# Patient Record
Sex: MALE | Race: WHITE | Employment: OTHER | URBAN - METROPOLITAN AREA
[De-identification: names, ages, dates, MRNs, and addresses within clinical notes are randomized per-mention and may not be internally consistent; named-entity substitution may affect disease eponyms.]

---

## 2020-07-03 ENCOUNTER — APPOINTMENT (OUTPATIENT)
Dept: GENERAL RADIOLOGY | Age: 50
DRG: 917 | End: 2020-07-03
Payer: MEDICARE

## 2020-07-03 ENCOUNTER — HOSPITAL ENCOUNTER (INPATIENT)
Age: 50
LOS: 6 days | Discharge: LEFT BEFORE DISCHARGE | DRG: 917 | End: 2020-07-09
Attending: EMERGENCY MEDICINE | Admitting: HOSPITALIST
Payer: MEDICARE

## 2020-07-03 PROBLEM — R41.82 AMS (ALTERED MENTAL STATUS): Status: ACTIVE | Noted: 2020-07-03

## 2020-07-03 LAB
A/G RATIO: 1.3 (ref 1.1–2.2)
ALBUMIN SERPL-MCNC: 4.1 G/DL (ref 3.4–5)
ALP BLD-CCNC: 114 U/L (ref 40–129)
ALT SERPL-CCNC: 21 U/L (ref 10–40)
AMPHETAMINE SCREEN, URINE: POSITIVE
ANION GAP SERPL CALCULATED.3IONS-SCNC: 11 MMOL/L (ref 3–16)
AST SERPL-CCNC: 32 U/L (ref 15–37)
BACTERIA: ABNORMAL /HPF
BARBITURATE SCREEN URINE: ABNORMAL
BASE EXCESS VENOUS: -2.7 MMOL/L (ref -3–3)
BASE EXCESS VENOUS: 1.5 MMOL/L (ref -3–3)
BASOPHILS ABSOLUTE: 0 K/UL (ref 0–0.2)
BASOPHILS RELATIVE PERCENT: 0.2 %
BENZODIAZEPINE SCREEN, URINE: ABNORMAL
BILIRUB SERPL-MCNC: 0.8 MG/DL (ref 0–1)
BILIRUBIN URINE: NEGATIVE
BLOOD, URINE: ABNORMAL
BUN BLDV-MCNC: 16 MG/DL (ref 7–20)
CALCIUM SERPL-MCNC: 8.6 MG/DL (ref 8.3–10.6)
CANNABINOID SCREEN URINE: ABNORMAL
CARBOXYHEMOGLOBIN: 1.7 % (ref 0–1.5)
CARBOXYHEMOGLOBIN: 2.3 % (ref 0–1.5)
CHLORIDE BLD-SCNC: 101 MMOL/L (ref 99–110)
CLARITY: CLEAR
CO2: 27 MMOL/L (ref 21–32)
COCAINE METABOLITE SCREEN URINE: ABNORMAL
COLOR: YELLOW
CREAT SERPL-MCNC: 0.8 MG/DL (ref 0.9–1.3)
EOSINOPHILS ABSOLUTE: 0 K/UL (ref 0–0.6)
EOSINOPHILS RELATIVE PERCENT: 0.1 %
GFR AFRICAN AMERICAN: >60
GFR NON-AFRICAN AMERICAN: >60
GLOBULIN: 3.2 G/DL
GLUCOSE BLD-MCNC: 105 MG/DL (ref 70–99)
GLUCOSE URINE: NEGATIVE MG/DL
HCO3 VENOUS: 23.8 MMOL/L (ref 23–29)
HCO3 VENOUS: 28.9 MMOL/L (ref 23–29)
HCT VFR BLD CALC: 40.1 % (ref 40.5–52.5)
HEMOGLOBIN: 13 G/DL (ref 13.5–17.5)
KETONES, URINE: NEGATIVE MG/DL
LEUKOCYTE ESTERASE, URINE: NEGATIVE
LYMPHOCYTES ABSOLUTE: 0.2 K/UL (ref 1–5.1)
LYMPHOCYTES RELATIVE PERCENT: 1.6 %
Lab: ABNORMAL
MCH RBC QN AUTO: 28.6 PG (ref 26–34)
MCHC RBC AUTO-ENTMCNC: 32.3 G/DL (ref 31–36)
MCV RBC AUTO: 88.5 FL (ref 80–100)
METHADONE SCREEN, URINE: ABNORMAL
METHEMOGLOBIN VENOUS: 0.3 %
METHEMOGLOBIN VENOUS: 0.3 %
MICROSCOPIC EXAMINATION: YES
MONOCYTES ABSOLUTE: 0.2 K/UL (ref 0–1.3)
MONOCYTES RELATIVE PERCENT: 1.2 %
MUCUS: ABNORMAL /LPF
NEUTROPHILS ABSOLUTE: 14.6 K/UL (ref 1.7–7.7)
NEUTROPHILS RELATIVE PERCENT: 96.9 %
NITRITE, URINE: NEGATIVE
O2 CONTENT, VEN: 13 VOL %
O2 CONTENT, VEN: 17 VOL %
O2 SAT, VEN: 79 %
O2 SAT, VEN: 88 %
O2 THERAPY: ABNORMAL
O2 THERAPY: ABNORMAL
OPIATE SCREEN URINE: ABNORMAL
OXYCODONE URINE: ABNORMAL
PCO2, VEN: 48.4 MMHG (ref 40–50)
PCO2, VEN: 57.6 MMHG (ref 40–50)
PDW BLD-RTO: 14 % (ref 12.4–15.4)
PH UA: 5
PH UA: 5 (ref 5–8)
PH VENOUS: 7.31 (ref 7.35–7.45)
PH VENOUS: 7.32 (ref 7.35–7.45)
PHENCYCLIDINE SCREEN URINE: ABNORMAL
PLATELET # BLD: 283 K/UL (ref 135–450)
PMV BLD AUTO: 6.8 FL (ref 5–10.5)
PO2, VEN: 47.2 MMHG (ref 25–40)
PO2, VEN: 59.1 MMHG (ref 25–40)
POTASSIUM REFLEX MAGNESIUM: 4.3 MMOL/L (ref 3.5–5.1)
PRO-BNP: 512 PG/ML (ref 0–124)
PROPOXYPHENE SCREEN: ABNORMAL
PROTEIN UA: 30 MG/DL
RBC # BLD: 4.53 M/UL (ref 4.2–5.9)
RBC UA: ABNORMAL /HPF (ref 0–4)
SODIUM BLD-SCNC: 139 MMOL/L (ref 136–145)
SPECIFIC GRAVITY UA: >=1.03 (ref 1–1.03)
TCO2 CALC VENOUS: 25 MMOL/L
TCO2 CALC VENOUS: 31 MMOL/L
TOTAL PROTEIN: 7.3 G/DL (ref 6.4–8.2)
TROPONIN: <0.01 NG/ML
URINE REFLEX TO CULTURE: YES
URINE TYPE: ABNORMAL
UROBILINOGEN, URINE: 1 E.U./DL
WBC # BLD: 15.1 K/UL (ref 4–11)
WBC UA: ABNORMAL /HPF (ref 0–5)

## 2020-07-03 PROCEDURE — 2500000003 HC RX 250 WO HCPCS

## 2020-07-03 PROCEDURE — 93005 ELECTROCARDIOGRAM TRACING: CPT | Performed by: PHYSICIAN ASSISTANT

## 2020-07-03 PROCEDURE — 80307 DRUG TEST PRSMV CHEM ANLYZR: CPT

## 2020-07-03 PROCEDURE — 85025 COMPLETE CBC W/AUTO DIFF WBC: CPT

## 2020-07-03 PROCEDURE — 2000000000 HC ICU R&B

## 2020-07-03 PROCEDURE — 6360000002 HC RX W HCPCS: Performed by: EMERGENCY MEDICINE

## 2020-07-03 PROCEDURE — 2580000003 HC RX 258: Performed by: PHYSICIAN ASSISTANT

## 2020-07-03 PROCEDURE — 6360000002 HC RX W HCPCS: Performed by: PHYSICIAN ASSISTANT

## 2020-07-03 PROCEDURE — 6360000002 HC RX W HCPCS

## 2020-07-03 PROCEDURE — 82803 BLOOD GASES ANY COMBINATION: CPT

## 2020-07-03 PROCEDURE — 96365 THER/PROPH/DIAG IV INF INIT: CPT

## 2020-07-03 PROCEDURE — 71045 X-RAY EXAM CHEST 1 VIEW: CPT

## 2020-07-03 PROCEDURE — 99291 CRITICAL CARE FIRST HOUR: CPT | Performed by: INTERNAL MEDICINE

## 2020-07-03 PROCEDURE — 84484 ASSAY OF TROPONIN QUANT: CPT

## 2020-07-03 PROCEDURE — 99285 EMERGENCY DEPT VISIT HI MDM: CPT

## 2020-07-03 PROCEDURE — 96375 TX/PRO/DX INJ NEW DRUG ADDON: CPT

## 2020-07-03 PROCEDURE — 96372 THER/PROPH/DIAG INJ SC/IM: CPT

## 2020-07-03 PROCEDURE — 87086 URINE CULTURE/COLONY COUNT: CPT

## 2020-07-03 PROCEDURE — 80053 COMPREHEN METABOLIC PANEL: CPT

## 2020-07-03 PROCEDURE — 83880 ASSAY OF NATRIURETIC PEPTIDE: CPT

## 2020-07-03 PROCEDURE — 81001 URINALYSIS AUTO W/SCOPE: CPT

## 2020-07-03 RX ORDER — HALOPERIDOL 5 MG/ML
5 INJECTION INTRAMUSCULAR ONCE
Status: COMPLETED | OUTPATIENT
Start: 2020-07-03 | End: 2020-07-03

## 2020-07-03 RX ORDER — SODIUM CHLORIDE 0.9 % (FLUSH) 0.9 %
10 SYRINGE (ML) INJECTION EVERY 12 HOURS SCHEDULED
Status: DISCONTINUED | OUTPATIENT
Start: 2020-07-03 | End: 2020-07-04

## 2020-07-03 RX ORDER — 0.9 % SODIUM CHLORIDE 0.9 %
2000 INTRAVENOUS SOLUTION INTRAVENOUS ONCE
Status: COMPLETED | OUTPATIENT
Start: 2020-07-03 | End: 2020-07-03

## 2020-07-03 RX ORDER — DIPHENHYDRAMINE HYDROCHLORIDE 50 MG/ML
25 INJECTION INTRAMUSCULAR; INTRAVENOUS ONCE
Status: COMPLETED | OUTPATIENT
Start: 2020-07-03 | End: 2020-07-03

## 2020-07-03 RX ORDER — LORAZEPAM 2 MG/ML
2 INJECTION INTRAMUSCULAR ONCE
Status: COMPLETED | OUTPATIENT
Start: 2020-07-03 | End: 2020-07-03

## 2020-07-03 RX ORDER — DIPHENHYDRAMINE HYDROCHLORIDE 50 MG/ML
INJECTION INTRAMUSCULAR; INTRAVENOUS
Status: COMPLETED
Start: 2020-07-03 | End: 2020-07-03

## 2020-07-03 RX ORDER — SODIUM CHLORIDE 0.9 % (FLUSH) 0.9 %
10 SYRINGE (ML) INJECTION PRN
Status: DISCONTINUED | OUTPATIENT
Start: 2020-07-03 | End: 2020-07-04

## 2020-07-03 RX ADMIN — HALOPERIDOL LACTATE 5 MG: 5 INJECTION, SOLUTION INTRAMUSCULAR at 14:11

## 2020-07-03 RX ADMIN — CEFTRIAXONE SODIUM 1 G: 1 INJECTION, POWDER, FOR SOLUTION INTRAMUSCULAR; INTRAVENOUS at 17:21

## 2020-07-03 RX ADMIN — DIPHENHYDRAMINE HYDROCHLORIDE 25 MG: 50 INJECTION, SOLUTION INTRAMUSCULAR; INTRAVENOUS at 14:11

## 2020-07-03 RX ADMIN — DIPHENHYDRAMINE HYDROCHLORIDE 25 MG: 50 INJECTION, SOLUTION INTRAMUSCULAR; INTRAVENOUS at 14:00

## 2020-07-03 RX ADMIN — DIPHENHYDRAMINE HYDROCHLORIDE 25 MG: 50 INJECTION INTRAMUSCULAR; INTRAVENOUS at 14:00

## 2020-07-03 RX ADMIN — HALOPERIDOL LACTATE 5 MG: 5 INJECTION, SOLUTION INTRAMUSCULAR at 13:50

## 2020-07-03 RX ADMIN — SODIUM CHLORIDE 2000 ML: 9 INJECTION, SOLUTION INTRAVENOUS at 14:29

## 2020-07-03 RX ADMIN — LORAZEPAM 2 MG: 2 INJECTION INTRAMUSCULAR; INTRAVENOUS at 13:50

## 2020-07-03 RX ADMIN — LORAZEPAM 2 MG: 2 INJECTION INTRAMUSCULAR; INTRAVENOUS at 14:11

## 2020-07-03 NOTE — ED NOTES
Patient remains subdue, sleeping with respiration even and easy no s/s of distress or discomfort at present.      Bertha Weinberg RN  07/03/20 5660

## 2020-07-03 NOTE — ED PROVIDER NOTES
[address] ED  EMERGENCY DEPARTMENT ENCOUNTER        Pt Name: Halie Morales  MRN: [id number]  Armstrongf[place]roll 1970  Date of evaluation: 7/3/2020  Provider: Arminda Elizondo PA-C  PCP: No primary care provider on file. I have seen and evaluated this patient with my supervising physician Demetri Moise MD.    CHIEF COMPLAINT       Chief Complaint   Patient presents with    Shortness of Breath     Squad brought patient in with patient posturing to breathe, EMS stated patient found in shed with O2 sat 63% with white powder substance next to patient. patient alert. HISTORY OF PRESENT ILLNESS   (Location, Timing/Onset, Context/Setting, Quality, Duration, Modifying Factors, Severity, Associated Signs and Symptoms)  Note limiting factors. Halie Morales is a 52 y.o. male who presents here to the emergency department, with rapid respirations, severe agitation, tachycardia, the squad report that there was a crystalline substance found near him, and a syringe found near him. He states that he is having some trouble breathing, otherwise all other history is unobtainable. Nursing Notes were all reviewed and agreed with or any disagreements were addressed in the HPI.     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    Pneumonia          SURGICAL HISTORY     Past Surgical History:   Procedure Laterality Date    BRAIN SURGERY  1986    s/p trauma    BRAIN SURGERY      FRACTURE SURGERY      LEG SURGERY           CURRENTMEDICATIONS       Previous Medications    CLONIDINE (CATAPRES) 0.1 Carboxyhemoglobin 2.3 (*)     All other components within normal limits    Narrative:     Performed at:  [facility] [address],  [place], [facility]   Phone [phone]   MICROSCOPIC URINALYSIS - Abnormal; Notable for the following components:    Mucus, UA 2+ (*)     WBC, UA 21-50 (*)     RBC, UA 11-20 (*)     Bacteria, UA 2+ (*)     All other components within normal limits    Narrative:     Performed at:  [facility] - [facility],  [place], [facility]   Phone [phone]   CULTURE, BLOOD 1   CULTURE, BLOOD 2   CULTURE, URINE   TROPONIN    Narrative:     Performed at:  [facility]) - [facility],  [place], [facility]   Phone [phone]   LACTATE, SEPSIS   LACTATE, SEPSIS       All other labs were within normal range or not returned as of this dictation. EKG: All EKG's are interpreted by the Emergency Department Physician in the absence of a cardiologist.  Please see their note for interpretation of EKG. RADIOLOGY:   Non-plain film images such as CT, Ultrasound and MRI are read by the radiologist. Plain radiographic images are visualized and preliminarily interpreted by the ED Provider with the below findings:        Interpretation per the Radiologist below, if available at the time of this note:    XR CHEST PORTABLE   Final Result   Pulmonary vascular congestion. Xr Chest Portable    Result Date: 7/3/2020  EXAMINATION: ONE XRAY VIEW OF THE CHEST 7/3/2020 1:41 pm COMPARISON: July 24, 2014 HISTORY: ORDERING SYSTEM PROVIDED HISTORY: SOB TECHNOLOGIST PROVIDED HISTORY: Reason for exam:->SOB Reason for Exam: SOB Acuity: Unknown Type of Exam: Unknown FINDINGS: Cardiac silhouette is normal in size. No pneumothorax. No pleural effusion. Pulmonary vascular congestion. No focal consolidation. No acute bony abnormality. Deformity of the proximal left humerus, unchanged. Pulmonary vascular congestion. PROCEDURES   Unless otherwise noted below, none     Procedures    CRITICAL CARE TIME   N/A    CONSULTS:  None      EMERGENCY DEPARTMENT COURSE and DIFFERENTIAL DIAGNOSIS/MDM:   Vitals:    Vitals:    07/03/20 1525 07/03/20 1615 07/03/20 1618 07/03/20 1626   BP:  (!) 72/55 (!) 74/55 85/66   Pulse:  105 105 102   Resp:       Temp:       TempSrc:       SpO2:  100% 100% 100%   Weight: 125 lb (56.7 kg)      Height: 5' 2\" (1.575 m)          Patient was given the following medications:  Medications   sodium chloride flush 0.9 % injection 10 mL (has no administration in time range)   sodium chloride flush 0.9 % injection 10 mL (has no administration in time range)   cefTRIAXone (ROCEPHIN) 1 g IVPB in 50 mL D5W minibag (has no administration in time range)   LORazepam (ATIVAN) injection 2 mg (2 mg Intramuscular Given 7/3/20 1350)   haloperidol lactate (HALDOL) injection 5 mg (5 mg Intramuscular Given 7/3/20 1350)   0.9 % sodium chloride bolus (0 mLs Intravenous Stopped 7/3/20 1628)   diphenhydrAMINE (BENADRYL) injection 25 mg (25 mg Intravenous Given 7/3/20 1400)   LORazepam (ATIVAN) injection 2 mg (2 mg Intravenous Given by Other 7/3/20 1411)   haloperidol lactate (HALDOL) injection 5 mg (5 mg Intramuscular Given by Other 7/3/20 1411)   diphenhydrAMINE (BENADRYL) injection 25 mg (25 mg Intravenous Given by Other 7/3/20 1411)           This patient required a significant amount of sedation because of his agitation, our suspicion is that this is related to methamphetamine use/abuse, he does have a concurrent unrelated urinary tract infection. He remained stable here,    He required multiple visits, we entertained intubation however he was able to protect his airway, swallow, and maintain his oxygen saturation rate. Patient was turned over to the oncoming physician for final disposition and plan    FINAL IMPRESSION      1. Methamphetamine abuse ([address]    2.  Urinary tract infection

## 2020-07-03 NOTE — ED PROVIDER NOTES
Emergency Department Provider Note     Location: [place] [address] ED  7/3/2020    I independently performed a history and physical on [employer]. All diagnostic, treatment, and disposition decisions were made by myself in conjunction with the mid-level provider. Briefly, this is a 52 y.o. male here for reported shortness of breath. Patient appeared to be struggling to breathe reportedly patient was found in his shed with O2 saturation 63% with a white powder substance next to patient. Patient was alert upon arrival but was acutely agitated. He was found to have a syringe in his pocket. Patient was sedated. ED Triage Vitals   BP Temp Temp src Pulse Resp SpO2 Height Weight   -- -- -- -- -- -- -- --        Patient resting comfortably in no acute distress. Heart is tachycardic rate and regular rhythm. Lungs clear to auscultation bilaterally. Abdomen is soft, nondistended, and nontender. No peripheral edema noted. Patient seen and examined. Vital signs notable for blood pressure 96/78 with pulse 144, afebrile. Lab work obtained and notable for leukocytosis, pH 7.31, troponin negative. We will continue to observe patient in the emergency department. EKG  The Ekg interpreted by me in the absence of a cardiologist shows. Sinus tachycardia, rate 150, normal intervals, no STEMI      No results found.       Results for orders placed or performed during the hospital encounter of 07/03/20   CBC Auto Differential   Result Value Ref Range    WBC 15.1 (H) 4.0 - 11.0 K/uL    RBC 4.53 4.20 - 5.90 M/uL    Hemoglobin 13.0 (L) 13.5 - 17.5 g/dL    Hematocrit 40.1 (L) 40.5 - 52.5 %    MCV 88.5 80.0 - 100.0 fL    MCH 28.6 26.0 - 34.0 pg    MCHC 32.3 31.0 - 36.0 g/dL    RDW 14.0 12.4 - 15.4 %    Platelets 270 962 - 985 K/uL    MPV 6.8 5.0 - 10.5 fL    Neutrophils % 96.9 %    Lymphocytes % 1.6 %    Monocytes % 1.2 %    Eosinophils % 0.1 %    Basophils % 0.2 %    Neutrophils Absolute 14.6 (H) 1.7 - 7.7 is 15 minutes, which excludes separately billable procedures and updating family. Time spent is specifically for management of the presenting complaint and symptoms initially, direct bedside care, reevaluation, review of records, and consultation. There was a high probability of clinically significant life-threatening deterioration in the patient's condition, which required my urgent intervention. This chart was generated in part by using Dragon Dictation system and may contain errors related to that system including errors in grammar, punctuation, and spelling, as well as words and phrases that may be inappropriate. If there are any questions or concerns please feel free to contact the dictating provider for clarification.            Demetri Mak MD  07/03/20 0097

## 2020-07-04 ENCOUNTER — APPOINTMENT (OUTPATIENT)
Dept: GENERAL RADIOLOGY | Age: 50
DRG: 917 | End: 2020-07-04
Payer: MEDICARE

## 2020-07-04 LAB
ALBUMIN SERPL-MCNC: 2.8 G/DL (ref 3.4–5)
ALP BLD-CCNC: 84 U/L (ref 40–129)
ALT SERPL-CCNC: 19 U/L (ref 10–40)
ANION GAP SERPL CALCULATED.3IONS-SCNC: 11 MMOL/L (ref 3–16)
AST SERPL-CCNC: 33 U/L (ref 15–37)
BASE EXCESS ARTERIAL: -1.3 MMOL/L (ref -3–3)
BASOPHILS ABSOLUTE: 0 K/UL (ref 0–0.2)
BASOPHILS RELATIVE PERCENT: 0.4 %
BILIRUB SERPL-MCNC: 0.5 MG/DL (ref 0–1)
BILIRUBIN DIRECT: <0.2 MG/DL (ref 0–0.3)
BILIRUBIN, INDIRECT: ABNORMAL MG/DL (ref 0–1)
BUN BLDV-MCNC: 12 MG/DL (ref 7–20)
CALCIUM SERPL-MCNC: 8.2 MG/DL (ref 8.3–10.6)
CARBOXYHEMOGLOBIN ARTERIAL: 0.5 % (ref 0–1.5)
CHLORIDE BLD-SCNC: 103 MMOL/L (ref 99–110)
CO2: 19 MMOL/L (ref 21–32)
CREAT SERPL-MCNC: 0.6 MG/DL (ref 0.9–1.3)
EKG ATRIAL RATE: 150 BPM
EKG DIAGNOSIS: NORMAL
EKG P AXIS: 68 DEGREES
EKG P-R INTERVAL: 94 MS
EKG Q-T INTERVAL: 224 MS
EKG QRS DURATION: 58 MS
EKG QTC CALCULATION (BAZETT): 353 MS
EKG R AXIS: 91 DEGREES
EKG T AXIS: 78 DEGREES
EKG VENTRICULAR RATE: 150 BPM
EOSINOPHILS ABSOLUTE: 0.1 K/UL (ref 0–0.6)
EOSINOPHILS RELATIVE PERCENT: 1.3 %
GFR AFRICAN AMERICAN: >60
GFR NON-AFRICAN AMERICAN: >60
GLUCOSE BLD-MCNC: 128 MG/DL (ref 70–99)
GLUCOSE BLD-MCNC: 66 MG/DL (ref 70–99)
GLUCOSE BLD-MCNC: 68 MG/DL (ref 70–99)
GLUCOSE BLD-MCNC: 74 MG/DL (ref 70–99)
GLUCOSE BLD-MCNC: 76 MG/DL (ref 70–99)
GLUCOSE BLD-MCNC: 90 MG/DL (ref 70–99)
GLUCOSE BLD-MCNC: 98 MG/DL (ref 70–99)
HCO3 ARTERIAL: 24.6 MMOL/L (ref 21–29)
HCT VFR BLD CALC: 39.3 % (ref 40.5–52.5)
HEMOGLOBIN, ART, EXTENDED: 13.1 G/DL (ref 13.5–17.5)
HEMOGLOBIN: 12.3 G/DL (ref 13.5–17.5)
LACTIC ACID: 1.1 MMOL/L (ref 0.4–2)
LYMPHOCYTES ABSOLUTE: 1.8 K/UL (ref 1–5.1)
LYMPHOCYTES RELATIVE PERCENT: 22.3 %
MCH RBC QN AUTO: 28.5 PG (ref 26–34)
MCHC RBC AUTO-ENTMCNC: 31.3 G/DL (ref 31–36)
MCV RBC AUTO: 91.2 FL (ref 80–100)
METHEMOGLOBIN ARTERIAL: 0.3 %
MONOCYTES ABSOLUTE: 0.4 K/UL (ref 0–1.3)
MONOCYTES RELATIVE PERCENT: 4.9 %
NEUTROPHILS ABSOLUTE: 5.6 K/UL (ref 1.7–7.7)
NEUTROPHILS RELATIVE PERCENT: 71.1 %
O2 CONTENT ARTERIAL: 18 ML/DL
O2 SAT, ARTERIAL: 98 %
O2 THERAPY: ABNORMAL
PCO2 ARTERIAL: 45.7 MMHG (ref 35–45)
PDW BLD-RTO: 14.7 % (ref 12.4–15.4)
PERFORMED ON: ABNORMAL
PERFORMED ON: ABNORMAL
PERFORMED ON: NORMAL
PH ARTERIAL: 7.35 (ref 7.35–7.45)
PLATELET # BLD: 166 K/UL (ref 135–450)
PMV BLD AUTO: 7.4 FL (ref 5–10.5)
PO2 ARTERIAL: 114.9 MMHG (ref 75–108)
POTASSIUM REFLEX MAGNESIUM: 4.2 MMOL/L (ref 3.5–5.1)
PROCALCITONIN: 50.02 NG/ML (ref 0–0.15)
RBC # BLD: 4.31 M/UL (ref 4.2–5.9)
SODIUM BLD-SCNC: 133 MMOL/L (ref 136–145)
TCO2 ARTERIAL: 26 MMOL/L
TOTAL PROTEIN: 5.9 G/DL (ref 6.4–8.2)
URINE CULTURE, ROUTINE: NORMAL
WBC # BLD: 7.9 K/UL (ref 4–11)

## 2020-07-04 PROCEDURE — 36556 INSERT NON-TUNNEL CV CATH: CPT

## 2020-07-04 PROCEDURE — 2700000000 HC OXYGEN THERAPY PER DAY

## 2020-07-04 PROCEDURE — U0002 COVID-19 LAB TEST NON-CDC: HCPCS

## 2020-07-04 PROCEDURE — 6360000002 HC RX W HCPCS: Performed by: INTERNAL MEDICINE

## 2020-07-04 PROCEDURE — 2500000003 HC RX 250 WO HCPCS: Performed by: HOSPITALIST

## 2020-07-04 PROCEDURE — 6370000000 HC RX 637 (ALT 250 FOR IP): Performed by: INTERNAL MEDICINE

## 2020-07-04 PROCEDURE — 6370000000 HC RX 637 (ALT 250 FOR IP): Performed by: HOSPITALIST

## 2020-07-04 PROCEDURE — 36415 COLL VENOUS BLD VENIPUNCTURE: CPT

## 2020-07-04 PROCEDURE — 94640 AIRWAY INHALATION TREATMENT: CPT

## 2020-07-04 PROCEDURE — 2580000003 HC RX 258: Performed by: HOSPITALIST

## 2020-07-04 PROCEDURE — 0BH17EZ INSERTION OF ENDOTRACHEAL AIRWAY INTO TRACHEA, VIA NATURAL OR ARTIFICIAL OPENING: ICD-10-PCS | Performed by: HOSPITALIST

## 2020-07-04 PROCEDURE — U0003 INFECTIOUS AGENT DETECTION BY NUCLEIC ACID (DNA OR RNA); SEVERE ACUTE RESPIRATORY SYNDROME CORONAVIRUS 2 (SARS-COV-2) (CORONAVIRUS DISEASE [COVID-19]), AMPLIFIED PROBE TECHNIQUE, MAKING USE OF HIGH THROUGHPUT TECHNOLOGIES AS DESCRIBED BY CMS-2020-01-R: HCPCS

## 2020-07-04 PROCEDURE — 83605 ASSAY OF LACTIC ACID: CPT

## 2020-07-04 PROCEDURE — 2500000003 HC RX 250 WO HCPCS: Performed by: INTERNAL MEDICINE

## 2020-07-04 PROCEDURE — 82803 BLOOD GASES ANY COMBINATION: CPT

## 2020-07-04 PROCEDURE — 80048 BASIC METABOLIC PNL TOTAL CA: CPT

## 2020-07-04 PROCEDURE — 5A1945Z RESPIRATORY VENTILATION, 24-96 CONSECUTIVE HOURS: ICD-10-PCS | Performed by: HOSPITALIST

## 2020-07-04 PROCEDURE — 93010 ELECTROCARDIOGRAM REPORT: CPT | Performed by: INTERNAL MEDICINE

## 2020-07-04 PROCEDURE — 71045 X-RAY EXAM CHEST 1 VIEW: CPT

## 2020-07-04 PROCEDURE — 6360000002 HC RX W HCPCS: Performed by: HOSPITALIST

## 2020-07-04 PROCEDURE — 85025 COMPLETE CBC W/AUTO DIFF WBC: CPT

## 2020-07-04 PROCEDURE — 2000000000 HC ICU R&B

## 2020-07-04 PROCEDURE — 84145 PROCALCITONIN (PCT): CPT

## 2020-07-04 PROCEDURE — 80076 HEPATIC FUNCTION PANEL: CPT

## 2020-07-04 PROCEDURE — 06HN33Z INSERTION OF INFUSION DEVICE INTO LEFT FEMORAL VEIN, PERCUTANEOUS APPROACH: ICD-10-PCS | Performed by: HOSPITALIST

## 2020-07-04 PROCEDURE — 99291 CRITICAL CARE FIRST HOUR: CPT | Performed by: INTERNAL MEDICINE

## 2020-07-04 PROCEDURE — 94761 N-INVAS EAR/PLS OXIMETRY MLT: CPT

## 2020-07-04 PROCEDURE — 2580000003 HC RX 258: Performed by: INTERNAL MEDICINE

## 2020-07-04 RX ORDER — DEXTROSE, SODIUM CHLORIDE, SODIUM LACTATE, POTASSIUM CHLORIDE, AND CALCIUM CHLORIDE 5; .6; .31; .03; .02 G/100ML; G/100ML; G/100ML; G/100ML; G/100ML
INJECTION, SOLUTION INTRAVENOUS CONTINUOUS
Status: DISCONTINUED | OUTPATIENT
Start: 2020-07-04 | End: 2020-07-07

## 2020-07-04 RX ORDER — TRAMADOL HYDROCHLORIDE 50 MG/1
50 TABLET ORAL PRN
Status: DISPENSED | OUTPATIENT
Start: 2020-07-04 | End: 2020-07-07

## 2020-07-04 RX ORDER — ACETAMINOPHEN 650 MG/1
650 SUPPOSITORY RECTAL EVERY 6 HOURS PRN
Status: DISCONTINUED | OUTPATIENT
Start: 2020-07-04 | End: 2020-07-09 | Stop reason: HOSPADM

## 2020-07-04 RX ORDER — SODIUM CHLORIDE, SODIUM LACTATE, POTASSIUM CHLORIDE, CALCIUM CHLORIDE 600; 310; 30; 20 MG/100ML; MG/100ML; MG/100ML; MG/100ML
INJECTION, SOLUTION INTRAVENOUS CONTINUOUS
Status: DISCONTINUED | OUTPATIENT
Start: 2020-07-04 | End: 2020-07-04

## 2020-07-04 RX ORDER — ACETAMINOPHEN 325 MG/1
650 TABLET ORAL EVERY 6 HOURS PRN
Status: DISCONTINUED | OUTPATIENT
Start: 2020-07-04 | End: 2020-07-09 | Stop reason: HOSPADM

## 2020-07-04 RX ORDER — TRAZODONE HYDROCHLORIDE 100 MG/1
100 TABLET ORAL NIGHTLY PRN
Status: DISCONTINUED | OUTPATIENT
Start: 2020-07-04 | End: 2020-07-09 | Stop reason: HOSPADM

## 2020-07-04 RX ORDER — HALOPERIDOL 5 MG/ML
2 INJECTION INTRAMUSCULAR ONCE
Status: COMPLETED | OUTPATIENT
Start: 2020-07-04 | End: 2020-07-04

## 2020-07-04 RX ORDER — LANOLIN ALCOHOL/MO/W.PET/CERES
3 CREAM (GRAM) TOPICAL NIGHTLY
Status: DISCONTINUED | OUTPATIENT
Start: 2020-07-04 | End: 2020-07-09 | Stop reason: HOSPADM

## 2020-07-04 RX ORDER — IPRATROPIUM BROMIDE AND ALBUTEROL SULFATE 2.5; .5 MG/3ML; MG/3ML
1 SOLUTION RESPIRATORY (INHALATION)
Status: DISCONTINUED | OUTPATIENT
Start: 2020-07-04 | End: 2020-07-05

## 2020-07-04 RX ORDER — GABAPENTIN 300 MG/1
300 CAPSULE ORAL EVERY 8 HOURS PRN
Status: DISCONTINUED | OUTPATIENT
Start: 2020-07-04 | End: 2020-07-09 | Stop reason: HOSPADM

## 2020-07-04 RX ORDER — POLYETHYLENE GLYCOL 3350 17 G/17G
17 POWDER, FOR SOLUTION ORAL DAILY PRN
Status: DISCONTINUED | OUTPATIENT
Start: 2020-07-04 | End: 2020-07-09 | Stop reason: HOSPADM

## 2020-07-04 RX ORDER — LORAZEPAM 2 MG/ML
INJECTION INTRAMUSCULAR
Status: DISPENSED
Start: 2020-07-04 | End: 2020-07-04

## 2020-07-04 RX ORDER — LORAZEPAM 2 MG/ML
1 INJECTION INTRAMUSCULAR EVERY 4 HOURS PRN
Status: DISCONTINUED | OUTPATIENT
Start: 2020-07-04 | End: 2020-07-04

## 2020-07-04 RX ORDER — LORAZEPAM 2 MG/ML
2 INJECTION INTRAMUSCULAR EVERY 6 HOURS PRN
Status: DISCONTINUED | OUTPATIENT
Start: 2020-07-04 | End: 2020-07-09 | Stop reason: HOSPADM

## 2020-07-04 RX ORDER — GABAPENTIN 300 MG/1
300 CAPSULE ORAL 3 TIMES DAILY
Status: DISCONTINUED | OUTPATIENT
Start: 2020-07-04 | End: 2020-07-09 | Stop reason: HOSPADM

## 2020-07-04 RX ORDER — IBUPROFEN 800 MG/1
800 TABLET ORAL EVERY 8 HOURS PRN
Status: DISCONTINUED | OUTPATIENT
Start: 2020-07-04 | End: 2020-07-09 | Stop reason: HOSPADM

## 2020-07-04 RX ORDER — PROPOFOL 10 MG/ML
INJECTION, EMULSION INTRAVENOUS
Status: DISPENSED
Start: 2020-07-04 | End: 2020-07-05

## 2020-07-04 RX ORDER — HYDROXYZINE PAMOATE 50 MG/1
50 CAPSULE ORAL EVERY 8 HOURS PRN
Status: DISCONTINUED | OUTPATIENT
Start: 2020-07-04 | End: 2020-07-09 | Stop reason: HOSPADM

## 2020-07-04 RX ORDER — DEXTROSE MONOHYDRATE 50 MG/ML
100 INJECTION, SOLUTION INTRAVENOUS PRN
Status: DISCONTINUED | OUTPATIENT
Start: 2020-07-04 | End: 2020-07-09 | Stop reason: HOSPADM

## 2020-07-04 RX ORDER — DEXTROSE MONOHYDRATE 25 G/50ML
12.5 INJECTION, SOLUTION INTRAVENOUS PRN
Status: DISCONTINUED | OUTPATIENT
Start: 2020-07-04 | End: 2020-07-09 | Stop reason: HOSPADM

## 2020-07-04 RX ORDER — NICOTINE POLACRILEX 4 MG
15 LOZENGE BUCCAL PRN
Status: DISCONTINUED | OUTPATIENT
Start: 2020-07-04 | End: 2020-07-09 | Stop reason: HOSPADM

## 2020-07-04 RX ORDER — DICYCLOMINE HCL 20 MG
20 TABLET ORAL EVERY 6 HOURS PRN
Status: DISCONTINUED | OUTPATIENT
Start: 2020-07-04 | End: 2020-07-09 | Stop reason: HOSPADM

## 2020-07-04 RX ORDER — CLONIDINE HYDROCHLORIDE 0.1 MG/1
0.1 TABLET ORAL EVERY 4 HOURS PRN
Status: DISCONTINUED | OUTPATIENT
Start: 2020-07-04 | End: 2020-07-09 | Stop reason: HOSPADM

## 2020-07-04 RX ORDER — LORAZEPAM 2 MG/ML
2 INJECTION INTRAMUSCULAR EVERY 4 HOURS PRN
Status: DISCONTINUED | OUTPATIENT
Start: 2020-07-04 | End: 2020-07-04

## 2020-07-04 RX ORDER — ONDANSETRON 2 MG/ML
4 INJECTION INTRAMUSCULAR; INTRAVENOUS EVERY 6 HOURS PRN
Status: DISCONTINUED | OUTPATIENT
Start: 2020-07-04 | End: 2020-07-09 | Stop reason: HOSPADM

## 2020-07-04 RX ORDER — SODIUM CHLORIDE 0.9 % (FLUSH) 0.9 %
10 SYRINGE (ML) INJECTION EVERY 12 HOURS SCHEDULED
Status: DISCONTINUED | OUTPATIENT
Start: 2020-07-04 | End: 2020-07-09 | Stop reason: HOSPADM

## 2020-07-04 RX ORDER — HALOPERIDOL 5 MG/ML
INJECTION INTRAMUSCULAR
Status: DISPENSED
Start: 2020-07-04 | End: 2020-07-05

## 2020-07-04 RX ORDER — DEXTROSE, SODIUM CHLORIDE, AND POTASSIUM CHLORIDE 5; .45; .15 G/100ML; G/100ML; G/100ML
INJECTION INTRAVENOUS CONTINUOUS
Status: DISCONTINUED | OUTPATIENT
Start: 2020-07-04 | End: 2020-07-04

## 2020-07-04 RX ORDER — SODIUM CHLORIDE 0.9 % (FLUSH) 0.9 %
10 SYRINGE (ML) INJECTION PRN
Status: DISCONTINUED | OUTPATIENT
Start: 2020-07-04 | End: 2020-07-09 | Stop reason: HOSPADM

## 2020-07-04 RX ORDER — LORAZEPAM 2 MG/ML
2 INJECTION INTRAMUSCULAR ONCE
Status: COMPLETED | OUTPATIENT
Start: 2020-07-04 | End: 2020-07-04

## 2020-07-04 RX ORDER — CLONIDINE HYDROCHLORIDE 0.1 MG/1
0.1 TABLET ORAL PRN
Status: DISCONTINUED | OUTPATIENT
Start: 2020-07-04 | End: 2020-07-09 | Stop reason: HOSPADM

## 2020-07-04 RX ORDER — PROMETHAZINE HYDROCHLORIDE 25 MG/1
12.5 TABLET ORAL EVERY 6 HOURS PRN
Status: DISCONTINUED | OUTPATIENT
Start: 2020-07-04 | End: 2020-07-09 | Stop reason: HOSPADM

## 2020-07-04 RX ADMIN — GABAPENTIN 300 MG: 300 CAPSULE ORAL at 20:14

## 2020-07-04 RX ADMIN — DEXTROSE MONOHYDRATE 12.5 G: 25 INJECTION, SOLUTION INTRAVENOUS at 07:50

## 2020-07-04 RX ADMIN — LORAZEPAM 2 MG: 2 INJECTION INTRAMUSCULAR; INTRAVENOUS at 20:14

## 2020-07-04 RX ADMIN — MELATONIN 3 MG ORAL TABLET 3 MG: 3 TABLET ORAL at 20:15

## 2020-07-04 RX ADMIN — LORAZEPAM 2 MG: 2 INJECTION INTRAMUSCULAR; INTRAVENOUS at 08:07

## 2020-07-04 RX ADMIN — Medication 10 ML: at 20:15

## 2020-07-04 RX ADMIN — SODIUM CHLORIDE 0.2 MCG/KG/HR: 9 INJECTION, SOLUTION INTRAVENOUS at 14:03

## 2020-07-04 RX ADMIN — SODIUM CHLORIDE, SODIUM LACTATE, POTASSIUM CHLORIDE, CALCIUM CHLORIDE AND DEXTROSE MONOHYDRATE: 5; 600; 310; 30; 20 INJECTION, SOLUTION INTRAVENOUS at 11:56

## 2020-07-04 RX ADMIN — LORAZEPAM 1 MG: 2 INJECTION INTRAMUSCULAR; INTRAVENOUS at 10:45

## 2020-07-04 RX ADMIN — POTASSIUM CHLORIDE, DEXTROSE MONOHYDRATE AND SODIUM CHLORIDE: 150; 5; 450 INJECTION, SOLUTION INTRAVENOUS at 07:52

## 2020-07-04 RX ADMIN — SODIUM CHLORIDE, POTASSIUM CHLORIDE, SODIUM LACTATE AND CALCIUM CHLORIDE: 600; 310; 30; 20 INJECTION, SOLUTION INTRAVENOUS at 09:15

## 2020-07-04 RX ADMIN — HALOPERIDOL LACTATE 2 MG: 5 INJECTION, SOLUTION INTRAMUSCULAR at 20:37

## 2020-07-04 RX ADMIN — Medication 10 ML: at 07:52

## 2020-07-04 RX ADMIN — CLONIDINE HYDROCHLORIDE 0.1 MG: 0.1 TABLET ORAL at 10:10

## 2020-07-04 RX ADMIN — TRAMADOL HYDROCHLORIDE 50 MG: 50 TABLET, FILM COATED ORAL at 12:55

## 2020-07-04 RX ADMIN — LORAZEPAM 2 MG: 2 INJECTION INTRAMUSCULAR; INTRAVENOUS at 16:08

## 2020-07-04 RX ADMIN — TRAMADOL HYDROCHLORIDE 50 MG: 50 TABLET, FILM COATED ORAL at 10:10

## 2020-07-04 RX ADMIN — PROPOFOL 10 MCG/KG/MIN: 10 INJECTION, EMULSION INTRAVENOUS at 23:00

## 2020-07-04 RX ADMIN — IPRATROPIUM BROMIDE AND ALBUTEROL SULFATE 1 AMPULE: .5; 3 SOLUTION RESPIRATORY (INHALATION) at 15:10

## 2020-07-04 RX ADMIN — IPRATROPIUM BROMIDE AND ALBUTEROL SULFATE 1 AMPULE: .5; 3 SOLUTION RESPIRATORY (INHALATION) at 18:54

## 2020-07-04 RX ADMIN — NOREPINEPHRINE BITARTRATE 2 MCG/MIN: 1 INJECTION INTRAVENOUS at 23:55

## 2020-07-04 RX ADMIN — CEFTRIAXONE SODIUM 1 G: 1 INJECTION, POWDER, FOR SOLUTION INTRAMUSCULAR; INTRAVENOUS at 17:22

## 2020-07-04 RX ADMIN — CLONIDINE HYDROCHLORIDE 0.1 MG: 0.1 TABLET ORAL at 12:55

## 2020-07-04 ASSESSMENT — PAIN DESCRIPTION - LOCATION: LOCATION: GENERALIZED

## 2020-07-04 ASSESSMENT — PAIN DESCRIPTION - ONSET: ONSET: ON-GOING

## 2020-07-04 ASSESSMENT — PAIN SCALES - GENERAL
PAINLEVEL_OUTOF10: 6
PAINLEVEL_OUTOF10: 6

## 2020-07-04 ASSESSMENT — PAIN DESCRIPTION - FREQUENCY: FREQUENCY: CONTINUOUS

## 2020-07-04 ASSESSMENT — PAIN SCALES - WONG BAKER
WONGBAKER_NUMERICALRESPONSE: 0
WONGBAKER_NUMERICALRESPONSE: 0
WONGBAKER_NUMERICALRESPONSE: 4

## 2020-07-04 ASSESSMENT — PAIN DESCRIPTION - PROGRESSION: CLINICAL_PROGRESSION: GRADUALLY WORSENING

## 2020-07-04 ASSESSMENT — PAIN DESCRIPTION - DESCRIPTORS: DESCRIPTORS: ACHING;DISCOMFORT

## 2020-07-04 ASSESSMENT — PAIN DESCRIPTION - PAIN TYPE: TYPE: ACUTE PAIN

## 2020-07-04 NOTE — PROGRESS NOTES
Recheck blood sugar after 1/2 amp dextrose given. FSBS was 128. Perfect serve sent to Dr. Mireille Hernandez regarding patient having withdrawal symptoms. One time order for Ativan 2 mg IV to give.

## 2020-07-04 NOTE — PROGRESS NOTES
Pt a/o to self and place. Pt stated \"I want to go home\". Pt tachpenic and tachycardic. COWS level of 6 currently. PRN Tramadol/clonodine given. General diet ordered for lunch. Tele-sitter in place. Call light within reach. Bed alarm is on. Will monitor.       07/04/20 1006   Clinical Opiate Withdrawal Scale   Resting Pulse rate 1   GI upset over last 30 mins 0   Sweating over last 30 mins 0   Tremor observed in outreached hands 0   Restlessness observed during assessment 3   Yawning observed during assessment 0   Pupil size 0   Anxiety or Irritability 2   Bone or joint aches 0   Gooseflesh skin 0   Running Nose or tearing 0   Clinical Opiate Withdrawal Scale Score 6

## 2020-07-04 NOTE — ED PROVIDER NOTES
[id number]   Phone [phone]   COMPREHENSIVE METABOLIC PANEL W/ REFLEX TO MG FOR LOW K - Abnormal; Notable for the following components:    Glucose 105 (*)     CREATININE 0.8 (*)     All other components within normal limits    Narrative:     Performed at:  [facility] [address],  [place] [facility]   Phone [phone]   URINE RT REFLEX TO CULTURE - Abnormal; Notable for the following components:    Blood, Urine SMALL (*)     Protein, UA 30 (*)     All other components within normal limits    Narrative:     Performed at:  [facility] [address],  [place] [facility]   Phone [phone]   [address] - Abnormal; Notable for the following components:    Amphetamine Screen, Urine POSITIVE (*)     All other components within normal limits    Narrative:     Performed at:  [place] [address],  [place] [facility]   Phone [phone]   BLOOD GAS, VENOUS - Abnormal; Notable for the following components:    pH, Mikey 7.318 (*)     pCO2, Mikey 57.6 (*)     pO2, Mikey 59.1 (*)     Carboxyhemoglobin 2.3 (*)     All other components within normal limits    Narrative:     Performed at:  [place] [address],  [place] [facility]   Phone [phone]   MICROSCOPIC URINALYSIS - Abnormal; Notable for the following components:    Mucus, UA 2+ (*)     WBC, UA 21-50 (*)     RBC, UA 11-20 (*)     Bacteria, UA 2+ (*)     All other components within normal limits    Narrative:     Performed at:  [facility]) [facility] [address],  [place] [facility]   Phone [phone]   BRAIN NATRIURETIC PEPTIDE - Abnormal; Notable for the following components:    Pro- (*)     All other components within normal limits    Narrative:     Performed at:  [facility]) [facility] [address],  [email or web address] [facility] Phone [phone]   CULTURE, URINE   TROPONIN    Narrative:     Performed at:  [facility] [address],  [place], [facility]   Phone [phone]     XR CHEST PORTABLE   Final Result   Pulmonary vascular congestion. 1. Methamphetamine abuse ([facility]    2.  Urinary tract infection with hematuria, site unspecified            Eran Castellanos MD  07/03/20 4581       Eran Castellanos MD  07/03/20 9296

## 2020-07-04 NOTE — PROGRESS NOTES
Tele-sitter was going off. When RN's entered the room the patient was standing on the bed pulling at wires and lines. Pt sat back down on request and continued to pull at lines. New orders from Dr. Sarkis Brooks to place patient in bilateral wrist restraints with repeat back. New order to modify PRN ativan 2 mg IV every 6 hours with repeat back. Patient has bilateral wrist restraints. Pt increase trouble breathing. Stable on 2/L with SpO2 of %. Abg obtained from left radial and sent to lab. Pressure held for 5 minutes.

## 2020-07-04 NOTE — PROGRESS NOTES
Spoke to Dr. Ami Zimmerman regarding patient increase agitiation and withdrawal symptoms. New order for PRN ativan 1 mg IV every 4 hours as needed for agitation.

## 2020-07-04 NOTE — PROGRESS NOTES
IM Progress Note    Admit Date:  7/3/2020  1    Interval history:    Admitted for AMS with substance abuse. Patient remains very confused and starting to go through withdrawal symptoms now    Subjective:  Mr. Josef Donovan is restless and agitated. Hypoxia has improved oxygen saturation stable on 2 L  Procalcitonin came back elevated over 50. Objective:   /77   Pulse 107   Temp 97.7 °F (36.5 °C) (Axillary)   Resp 21   Ht 5' 2\" (1.575 m)   Wt 125 lb (56.7 kg)   SpO2 98%   BMI 22.86 kg/m²       Intake/Output Summary (Last 24 hours) at 7/4/2020 1210  Last data filed at 7/4/2020 1012  Gross per 24 hour   Intake 91 ml   Output 1225 ml   Net -1134 ml       Physical Exam:    General: Patient is confused, sedated with Ativan, restlessness. Oriented to person only. Mucous Membranes:  Pink , anicteric  Neck: No JVD, no carotid bruit, no thyromegaly  Chest: Diminished breath sounds with bilateral diffuse wheezes  Cardiovascular: Tachycardic,  no murmurs or gallops  Abdomen:  Soft, undistended, non tender, no organomegaly, BS present  Extremities: No edema or cyanosis.  Distal pulses well felt  Neurological : grossly nonfocal   Psych: Agitated and anxious         Medications:   Scheduled Medications:    gabapentin  300 mg Oral TID    sodium chloride flush  10 mL Intravenous 2 times per day    enoxaparin  40 mg Subcutaneous Daily    cefTRIAXone (ROCEPHIN) IV  1 g Intravenous Q24H    LORazepam        melatonin  3 mg Oral Nightly    LORazepam        ipratropium-albuterol  1 ampule Inhalation Q4H WA     I   dextrose      dextrose 5% in lactated ringers 100 mL/hr at 07/04/20 1156     cloNIDine, traZODone, sodium chloride flush, acetaminophen **OR** acetaminophen, polyethylene glycol, promethazine **OR** ondansetron, albuterol, glucose, dextrose, glucagon (rDNA), dextrose, dextrose, traMADol **AND** cloNIDine, dicyclomine, ibuprofen, gabapentin, hydrOXYzine, LORazepam    Lab Data:  Recent Labs 07/03/20  1349 07/04/20  0445   WBC 15.1* 7.9   HGB 13.0* 12.3*   HCT 40.1* 39.3*   MCV 88.5 91.2    166     Recent Labs     07/03/20  1349 07/04/20  0445    133*   K 4.3 4.2    103   CO2 27 19*   BUN 16 12   CREATININE 0.8* 0.6*     Recent Labs     07/03/20  1349   TROPONINI <0.01       Coagulation: No results found for: INR, APTT  Cardiac markers:   Lab Results   Component Value Date    TROPONINI <0.01 07/03/2020         Lab Results   Component Value Date    ALT 19 07/04/2020    AST 33 07/04/2020    ALKPHOS 84 07/04/2020    BILITOT 0.5 07/04/2020       No results found for: INR, PROTIME       Radiology  XR CHEST PORTABLE   Final Result   No acute cardiopulmonary disease. XR CHEST PORTABLE   Final Result   Pulmonary vascular congestion.              Cultures:   Urine cultures pending  Blood cultures pending       ASSESSMENT and Plan      Altered mental status  Acute toxic encephalopathy  - secondary to substance abuse, (+) methamphetamine, poss fentanyl  -Patient with history of IV drug abuse  -Now starting to have withdrawal symptoms  Appears to have opiate and methamphetamine withdrawal  -Patient required Benadryl Haldol and Ativan in the ED for agitation.  -Currently placed on COWS protocol  -Received clonidine and tramadol.  -We will also order IV Ativan 2 mg every 4 hours as needed  -Might need Precedex drip    Acute hypoxic respiratory failure  -O2 sats documented at 63% on arrival to the ED  -Currently sats stable on 2 L  - continues to protect airway though remains obtunded  - continue to observe in ICU     UTI  -Procalcitonin elevated  -Continue Rocephin        DVT Prophylaxis: lovenox  DIET GENERAL;  Code Status: Full Code         Sherrie Mason MD 7/4/2020 12:10 PM

## 2020-07-04 NOTE — PROGRESS NOTES
Pt transported from ER to ICU 1 via bed. Pt is currently on 2L NC; tolerating well. Mondragon cath in place and draining clear yellow urine. Pt is unable to answer admission questions at this time r/t somnolence. Skin assessment complete; see 4eyes. Wallet and pocket knife sent to security; claim ticket in chart. Telesitter placed at bedside for pt observation. Two baggies with unidentifiable drugs found in pt belongings. Destroyed by security and clinical .

## 2020-07-04 NOTE — H&P
Hospital Medicine History & Physical       PCP: No primary care provider on file.     Date of Admission: 7/3/2020     Date of Service: Pt seen/examined on 7/3/2020 and Admitted to Inpatient with expected LOS greater than two midnights due to medical therapy.       Chief Complaint:  SOB        History Of Present Illness:       52 y.o. male brought in following complaint of SOB, patient was found to be hypoxemic at 63%, obtunded with white powder substance next to him. He was also found to have a syringe. Patient became agitated, required anxiolytics in the ER, and has been somnolent since. Intialy was hypercapnic as well as hypoxemic. Patient awakens to name, but responds incoherently. Appears to be in no respiratory distress on O2 per nasal canula.     Past Medical History:       Past Medical History             Diagnosis Date    H/O brain surgery      Hepatitis C antibody test positive 7/17/14    History of surgery       L leg surgery    MRSA (methicillin resistant staph aureus) culture positive 7/12/14     blood cx    Paralysis of upper limb (H[facility])       right arm    Pneumonia              Past Surgical History:       Past Surgical History             Procedure Laterality Date    BRAIN SURGERY   1986     s/p trauma    BRAIN SURGERY        FRACTURE SURGERY        LEG SURGERY                Medications Prior to Admission:      Home Medications           Prior to Admission medications    Medication Sig Start Date End Date Taking?  Authorizing Provider   traZODone (DESYREL) 100 MG tablet Take 1 tablet by mouth nightly as needed for Sleep 6/24/15     GIA Hui   ondansetron (ZOFRAN ODT) 4 MG disintegrating tablet Take 1-2 tablets by mouth every 12 hours as needed for Nausea 6/24/15     GIA Hui   cloNIDine (CATAPRES) 0.1 MG tablet Take 1 tablet by mouth every 4 hours as needed 6/24/15     GIA Hui   gabapentin (NEURONTIN) 300 MG capsule Take 1 capsule by mouth 3 11-20 07/03/2020     BLOODU SMALL 07/03/2020     SPECGRAV >=1.030 07/03/2020     GLUCOSEU Negative 07/03/2020         Radiology:            XR CHEST PORTABLE   Final Result   Pulmonary vascular congestion.                 ASSESSMENT:     Active Hospital Problems     Diagnosis Date Noted    AMS (altered mental status) [R41.82] 07/03/2020            PLAN:     1) AMS  - secondary to substance abuse, (+) methamphetamine, poss fentanyl?  - continues to protect airway though remains obtunded  - continue to observe in ICU     2) UTI  - Rocephin       DVT Prophylaxis: lovenox  Diet: Diet NPO Effective Now  Code Status: Full Code           Dispo - icu         Roldan Levi MD     Thank you No primary care provider on file. for the opportunity to be involved in this patient's care. If you have any questions or concerns please feel free to contact me at [phone].

## 2020-07-04 NOTE — SIGNIFICANT EVENT
Witnessed security destroying unidentifiable drugs found in pt belongings by primary nurse.  Theresa Forrest Clinical
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
## 2020-07-04 NOTE — PROGRESS NOTES
Shift assessment, completed, see flow sheet. Pt is alert and oriented x2 to self and place disoriented to time/situation. Pt very anxious. Rolling back and forth in bed. Yelling out \"give me something\". Pt stated he take herion and is withdrawing. One time dosage of Ativan 2 mg IV given. Rhythm is ST with , /70, SpO2 94% on 2/L. Respirations are easy, even, and unlabored. Bilateral lung sounds end expiratory wheeze upper lobes and diminished lower lobes. Pt remains NPO. Mondragon in place and patent with STAT lock. Urine yellow/clear. PIV x1 to 20 [facility] with IVF's started. Clinical aware of need for PIV placement due to difficult stick. Call light within reach. Bed in lowest position. Bed alarm on. Tele-sitter is on for safety.  Will continue to monitor     07/04/20 0800   Vitals   Pulse 113   Resp 29   /70   MAP (mmHg) 85   Oxygen Therapy   SpO2 94 %   O2 Device Nasal cannula   O2 Flow Rate (L/min) 2 L/min

## 2020-07-04 NOTE — PROGRESS NOTES
Pulmonary & Critical Care Medicine ICU Progress Note    CC: Drug OD    Events of Last 24 hours: increased agitation this morning and received IV ativan and is now sedated and not answering questions    PIV  IV:   dextrose      dextrose 5% and 0.45% NaCl with KCl 20 mEq 75 mL/hr at 07/04/20 0752       Vitals:  /70   Pulse 113   Temp 98.2 °F (36.8 °C)   Resp 29   Ht 5' 2\" (1.575 m)   Wt 125 lb (56.7 kg)   SpO2 94%   BMI 22.86 kg/m²   on 2lpm  EXAM:  Constitutional: ill appearing. In distress. Eyes: PERRL. No sclera icterus. ENT: Normal Nose. Normal Tongue. Neck:  Trachea is midline. No thyroid tenderness. Respiratory: No accessory muscle usage. No decreased breath sounds. No wheezes. No rales. No Rhonchi. Cardiovascular: Normal S1S2. Philbert Nicholas No murmur. No digit cyanosis. No digit clubbing. No LE edema. GI: Non-tender. Non-distended. Normal bowel sounds. No masses. No umbilical hernia. Skin: No rash on the exposed extremities. No Nodules on exposed extremities. Neuro: sedated. Does not follows command. Moves all extremities. Psych: + agitation, no anxiety.     Scheduled Meds:   LORazepam        gabapentin  300 mg Oral TID    sodium chloride flush  10 mL Intravenous 2 times per day    enoxaparin  40 mg Subcutaneous Daily    cefTRIAXone (ROCEPHIN) IV  1 g Intravenous Q24H     PRN Meds:  cloNIDine, traZODone, sodium chloride flush, acetaminophen **OR** acetaminophen, polyethylene glycol, promethazine **OR** ondansetron, albuterol, glucose, dextrose, glucagon (rDNA), dextrose, dextrose    Results:  CBC:   Recent Labs     07/03/20  1349 07/04/20  0445   WBC 15.1* 7.9   HGB 13.0* 12.3*   HCT 40.1* 39.3*   MCV 88.5 91.2    166     BMP:   Recent Labs     07/03/20  1349 07/04/20  0445    133*   K 4.3 4.2    103   CO2 27 19*   BUN 16 12   CREATININE 0.8* 0.6*     LIVER PROFILE:   Recent Labs     07/03/20  1349 07/04/20  0445   AST 32 33   ALT 21 19   BILIDIR  --  <0.2   BILITOT 0.8 0. 5   ALKPHOS 114 84     PCT 50    ASSESSMENT:  ·  Drug OD: + for meth but initial obtundation make congestion of an opiate likely as well  · Methamphetamine abuse  · Heroin abuse and currently in withdrawal  · Toxic encephalopathy  · Possible UTI  · HCV  · Elevated PRocalcitonin     PLAN:  · unclear to me why the procalcitonin is so high  · Repeat cxr  · MIVF  · COWS  · PRN ativan  · CTX  · F/u urine cx  · Lovenox DVT prophylaxis  · Ok for diet if he wakes up and is appropriate  · CCT 31 min

## 2020-07-04 NOTE — PROGRESS NOTES
Spoke to Dr. Megan Joe via telephone regarding low blood glucose level of 66. New order for 1/2 amp of dextrose to be given. Hold off on diet until rounds due to disorientation still.

## 2020-07-04 NOTE — PROGRESS NOTES
Hand off report given to Kyaw Walsh RN. Pt is in stable condition at this time. Care transferred.  Electronically signed by Jhon Poole RN on 7/4/2020 at 7:19 AM

## 2020-07-04 NOTE — PROGRESS NOTES
Pt still remains agitated with COWs protocol initiated and PRN ativan. Withdrawing symptoms and rolling back and forth in bed. Pt pulled out 2 PIV's. Spoke to Dr. Ray Tidwell on the floor and new order for precedex drip starting at 0.2 mcg and titrating up for goal with repeat back. Called clinical  to place PIV.

## 2020-07-04 NOTE — PROGRESS NOTES
Reassessment completed:    Pt is only a/o to self at this time and disoriented to time/place/situation. VSS. Pt remains anxious with withdrawal symptoms. Pt will thrush back and forth in bed. Precedex drip running into LFA PIV at 0.5 mcg. Titrating up based of agitation with RASS +2. Respirations are dyspnea with exertion in bed, expiratory wheezing throughout lobes. Pt had duoneb breathing treatment. SpO2 stable at 97% on 2/L. Called radiology to get portable CXR. Mondragon remains in place with yellow/clear urine. Tele-sitter is on for safety. Call light within reach. Bed alarm is on. Will monitor.       07/04/20 1533   Vitals   Pulse 110   Resp 27   /78   MAP (mmHg) 88   Oxygen Therapy   SpO2 97 %   O2 Device Nasal cannula   O2 Flow Rate (L/min) 2 L/min

## 2020-07-04 NOTE — PROGRESS NOTES
RESPIRATORY THERAPY ASSESSMENT    Name:  Brooks   Seth Sutherland Record Number:  [id number]  Age: 52 y.o. Gender: male  : 1970  Today's Date:  2020  Room:  3001/3001-01    Assessment     Is the patient being admitted for a COPD or Asthma exacerbation? No   (If yes the patient will be seen every 4 hours for the first 24 hours and then reassessed)    Patient Admission Diagnosis      Allergies  No Known Allergies    Minimum Predicted Vital Capacity:               Actual Vital Capacity:                    Pulmonary History:current smoker  Home Oxygen Therapy:  room air  Home Respiratory Therapy:None   Current Respiratory Therapy:  Albuterol prn          Respiratory Severity Index(RSI)   Patients with orders for inhalation medications, oxygen, or any therapeutic treatment modality will be placed on Respiratory Protocol. They will be assessed with the first treatment and at least every 72 hours thereafter. The following severity scale will be used to determine frequency of treatment intervention.     Smoking History: Pulmonary Disease or Smoking History, Greater than 15 pack year = 2    Social History  Social History     Tobacco Use    Smoking status: Current Every Day Smoker     Packs/day: 2.00     Types: Cigarettes    Smokeless tobacco: Never Used   Substance Use Topics    Alcohol use: No    Drug use: No       Recent Surgical History: None = 0  Past Surgical History  Past Surgical History:   Procedure Laterality Date    BRAIN SURGERY      s/p trauma    BRAIN SURGERY      FRACTURE SURGERY      LEG SURGERY         Level of Consciousness: alert, follows commands but disoriented=1    Level of Activity: Walking unassisted = 0    Respiratory Pattern: Regular Pattern; RR 8-20 = 0    Breath Sounds: Diminshed bilaterally and/or crackles = 2    Sputum   ,  ,    Cough: Strong, spontaneous, non-productive = 0    Vital Signs   BP 98/65   Pulse 73   Temp 98.2 °F (36.8 °C) (Oral)   Resp 15   Ht 5' 2\" (1.575 m)   Wt 125 lb (56.7 kg)   SpO2 100%   BMI 22.86 kg/m²   SPO2 (COPD values may differ): 90-91% on room air or greater than 92% on FiO2 24- 28% = 1    Peak Flow (asthma only): not applicable = 0    RSI: 5-6 = Q4hr PRN (every four hours as needed) for dyspnea        Plan       Goals: medication delivery, mobilize retained secretions, volume expansion and improve oxygenation    Patient/caregiver was educated on the proper method of use for Respiratory Care Devices:  Yes      Level of patient/caregiver understanding able to:   ? Verbalize understanding   ? Demonstrate understanding       ? Teach back        ? Needs reinforcement       ? No available caregiver               ? Other:     Response to education:  Ryan Araya     Is patient being placed on Home Treatment Regimen? No     Does the patient have everything they need prior to discharge? NA     Comments: chart reviewed and patient assessed    Plan of Care: albuterol prn per assessment    Electronically signed by Migue Em RCP on 7/4/2020 at 5:02 AM    Respiratory Protocol Guidelines     1. Assessment and treatment by Respiratory Therapy will be initiated for medication and therapeutic interventions upon initiation of aerosolized medication. 2. Physician will be contacted for respiratory rate (RR) greater than 35 breaths per minute. Therapy will be held for heart rate (HR) greater than 140 beats per minute, pending direction from physician. 3. Bronchodilators will be administered via Metered Dose Inhaler (MDI) with spacer when the following criteria are met:  a. Alert and cooperative     b. HR < 140 bpm  c. RR < 30 bpm                d. Can demonstrate a 2-3 second inspiratory hold  4. Bronchodilators will be administered via Hand Held Nebulizer ROS[facility]INA [facility]) to patients when ANY of the following criteria are met  a. Incognizant or uncooperative          b. Patients treated with HHN at Home        c. Unable to demonstrate proper use of MDI with spacer     d.  RR > 30 bpm   5. Bronchodilators will be delivered via Metered Dose Inhaler (MDI), HHN, Aerogen to intubated patients on mechanical ventilation. 6. Inhalation medication orders will be delivered and/or substituted as outlined below. Aerosolized Medications Ordering and Administration Guidelines:    1. All Medications will be ordered by a physician, and their frequency and/or modality will be adjusted as defined by the patients Respiratory Severity Index (RSI) score. 2. If the patient does not have documented COPD, consider discontinuing anticholinergics when RSI is less than 9.  3. If the bronchospasm worsens (increased RSI), then the bronchodilator frequency can be increased to a maximum of every 4 hours. If greater than every 4 hours is required, the physician will be contacted. 4. If the bronchospasm improves, the frequency of the bronchodilator can be decreased, based on the patient's RSI, but not less than home treatment regimen frequency. 5. Bronchodilator(s) will be discontinued if patient has a RSI less than 9 and has received no scheduled or as needed treatment for 72  Hrs. Patients Ordered on a Mucolytic Agent:    1. Must always be administered with a bronchodilator. 2. Discontinue if patient experiences worsened bronchospasm, or secretions have lessened to the point that the patient is able to clear them with a cough. Anti-inflammatory and Combination Medications:    1. If the patient lacks prior history of lung disease, is not using inhaled anti-inflammatory medication at home, and lacks wheezing by examination or by history for at least 24 hours, contact physician for possible discontinuation.
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
## 2020-07-04 NOTE — PROGRESS NOTES
Dr. Weber Angry at the bedside to examine pt. See progress note for details. Pt remains disoriented. Pt would not talk to staff. New orders for PRN duonebs every 4 hours for wheezing. Continue IV fluids. Discussed agitation, continue COWS, increase ativan to 2 mg IV every 4 hours for agitation. Orders repeated back.

## 2020-07-04 NOTE — PROGRESS NOTES
Patient is not able to demonstrate the ability to move from a reclining position to an upright position within the recliner due to alter mental status.  Electronically signed by Nicole Lazo RN on 7/4/2020 at 1:44 AM
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
## 2020-07-05 ENCOUNTER — APPOINTMENT (OUTPATIENT)
Dept: GENERAL RADIOLOGY | Age: 50
DRG: 917 | End: 2020-07-05
Payer: MEDICARE

## 2020-07-05 LAB
ALBUMIN SERPL-MCNC: 3.3 G/DL (ref 3.4–5)
ANION GAP SERPL CALCULATED.3IONS-SCNC: 11 MMOL/L (ref 3–16)
BASE EXCESS ARTERIAL: -2 MMOL/L (ref -3–3)
BUN BLDV-MCNC: 9 MG/DL (ref 7–20)
CALCIUM SERPL-MCNC: 8.1 MG/DL (ref 8.3–10.6)
CARBOXYHEMOGLOBIN ARTERIAL: 0.3 % (ref 0–1.5)
CHLORIDE BLD-SCNC: 98 MMOL/L (ref 99–110)
CO2: 28 MMOL/L (ref 21–32)
CREAT SERPL-MCNC: 0.7 MG/DL (ref 0.9–1.3)
GFR AFRICAN AMERICAN: >60
GFR NON-AFRICAN AMERICAN: >60
GLUCOSE BLD-MCNC: 110 MG/DL (ref 70–99)
GLUCOSE BLD-MCNC: 116 MG/DL (ref 70–99)
GLUCOSE BLD-MCNC: 128 MG/DL (ref 70–99)
GLUCOSE BLD-MCNC: 141 MG/DL (ref 70–99)
GLUCOSE BLD-MCNC: 177 MG/DL (ref 70–99)
HCO3 ARTERIAL: 23.7 MMOL/L (ref 21–29)
HCT VFR BLD CALC: 41.5 % (ref 40.5–52.5)
HEMOGLOBIN, ART, EXTENDED: 14.1 G/DL (ref 13.5–17.5)
HEMOGLOBIN: 13.6 G/DL (ref 13.5–17.5)
MCH RBC QN AUTO: 28.1 PG (ref 26–34)
MCHC RBC AUTO-ENTMCNC: 32.7 G/DL (ref 31–36)
MCV RBC AUTO: 86 FL (ref 80–100)
METHEMOGLOBIN ARTERIAL: 0.3 %
O2 CONTENT ARTERIAL: 19 ML/DL
O2 SAT, ARTERIAL: 97.3 %
O2 THERAPY: ABNORMAL
PCO2 ARTERIAL: 44 MMHG (ref 35–45)
PDW BLD-RTO: 13.6 % (ref 12.4–15.4)
PERFORMED ON: ABNORMAL
PH ARTERIAL: 7.35 (ref 7.35–7.45)
PHOSPHORUS: 2.1 MG/DL (ref 2.5–4.9)
PLATELET # BLD: 306 K/UL (ref 135–450)
PMV BLD AUTO: 7.1 FL (ref 5–10.5)
PO2 ARTERIAL: 101.2 MMHG (ref 75–108)
POTASSIUM SERPL-SCNC: 4.1 MMOL/L (ref 3.5–5.1)
PROCALCITONIN: 26.82 NG/ML (ref 0–0.15)
RBC # BLD: 4.82 M/UL (ref 4.2–5.9)
SODIUM BLD-SCNC: 137 MMOL/L (ref 136–145)
TCO2 ARTERIAL: 25 MMOL/L
TOTAL CK: 1115 U/L (ref 39–308)
WBC # BLD: 11.4 K/UL (ref 4–11)

## 2020-07-05 PROCEDURE — 80069 RENAL FUNCTION PANEL: CPT

## 2020-07-05 PROCEDURE — 6370000000 HC RX 637 (ALT 250 FOR IP): Performed by: INTERNAL MEDICINE

## 2020-07-05 PROCEDURE — 94750 HC PULMONARY COMPLIANCE STUDY: CPT

## 2020-07-05 PROCEDURE — 2580000003 HC RX 258: Performed by: INTERNAL MEDICINE

## 2020-07-05 PROCEDURE — 2500000003 HC RX 250 WO HCPCS: Performed by: INTERNAL MEDICINE

## 2020-07-05 PROCEDURE — 82550 ASSAY OF CK (CPK): CPT

## 2020-07-05 PROCEDURE — 6370000000 HC RX 637 (ALT 250 FOR IP): Performed by: HOSPITALIST

## 2020-07-05 PROCEDURE — 71045 X-RAY EXAM CHEST 1 VIEW: CPT

## 2020-07-05 PROCEDURE — 6360000002 HC RX W HCPCS: Performed by: HOSPITALIST

## 2020-07-05 PROCEDURE — 2580000003 HC RX 258: Performed by: HOSPITALIST

## 2020-07-05 PROCEDURE — 85027 COMPLETE CBC AUTOMATED: CPT

## 2020-07-05 PROCEDURE — 2000000000 HC ICU R&B

## 2020-07-05 PROCEDURE — 84145 PROCALCITONIN (PCT): CPT

## 2020-07-05 PROCEDURE — 94002 VENT MGMT INPAT INIT DAY: CPT

## 2020-07-05 PROCEDURE — 2700000000 HC OXYGEN THERAPY PER DAY

## 2020-07-05 PROCEDURE — 82803 BLOOD GASES ANY COMBINATION: CPT

## 2020-07-05 PROCEDURE — 36415 COLL VENOUS BLD VENIPUNCTURE: CPT

## 2020-07-05 PROCEDURE — 99291 CRITICAL CARE FIRST HOUR: CPT | Performed by: INTERNAL MEDICINE

## 2020-07-05 PROCEDURE — 94761 N-INVAS EAR/PLS OXIMETRY MLT: CPT

## 2020-07-05 PROCEDURE — 94640 AIRWAY INHALATION TREATMENT: CPT

## 2020-07-05 RX ORDER — FENTANYL CITRATE 50 UG/ML
25 INJECTION, SOLUTION INTRAMUSCULAR; INTRAVENOUS
Status: DISCONTINUED | OUTPATIENT
Start: 2020-07-05 | End: 2020-07-07

## 2020-07-05 RX ORDER — IPRATROPIUM BROMIDE AND ALBUTEROL SULFATE 2.5; .5 MG/3ML; MG/3ML
1 SOLUTION RESPIRATORY (INHALATION) EVERY 4 HOURS
Status: DISCONTINUED | OUTPATIENT
Start: 2020-07-05 | End: 2020-07-07

## 2020-07-05 RX ORDER — PROPOFOL 10 MG/ML
10 INJECTION, EMULSION INTRAVENOUS
Status: DISCONTINUED | OUTPATIENT
Start: 2020-07-05 | End: 2020-07-07

## 2020-07-05 RX ORDER — CHLORHEXIDINE GLUCONATE 0.12 MG/ML
15 RINSE ORAL 2 TIMES DAILY
Status: DISCONTINUED | OUTPATIENT
Start: 2020-07-05 | End: 2020-07-07

## 2020-07-05 RX ORDER — MIDAZOLAM HYDROCHLORIDE 1 MG/ML
2 INJECTION INTRAMUSCULAR; INTRAVENOUS
Status: DISCONTINUED | OUTPATIENT
Start: 2020-07-05 | End: 2020-07-07

## 2020-07-05 RX ADMIN — CHLORHEXIDINE GLUCONATE 0.12% ORAL RINSE 15 ML: 1.2 LIQUID ORAL at 21:31

## 2020-07-05 RX ADMIN — SODIUM PHOSPHATE, MONOBASIC, MONOHYDRATE 15 MMOL: 276; 142 INJECTION, SOLUTION INTRAVENOUS at 16:04

## 2020-07-05 RX ADMIN — GABAPENTIN 300 MG: 300 CAPSULE ORAL at 16:04

## 2020-07-05 RX ADMIN — GABAPENTIN 300 MG: 300 CAPSULE ORAL at 20:55

## 2020-07-05 RX ADMIN — IPRATROPIUM BROMIDE AND ALBUTEROL SULFATE 1 AMPULE: .5; 3 SOLUTION RESPIRATORY (INHALATION) at 15:05

## 2020-07-05 RX ADMIN — Medication 10 ML: at 21:31

## 2020-07-05 RX ADMIN — IPRATROPIUM BROMIDE AND ALBUTEROL SULFATE 1 AMPULE: .5; 3 SOLUTION RESPIRATORY (INHALATION) at 08:08

## 2020-07-05 RX ADMIN — Medication 10 ML: at 09:55

## 2020-07-05 RX ADMIN — PROPOFOL 50 MCG/KG/MIN: 10 INJECTION, EMULSION INTRAVENOUS at 11:35

## 2020-07-05 RX ADMIN — ENOXAPARIN SODIUM 40 MG: 40 INJECTION SUBCUTANEOUS at 09:55

## 2020-07-05 RX ADMIN — IPRATROPIUM BROMIDE AND ALBUTEROL SULFATE 1 AMPULE: .5; 3 SOLUTION RESPIRATORY (INHALATION) at 00:46

## 2020-07-05 RX ADMIN — SODIUM CHLORIDE, SODIUM LACTATE, POTASSIUM CHLORIDE, CALCIUM CHLORIDE AND DEXTROSE MONOHYDRATE: 5; 600; 310; 30; 20 INJECTION, SOLUTION INTRAVENOUS at 13:30

## 2020-07-05 RX ADMIN — PROPOFOL 50 MCG/KG/MIN: 10 INJECTION, EMULSION INTRAVENOUS at 04:51

## 2020-07-05 RX ADMIN — CHLORHEXIDINE GLUCONATE 0.12% ORAL RINSE 15 ML: 1.2 LIQUID ORAL at 09:55

## 2020-07-05 RX ADMIN — SODIUM CHLORIDE, SODIUM LACTATE, POTASSIUM CHLORIDE, CALCIUM CHLORIDE AND DEXTROSE MONOHYDRATE: 5; 600; 310; 30; 20 INJECTION, SOLUTION INTRAVENOUS at 02:31

## 2020-07-05 RX ADMIN — PROPOFOL 50 MCG/KG/MIN: 10 INJECTION, EMULSION INTRAVENOUS at 20:55

## 2020-07-05 RX ADMIN — IPRATROPIUM BROMIDE AND ALBUTEROL SULFATE 1 AMPULE: .5; 3 SOLUTION RESPIRATORY (INHALATION) at 20:17

## 2020-07-05 RX ADMIN — IPRATROPIUM BROMIDE AND ALBUTEROL SULFATE 1 AMPULE: .5; 3 SOLUTION RESPIRATORY (INHALATION) at 11:01

## 2020-07-05 RX ADMIN — GABAPENTIN 300 MG: 300 CAPSULE ORAL at 09:55

## 2020-07-05 RX ADMIN — CEFTRIAXONE SODIUM 1 G: 1 INJECTION, POWDER, FOR SOLUTION INTRAMUSCULAR; INTRAVENOUS at 18:24

## 2020-07-05 RX ADMIN — MELATONIN 3 MG ORAL TABLET 3 MG: 3 TABLET ORAL at 20:55

## 2020-07-05 ASSESSMENT — PULMONARY FUNCTION TESTS
PIF_VALUE: 19
PIF_VALUE: 20
PIF_VALUE: 20
PIF_VALUE: 21
PIF_VALUE: 20
PIF_VALUE: 24
PIF_VALUE: 21
PIF_VALUE: 20
PIF_VALUE: 19
PIF_VALUE: 20
PIF_VALUE: 20
PIF_VALUE: 23
PIF_VALUE: 19
PIF_VALUE: 21
PIF_VALUE: 20
PIF_VALUE: 26
PIF_VALUE: 21
PIF_VALUE: 19
PIF_VALUE: 25
PIF_VALUE: 19
PIF_VALUE: 21
PIF_VALUE: 21
PIF_VALUE: 20
PIF_VALUE: 20

## 2020-07-05 ASSESSMENT — PAIN SCALES - GENERAL: PAINLEVEL_OUTOF10: 0

## 2020-07-05 NOTE — PROGRESS NOTES
Pt IV infiltrated. Left arm noted with multiple small fluid filled blisters and weeping. Pt attempting to get out of restraints. Extremely agitated at this time. Verbal order from Dr Zahraa Horta for one time IM haldol. Electronically signed by Hieu Glaser RN.

## 2020-07-05 NOTE — PROGRESS NOTES
Hand off report given to Dominic Silva RN. Pt is in stable condition at this time. Care transferred.  Electronically signed by Juan Antonio Madden RN on 7/5/2020 at 7:20 AM

## 2020-07-05 NOTE — PROGRESS NOTES
Writer call pt mother, Brandan Edmond, to give update on change in pt condition. Voiced understanding.  Electronically signed by Lupis Costa RN on 7/5/2020 at 1:39 AM

## 2020-07-05 NOTE — PROGRESS NOTES
07/05/20 0039   Vent Information   Vent Type 840   Vent Mode AC/VC   Vt Ordered 420 mL   Rate Set 14 bmp   Peak Flow 60 L/min   Pressure Support 0 cmH20   FiO2  35 %   SpO2 100 %   SpO2/FiO2 ratio 285.71   Sensitivity 3   PEEP/CPAP 5   I Time/ I Time % 0 s   Humidification Source Heated wire   Humidification Temp Measured 37   Circuit Condensation Drained   Vent Patient Data   High Peep/I Pressure 0   Peak Inspiratory Pressure 25 cmH2O   Mean Airway Pressure 8.69 cmH20   Rate Measured 14 br/min   Vt Exhaled 465 mL   Minute Volume 6.84 Liters   I:E Ratio 1:4.60   Cough/Sputum   Sputum How Obtained Endotracheal   Cough Non-productive   Sputum Amount None   Spontaneous Breathing Trial (SBT) RT Doc   Pulse 74   Breath Sounds   Right Upper Lobe Diminished   Right Middle Lobe Diminished   Right Lower Lobe Diminished   Left Upper Lobe Diminished   Left Lower Lobe Diminished   Additional Respiratory  Assessments   Resp 14   Alarm Settings   High Pressure Alarm 40 cmH2O   Low Minute Volume Alarm 4 L/min   Apnea (secs) 20 secs   High Respiratory Rate 40 br/min   Low Exhaled Vt  320 mL   Patient Observation   Observations 8ett 25 at lip

## 2020-07-05 NOTE — PROGRESS NOTES
Perfect serve sent to Dr Migue Mueller regarding pt continuing restlessness and agitation. Haldol was given and had no effect. Multiple staff members at bedside attempting to restraint pt from pulling out IV. Pt attempting to bite and scratch staff members. Precedex running at max with no effect. Pt heart rate sustaining in 130's, respirations sustaining in 30's. Dr Migue Mueller came to pt bedside. Decision to intubate. Electronically signed by Tabitha Duke RN.

## 2020-07-05 NOTE — PROGRESS NOTES
Pt being intubated at this time. Versed 2 mg given  Etomidate 10 mg given. Having IV access issues. Starting another IV in pt's foot per Dr. Gary Skaggs. 25 Keith  2 versed   4 versed  7.5 haldol IM. Pt intubated with a #8 ETT @ 25 lip. Good color change noted. Bilateral breath sounds ausculted over lung fields.  Verneice Carrel RN
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
## 2020-07-05 NOTE — PROGRESS NOTES
AM assessment completed. See flowsheet. Sedated on vent. Propofol infusing at 50 mcg/kg/min. RASS -2. Lungs sounds expiratory wheezes in upper lobes, diminished in bases . SB on bedside monitor. Bowel sounds active. No edema noted. Urinary catheter in place draining clear yellow urine. Bilateral soft wrist restraints in place safety. Labs reviewed. Cont to monitor.

## 2020-07-05 NOTE — PROGRESS NOTES
Chest x-ray results as follows \"Endotracheal tube with tip in the right mainstem bronchus. Recommend repositioning. Opacification throughout the left lung, new. Hyperinflation of the right lung\". Dr. Hilario Arellano notified and he requested the ETT be retracted 5cm by respiratory and a chest xray retaken afterwards. RT notified and at bedside.

## 2020-07-05 NOTE — PROCEDURES
Linda Sanchez is a 52 y.o. male patient. 1. Methamphetamine abuse ([address]    2. Urinary tract infection with hematuria, site unspecified    3. 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 rate (!) 32, height 5' 2\" (1.575 m), weight 125 lb (56.7 kg), SpO2 99 %. Central Line  Date/Time: 7/4/2020 11:46 PM  Performed by: Tatum Mccoy MD  Authorized by: Tatum Mccoy MD   Consent: The procedure was performed in an emergent situation.   Patient identity confirmed: hospital-assigned identification number  Indications: vascular access    Sedation:  Patient sedated: yes  Sedatives: propofol    Preparation: skin prepped with ChloraPrep  Skin prep agent dried: skin prep agent completely dried prior to procedure  Sterile barriers: all five maximum sterile barriers used - cap, mask, sterile gown, sterile gloves, and large sterile sheet  Hand hygiene: hand hygiene performed prior to central venous catheter insertion  Location details: left femoral  Patient position: flat  Catheter type: triple lumen  Pre-procedure: landmarks identified  Ultrasound guidance: no  Number of attempts: 1  Successful placement: yes  Post-procedure: line sutured  Assessment: blood return through all ports  Patient tolerance: Patient tolerated the procedure well with no immediate complications  Comments: ebl 5 ml          Tatum Mccoy MD  7/4/2020
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
## 2020-07-05 NOTE — PROGRESS NOTES
Pulmonary & Critical Care Medicine ICU Progress Note    CC: Drug OD    Events of Last 24 hours: The patient became increasingly agitated overnight and required intubation and a central line. He became hypotensive wit hsedation and levophed was started. Patient was placed in droplet precautions by the nocturnist last night. Levo @ 4  Propofol @ 50    7/4/20 Left femoral CVC  MV: 7/4/20 -    IV:   propofol 50 mcg/kg/min (07/05/20 0451)    dextrose      dextrose 5% in lactated ringers 100 mL/hr at 07/05/20 0231    dexmedetomidine (PRECEDEX) IV infusion 1.4 mcg/kg/hr (07/04/20 2017)    norepinephrine 4 mcg/min (07/05/20 0005)       Vitals:  /75   Pulse 58   Temp 98.2 °F (36.8 °C) (Oral)   Resp 14   Ht 5' 2\" (1.575 m)   Wt 125 lb (56.7 kg)   SpO2 100%   BMI 22.86 kg/m²   on 35% vent  EXAM:  Constitutional: ill appearing. In distress. Eyes: PERRL. No sclera icterus. ENT: Normal Nose. Normal Tongue. Neck:  Trachea is midline. No thyroid tenderness. Respiratory: No accessory muscle usage. No decreased breath sounds. No wheezes. No rales. No Rhonchi. Cardiovascular: Normal S1S2. Mia Sleet No murmur. No digit cyanosis. No digit clubbing. No LE edema. GI: Non-tender. Non-distended. Normal bowel sounds. No masses. No umbilical hernia. Skin: No rash on the exposed extremities. No Nodules on exposed extremities. Neuro: sedated. Does not follows command. Moves all extremities. Psych: + agitation, no anxiety.     Scheduled Meds:   chlorhexidine  15 mL Mouth/Throat BID    gabapentin  300 mg Oral TID    sodium chloride flush  10 mL Intravenous 2 times per day    enoxaparin  40 mg Subcutaneous Daily    cefTRIAXone (ROCEPHIN) IV  1 g Intravenous Q24H    melatonin  3 mg Oral Nightly    ipratropium-albuterol  1 ampule Inhalation Q4H WA    propofol        haloperidol lactate         PRN Meds:  cloNIDine, traZODone, sodium chloride flush, acetaminophen **OR** acetaminophen, polyethylene glycol, promethazine **OR** ondansetron, albuterol, glucose, dextrose, glucagon (rDNA), dextrose, dextrose, traMADol **AND** cloNIDine, dicyclomine, ibuprofen, gabapentin, hydrOXYzine, LORazepam    Results:  CBC:   Recent Labs     07/03/20  1349 07/04/20  0445 07/05/20  0430   WBC 15.1* 7.9 11.4*   HGB 13.0* 12.3* 13.6   HCT 40.1* 39.3* 41.5   MCV 88.5 91.2 86.0    166 306     BMP:   Recent Labs     07/03/20  1349 07/04/20  0445 07/05/20  0430    133* 137   K 4.3 4.2 4.1    103 98*   CO2 27 19* 28   PHOS  --   --  2.1*   BUN 16 12 9   CREATININE 0.8* 0.6* 0.7*     LIVER PROFILE:   Recent Labs     07/03/20  1349 07/04/20  0445   AST 32 33   ALT 21 19   BILIDIR  --  <0.2   BILITOT 0.8 0.5   ALKPHOS 114 84     PCT 50, 26.82,     7/5/20 CXR: Left lung air space disease improved - likely atelectasis from right main stem intubation yesterday    ASSESSMENT:  ·  Drug OD: + for meth but initial obtundation make congestion of an opiate likely as well  · Methamphetamine abuse  · Heroin abuse and currently in withdrawal  · Toxic encephalopathy  · Possible UTI  · HCV  · Elevated PRocalcitonin     PLAN:  Droplet plus precautions started by the nocturnist last night   mechanical ventilation as per my orders. The ventilator was adjusted by me at the bedside for unstable, life threatening respiratory failure. IV Propofol for sedation, target RASS -2, with daily spontaneous awakening trial.  Fentanyl PRN pain, gtt as needed  Head of bed 30 degrees or higher at all times  Daily spontaneous breathing trial once PEEP less than 8, FiO2 less than 55%. ·  unclear to me why the procalcitonin is so high  · MIVF with D5 LR for hypoglycemia yesterday  · CTX  · F/u urine cx  · Lovenox DVT prophylaxis  · Due to at least single organ failure and risk of rapid deterioration, I spent 32 minutes of Critical care time reviewing labs/films, examining patient, collaborating with other physicians.  This does not include time performing critical procedures.

## 2020-07-05 NOTE — PROGRESS NOTES
IM Progress Note    Admit Date:  7/3/2020  2    Interval history:      Admitted for AMS with substance abuse. 7/4 increasing agitation and withdrawal symptoms     7/5  Increased agitation last night ,delirious. Patient intubated and currently on mechanical ventilation. Sedated on propofol. Required Levophed for hypotension,  blood pressure stable now, weaned off of levo   On droplet plus precautions COVID-19 pending    Procalcitonin better down from 50-> 26.8 today    CK elevated on IV fluids. Subjective:  Mr. Sudeep Camacho is  Intubated, sedated on mechanical vent       Objective:   BP (!) 145/86   Pulse 68   Temp 98.3 °F (36.8 °C) (Axillary)   Resp 16   Ht 5' 2\" (1.575 m)   Wt 125 lb (56.7 kg)   SpO2 100%   BMI 22.86 kg/m²       Intake/Output Summary (Last 24 hours) at 7/5/2020 1436  Last data filed at 7/5/2020 0600  Gross per 24 hour   Intake 1790.23 ml   Output 2200 ml   Net -409.77 ml       Physical Exam:  General: intubated, sedated on mechanical vent   Mucous Membranes:  Pink , anicteric  Neck: No JVD, no carotid bruit, no thyromegaly  Chest: good air entry , no wheezes, rhonchi  Cardiovascular: Tachycardic,  no murmurs or gallops  Abdomen:  Soft, undistended, non tender, no organomegaly, BS present  Extremities: No edema or cyanosis.  Distal pulses well felt  Neurological : grossly nonfocal   Psych: sedated        Medications:   Scheduled Medications:    chlorhexidine  15 mL Mouth/Throat BID    ipratropium-albuterol  1 ampule Inhalation Q4H    gabapentin  300 mg Oral TID    sodium chloride flush  10 mL Intravenous 2 times per day    enoxaparin  40 mg Subcutaneous Daily    cefTRIAXone (ROCEPHIN) IV  1 g Intravenous Q24H    melatonin  3 mg Oral Nightly     I   propofol 50 mcg/kg/min (07/05/20 1135)    dextrose      dextrose 5% in lactated ringers 100 mL/hr at 07/05/20 1330    norepinephrine Stopped (07/05/20 1136)     fentanNYL, midazolam, cloNIDine, traZODone, sodium chloride flush, acetaminophen **OR** acetaminophen, polyethylene glycol, promethazine **OR** ondansetron, albuterol, glucose, dextrose, glucagon (rDNA), dextrose, dextrose, traMADol **AND** cloNIDine, dicyclomine, ibuprofen, gabapentin, hydrOXYzine, LORazepam    Lab Data:  Recent Labs     07/03/20  1349 07/04/20  0445 07/05/20  0430   WBC 15.1* 7.9 11.4*   HGB 13.0* 12.3* 13.6   HCT 40.1* 39.3* 41.5   MCV 88.5 91.2 86.0    166 306     Recent Labs     07/03/20  1349 07/04/20  0445 07/05/20  0430    133* 137   K 4.3 4.2 4.1    103 98*   CO2 27 19* 28   PHOS  --   --  2.1*   BUN 16 12 9   CREATININE 0.8* 0.6* 0.7*     Recent Labs     07/03/20  1349 07/05/20  0430   CKTOTAL  --  1,115*   TROPONINI <0.01  --        Coagulation: No results found for: INR, APTT  Cardiac markers:   Lab Results   Component Value Date    CKTOTAL 1,115 07/05/2020    TROPONINI <0.01 07/03/2020         Lab Results   Component Value Date    ALT 19 07/04/2020    AST 33 07/04/2020    ALKPHOS 84 07/04/2020    BILITOT 0.5 07/04/2020       No results found for: INR, PROTIME       Radiology  XR CHEST PORTABLE   Final Result   1. Endotracheal tube tip is in the distal trachea. 2. The tip of the enteric tube remains at the gastroesophageal junction. The   tube should be advanced 12 cm. 3. Improved aeration of the left lung. XR CHEST PORTABLE   Final Result   Endotracheal tube with tip in the right mainstem bronchus. Recommend   repositioning. Opacification throughout the left lung, new. Hyperinflation of the right   lung. XR CHEST PORTABLE   Final Result   No acute cardiopulmonary disease. XR CHEST PORTABLE   Final Result   Pulmonary vascular congestion.          XR CHEST PORTABLE    (Results Pending)       Cultures:   Urine cultures pending  Blood cultures pending          ASSESSMENT and Plan      Altered mental status  Acute toxic encephalopathy  Delirium   - secondary to substance abuse, (+) methamphetamine, poss fentanyl  - Patient required Benadryl Haldol and Ativan in the ED for agitation.  -Patient with history of IV drug abuse  -Worsening agitation and withdrawal symptoms due to opiate and methamphetamine withdrawa  -No improvement on COWS protocol, with clonidine and tramadol and PRN  Ativan   -Intubated now, sedated on propofol drip    Acute hypoxic respiratory failure  -O2 sats documented at 63% on arrival to the ED. this was transient and sats improved. -Currently with worsening respiratory failure in the setting of agitation and delirium, intubated on mechanical ventilation. Oxygen saturation stable on FiO2 30% and PEEP of 5  -Rule out COVID-19, on droplet plus precautions       UTI  -Procalcitonin was  elevated.   Procalcitonin levels improved from 50-> 26.8 today  -Continue Rocephin  -Follow-up on cultures    Rhabdomyolysis  -Secondary to agitation   - CK levels elevated at 1115, continue IV fluids      Hypophosphatemia   - replete          DVT Prophylaxis: lovenox  Diet NPO Effective Now  Code Status: Full Code         Ninoska Loera MD 7/5/2020 2:36 PM

## 2020-07-05 NOTE — PROCEDURES
Gerardo Tao is a 52 y.o. male patient. 1. Methamphetamine abuse ([address]    2. 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 rate (!) 32, height 5' 2\" (1.575 m), weight 125 lb (56.7 kg), SpO2 99 %. Intubation  Date/Time: 7/4/2020 11:45 PM  Performed by: Vita Izquierdo MD  Authorized by: Vita Izquierdo MD   Consent: The procedure was performed in an emergent situation.   Patient identity confirmed: hospital-assigned identification number  Indications: airway protection  Intubation method: fiberoptic oral  Patient status: paralyzed (RSI)  Preoxygenation: BVM  Pretreatment medications: midazolam  Sedatives: etomidate  Paralytic: rocuronium  Number of attempts: 2  Cricoid pressure: yes  Cords visualized: yes  Post-procedure assessment: ETCO2 monitor and chest rise  Cuff inflated: yes  Tube secured with: ETT wong  Patient tolerance: Patient tolerated the procedure well with no immediate complications          Vita Izquierdo MD  7/4/2020

## 2020-07-06 ENCOUNTER — APPOINTMENT (OUTPATIENT)
Dept: GENERAL RADIOLOGY | Age: 50
DRG: 917 | End: 2020-07-06
Payer: MEDICARE

## 2020-07-06 ENCOUNTER — APPOINTMENT (OUTPATIENT)
Dept: INTERVENTIONAL RADIOLOGY/VASCULAR | Age: 50
DRG: 917 | End: 2020-07-06
Payer: MEDICARE

## 2020-07-06 LAB
ANION GAP SERPL CALCULATED.3IONS-SCNC: 9 MMOL/L (ref 3–16)
BASE EXCESS ARTERIAL: 12 (ref -3–3)
BUN BLDV-MCNC: <2 MG/DL (ref 7–20)
CALCIUM SERPL-MCNC: 8.4 MG/DL (ref 8.3–10.6)
CHLORIDE BLD-SCNC: 104 MMOL/L (ref 99–110)
CO2: 31 MMOL/L (ref 21–32)
CREAT SERPL-MCNC: 0.6 MG/DL (ref 0.9–1.3)
GFR AFRICAN AMERICAN: >60
GFR NON-AFRICAN AMERICAN: >60
GLUCOSE BLD-MCNC: 109 MG/DL (ref 70–99)
GLUCOSE BLD-MCNC: 117 MG/DL (ref 70–99)
GLUCOSE BLD-MCNC: 124 MG/DL (ref 70–99)
GLUCOSE BLD-MCNC: 170 MG/DL (ref 70–99)
HCO3 ARTERIAL: 34.2 MMOL/L (ref 21–29)
HCT VFR BLD CALC: 45.1 % (ref 40.5–52.5)
HEMOGLOBIN: 14.5 G/DL (ref 13.5–17.5)
INR BLD: 0.99 (ref 0.86–1.14)
MAGNESIUM: 2.1 MG/DL (ref 1.8–2.4)
MCH RBC QN AUTO: 27.7 PG (ref 26–34)
MCHC RBC AUTO-ENTMCNC: 32.3 G/DL (ref 31–36)
MCV RBC AUTO: 85.8 FL (ref 80–100)
O2 SAT, ARTERIAL: 100 % (ref 93–100)
PCO2 ARTERIAL: 41.2 MM HG (ref 35–45)
PDW BLD-RTO: 13.6 % (ref 12.4–15.4)
PERFORMED ON: ABNORMAL
PH ARTERIAL: 7.53 (ref 7.35–7.45)
PLATELET # BLD: 298 K/UL (ref 135–450)
PMV BLD AUTO: 6.9 FL (ref 5–10.5)
PO2 ARTERIAL: 152 MM HG (ref 75–108)
POC SAMPLE TYPE: ABNORMAL
POTASSIUM SERPL-SCNC: 2.9 MMOL/L (ref 3.5–5.1)
PROTHROMBIN TIME: 11.5 SEC (ref 10–13.2)
RBC # BLD: 5.25 M/UL (ref 4.2–5.9)
SARS-COV-2, PCR: NOT DETECTED
SODIUM BLD-SCNC: 144 MMOL/L (ref 136–145)
TOTAL CK: 1317 U/L (ref 39–308)
WBC # BLD: 8.6 K/UL (ref 4–11)

## 2020-07-06 PROCEDURE — 99232 SBSQ HOSP IP/OBS MODERATE 35: CPT | Performed by: INTERNAL MEDICINE

## 2020-07-06 PROCEDURE — C1769 GUIDE WIRE: HCPCS

## 2020-07-06 PROCEDURE — 36415 COLL VENOUS BLD VENIPUNCTURE: CPT

## 2020-07-06 PROCEDURE — 99291 CRITICAL CARE FIRST HOUR: CPT | Performed by: INTERNAL MEDICINE

## 2020-07-06 PROCEDURE — 6370000000 HC RX 637 (ALT 250 FOR IP): Performed by: HOSPITALIST

## 2020-07-06 PROCEDURE — 80048 BASIC METABOLIC PNL TOTAL CA: CPT

## 2020-07-06 PROCEDURE — 6370000000 HC RX 637 (ALT 250 FOR IP): Performed by: INTERNAL MEDICINE

## 2020-07-06 PROCEDURE — 85610 PROTHROMBIN TIME: CPT

## 2020-07-06 PROCEDURE — 83735 ASSAY OF MAGNESIUM: CPT

## 2020-07-06 PROCEDURE — 94003 VENT MGMT INPAT SUBQ DAY: CPT

## 2020-07-06 PROCEDURE — 2500000003 HC RX 250 WO HCPCS: Performed by: INTERNAL MEDICINE

## 2020-07-06 PROCEDURE — 2700000000 HC OXYGEN THERAPY PER DAY

## 2020-07-06 PROCEDURE — 2000000000 HC ICU R&B

## 2020-07-06 PROCEDURE — 6360000002 HC RX W HCPCS: Performed by: INTERNAL MEDICINE

## 2020-07-06 PROCEDURE — 94640 AIRWAY INHALATION TREATMENT: CPT

## 2020-07-06 PROCEDURE — 94750 HC PULMONARY COMPLIANCE STUDY: CPT

## 2020-07-06 PROCEDURE — 82803 BLOOD GASES ANY COMBINATION: CPT

## 2020-07-06 PROCEDURE — 82550 ASSAY OF CK (CPK): CPT

## 2020-07-06 PROCEDURE — 71045 X-RAY EXAM CHEST 1 VIEW: CPT

## 2020-07-06 PROCEDURE — 2580000003 HC RX 258: Performed by: HOSPITALIST

## 2020-07-06 PROCEDURE — 85027 COMPLETE CBC AUTOMATED: CPT

## 2020-07-06 PROCEDURE — 36592 COLLECT BLOOD FROM PICC: CPT

## 2020-07-06 PROCEDURE — 2580000003 HC RX 258: Performed by: INTERNAL MEDICINE

## 2020-07-06 PROCEDURE — 94761 N-INVAS EAR/PLS OXIMETRY MLT: CPT

## 2020-07-06 PROCEDURE — 6360000002 HC RX W HCPCS: Performed by: HOSPITALIST

## 2020-07-06 RX ORDER — POTASSIUM CHLORIDE 29.8 MG/ML
20 INJECTION INTRAVENOUS PRN
Status: DISCONTINUED | OUTPATIENT
Start: 2020-07-06 | End: 2020-07-09 | Stop reason: HOSPADM

## 2020-07-06 RX ORDER — SODIUM CHLORIDE 0.9 % (FLUSH) 0.9 %
10 SYRINGE (ML) INJECTION PRN
Status: DISCONTINUED | OUTPATIENT
Start: 2020-07-06 | End: 2020-07-09 | Stop reason: HOSPADM

## 2020-07-06 RX ORDER — LIDOCAINE HYDROCHLORIDE 10 MG/ML
5 INJECTION, SOLUTION EPIDURAL; INFILTRATION; INTRACAUDAL; PERINEURAL ONCE
Status: DISCONTINUED | OUTPATIENT
Start: 2020-07-06 | End: 2020-07-09 | Stop reason: HOSPADM

## 2020-07-06 RX ORDER — SODIUM CHLORIDE 0.9 % (FLUSH) 0.9 %
10 SYRINGE (ML) INJECTION EVERY 12 HOURS SCHEDULED
Status: DISCONTINUED | OUTPATIENT
Start: 2020-07-06 | End: 2020-07-09 | Stop reason: HOSPADM

## 2020-07-06 RX ADMIN — MIDAZOLAM HYDROCHLORIDE 2 MG: 1 INJECTION, SOLUTION INTRAMUSCULAR; INTRAVENOUS at 03:00

## 2020-07-06 RX ADMIN — IPRATROPIUM BROMIDE AND ALBUTEROL SULFATE 1 AMPULE: .5; 3 SOLUTION RESPIRATORY (INHALATION) at 14:38

## 2020-07-06 RX ADMIN — Medication 10 ML: at 09:54

## 2020-07-06 RX ADMIN — GABAPENTIN 300 MG: 300 CAPSULE ORAL at 09:54

## 2020-07-06 RX ADMIN — FENTANYL CITRATE 25 MCG: 50 INJECTION INTRAMUSCULAR; INTRAVENOUS at 10:04

## 2020-07-06 RX ADMIN — MELATONIN 3 MG ORAL TABLET 3 MG: 3 TABLET ORAL at 22:32

## 2020-07-06 RX ADMIN — CEFTRIAXONE SODIUM 1 G: 1 INJECTION, POWDER, FOR SOLUTION INTRAMUSCULAR; INTRAVENOUS at 18:24

## 2020-07-06 RX ADMIN — SODIUM CHLORIDE, SODIUM LACTATE, POTASSIUM CHLORIDE, CALCIUM CHLORIDE AND DEXTROSE MONOHYDRATE: 5; 600; 310; 30; 20 INJECTION, SOLUTION INTRAVENOUS at 23:22

## 2020-07-06 RX ADMIN — FENTANYL CITRATE 100 MCG/HR: 50 INJECTION, SOLUTION INTRAMUSCULAR; INTRAVENOUS at 22:34

## 2020-07-06 RX ADMIN — Medication 10 ML: at 22:33

## 2020-07-06 RX ADMIN — SODIUM CHLORIDE, SODIUM LACTATE, POTASSIUM CHLORIDE, CALCIUM CHLORIDE AND DEXTROSE MONOHYDRATE: 5; 600; 310; 30; 20 INJECTION, SOLUTION INTRAVENOUS at 14:20

## 2020-07-06 RX ADMIN — PROPOFOL 50 MCG/KG/MIN: 10 INJECTION, EMULSION INTRAVENOUS at 19:28

## 2020-07-06 RX ADMIN — IPRATROPIUM BROMIDE AND ALBUTEROL SULFATE 1 AMPULE: .5; 3 SOLUTION RESPIRATORY (INHALATION) at 03:09

## 2020-07-06 RX ADMIN — IPRATROPIUM BROMIDE AND ALBUTEROL SULFATE 1 AMPULE: .5; 3 SOLUTION RESPIRATORY (INHALATION) at 00:13

## 2020-07-06 RX ADMIN — PROPOFOL 50 MCG/KG/MIN: 10 INJECTION, EMULSION INTRAVENOUS at 06:52

## 2020-07-06 RX ADMIN — SODIUM CHLORIDE, SODIUM LACTATE, POTASSIUM CHLORIDE, CALCIUM CHLORIDE AND DEXTROSE MONOHYDRATE: 5; 600; 310; 30; 20 INJECTION, SOLUTION INTRAVENOUS at 03:35

## 2020-07-06 RX ADMIN — POTASSIUM CHLORIDE 20 MEQ: 400 INJECTION, SOLUTION INTRAVENOUS at 06:55

## 2020-07-06 RX ADMIN — PROPOFOL 50 MCG/KG/MIN: 10 INJECTION, EMULSION INTRAVENOUS at 03:00

## 2020-07-06 RX ADMIN — CHLORHEXIDINE GLUCONATE 0.12% ORAL RINSE 15 ML: 1.2 LIQUID ORAL at 09:54

## 2020-07-06 RX ADMIN — IPRATROPIUM BROMIDE AND ALBUTEROL SULFATE 1 AMPULE: .5; 3 SOLUTION RESPIRATORY (INHALATION) at 18:53

## 2020-07-06 RX ADMIN — IPRATROPIUM BROMIDE AND ALBUTEROL SULFATE 1 AMPULE: .5; 3 SOLUTION RESPIRATORY (INHALATION) at 06:42

## 2020-07-06 RX ADMIN — FENTANYL CITRATE 50 MCG/HR: 50 INJECTION, SOLUTION INTRAMUSCULAR; INTRAVENOUS at 12:27

## 2020-07-06 RX ADMIN — ENOXAPARIN SODIUM 40 MG: 40 INJECTION SUBCUTANEOUS at 09:54

## 2020-07-06 RX ADMIN — MUPIROCIN: 20 OINTMENT TOPICAL at 23:00

## 2020-07-06 RX ADMIN — GABAPENTIN 300 MG: 300 CAPSULE ORAL at 13:40

## 2020-07-06 RX ADMIN — GABAPENTIN 300 MG: 300 CAPSULE ORAL at 22:32

## 2020-07-06 RX ADMIN — MIDAZOLAM HYDROCHLORIDE 2 MG: 1 INJECTION, SOLUTION INTRAMUSCULAR; INTRAVENOUS at 11:41

## 2020-07-06 RX ADMIN — PROPOFOL 50 MCG/KG/MIN: 10 INJECTION, EMULSION INTRAVENOUS at 22:33

## 2020-07-06 RX ADMIN — PROPOFOL 50 MCG/KG/MIN: 10 INJECTION, EMULSION INTRAVENOUS at 14:18

## 2020-07-06 RX ADMIN — IPRATROPIUM BROMIDE AND ALBUTEROL SULFATE 1 AMPULE: .5; 3 SOLUTION RESPIRATORY (INHALATION) at 11:03

## 2020-07-06 RX ADMIN — FAMOTIDINE 20 MG: 10 INJECTION INTRAVENOUS at 22:32

## 2020-07-06 RX ADMIN — IPRATROPIUM BROMIDE AND ALBUTEROL SULFATE 1 AMPULE: .5; 3 SOLUTION RESPIRATORY (INHALATION) at 22:24

## 2020-07-06 RX ADMIN — POTASSIUM CHLORIDE 20 MEQ: 400 INJECTION, SOLUTION INTRAVENOUS at 06:52

## 2020-07-06 RX ADMIN — MIDAZOLAM HYDROCHLORIDE 2 MG: 1 INJECTION, SOLUTION INTRAMUSCULAR; INTRAVENOUS at 09:54

## 2020-07-06 RX ADMIN — CHLORHEXIDINE GLUCONATE 0.12% ORAL RINSE 15 ML: 1.2 LIQUID ORAL at 22:32

## 2020-07-06 RX ADMIN — FAMOTIDINE 20 MG: 10 INJECTION INTRAVENOUS at 11:41

## 2020-07-06 RX ADMIN — MIDAZOLAM HYDROCHLORIDE 2 MG: 1 INJECTION, SOLUTION INTRAMUSCULAR; INTRAVENOUS at 19:13

## 2020-07-06 RX ADMIN — MUPIROCIN: 20 OINTMENT TOPICAL at 10:04

## 2020-07-06 RX ADMIN — POTASSIUM CHLORIDE 20 MEQ: 400 INJECTION, SOLUTION INTRAVENOUS at 09:54

## 2020-07-06 ASSESSMENT — PULMONARY FUNCTION TESTS
PIF_VALUE: 20
PIF_VALUE: 20
PIF_VALUE: 19
PIF_VALUE: 21
PIF_VALUE: 19
PIF_VALUE: 21
PIF_VALUE: 22
PIF_VALUE: 19
PIF_VALUE: 20
PIF_VALUE: 18
PIF_VALUE: 18
PIF_VALUE: 20
PIF_VALUE: 23
PIF_VALUE: 19
PIF_VALUE: 21
PIF_VALUE: 19
PIF_VALUE: 18
PIF_VALUE: 19
PIF_VALUE: 18
PIF_VALUE: 19
PIF_VALUE: 21
PIF_VALUE: 30
PIF_VALUE: 20

## 2020-07-06 ASSESSMENT — PAIN SCALES - GENERAL
PAINLEVEL_OUTOF10: 0

## 2020-07-06 NOTE — PROGRESS NOTES
Pt HR gregorio tfrom mid 70's to high 140's. Aggressive. Shaking head, kicking, and thrashing around bed. Attempting to pull ETT. Sedation restarted per protocol. Cont to monitor.

## 2020-07-06 NOTE — PROGRESS NOTES
07/06/20 0309   Vent Information   Vent Type 840   Vent Mode AC/VC   Vt Ordered 420 mL   Rate Set 14 bmp   Peak Flow 60 L/min   Pressure Support 0 cmH20   FiO2  30 %   SpO2 100 %   SpO2/FiO2 ratio 333.33   Sensitivity 3   PEEP/CPAP 5   I Time/ I Time % 0 s   Humidification Source Heated wire   Humidification Temp Measured 36.9   Circuit Condensation Drained   Vent Patient Data   High Peep/I Pressure 0   Peak Inspiratory Pressure 18 cmH2O   Mean Airway Pressure 7.7 cmH20   Rate Measured 14 br/min   Vt Exhaled 414 mL   Minute Volume 6.06 Liters   I:E Ratio 1:4.60   Cough/Sputum   Sputum How Obtained Endotracheal   Cough Non-productive   Sputum Amount None   Spontaneous Breathing Trial (SBT) RT Doc   Pulse 70   Breath Sounds   Right Upper Lobe Diminished   Right Middle Lobe Diminished   Right Lower Lobe Diminished   Left Upper Lobe Diminished   Left Lower Lobe Diminished   Additional Respiratory  Assessments   Resp 15   Alarm Settings   High Pressure Alarm 40 cmH2O   Low Minute Volume Alarm 4 L/min   Apnea (secs) 20 secs   High Respiratory Rate 40 br/min   Low Exhaled Vt  320 mL   Patient Observation   Observations 8ett 22 at lip

## 2020-07-06 NOTE — PROGRESS NOTES
07/05/20 2017   Vent Information   $Ventilation $Initial Day   Skin Assessment Clean, dry, & intact   Vent Type 840   Vent Mode AC/VC   Vt Ordered 420 mL   Rate Set 14 bmp   Peak Flow 60 L/min   Pressure Support 0 cmH20   FiO2  30 %   SpO2 100 %   SpO2/FiO2 ratio 333.33   Sensitivity 3   PEEP/CPAP 5   I Time/ I Time % 0 s   Humidification Source Heated wire   Humidification Temp Measured 35.9   Circuit Condensation Drained   Vent Patient Data   High Peep/I Pressure 0   Peak Inspiratory Pressure 20 cmH2O   Mean Airway Pressure 8.1 cmH20   Rate Measured 16 br/min   Vt Exhaled 479 mL   Minute Volume 6.98 Liters   I:E Ratio 1:4.60   Plateau Pressure 15 [id number]   Static Compliance 44 mL/cmH2O   Dynamic Compliance 29 mL/cmH2O   Cough/Sputum   Sputum How Obtained Endotracheal   Cough Non-productive   Sputum Amount None   Spontaneous Breathing Trial (SBT) RT Doc   Pulse 70   Breath Sounds   Right Upper Lobe Diminished   Right Middle Lobe Diminished   Right Lower Lobe Diminished   Left Upper Lobe Diminished   Left Lower Lobe Diminished   Additional Respiratory  Assessments   Resp 16   Alarm Settings   High Pressure Alarm 40 cmH2O   Low Minute Volume Alarm 4 L/min   Apnea (secs) 20 secs   High Respiratory Rate 40 br/min   Low Exhaled Vt  320 mL   Patient Observation   Observations 8ett 22 at lip

## 2020-07-06 NOTE — PROGRESS NOTES
Dr. Shraddha Fuchs notified of K of 2.9. Mag 2.1. K replacements ordered. 60mEqs total to be given.  See [facility]

## 2020-07-06 NOTE — PROGRESS NOTES
Pulmonary & Critical Care Medicine ICU Progress Note    CC: Drug overdose    Events of Last 24 hours:   D5LR 100 cc/hr   Failed SBT dure to tachycardia     Invasive Lines: IV: Left femoral     MV: 7  Vent Mode: AC/VC Rate Set: 14 bmp/Vt Ordered: 420 mL/ /FiO2 : 25 %  Recent Labs     20  0240 20  0516   PHART 7.349* 7.527*   LMC3DLV 44.0 41.2   PO2ART 101.2 152.0*       IV:   propofol 50 mcg/kg/min (20 0652)    dextrose      dextrose 5% in lactated ringers 100 mL/hr at 20 0335    norepinephrine Stopped (20 1136)       Vitals:  Blood pressure (!) 138/92, pulse 86, temperature 98.2 °F (36.8 °C), temperature source Axillary, resp. rate 18, height 5' 2\" (1.575 m), weight 125 lb (56.7 kg), SpO2 100 %. on /+5 25%   Temp  Av.7 °F (37.1 °C)  Min: 98.2 °F (36.8 °C)  Max: 99.4 °F (37.4 °C)    Intake/Output Summary (Last 24 hours) at 2020 0725  Last data filed at 2020 0501  Gross per 24 hour   Intake 2635 ml   Output 2850 ml   Net -215 ml     EXAM:  General: ill appearing    Eyes: PERRL. No sclera icterus. No conjunctival injection. ENT: No discharge. Pharynx clear. Neck: Trachea midline. Normal thyroid. Resp: No accessory muscle use. No crackles. No wheezing. Bilateral rhonchi. No dullness on percussion. CV: Regular rate. Regular rhythm. No mumur or rub. No edema. GI: Non-tender. Non-distended. No masses. No organomegaly. Normal bowel sounds. No hernia. Skin: Warm and dry. No nodule on exposed extremities. No rash on exposed extremities. Lymph: No cervical LAD. No supraclavicular LAD. M/S: No cyanosis. No joint deformity. No clubbing. Neuro: Sedated intubated. Responsive to painful stimuli.   Psych: No agitation, no anxiety, affect is full       Scheduled Meds:   chlorhexidine  15 mL Mouth/Throat BID    ipratropium-albuterol  1 ampule Inhalation Q4H    gabapentin  300 mg Oral TID    sodium chloride flush  10 mL Intravenous 2 times per day    enoxaparin  40 mg Subcutaneous Daily    cefTRIAXone (ROCEPHIN) IV  1 g Intravenous Q24H    melatonin  3 mg Oral Nightly     PRN Meds:  potassium chloride, fentanNYL, midazolam, cloNIDine, traZODone, sodium chloride flush, acetaminophen **OR** acetaminophen, polyethylene glycol, promethazine **OR** ondansetron, albuterol, glucose, dextrose, glucagon (rDNA), dextrose, dextrose, traMADol **AND** cloNIDine, dicyclomine, ibuprofen, gabapentin, hydrOXYzine, LORazepam    Results:  CBC:   Recent Labs     07/04/20  0445 07/05/20  0430 07/06/20  0510   WBC 7.9 11.4* 8.6   HGB 12.3* 13.6 14.5   HCT 39.3* 41.5 45.1   MCV 91.2 86.0 85.8    306 298     BMP:   Recent Labs     07/04/20  0445 07/05/20  0430 07/06/20  0510   * 137 144   K 4.2 4.1 2.9*    98* 104   CO2 19* 28 31   PHOS  --  2.1*  --    BUN 12 9 <2*   CREATININE 0.6* 0.7* 0.6*     LIVER PROFILE:   Recent Labs     07/03/20  1349 07/04/20  0445   AST 32 33   ALT 21 19   BILIDIR  --  <0.2   BILITOT 0.8 0.5   ALKPHOS 114 84       Cultures:  7/3 UC NGTD  7/3 BC  7/4 COVID-19 negative    Films:  CXR 7/6 was reviewed by me and it showed   Improved L lung aeration  Satisfactory ETT position         ASSESSMENT:  · Acute hypercapnic respiratory failure  · Drug overdose-positive for meth possible opiate  · Abnormal CXR with L sided opacification   · Methamphetamine abuse  · Heroin abuse and currently in withdrawal  · Acute encephalopathy/Metabolic encephalopathy   · Electrolytes disorder  · Rhabdomyolysis   · Possible UTI  · Hepatitis C  · Elevated procalcitonin      PLAN:  Mechanical ventilation as per my orders.  The ventilator was adjusted by me at the bedside for unstable, life threatening respiratory failure  Follow ABG and chest x-ray while on the ventilator  Supplemental oxygen to maintain SaO2 >92%; wean as tolerated  IV Propofol for sedation, target RASS -2, with daily spontaneous awakening trial   Fentanyl and Versed PRN, gtt as needed  Head of bed 30 degrees or higher at all times  Daily spontaneous breathing trial once PEEP less than 8, FiO2 less than 55%  Closely monitory airways, clinical status, cardiac rhythm, vital signs, and urine output   Inhaled bronchodilators  DC droplet plus precautions  D5LR for hypoglycemia  D/C femoral line and place PICC   Ceftriaxone D#4  Follow CK   Follow-up Cx   Nutrition: Tube feeding  Electrolytes replacement   Blood sugar control, with goal 150-180  GI prophylaxis: Pepcid  DVT prophylaxis: Lovenox  MRSA prophylaxis: Bactroban   Code status: Full code               Total critical care time caring for this patient with life threatening, unstable organ failure, including direct patient contact, management of life support systems, review of data including imaging and labs, discussions with other team members and physicians is 34 minutes so far today, excluding procedures.

## 2020-07-06 NOTE — CARE COORDINATION
CASE MANAGEMENT INITIAL ASSESSMENT      Reviewed chart and completed assessment via telephone with:  Patient's daughter, Gomez Leigh. Explained Case Management role/services. Primary contact information:  DaughterRanjit [phone]    Admit date/status:  Inpatient 7/3/20    Diagnosis:  AMS    Is this a Readmission?:   No    Insurance:   Medicare    Precert required for SNF -  N        3 night stay required - Y    Living arrangements, Adls, care needs, prior to admission:   Per daughter, patient is basically homeless. He stays with whoever will take him in. [facility] address was his father's home until he passed away and then it went to the patient's brother and patient and brother do not get along. Per daughter patient is independent with ADLs for the most part. Has difficulty breathing. Transportation:  walks    Durable Medical Equipment at home: None  Walker__Cane__RTS__ BSC__Shower Chair__02__ HHN__ CPAP__  BiPap__  Hospital Bed__ W/C___ Other__________    Services in the home and/or outpatient, prior to admission: none    Dialysis Facility (if applicable) no    PT/OT recs: [address] [place], [place] [address] [facility] Exemption Notification (HEN): n/a    Barriers to discharge:  homeless    Plan/comments:  Phone call to patient's daughterGoemz. See above.   Will likely have psych eval.    ECOC on chart for MD signature no

## 2020-07-06 NOTE — PROGRESS NOTES
PM assessment completed. See flowsheet. Sedated on vent. Propofol infusing at 50 mcg/kg/min and Fentanyl infusing at 75 mcg/hr. Carlos Ribera RASS -2. Lungs sounds clear throughout. NSR on bedside monitor. Bowel sounds active. LUE edema noted. Urinary catheter in place draining clear yellow urine. Bilateral soft wrist restraints in place for safety. Labs reviewed. Cont to monitor.

## 2020-07-06 NOTE — PROGRESS NOTES
AM assessment completed. See flowsheet. Sedated on vent. Propofol infusing at 50 mcg/kg/min. RASS -2. Lungs sounds clear throughout. NSR on bedside monitor. Bowel sounds active. LUE edema noted. Urinary catheter in place draining clear yellow urine. Bilateral soft wrist restraints in place for safety. Labs reviewed. Cont to monitor.

## 2020-07-06 NOTE — PROGRESS NOTES
07/06/20 0013   Vent Information   Vent Type 840   Vent Mode AC/VC   Vt Ordered 420 mL   Rate Set 14 bmp   Peak Flow 60 L/min   Pressure Support 0 cmH20   FiO2  30 %   SpO2 100 %   SpO2/FiO2 ratio 333.33   Sensitivity 3   PEEP/CPAP 5   I Time/ I Time % 0 s   Humidification Source Heated wire   Humidification Temp Measured 37   Circuit Condensation Drained   Vent Patient Data   High Peep/I Pressure 0   Peak Inspiratory Pressure 19 cmH2O   Mean Airway Pressure 7.7 cmH20   Rate Measured 15 br/min   Vt Exhaled 402 mL   Minute Volume 6.35 Liters   I:E Ratio 1:4.40   Cough/Sputum   Sputum How Obtained Endotracheal   Cough Non-productive   Sputum Amount None   Spontaneous Breathing Trial (SBT) RT Doc   Pulse 74   Breath Sounds   Right Upper Lobe Diminished   Right Middle Lobe Diminished   Right Lower Lobe Diminished   Left Upper Lobe Diminished   Left Lower Lobe Diminished   Additional Respiratory  Assessments   Resp 14   Alarm Settings   High Pressure Alarm 40 cmH2O   Low Minute Volume Alarm 4 L/min   Apnea (secs) 20 secs   High Respiratory Rate 40 br/min   Low Exhaled Vt  320 mL   Patient Observation   Observations 8ett 22 at lip

## 2020-07-06 NOTE — PROGRESS NOTES
Shift assessment completed, see flow sheet. Pt is following commands. RASS -1  Intubated and sedated on AC # 8.0 ETT, at 25 LL. 14 / 420 /30 %/ +5. SpO2 99%. Respirations are easy, even, and unlabored. Bilateral lung sounds diminished. OG in place at 54. Clamped. PIVs and CVC WDL  Propofol infusing at 50 mcg/kg/min. Bilateral soft wrist restraints in place for pt safety. All lines and monitoring devices in place. Mondragon is patent and secured with yellow urine. Plan of care and goals reviewed. Call light within reach. Bed in lowest position with wheels locked. No needs expressed at this time. Will continue to monitor.

## 2020-07-06 NOTE — PROGRESS NOTES
IM Progress Note    Admit Date:  7/3/2020  3    Interval history:       Pt with H/O polysustance abuse   Admitted for AMS , unintentional drug overdose - meth/ opiates    PCT elevated  Started developing withdrawal symptoms 7/4. Did not improve with COWS protcol --> patient with increasing agitation and delirium--> Intubated on 7/4 night       7/6  Patient remains intubated,  on mechanical ventilation. Sedated now on fentanyl , propofol  blood pressure stable now, weaned off of levo   Off droplet plus precautions, COVID-19 neg    Subjective:  Mr. William Hayes is  Intubated, sedated on mechanical vent     Objective:   BP (!) 151/95   Pulse 85   Temp 98.5 °F (36.9 °C) (Axillary)   Resp 17   Ht 5' 2\" (1.575 m)   Wt 125 lb (56.7 kg)   SpO2 100%   BMI 22.86 kg/m²       Intake/Output Summary (Last 24 hours) at 7/6/2020 1604  Last data filed at 7/6/2020 0501  Gross per 24 hour   Intake 1366 ml   Output 2200 ml   Net -834 ml       Physical Exam:  General: intubated, sedated on mechanical vent   Mucous Membranes:  Pink , anicteric  Neck: No JVD, no carotid bruit, no thyromegaly  Chest: good air entry , no wheezes, rhonchi  Cardiovascular: Tachycardic,  no murmurs or gallops  Abdomen:  Soft, undistended, non tender, no organomegaly, BS present  Extremities: No edema or cyanosis.  Distal pulses well felt  Neurological : grossly nonfocal   Psych: sedated        Medications:   Scheduled Medications:    mupirocin   Topical BID    famotidine (PEPCID) injection  20 mg Intravenous BID    lidocaine 1 % injection  5 mL Intradermal Once    sodium chloride flush  10 mL Intravenous 2 times per day    chlorhexidine  15 mL Mouth/Throat BID    ipratropium-albuterol  1 ampule Inhalation Q4H    gabapentin  300 mg Oral TID    sodium chloride flush  10 mL Intravenous 2 times per day    enoxaparin  40 mg Subcutaneous Daily    cefTRIAXone (ROCEPHIN) IV  1 g Intravenous Q24H    melatonin  3 mg Oral Nightly     I   fentaNYL (SUBLIMAZE) infusion 75 mcg/hr (07/06/20 1308)    propofol 50 mcg/kg/min (07/06/20 1418)    dextrose      dextrose 5% in lactated ringers 100 mL/hr at 07/06/20 1420    norepinephrine Stopped (07/05/20 1136)     potassium chloride, sodium chloride flush, fentanNYL, midazolam, cloNIDine, traZODone, sodium chloride flush, acetaminophen **OR** acetaminophen, polyethylene glycol, promethazine **OR** ondansetron, albuterol, glucose, dextrose, glucagon (rDNA), dextrose, dextrose, traMADol **AND** cloNIDine, dicyclomine, ibuprofen, gabapentin, hydrOXYzine, LORazepam    Lab Data:  Recent Labs     07/04/20 0445 07/05/20  0430 07/06/20  0510   WBC 7.9 11.4* 8.6   HGB 12.3* 13.6 14.5   HCT 39.3* 41.5 45.1   MCV 91.2 86.0 85.8    306 298     Recent Labs     07/04/20  0445 07/05/20  0430 07/06/20  0510   * 137 144   K 4.2 4.1 2.9*    98* 104   CO2 19* 28 31   PHOS  --  2.1*  --    BUN 12 9 <2*   CREATININE 0.6* 0.7* 0.6*     Recent Labs     07/05/20  0430 07/06/20  1010   CKTOTAL 1,115* 1,317*       Coagulation:   Lab Results   Component Value Date    INR 0.99 07/06/2020     Cardiac markers:   Lab Results   Component Value Date    CKTOTAL 1,317 07/06/2020    TROPONINI <0.01 07/03/2020         Lab Results   Component Value Date    ALT 19 07/04/2020    AST 33 07/04/2020    ALKPHOS 84 07/04/2020    BILITOT 0.5 07/04/2020       Lab Results   Component Value Date    INR 0.99 07/06/2020    PROTIME 11.5 07/06/2020          Radiology  IR PICC WO SQ PORT/PUMP > 5 YEARS   Final Result      XR CHEST PORTABLE   Final Result   Improved aeration of the lungs         XR CHEST PORTABLE   Final Result   1. Endotracheal tube tip is in the distal trachea. 2. The tip of the enteric tube remains at the gastroesophageal junction. The   tube should be advanced 12 cm. 3. Improved aeration of the left lung. XR CHEST PORTABLE   Final Result   Endotracheal tube with tip in the right mainstem bronchus. Recommend   repositioning. Opacification throughout the left lung, new. Hyperinflation of the right   lung. XR CHEST PORTABLE   Final Result   No acute cardiopulmonary disease. XR CHEST PORTABLE   Final Result   Pulmonary vascular congestion. XR CHEST PORTABLE    (Results Pending)       Cultures:   Urine cultures  NGTD  Blood cultures pending  COVID-19 negative          ASSESSMENT and PLAN :     Altered mental status  Acute toxic encephalopathy  Delirium   - secondary to drug overdose meth/ opiates   - H/O polysubstance abuse, (+) methamphetamine, poss fentanyl  - Patient required Benadryl Haldol and Ativan in the ED for agitation.  -Patient with history of IV drug abuse  -Worsening agitation and withdrawal symptoms due to opiate and methamphetamine withdrawal  -No improvement on COWS protocol, with clonidine and tramadol and PRN  Ativan   -Intubated On 7/4, sedated on propofol, fentanyl  drip    Acute hypoxic respiratory failure  -O2 sats documented at 63% on arrival to the ED. this was transient and sats improved. -Subsequent worsening respiratory failure in the setting of agitation and delirium, intubated, placed  on mechanical ventilation. Oxygen saturation stable on FiO2 30% and PEEP of 5  -Ruled out COVID-19, off droplet plus precautions    UTI  -Procalcitonin was  elevated. Procalcitonin levels improved from 50-> 26.8   -Continue Rocephin  -Follow-up on cultures    Rhabdomyolysis  -Secondary to agitation   - CK levels elevated at 1115--> 1317, continue IV fluids      Hypophosphatemia  Hypokalemia    - replete          DVT Prophylaxis: lovenox  DIET TUBE FEED CONTINUOUS/CYCLIC NPO; STANDARD WITH FIBER (Jevity 1.2 );  Orogastric; Continuous; 20; 50; 20  Code Status: Full Code         Emy Mckinnon MD 7/6/2020 4:04 PM

## 2020-07-06 NOTE — PROGRESS NOTES
Pt's Covid 19 came back not detected. Dr. Eleno Jolley made aware. Ok to D/C isolation at this time.

## 2020-07-06 NOTE — PLAN OF CARE
Nutrition Problem: Inadequate oral intake  Intervention: Food and/or Nutrient Delivery: Continue NPO, Start Tube Feeding  Nutritional Goals: patient will tolerate Jevity 1.2 at goal rate of 50 ml/hr x 20 hours without GI distress, without s/s of aspiration, and without additional lab/fluid disturbances; weight will remain stable during remainder of admission

## 2020-07-06 NOTE — PROGRESS NOTES
This note also relates to the following rows which could not be included:  Vt Ordered - Cannot attach notes to unvalidated device data  Rate Set - Cannot attach notes to unvalidated device data  Peak Flow - Cannot attach notes to unvalidated device data  Pressure Support - Cannot attach notes to unvalidated device data  FiO2  - Cannot attach notes to unvalidated device data  SpO2 - Cannot attach notes to unvalidated device data  Sensitivity - Cannot attach notes to unvalidated device data  PEEP/CPAP - Cannot attach notes to unvalidated device data  I Time/ I Time % - Cannot attach notes to unvalidated device data  High Peep/I Pressure - Cannot attach notes to unvalidated device data  Peak Inspiratory Pressure - Cannot attach notes to unvalidated device data  Mean Airway Pressure - Cannot attach notes to unvalidated device data  Rate Measured - Cannot attach notes to unvalidated device data  Minute Volume - Cannot attach notes to unvalidated device data  I:E Ratio - Cannot attach notes to unvalidated device data  Pulse - Cannot attach notes to unvalidated device data  Resp - Cannot attach notes to unvalidated device data  High Pressure Alarm - Cannot attach notes to unvalidated device data  Low Minute Volume Alarm - Cannot attach notes to unvalidated device data  High Respiratory Rate - Cannot attach notes to unvalidated device data       07/06/20 1854   Vent Information   $Ventilation $Subsequent Day   Vent Type 840   Vent Mode AC/VC   Humidification Source Heated wire   Humidification Temp 37   Humidification Temp Measured 36.9   Circuit Condensation Drained   Vent Patient Data   Vt Exhaled 446 mL   Plateau Pressure 15 PUN03   Static Compliance 45 mL/cmH2O   Dynamic Compliance 30 mL/cmH2O   Cough/Sputum   Sputum How Obtained Endotracheal;Suctioned   Cough Productive   Sputum Amount Small   Sputum Color Creamy; Yellow   Tenacity Thick   Breath Sounds   Right Upper Lobe Diminished   Right Middle Lobe Diminished Right Lower Lobe Diminished   Left Upper Lobe Diminished   Left Lower Lobe Diminished   Additional Respiratory  Assessments   Position Semi-Zaldivar's   Alarm Settings   Apnea (secs) 20 secs   Low Exhaled Vt  320 mL   ETT (adult)   Placement Date/Time: 07/04/20 6393   Timeout: Patient  Preoxygenation: Yes  Location: Oral  Secured at: 25 cm  Measured From: Lips   Secured at 22 cm   Measured From Lips   ET Placement Right  (moved to right)   Secured By Commercial tube wong   Site Condition Dry

## 2020-07-06 NOTE — PROGRESS NOTES
Nutrition Assessment (Enteral Nutrition)    Type and Reason for Visit: Initial, Consult(consult for TF ordering and management)    Nutrition Recommendations:   1. Start TF - Jevity 1.2 (\"standard with fiber\" formula in Epic) with a goal rate of 50 ml/hr x 20 hours. Start with 20 ml/hr and increase by 15 ml every 4 hours, as tolerated by patient, until goal rate can be achieved and maintained. Water flushes, 100 ml every 3 hours or per MD guidance. 2. Monitor TF start, rate, intake, and tolerance + water flushes. 3. Monitor vent status, sedation type/amount, TG check every 72 hours while receiving propofol, and plan of care. 4. Monitor nutrition-related labs, bowel function, and weight trends. 5. Please obtain an actual, current weight for this patient. Nutrition Assessment: patient was admitted with respiratory distress and altered mental status; patient is nutritionally compromised AEB hx of IVDU, admitted with c/o SOB and AMS, and intubation on 7/4/20 and he remains at risk for further compromise d/t no nutrition intake x 3 days admission, altered nutrition-related labs, and agitation this am; will start TF today    Malnutrition Assessment:  · Malnutrition Status: At risk for malnutrition  · Context: Acute illness or injury  · Findings of the 6 clinical characteristics of malnutrition (Minimum of 2 out of 6 clinical characteristics is required to make the diagnosis of moderate or severe Protein Calorie Malnutrition based on AND/ASPEN Guidelines):  1. Energy Intake-Less than or equal to 50% of estimated energy requirement, (x at least 3 days admission)    2. Weight Loss-Unable to assess, unable to assess  3. Fat Loss-Unable to assess(patient is very agitated),    4. Muscle Loss-Unable to assess(patient is very agitated),    5. Fluid Accumulation-No significant fluid accumulation,    6.   Strength-Not measured    Nutrition Risk Level: High    Nutrition Needs:  · Estimated Daily Total Kcal: 1311 - 1425 kcals based on 23-25 kcals/kg/CBW  · Estimated Daily Protein (g): 57 - 68 g protein based on 1.0-1.2 g/kg/CBW  · Estimated Daily Fluid (ml/day): 1300 - 1400 ml     Nutrition Diagnosis:   · Problem: Inadequate oral intake  · Etiology: related to Impaired respiratory function-inability to consume food     Signs and symptoms:  as evidenced by NPO status due to medical condition, Intubation    Objective Information:  · Nutrition-Focused Physical Findings: patient remains intubated and sedated on 35 mcg propofol at this time; patient was agitated this am before and during rounds; + small, fluid-filled blisters on L arm that are weeping; K and BUN/Cr were low this am; K was ordered during rounds; patient failed SBT this am d/t agitation; patient has D5 in LR at 100 ml/hr ordered; abdomen is soft, non-distended, and bowel sounds are active; + poor dentition noted; + scattered bruising and abrasions; skin color is appropriate for ethnicity + appropriately dry and warm  · Wound Type: None  · Current Nutrition Therapies:  · Oral Diet Orders: NPO   · Oral Diet intake: NPO  · Oral Nutrition Supplement (ONS) Orders: None  · ONS intake: NPO  · Tube Feeding (TF) Orders:   · Feeding Route: Orogastric  · Formula: Standard w/Fiber  · Rate (ml/hr):50 ml/hr     · Volume (ml/day): 1000 ml   · Duration: Continuous  · Additives/Modulars: (none)  · Water Flushes: 100 ml every 3 hours or per MD guidance  · Current TF & Flush Orders Provides: TF to be started today  · Goal TF & Flush Orders Provides: Jevity 1.2 with a goal rate of 50 ml/hr x 20 hours = 1000 ml TV, 1200 kcals, 56 g protein, and 807 ml free water + 100 ml water flushes every 3 hours or per MD guidance  · Additional Calories: propofol at 35 mcg x 24 hours = 316 kcals from lipids  · Anthropometric Measures:  · Ht: 5' 2\" (157.5 cm)   · Current Body Wt: 125 lb (56.7 kg)(stated weight; obtained on 7/3/20)  · Admission Body Wt: 125 lb (56.7 kg)(stated weight; obtained on 7/3/20)  · Usual Body Wt: (unable to assess)  · Weight Change: unable to assess   · Ideal Body Wt: 118 lb (53.5 kg), % Ideal Body 106%  · BMI Classification: BMI 18.5 - 24.9 Normal Weight    Nutrition Interventions:   Continue NPO, Start Tube Feeding  Continued Inpatient Monitoring, Coordination of Care, Coordination of Community Care    Nutrition Evaluation:   · Evaluation: Goals set   · Goals: patient will tolerate Jevity 1.2 at goal rate of 50 ml/hr x 20 hours without GI distress, without s/s of aspiration, and without additional lab/fluid disturbances; weight will remain stable during remainder of admission   · Monitoring: TF Intake, TF Tolerance, Skin Integrity, Mental Status/Confusion, Weight, Pertinent Labs, Monitor Bowel Function      Electronically signed by Pamela Jules RD, LD on 7/6/20 at 11:27 AM EDT    Contact Number: [phone]

## 2020-07-07 ENCOUNTER — APPOINTMENT (OUTPATIENT)
Dept: GENERAL RADIOLOGY | Age: 50
DRG: 917 | End: 2020-07-07
Payer: MEDICARE

## 2020-07-07 LAB
ANION GAP SERPL CALCULATED.3IONS-SCNC: 10 MMOL/L (ref 3–16)
ANION GAP SERPL CALCULATED.3IONS-SCNC: 9 MMOL/L (ref 3–16)
BASE EXCESS ARTERIAL: 2.6 MMOL/L (ref -3–3)
BASE EXCESS ARTERIAL: 4 MMOL/L (ref -3–3)
BASOPHILS ABSOLUTE: 0 K/UL (ref 0–0.2)
BASOPHILS RELATIVE PERCENT: 0.5 %
BUN BLDV-MCNC: 3 MG/DL (ref 7–20)
BUN BLDV-MCNC: <2 MG/DL (ref 7–20)
CALCIUM IONIZED: 1.16 MMOL/L (ref 1.12–1.32)
CALCIUM SERPL-MCNC: 5.9 MG/DL (ref 8.3–10.6)
CALCIUM SERPL-MCNC: 8.6 MG/DL (ref 8.3–10.6)
CARBOXYHEMOGLOBIN ARTERIAL: 0.4 % (ref 0–1.5)
CARBOXYHEMOGLOBIN ARTERIAL: 0.5 % (ref 0–1.5)
CHLORIDE BLD-SCNC: 108 MMOL/L (ref 99–110)
CHLORIDE BLD-SCNC: 112 MMOL/L (ref 99–110)
CO2: 17 MMOL/L (ref 21–32)
CO2: 27 MMOL/L (ref 21–32)
CREAT SERPL-MCNC: 0.6 MG/DL (ref 0.9–1.3)
CREAT SERPL-MCNC: <0.5 MG/DL (ref 0.9–1.3)
EOSINOPHILS ABSOLUTE: 0.2 K/UL (ref 0–0.6)
EOSINOPHILS RELATIVE PERCENT: 2.4 %
GFR AFRICAN AMERICAN: >60
GFR AFRICAN AMERICAN: >60
GFR NON-AFRICAN AMERICAN: >60
GFR NON-AFRICAN AMERICAN: >60
GLUCOSE BLD-MCNC: 109 MG/DL (ref 70–99)
GLUCOSE BLD-MCNC: 109 MG/DL (ref 70–99)
GLUCOSE BLD-MCNC: 86 MG/DL (ref 70–99)
GLUCOSE BLD-MCNC: 93 MG/DL (ref 70–99)
HCO3 ARTERIAL: 27.5 MMOL/L (ref 21–29)
HCO3 ARTERIAL: 29.2 MMOL/L (ref 21–29)
HCT VFR BLD CALC: 41.5 % (ref 40.5–52.5)
HEMOGLOBIN, ART, EXTENDED: 14.4 G/DL (ref 13.5–17.5)
HEMOGLOBIN, ART, EXTENDED: 14.7 G/DL (ref 13.5–17.5)
HEMOGLOBIN: 13.2 G/DL (ref 13.5–17.5)
LYMPHOCYTES ABSOLUTE: 2.1 K/UL (ref 1–5.1)
LYMPHOCYTES RELATIVE PERCENT: 31.9 %
MAGNESIUM: 1.4 MG/DL (ref 1.8–2.4)
MAGNESIUM: 2.5 MG/DL (ref 1.8–2.4)
MCH RBC QN AUTO: 27.6 PG (ref 26–34)
MCHC RBC AUTO-ENTMCNC: 31.8 G/DL (ref 31–36)
MCV RBC AUTO: 86.9 FL (ref 80–100)
METHEMOGLOBIN ARTERIAL: 0 %
METHEMOGLOBIN ARTERIAL: 0.3 %
MONOCYTES ABSOLUTE: 0.5 K/UL (ref 0–1.3)
MONOCYTES RELATIVE PERCENT: 6.8 %
NEUTROPHILS ABSOLUTE: 3.9 K/UL (ref 1.7–7.7)
NEUTROPHILS RELATIVE PERCENT: 58.4 %
O2 CONTENT ARTERIAL: 19 ML/DL
O2 CONTENT ARTERIAL: 19 ML/DL
O2 SAT, ARTERIAL: 93.4 %
O2 SAT, ARTERIAL: 96.4 %
O2 THERAPY: ABNORMAL
O2 THERAPY: ABNORMAL
PCO2 ARTERIAL: 43.6 MMHG (ref 35–45)
PCO2 ARTERIAL: 46 MMHG (ref 35–45)
PDW BLD-RTO: 14 % (ref 12.4–15.4)
PERFORMED ON: NORMAL
PERFORMED ON: NORMAL
PH ARTERIAL: 7.42 (ref 7.35–7.45)
PH ARTERIAL: 7.42 (ref 7.35–7.45)
PH VENOUS: 7.4 (ref 7.35–7.45)
PHOSPHORUS: 2.8 MG/DL (ref 2.5–4.9)
PLATELET # BLD: 223 K/UL (ref 135–450)
PMV BLD AUTO: 7 FL (ref 5–10.5)
PO2 ARTERIAL: 66.3 MMHG (ref 75–108)
PO2 ARTERIAL: 83.9 MMHG (ref 75–108)
POTASSIUM SERPL-SCNC: 2.5 MMOL/L (ref 3.5–5.1)
POTASSIUM SERPL-SCNC: 5.1 MMOL/L (ref 3.5–5.1)
POTASSIUM SERPL-SCNC: 5.9 MMOL/L (ref 3.5–5.1)
RBC # BLD: 4.77 M/UL (ref 4.2–5.9)
SODIUM BLD-SCNC: 139 MMOL/L (ref 136–145)
SODIUM BLD-SCNC: 144 MMOL/L (ref 136–145)
TCO2 ARTERIAL: 28.9 MMOL/L
TCO2 ARTERIAL: 30.6 MMOL/L
TOTAL CK: 424 U/L (ref 39–308)
WBC # BLD: 6.6 K/UL (ref 4–11)

## 2020-07-07 PROCEDURE — 6370000000 HC RX 637 (ALT 250 FOR IP): Performed by: INTERNAL MEDICINE

## 2020-07-07 PROCEDURE — 6370000000 HC RX 637 (ALT 250 FOR IP): Performed by: HOSPITALIST

## 2020-07-07 PROCEDURE — 80048 BASIC METABOLIC PNL TOTAL CA: CPT

## 2020-07-07 PROCEDURE — 94640 AIRWAY INHALATION TREATMENT: CPT

## 2020-07-07 PROCEDURE — 71045 X-RAY EXAM CHEST 1 VIEW: CPT

## 2020-07-07 PROCEDURE — 99291 CRITICAL CARE FIRST HOUR: CPT | Performed by: INTERNAL MEDICINE

## 2020-07-07 PROCEDURE — 94761 N-INVAS EAR/PLS OXIMETRY MLT: CPT

## 2020-07-07 PROCEDURE — 85025 COMPLETE CBC W/AUTO DIFF WBC: CPT

## 2020-07-07 PROCEDURE — 36592 COLLECT BLOOD FROM PICC: CPT

## 2020-07-07 PROCEDURE — 6360000002 HC RX W HCPCS: Performed by: INTERNAL MEDICINE

## 2020-07-07 PROCEDURE — 83735 ASSAY OF MAGNESIUM: CPT

## 2020-07-07 PROCEDURE — 2500000003 HC RX 250 WO HCPCS: Performed by: INTERNAL MEDICINE

## 2020-07-07 PROCEDURE — 2000000000 HC ICU R&B

## 2020-07-07 PROCEDURE — 36415 COLL VENOUS BLD VENIPUNCTURE: CPT

## 2020-07-07 PROCEDURE — 82550 ASSAY OF CK (CPK): CPT

## 2020-07-07 PROCEDURE — 2580000003 HC RX 258: Performed by: HOSPITALIST

## 2020-07-07 PROCEDURE — 36600 WITHDRAWAL OF ARTERIAL BLOOD: CPT

## 2020-07-07 PROCEDURE — 82330 ASSAY OF CALCIUM: CPT

## 2020-07-07 PROCEDURE — 84132 ASSAY OF SERUM POTASSIUM: CPT

## 2020-07-07 PROCEDURE — 2580000003 HC RX 258: Performed by: INTERNAL MEDICINE

## 2020-07-07 PROCEDURE — 6360000002 HC RX W HCPCS: Performed by: HOSPITALIST

## 2020-07-07 PROCEDURE — 84100 ASSAY OF PHOSPHORUS: CPT

## 2020-07-07 PROCEDURE — 99232 SBSQ HOSP IP/OBS MODERATE 35: CPT | Performed by: INTERNAL MEDICINE

## 2020-07-07 PROCEDURE — 2700000000 HC OXYGEN THERAPY PER DAY

## 2020-07-07 PROCEDURE — 82803 BLOOD GASES ANY COMBINATION: CPT

## 2020-07-07 RX ORDER — SODIUM CHLORIDE 9 MG/ML
INJECTION, SOLUTION INTRAVENOUS CONTINUOUS
Status: DISCONTINUED | OUTPATIENT
Start: 2020-07-07 | End: 2020-07-08

## 2020-07-07 RX ORDER — POTASSIUM CHLORIDE 29.8 MG/ML
20 INJECTION INTRAVENOUS
Status: COMPLETED | OUTPATIENT
Start: 2020-07-07 | End: 2020-07-07

## 2020-07-07 RX ORDER — MAGNESIUM SULFATE IN WATER 40 MG/ML
2 INJECTION, SOLUTION INTRAVENOUS ONCE
Status: COMPLETED | OUTPATIENT
Start: 2020-07-07 | End: 2020-07-07

## 2020-07-07 RX ORDER — IPRATROPIUM BROMIDE AND ALBUTEROL SULFATE 2.5; .5 MG/3ML; MG/3ML
1 SOLUTION RESPIRATORY (INHALATION) 4 TIMES DAILY
Status: DISCONTINUED | OUTPATIENT
Start: 2020-07-07 | End: 2020-07-09 | Stop reason: HOSPADM

## 2020-07-07 RX ADMIN — MUPIROCIN: 20 OINTMENT TOPICAL at 07:56

## 2020-07-07 RX ADMIN — CLONIDINE HYDROCHLORIDE 0.1 MG: 0.1 TABLET ORAL at 17:40

## 2020-07-07 RX ADMIN — CHLORHEXIDINE GLUCONATE 0.12% ORAL RINSE 15 ML: 1.2 LIQUID ORAL at 07:54

## 2020-07-07 RX ADMIN — MIDAZOLAM HYDROCHLORIDE 2 MG: 1 INJECTION, SOLUTION INTRAMUSCULAR; INTRAVENOUS at 05:09

## 2020-07-07 RX ADMIN — POTASSIUM CHLORIDE 20 MEQ: 400 INJECTION, SOLUTION INTRAVENOUS at 09:05

## 2020-07-07 RX ADMIN — PROPOFOL 50 MCG/KG/MIN: 10 INJECTION, EMULSION INTRAVENOUS at 04:15

## 2020-07-07 RX ADMIN — HYDROXYZINE PAMOATE 50 MG: 50 CAPSULE ORAL at 17:40

## 2020-07-07 RX ADMIN — Medication 10 ML: at 07:55

## 2020-07-07 RX ADMIN — SODIUM CHLORIDE: 9 INJECTION, SOLUTION INTRAVENOUS at 11:33

## 2020-07-07 RX ADMIN — IPRATROPIUM BROMIDE AND ALBUTEROL SULFATE 1 AMPULE: .5; 3 SOLUTION RESPIRATORY (INHALATION) at 02:00

## 2020-07-07 RX ADMIN — POTASSIUM CHLORIDE 20 MEQ: 400 INJECTION, SOLUTION INTRAVENOUS at 11:33

## 2020-07-07 RX ADMIN — FENTANYL CITRATE 125 MCG/HR: 50 INJECTION, SOLUTION INTRAMUSCULAR; INTRAVENOUS at 07:16

## 2020-07-07 RX ADMIN — POTASSIUM CHLORIDE 20 MEQ: 400 INJECTION, SOLUTION INTRAVENOUS at 10:04

## 2020-07-07 RX ADMIN — CEFTRIAXONE SODIUM 1 G: 1 INJECTION, POWDER, FOR SOLUTION INTRAMUSCULAR; INTRAVENOUS at 16:44

## 2020-07-07 RX ADMIN — GABAPENTIN 300 MG: 300 CAPSULE ORAL at 07:55

## 2020-07-07 RX ADMIN — MAGNESIUM SULFATE IN WATER 2 G: 40 INJECTION, SOLUTION INTRAVENOUS at 11:37

## 2020-07-07 RX ADMIN — TRAZODONE HYDROCHLORIDE 100 MG: 100 TABLET ORAL at 20:20

## 2020-07-07 RX ADMIN — ONDANSETRON 4 MG: 2 INJECTION INTRAMUSCULAR; INTRAVENOUS at 14:03

## 2020-07-07 RX ADMIN — FENTANYL CITRATE 25 MCG: 50 INJECTION INTRAMUSCULAR; INTRAVENOUS at 09:05

## 2020-07-07 RX ADMIN — IBUPROFEN 800 MG: 800 TABLET, FILM COATED ORAL at 22:27

## 2020-07-07 RX ADMIN — MELATONIN 3 MG ORAL TABLET 3 MG: 3 TABLET ORAL at 20:20

## 2020-07-07 RX ADMIN — IPRATROPIUM BROMIDE AND ALBUTEROL SULFATE 1 AMPULE: .5; 3 SOLUTION RESPIRATORY (INHALATION) at 18:44

## 2020-07-07 RX ADMIN — Medication 10 ML: at 20:20

## 2020-07-07 RX ADMIN — GABAPENTIN 300 MG: 300 CAPSULE ORAL at 20:20

## 2020-07-07 RX ADMIN — MIDAZOLAM HYDROCHLORIDE 2 MG: 1 INJECTION, SOLUTION INTRAMUSCULAR; INTRAVENOUS at 00:36

## 2020-07-07 RX ADMIN — LORAZEPAM 2 MG: 2 INJECTION INTRAMUSCULAR; INTRAVENOUS at 15:02

## 2020-07-07 RX ADMIN — FAMOTIDINE 20 MG: 10 INJECTION INTRAVENOUS at 20:20

## 2020-07-07 RX ADMIN — POTASSIUM CHLORIDE 20 MEQ: 400 INJECTION, SOLUTION INTRAVENOUS at 12:47

## 2020-07-07 RX ADMIN — IPRATROPIUM BROMIDE AND ALBUTEROL SULFATE 1 AMPULE: .5; 3 SOLUTION RESPIRATORY (INHALATION) at 07:58

## 2020-07-07 RX ADMIN — Medication 10 ML: at 20:21

## 2020-07-07 RX ADMIN — Medication 10 ML: at 09:05

## 2020-07-07 RX ADMIN — POTASSIUM CHLORIDE 20 MEQ: 400 INJECTION, SOLUTION INTRAVENOUS at 08:04

## 2020-07-07 RX ADMIN — FAMOTIDINE 20 MG: 10 INJECTION INTRAVENOUS at 07:55

## 2020-07-07 RX ADMIN — ENOXAPARIN SODIUM 40 MG: 40 INJECTION SUBCUTANEOUS at 07:55

## 2020-07-07 RX ADMIN — IPRATROPIUM BROMIDE AND ALBUTEROL SULFATE 1 AMPULE: .5; 3 SOLUTION RESPIRATORY (INHALATION) at 15:55

## 2020-07-07 RX ADMIN — IPRATROPIUM BROMIDE AND ALBUTEROL SULFATE 1 AMPULE: .5; 3 SOLUTION RESPIRATORY (INHALATION) at 11:27

## 2020-07-07 ASSESSMENT — PULMONARY FUNCTION TESTS
PIF_VALUE: 11
PIF_VALUE: -20
PIF_VALUE: 12
PIF_VALUE: 23
PIF_VALUE: 24
PIF_VALUE: 20
PIF_VALUE: 25
PIF_VALUE: 11
PIF_VALUE: 22
PIF_VALUE: 24
PIF_VALUE: 23
PIF_VALUE: 19
PIF_VALUE: 18
PIF_VALUE: 12

## 2020-07-07 ASSESSMENT — PAIN SCALES - GENERAL
PAINLEVEL_OUTOF10: 0
PAINLEVEL_OUTOF10: 0
PAINLEVEL_OUTOF10: 4
PAINLEVEL_OUTOF10: 3
PAINLEVEL_OUTOF10: 0

## 2020-07-07 NOTE — PROGRESS NOTES
IM Progress Note    Admit Date:  7/3/2020  4    Interval history:       Pt with H/O polysustance abuse   Admitted for AMS , unintentional drug overdose - meth/ opiates   PCT elevated  Started developing withdrawal symptoms 7/4. Did not improve with COWS protcol --> patient with increasing agitation and delirium--> Intubated on 7/4 night     Off droplet plus precautions, COVID-19 neg    7/5, 7/6  Patient remained intubated,  on mechanical ventilation. 7/7  Patient extubated today. Subjective:  Mr. Jonny Torres is is awake responding well. No distress. Oxygen saturation stable on 2 L nasal cannula. Objective:   BP (!) 154/83   Pulse 111   Temp 98.2 °F (36.8 °C) (Axillary)   Resp (!) 31   Ht 5' 2\" (1.575 m)   Wt 125 lb (56.7 kg)   SpO2 95%   BMI 22.86 kg/m²       Intake/Output Summary (Last 24 hours) at 7/7/2020 1543  Last data filed at 7/7/2020 1234  Gross per 24 hour   Intake 4238 ml   Output 3825 ml   Net 413 ml       Physical Exam:  General: Awake and alert, no distress, appears to be oriented to place and person  Mucous Membranes:  Pink , anicteric  Neck: No JVD, no carotid bruit, no thyromegaly  Chest: Clear to auscultation, no wheezes, rhonchi  Cardiovascular: Tachycardic,  no murmurs or gallops  Abdomen:  Soft, undistended, non tender, no organomegaly, BS present  Extremities: No edema or cyanosis.  Distal pulses well felt  Neurological :  nonfocal   Psych: No anxiety or agitation        Medications:   Scheduled Medications:    ipratropium-albuterol  1 ampule Inhalation 4x daily    mupirocin   Topical BID    famotidine (PEPCID) injection  20 mg Intravenous BID    lidocaine 1 % injection  5 mL Intradermal Once    sodium chloride flush  10 mL Intravenous 2 times per day    gabapentin  300 mg Oral TID    sodium chloride flush  10 mL Intravenous 2 times per day    enoxaparin  40 mg Subcutaneous Daily    cefTRIAXone (ROCEPHIN) IV  1 g Intravenous Q24H    melatonin  3 mg Oral Nightly     I   sodium chloride 75 mL/hr at 20 1133    dextrose       potassium chloride, sodium chloride flush, cloNIDine, traZODone, sodium chloride flush, acetaminophen **OR** acetaminophen, polyethylene glycol, promethazine **OR** ondansetron, albuterol, glucose, dextrose, glucagon (rDNA), dextrose, dextrose, [] traMADol **AND** cloNIDine, dicyclomine, ibuprofen, gabapentin, hydrOXYzine, LORazepam    Lab Data:  Recent Labs     20  0430 20  0510 20  0717   WBC 11.4* 8.6 6.6   HGB 13.6 14.5 13.2*   HCT 41.5 45.1 41.5   MCV 86.0 85.8 86.9    298 223     Recent Labs     20  0430 20  0510 20  0520 20  1400 20  1510    144 139 144  --    K 4.1 2.9* 2.5* 5.9* 5.1   CL 98* 104 112* 108  --    CO2 28 31 17* 27  --    PHOS 2.1*  --  2.8  --   --    BUN 9 <2* <2* 3*  --    CREATININE 0.7* 0.6* <0.5* 0.6*  --      Recent Labs     20  1010 20  0520   CKTOTAL 1,317* 424*       Coagulation:   Lab Results   Component Value Date    INR 0.99 2020     Cardiac markers:   Lab Results   Component Value Date    CKTOTAL 424 2020    TROPONINI <0.01 2020         Lab Results   Component Value Date    ALT 19 2020    AST 33 2020    ALKPHOS 84 2020    BILITOT 0.5 2020       Lab Results   Component Value Date    INR 0.99 2020    PROTIME 11.5 2020          Radiology  XR CHEST PORTABLE   Final Result   Clear lungs. IR PICC WO SQ PORT/PUMP > 5 YEARS   Final Result      XR CHEST PORTABLE   Final Result   Improved aeration of the lungs         XR CHEST PORTABLE   Final Result   1. Endotracheal tube tip is in the distal trachea. 2. The tip of the enteric tube remains at the gastroesophageal junction. The   tube should be advanced 12 cm. 3. Improved aeration of the left lung. XR CHEST PORTABLE   Final Result   Endotracheal tube with tip in the right mainstem bronchus. Recommend   repositioning. Opacification throughout the left lung, new. Hyperinflation of the right   lung. XR CHEST PORTABLE   Final Result   No acute cardiopulmonary disease. XR CHEST PORTABLE   Final Result   Pulmonary vascular congestion. XR CHEST PORTABLE    (Results Pending)   XR CHEST PORTABLE    (Results Pending)       Cultures:   Urine cultures  NGTD  Blood cultures pending  COVID-19 negative          ASSESSMENT and PLAN :     Altered mental status  Acute toxic encephalopathy  Delirium   - secondary to drug overdose meth/ opiates   - H/O polysubstance abuse, (+) methamphetamine, poss fentanyl  - Patient required Benadryl Haldol and Ativan in the ED for agitation.  -Patient with history of IV drug abuse  -Worsening agitation and withdrawal symptoms due to opiate and methamphetamine withdrawal  -No improvement on COWS protocol, with clonidine and tramadol and PRN  Ativan   -Intubated On 7/4, sedated on propofol, fentanyl  Drip  -Extubated today 7/7/2020. Withdrawal symptoms seems to have improved. Off of all drips, monitor    Acute hypoxic respiratory failure  -O2 sats documented at 63% on arrival to the ED. this was transient and sats improved. -Subsequent worsening respiratory failure in the setting of agitation and delirium, intubated, placed  on mechanical ventilation.    -Extubated today 7/7/2020. Oxygen saturation stable on 2 L  -Ruled out COVID-19, off droplet plus precautions    UTI  -Procalcitonin was  elevated.   Procalcitonin levels improved from 50-> 26.8   -Continue Rocephin  -Follow-up on cultures-negative    Nontraumatic rhabdomyolysis  -Secondary to agitation   - CK levels elevated at 1115--> 1317--> 424, continue IV fluids    Hypophosphatemia  Hypokalemia    - repleted          DVT Prophylaxis: lovenox  Diet NPO Effective Now  Code Status: Full Code         Ema Briscoe MD 7/7/2020 3:43 PM

## 2020-07-07 NOTE — PROGRESS NOTES
Pulmonary & Critical Care Medicine ICU Progress Note    CC: Drug overdose    Events of Last 24 hours:   D5LR 100 cc/hr   Failed SBT dure to tachycardia   FiO2 25%   PEEP 5       Invasive Lines: IV: Left femoral - PICC      MV:   Vent Mode: AC/VC Rate Set: 14 bmp/Vt Ordered: 420 mL/ /FiO2 : 25 %  Recent Labs     20  0516 20  0531   PHART 7.527* 7.421   ZQB7CLQ 41.2 46.0*   PO2ART 152.0* 83.9       IV:   fentaNYL (SUBLIMAZE) infusion 125 mcg/hr (20 0716)    propofol 50 mcg/kg/min (20 0415)    dextrose      dextrose 5% in lactated ringers 100 mL/hr at 20 2322    norepinephrine Stopped (20 1136)       Vitals:  Blood pressure (!) 126/95, pulse 91, temperature 98.2 °F (36.8 °C), temperature source Oral, resp. rate 18, height 5' 2\" (1.575 m), weight 125 lb (56.7 kg), SpO2 100 %. on /+5 25%   Temp  Av.3 °F (36.8 °C)  Min: 98.2 °F (36.8 °C)  Max: 98.5 °F (36.9 °C)    Intake/Output Summary (Last 24 hours) at 2020 0722  Last data filed at 2020 0530  Gross per 24 hour   Intake 3752 ml   Output 2700 ml   Net 1052 ml     EXAM:  General: ill appearing    Eyes: PERRL. No sclera icterus. No conjunctival injection. ENT: No discharge. Pharynx clear. Neck: Trachea midline. Normal thyroid. Resp: + Accessory muscle use. No crackles. Bilateral wheezing. Bilateral rhonchi. No dullness on percussion. CV: Regular rate. Regular rhythm. No mumur or rub. No edema. GI: Non-tender. Non-distended. No masses. No organomegaly. Normal bowel sounds. No hernia. Skin: Warm and dry. No nodule on exposed extremities. No rash on exposed extremities. Lymph: No cervical LAD. No supraclavicular LAD. M/S: No cyanosis. No joint deformity. No clubbing. Neuro: Alert and awaked. Followed commands.    Psych: No agitation, no anxiety, affect is full       Scheduled Meds:   mupirocin   Topical BID    famotidine (PEPCID) injection  20 mg Intravenous BID    lidocaine 1 % on the ventilator  Supplemental oxygen to maintain SaO2 >92%; wean as tolerated  IV Propofol for sedation, target RASS -2, with daily spontaneous awakening trial   Fentanyl and Versed PRN, gtt as needed  Head of bed 30 degrees or higher at all times  Daily spontaneous breathing trial once PEEP less than 8, FiO2 less than 55%  Closely monitory airways, clinical status, cardiac rhythm, vital signs, and urine output   Inhaled bronchodilators- DuoNeb   Change IVF 75 cc NS   Ceftriaxone D#5  Follow CK -improving  Follow-up Cx   Electrolytes replacement   Nutrition: Tube feeding  Electrolytes replacement   Blood sugar control, with goal 150-180  GI prophylaxis: Pepcid  DVT prophylaxis: Lovenox  MRSA prophylaxis: Bactroban   Code status: Full code               Total critical care time caring for this patient with life threatening, unstable organ failure, including direct patient contact, management of life support systems, review of data including imaging and labs, discussions with other team members and physicians is 33 minutes so far today, excluding procedures.

## 2020-07-07 NOTE — PROGRESS NOTES
Patient reassessed. Pretty well unchanged. On 2L NC. Tachypneic. Slightly labored breathing. NS at 75 ml/h. Central line dressing is clean, dry and intact with no signs of drainage. All tubing is current and dated. IPA caps applied to all access hubs. Patient denies any further needs at this time. Call light and table within reach.      Electronically signed by Lynette Gu RN on 7/7/2020 at 4:01 PM

## 2020-07-07 NOTE — PROGRESS NOTES
2    Sputum  Sputum Color: White, Yellow, Tenacity: Thick, Sputum How Obtained: Endotracheal, Suctioned  Cough: Strong, productive = 1    Vital Signs   /79   Pulse 131   Temp 98.2 °F (36.8 °C) (Axillary)   Resp 28   Ht 5' 2\" (1.575 m)   Wt 125 lb (56.7 kg)   SpO2 99%   BMI 22.86 kg/m²   SPO2 (COPD values may differ): 90-91% on room air or greater than 92% on FiO2 24- 28% = 1    Peak Flow (asthma only): not applicable = 0    RSI: 21-59 = Q6H or QID and Q4HPRN for dyspnea        Plan       Goals: medication delivery, mobilize retained secretions, volume expansion and improve oxygenation    Patient/caregiver was educated on the proper method of use for Respiratory Care Devices:  Yes      Level of patient/caregiver understanding able to:   ? Verbalize understanding   ? Demonstrate understanding       ? Teach back        ? Needs reinforcement       ? No available caregiver               ? Other:     Response to education:  Good     Is patient being placed on Home Treatment Regimen? No     Does the patient have everything they need prior to discharge? No     Comments: patient assessed/interviewed. Chart reviewed. Plan of Care: Duoneb to qid    Electronically signed by Merissa Stoddard RCP on 7/7/2020 at 11:32 AM    Respiratory Protocol Guidelines     1. Assessment and treatment by Respiratory Therapy will be initiated for medication and therapeutic interventions upon initiation of aerosolized medication. 2. Physician will be contacted for respiratory rate (RR) greater than 35 breaths per minute. Therapy will be held for heart rate (HR) greater than 140 beats per minute, pending direction from physician. 3. Bronchodilators will be administered via Metered Dose Inhaler (MDI) with spacer when the following criteria are met:  a. Alert and cooperative     b. HR < 140 bpm  c. RR < 30 bpm                d. Can demonstrate a 2-3 second inspiratory hold  4.  Bronchodilators will be administered via Hand Held Nebulizer [facility]) to patients when ANY of the following criteria are met  a. Incognizant or uncooperative          b. Patients treated with HHN at Home        c. Unable to demonstrate proper use of MDI with spacer     d. RR > 30 bpm   5. Bronchodilators will be delivered via Metered Dose Inhaler (MDI), HHN, Aerogen to intubated patients on mechanical ventilation. 6. Inhalation medication orders will be delivered and/or substituted as outlined below. Aerosolized Medications Ordering and Administration Guidelines:    1. All Medications will be ordered by a physician, and their frequency and/or modality will be adjusted as defined by the patients Respiratory Severity Index (RSI) score. 2. If the patient does not have documented COPD, consider discontinuing anticholinergics when RSI is less than 9.  3. If the bronchospasm worsens (increased RSI), then the bronchodilator frequency can be increased to a maximum of every 4 hours. If greater than every 4 hours is required, the physician will be contacted. 4. If the bronchospasm improves, the frequency of the bronchodilator can be decreased, based on the patient's RSI, but not less than home treatment regimen frequency. 5. Bronchodilator(s) will be discontinued if patient has a RSI less than 9 and has received no scheduled or as needed treatment for 72  Hrs. Patients Ordered on a Mucolytic Agent:    1. Must always be administered with a bronchodilator. 2. Discontinue if patient experiences worsened bronchospasm, or secretions have lessened to the point that the patient is able to clear them with a cough. Anti-inflammatory and Combination Medications:    1. If the patient lacks prior history of lung disease, is not using inhaled anti-inflammatory medication at home, and lacks wheezing by examination or by history for at least 24 hours, contact physician for possible discontinuation.

## 2020-07-07 NOTE — PROGRESS NOTES
85 ml of FENTANYL gtt wasted with Vernell MENDES RN.      Electronically signed by Davon Mulligan RN on 7/7/2020 at 2:17 PM

## 2020-07-07 NOTE — PROGRESS NOTES
Shift report given to Jim Varela RN  at bedside. Patient care handed off in stable condition at this time.    Electronically signed by Vashti Macias RN on 7/7/2020 at 7:15 PM

## 2020-07-07 NOTE — PROGRESS NOTES
Patient agitated still. Remains on propofol at 25 mcg and fentanyl at 75 mcg. Patient is alert and following commands. Hitting bed rail to get attention from RN. Actively flipping me off at bedside.      Electronically signed by Nani Lorenzo RN on 7/7/2020 at 9:49 AM

## 2020-07-07 NOTE — PROGRESS NOTES
ABG drawn from R radial. x1 attempt(s). Site prepped per policy and procedure. Modified Chadwick's test positive. Pressure held to site after procedure for five minutes. No hematoma present. Pulse present after procedure. Pt tolerated procedure very well. Specimen sent to lab.

## 2020-07-07 NOTE — PROGRESS NOTES
07/06/20 2225   Vent Information   Vent Type 840   Vent Mode AC/VC   Vt Ordered 420 mL   Rate Set 14 bmp   Peak Flow 60 L/min   Pressure Support 0 cmH20   FiO2  25 %   SpO2 100 %   SpO2/FiO2 ratio 400   Sensitivity 2   PEEP/CPAP 5   I Time/ I Time % 0 s   Humidification Source Heated wire   Humidification Temp 37   Humidification Temp Measured 37.2   Circuit Condensation Drained   Vent Patient Data   High Peep/I Pressure 0   Peak Inspiratory Pressure 19 cmH2O   Mean Airway Pressure 7.6 cmH20   Rate Measured 15 br/min   Vt Exhaled 477 mL   Minute Volume 6.63 Liters   I:E Ratio 1:4.60   Cough/Sputum   Sputum How Obtained Endotracheal;Suctioned   Cough Non-productive   Sputum Amount None   Spontaneous Breathing Trial (SBT) RT Doc   Pulse 75   Breath Sounds   Right Upper Lobe Diminished   Right Middle Lobe Diminished   Right Lower Lobe Diminished   Left Upper Lobe Diminished   Left Lower Lobe Diminished   Additional Respiratory  Assessments   Resp 15   Position Semi-Zaldivar's   Alarm Settings   High Pressure Alarm 40 cmH2O   Low Minute Volume Alarm 4 L/min   Apnea (secs) 20 secs   High Respiratory Rate 40 br/min   Low Exhaled Vt  320 mL   ETT (adult)   Placement Date/Time: 07/04/20 8140   Timeout: Patient  Preoxygenation: Yes  Location: Oral  Secured at: 25 cm  Measured From: Lips   Secured at 22 cm   Measured From Lips   ET Placement Right   Secured By Commercial tube wong   Site Condition Dry

## 2020-07-07 NOTE — PROGRESS NOTES
Propofol reduced to 25 mcg and Fentanyl reduced to 75 mcg for SAT and potential SBT.     Electronically signed by Judy Reinoso RN on 7/7/2020 at 7:52 AM

## 2020-07-07 NOTE — PROGRESS NOTES
07/07/20 1845   Treatment   Treatment Type N   $Treatment Type $Inhaled Therapy/Meds   Medications Albuterol/Ipratropium   Pre-Tx Pulse 107   Pre-Tx Resps 30   Breath Sounds Pre-Tx LIZBET Diminished   Breath Sounds Pre-Tx LLL Diminished   Breath Sounds Pre-Tx RUL Diminished   Breath Sounds Pre-Tx RML Diminished   Breath Sounds Pre-Tx RLL Diminished   Breath Sounds Post-Tx LIZBET Diminished   Breath Sounds Post-Tx LLL Diminished   Breath Sounds Post-Tx RUL Diminished   Breath Sounds Post-Tx RML Diminished   Breath Sounds Post-Tx RLL Diminished   Post-Tx Pulse 107   Post-Tx Resps 30   Delivery Source Oxygen   Position Semi-Zaldivar's   Tx Tolerance Well   Is patient on O2?  Y   Oxygen Therapy/Pulse Ox   O2 Therapy Oxygen   $Oxygen $Daily Charge   O2 Device Nasal cannula   O2 Flow Rate (L/min) 1 L/min   Resp (!) 34   SpO2 97 %   $Pulse Oximeter $Spot check (multiple/continuous)

## 2020-07-07 NOTE — PROGRESS NOTES
Shift assessment complete. Intubated. VENT is on spontaneous now. Tolerating okay. Propofol infusing at 25 mcg/kg/min. Fentanyl infusing at 75 mcg//h. Patient is able to follow command and gesture appropriately. Lungs show diminished sounds throughout with some rhonchi in middle lobe. ST on monitor at 103 at beginning of SBT. Bowels active x4. Abdomen is soft and non distended. TF off for SBT. Edema noted in LUE. BUE soft wrist restraints in place due to attempts of pulling at ETT and lines. No signs of injury noted and PROM performed. Central line dressing is clean, dry and intact with no signs of drainage. All tubing is current and dated. IPA caps applied to all access hubs.      Electronically signed by Scotty Avalos RN on 7/7/2020 at 8:13 AM

## 2020-07-07 NOTE — PROGRESS NOTES
Agitated, shaking head back and forth, trying to grab ETtube. Breathing trial instructions reviewed. Emotional support provided. Keeps shaking head back and forth, coughing on vent. Shakes head \"yes \" for pain, grimacing. Fentanyl 25 mcg IVP administered. Verbally encouraged to try and calm down during breathing trial.  Still pulling on BUE soft wrist restraints, attempting to grab ETtube. Monitoring closely.

## 2020-07-07 NOTE — PROGRESS NOTES
07/07/20 0200   Vent Information   Vent Type 840   Vent Mode AC/VC   Vt Ordered 420 mL   Rate Set 14 bmp   Peak Flow 60 L/min   Pressure Support 0 cmH20   FiO2  25 %   SpO2 100 %   SpO2/FiO2 ratio 400   Sensitivity 2   PEEP/CPAP 5   I Time/ I Time % 0 s   Humidification Source Heated wire   Humidification Temp 37   Humidification Temp Measured 36.8   Circuit Condensation Drained   Vent Patient Data   High Peep/I Pressure 0   Peak Inspiratory Pressure 23 cmH2O   Mean Airway Pressure 8.19 cmH20   Rate Measured 16 br/min   Vt Exhaled 455 mL   Minute Volume 7.25 Liters   I:E Ratio 1:4.00   Plateau Pressure 14 NBH01   Cough/Sputum   Sputum How Obtained Suctioned;Endotracheal   Cough Productive   Sputum Amount Small   Sputum Color Creamy   Tenacity Thick   Spontaneous Breathing Trial (SBT) RT Doc   Pulse 95   Breath Sounds   Right Upper Lobe Diminished   Right Middle Lobe Diminished   Right Lower Lobe Diminished   Left Upper Lobe Diminished   Left Lower Lobe Diminished   Additional Respiratory  Assessments   Resp 16   Position Semi-Zaldivar's   Oral Care Completed? Yes   Oral Care Mouth suctioned   Subglottic Suction Done?  Yes   Alarm Settings   High Pressure Alarm 40 cmH2O   Low Minute Volume Alarm 4 L/min   Apnea (secs) 20 secs   High Respiratory Rate 40 br/min   Low Exhaled Vt  300 mL   ETT (adult)   Placement Date/Time: 07/04/20 2350   Timeout: Patient  Preoxygenation: Yes  Location: Oral  Secured at: 25 cm  Measured From: Lips   Secured at 23 cm   Measured From [address] By Commercial tube wong   Site Condition Dry

## 2020-07-08 ENCOUNTER — APPOINTMENT (OUTPATIENT)
Dept: GENERAL RADIOLOGY | Age: 50
DRG: 917 | End: 2020-07-08
Payer: MEDICARE

## 2020-07-08 LAB
ANION GAP SERPL CALCULATED.3IONS-SCNC: 12 MMOL/L (ref 3–16)
BASOPHILS ABSOLUTE: 0 K/UL (ref 0–0.2)
BASOPHILS RELATIVE PERCENT: 0.4 %
BUN BLDV-MCNC: 7 MG/DL (ref 7–20)
CALCIUM SERPL-MCNC: 8.6 MG/DL (ref 8.3–10.6)
CHLORIDE BLD-SCNC: 102 MMOL/L (ref 99–110)
CO2: 26 MMOL/L (ref 21–32)
CREAT SERPL-MCNC: 0.7 MG/DL (ref 0.9–1.3)
EKG ATRIAL RATE: 106 BPM
EKG DIAGNOSIS: NORMAL
EKG P AXIS: 71 DEGREES
EKG P-R INTERVAL: 100 MS
EKG Q-T INTERVAL: 338 MS
EKG QRS DURATION: 64 MS
EKG QTC CALCULATION (BAZETT): 448 MS
EKG R AXIS: 88 DEGREES
EKG T AXIS: 59 DEGREES
EKG VENTRICULAR RATE: 106 BPM
EOSINOPHILS ABSOLUTE: 0.1 K/UL (ref 0–0.6)
EOSINOPHILS RELATIVE PERCENT: 2.4 %
GFR AFRICAN AMERICAN: >60
GFR NON-AFRICAN AMERICAN: >60
GLUCOSE BLD-MCNC: 103 MG/DL (ref 70–99)
GLUCOSE BLD-MCNC: 107 MG/DL (ref 70–99)
GLUCOSE BLD-MCNC: 74 MG/DL (ref 70–99)
GLUCOSE BLD-MCNC: 86 MG/DL (ref 70–99)
GLUCOSE BLD-MCNC: 95 MG/DL (ref 70–99)
HCT VFR BLD CALC: 41.4 % (ref 40.5–52.5)
HEMOGLOBIN: 13.3 G/DL (ref 13.5–17.5)
LYMPHOCYTES ABSOLUTE: 1.1 K/UL (ref 1–5.1)
LYMPHOCYTES RELATIVE PERCENT: 21.2 %
MAGNESIUM: 2.4 MG/DL (ref 1.8–2.4)
MCH RBC QN AUTO: 28.3 PG (ref 26–34)
MCHC RBC AUTO-ENTMCNC: 32 G/DL (ref 31–36)
MCV RBC AUTO: 88.5 FL (ref 80–100)
MONOCYTES ABSOLUTE: 0.3 K/UL (ref 0–1.3)
MONOCYTES RELATIVE PERCENT: 6.4 %
NEUTROPHILS ABSOLUTE: 3.8 K/UL (ref 1.7–7.7)
NEUTROPHILS RELATIVE PERCENT: 69.6 %
PDW BLD-RTO: 13.9 % (ref 12.4–15.4)
PERFORMED ON: ABNORMAL
PERFORMED ON: ABNORMAL
PERFORMED ON: NORMAL
PERFORMED ON: NORMAL
PHOSPHORUS: 3.6 MG/DL (ref 2.5–4.9)
PLATELET # BLD: 245 K/UL (ref 135–450)
PMV BLD AUTO: 7.3 FL (ref 5–10.5)
POTASSIUM SERPL-SCNC: 4.3 MMOL/L (ref 3.5–5.1)
RBC # BLD: 4.68 M/UL (ref 4.2–5.9)
SODIUM BLD-SCNC: 140 MMOL/L (ref 136–145)
TOTAL CK: 569 U/L (ref 39–308)
WBC # BLD: 5.4 K/UL (ref 4–11)

## 2020-07-08 PROCEDURE — 2580000003 HC RX 258: Performed by: INTERNAL MEDICINE

## 2020-07-08 PROCEDURE — 80048 BASIC METABOLIC PNL TOTAL CA: CPT

## 2020-07-08 PROCEDURE — 85025 COMPLETE CBC W/AUTO DIFF WBC: CPT

## 2020-07-08 PROCEDURE — 97162 PT EVAL MOD COMPLEX 30 MIN: CPT

## 2020-07-08 PROCEDURE — 6360000002 HC RX W HCPCS: Performed by: HOSPITALIST

## 2020-07-08 PROCEDURE — 92610 EVALUATE SWALLOWING FUNCTION: CPT

## 2020-07-08 PROCEDURE — 94761 N-INVAS EAR/PLS OXIMETRY MLT: CPT

## 2020-07-08 PROCEDURE — 6370000000 HC RX 637 (ALT 250 FOR IP): Performed by: INTERNAL MEDICINE

## 2020-07-08 PROCEDURE — 99232 SBSQ HOSP IP/OBS MODERATE 35: CPT | Performed by: INTERNAL MEDICINE

## 2020-07-08 PROCEDURE — 94640 AIRWAY INHALATION TREATMENT: CPT

## 2020-07-08 PROCEDURE — 97535 SELF CARE MNGMENT TRAINING: CPT

## 2020-07-08 PROCEDURE — 2580000003 HC RX 258

## 2020-07-08 PROCEDURE — 6370000000 HC RX 637 (ALT 250 FOR IP): Performed by: HOSPITALIST

## 2020-07-08 PROCEDURE — 82550 ASSAY OF CK (CPK): CPT

## 2020-07-08 PROCEDURE — 6360000002 HC RX W HCPCS: Performed by: INTERNAL MEDICINE

## 2020-07-08 PROCEDURE — 2500000003 HC RX 250 WO HCPCS: Performed by: INTERNAL MEDICINE

## 2020-07-08 PROCEDURE — 2580000003 HC RX 258: Performed by: HOSPITALIST

## 2020-07-08 PROCEDURE — 83735 ASSAY OF MAGNESIUM: CPT

## 2020-07-08 PROCEDURE — 99233 SBSQ HOSP IP/OBS HIGH 50: CPT | Performed by: INTERNAL MEDICINE

## 2020-07-08 PROCEDURE — 97166 OT EVAL MOD COMPLEX 45 MIN: CPT

## 2020-07-08 PROCEDURE — 97530 THERAPEUTIC ACTIVITIES: CPT

## 2020-07-08 PROCEDURE — 93010 ELECTROCARDIOGRAM REPORT: CPT | Performed by: INTERNAL MEDICINE

## 2020-07-08 PROCEDURE — 93005 ELECTROCARDIOGRAM TRACING: CPT | Performed by: INTERNAL MEDICINE

## 2020-07-08 PROCEDURE — 84100 ASSAY OF PHOSPHORUS: CPT

## 2020-07-08 PROCEDURE — 92526 ORAL FUNCTION THERAPY: CPT

## 2020-07-08 PROCEDURE — 2700000000 HC OXYGEN THERAPY PER DAY

## 2020-07-08 PROCEDURE — 1200000000 HC SEMI PRIVATE

## 2020-07-08 RX ORDER — ONDANSETRON 2 MG/ML
4 INJECTION INTRAMUSCULAR; INTRAVENOUS EVERY 6 HOURS PRN
Status: DISCONTINUED | OUTPATIENT
Start: 2020-07-08 | End: 2020-07-09 | Stop reason: HOSPADM

## 2020-07-08 RX ORDER — SODIUM CHLORIDE 9 MG/ML
INJECTION, SOLUTION INTRAVENOUS
Status: COMPLETED
Start: 2020-07-08 | End: 2020-07-08

## 2020-07-08 RX ADMIN — LORAZEPAM 2 MG: 2 INJECTION INTRAMUSCULAR; INTRAVENOUS at 20:19

## 2020-07-08 RX ADMIN — IPRATROPIUM BROMIDE AND ALBUTEROL SULFATE 1 AMPULE: .5; 3 SOLUTION RESPIRATORY (INHALATION) at 11:50

## 2020-07-08 RX ADMIN — DICYCLOMINE HYDROCHLORIDE 20 MG: 20 TABLET ORAL at 20:18

## 2020-07-08 RX ADMIN — Medication 10 ML: at 10:08

## 2020-07-08 RX ADMIN — ONDANSETRON 4 MG: 2 INJECTION INTRAMUSCULAR; INTRAVENOUS at 19:04

## 2020-07-08 RX ADMIN — GABAPENTIN 300 MG: 300 CAPSULE ORAL at 15:21

## 2020-07-08 RX ADMIN — PROMETHAZINE HYDROCHLORIDE 12.5 MG: 25 TABLET ORAL at 20:18

## 2020-07-08 RX ADMIN — IPRATROPIUM BROMIDE AND ALBUTEROL SULFATE 1 AMPULE: .5; 3 SOLUTION RESPIRATORY (INHALATION) at 07:08

## 2020-07-08 RX ADMIN — Medication 10 ML: at 20:18

## 2020-07-08 RX ADMIN — ONDANSETRON 4 MG: 2 INJECTION INTRAMUSCULAR; INTRAVENOUS at 00:13

## 2020-07-08 RX ADMIN — SODIUM CHLORIDE: 9 INJECTION, SOLUTION INTRAVENOUS at 03:37

## 2020-07-08 RX ADMIN — GABAPENTIN 300 MG: 300 CAPSULE ORAL at 20:19

## 2020-07-08 RX ADMIN — ONDANSETRON 4 MG: 2 INJECTION INTRAMUSCULAR; INTRAVENOUS at 06:44

## 2020-07-08 RX ADMIN — CEFTRIAXONE SODIUM 1 G: 1 INJECTION, POWDER, FOR SOLUTION INTRAMUSCULAR; INTRAVENOUS at 17:54

## 2020-07-08 RX ADMIN — IPRATROPIUM BROMIDE AND ALBUTEROL SULFATE 1 AMPULE: .5; 3 SOLUTION RESPIRATORY (INHALATION) at 19:35

## 2020-07-08 RX ADMIN — LORAZEPAM 2 MG: 2 INJECTION INTRAMUSCULAR; INTRAVENOUS at 02:54

## 2020-07-08 RX ADMIN — FAMOTIDINE 20 MG: 10 INJECTION INTRAVENOUS at 20:19

## 2020-07-08 RX ADMIN — SODIUM CHLORIDE 250 ML: 9 INJECTION, SOLUTION INTRAVENOUS at 17:55

## 2020-07-08 RX ADMIN — ENOXAPARIN SODIUM 40 MG: 40 INJECTION SUBCUTANEOUS at 10:09

## 2020-07-08 RX ADMIN — FAMOTIDINE 20 MG: 10 INJECTION INTRAVENOUS at 10:09

## 2020-07-08 RX ADMIN — CLONIDINE HYDROCHLORIDE 0.1 MG: 0.1 TABLET ORAL at 20:20

## 2020-07-08 RX ADMIN — ONDANSETRON HYDROCHLORIDE 4 MG: 2 INJECTION, SOLUTION INTRAMUSCULAR; INTRAVENOUS at 16:08

## 2020-07-08 RX ADMIN — IPRATROPIUM BROMIDE AND ALBUTEROL SULFATE 1 AMPULE: .5; 3 SOLUTION RESPIRATORY (INHALATION) at 14:44

## 2020-07-08 RX ADMIN — GABAPENTIN 300 MG: 300 CAPSULE ORAL at 10:09

## 2020-07-08 RX ADMIN — MELATONIN 3 MG ORAL TABLET 3 MG: 3 TABLET ORAL at 20:19

## 2020-07-08 RX ADMIN — Medication 10 ML: at 10:09

## 2020-07-08 RX ADMIN — ONDANSETRON HYDROCHLORIDE 4 MG: 2 INJECTION, SOLUTION INTRAMUSCULAR; INTRAVENOUS at 10:09

## 2020-07-08 ASSESSMENT — PAIN SCALES - GENERAL
PAINLEVEL_OUTOF10: 0

## 2020-07-08 NOTE — PROGRESS NOTES
Inpatient Physical Therapy Evaluation and Treatment    Unit: ICU  Date:  7/8/2020  Patient Name:    wKabena Hdez  Admitting diagnosis:  AMS (altered mental status) [R41.82]  Admit Date:  7/3/2020  Precautions/Restrictions/WB Status/ Lines/ Wounds/ Oxygen: Fall risk, Bed/chair alarm, Lines -IV, Supplemental O2 (1) and Mondragon catheter, Telemetry, Continuous pulse oximetry and Continuous ICU monitoring. Treatment Time: 0112 - 1072  Treatment Number:  1   Timed Code Treatment Minutes: 20 minutes + Eval  Total Treatment Minutes: 30 minutes    Patient Goals for Therapy: \" to get better \"          Discharge Recommendations: SNF Continue to assess pending progress  DME needs for discharge: defer to facility       Therapy recommendation for EMS Transport: requires transport by cot due to difficulty in performing transfers. Therapy recommendations for staff:   Assist of 1 for bed mobility    History of Present Illness: 49 y.o. male brought in following complaint of SOB, patient was found to be hypoxemic at 63%, obtunded with white powder substance next to him. He was also found to have a syringe. Patient became agitated, required anxiolytics in the ER, and has been somnolent since. Intialy was hypercapnic as well as hypoxemic. Patient awakens to name, but responds incoherently. Appears to be in no respiratory distress on O2 per nasal canula. Home Health S4 Level Recommendation:  NA  AM-PAC Mobility Score    AM-PAC Inpatient Mobility Raw Score : 11     Preadmission Environment    Pt. Lives Alone and patient lives in a car. Planning to go to friend's house after discharge. Home environment:  one story home  Steps to enter first floor: 1 steps to enter  Steps to second floor: N/A  Bathroom: walk in shower  Equipment owned: none    Preadmission Status:  Pt. Able to drive: Yes  Pt Fully independent with ADLs: Yes  Pt.  Required assistance from family for: N/A Patient gets assistance from friends and family, patient is currently home less. Pt. independent for transfers and gait and walked with No Device  History of falls No    Pain   No    Cognition    A&O x4   Able to follow 2 step commands    Subjective  Patient lying supine in bed with no family present. Pt agreeable to this PT eval & tx. Upper Extremity ROM/Strength  Please see OT evaluation. Lower Extremity ROM / Strength   AROM WFL: Yes  ROM limitations: N/A    Strength Assessment (measured on a 0-5 scale):  R LE   Quad   3+   Ant Tib  3+   Hamstring 3+   Iliopsoas 3+  L LE  Quad   3+   Ant Tib  3+   Hamstring 3+   Iliopsoas 3+    Lower Extremity Sensation    WFL    Lower Extremity Proprioception:   WFL    Coordination and Tone  WFL    Balance  Sitting:  Good - ; SBA  Comments: 3 min    Standing: Not tested; Not Tested and due to inconsistent oxygen saturation levels and high HR. Comments:     Bed Mobility   Supine to Sit:    SBA  Sit to Supine:   SBA  Rolling:   SBA  Scooting in sitting: SBA  Scooting in supine:  SBA    Transfer Training   Patient showed abnormal vital parameter response hence functional transfers could not be assessed for patient safety. Sit to stand:   Not Tested  Stand to sit:   Not Tested  Bed to Chair:   Not Tested with use of N/A    Gait gait deferred due to abnormal vital parameter response during bed mobility hence gait assessment could not be assessed for patient safety. ; pt ambulated 0 ft. Stair Training deferred, pt unsafe/ not appropriate to complete stairs at this time    Activity Tolerance   Pt completed therapy session with Spo2: At rest: 100%, with bed mobility fluctuating between 60 -98% recovery within 30 sec to 1 min. HR = 107 BPM rest, with bed mobility 117 bpm  BP = 126/82 at rest, 113/82 on bed mobility    Positioning Needs   Pt in the bed with HOB in barboza's position, alarm set, positioned in proper neutral alignment and pressure relief provided.    Call light provided and all needs within reach    Exercises Initiated  all completed bilaterally unless indicated  Ankle Pumps x 5 reps    Other  None. Patient/Family Education   Pt educated on role of inpatient PT, POC, importance of continued activity, DC recommendations, safety awareness, transfer techniques, pursed lip breathing, energy conservation, pacing activity and calling for assist with mobility. Assessment  Pt seen for Physical Therapy evaluation in acute care setting. Patient showed abnormal parameter response to bed mobility with oxygen desaturation and SOB with high HR at baseline and with activity. Patient needed to physiologically stable to assess functional transfers and ambulation. Pt demonstrated decreased Activity tolerance, Balance, Safety and Strength as well as decreased independence with Ambulation, Bed Mobility  and Transfers. Recommending SNF upon discharge as patient functioning well below baseline, demonstrates good rehab potential and unable to return home due to burden of care beyond caregiver ability, home environment not conducive to patient recovery, limited safety awareness and Patient will be continue to be assessed for functional mobility tolerance to determine safe placement upon discharge. .    Goals : To be met in 3 visits:  1). Independent with LE Ex x 10 reps    To be met in 6 visits:  1). Supine to/from sit: Supervision  2). Sit to/from stand: Assess next visit  3). Bed to chair: Assess next visit  4). Sitting EOB for 15 min with normal vital parameter response with minimal to no SOB. 5). Tolerate B LE exercises 3 sets of 10-15 reps    Rehabilitation Potential: Fair  Strengths for achieving goals include:   PLOF   Barriers to achieving goals include:    Complexity of condition    Plan    To be seen 2-3 x / week  while in acute care setting for therapeutic exercises, bed mobility, transfers, progressive gait training, balance training, and family/patient education.     Signature: Nallely Posey MS PT, # [id number]      If patient discharges from this facility prior to next visit, this note will serve as the Discharge Summary.

## 2020-07-08 NOTE — FLOWSHEET NOTE
07/07/20 2000   Vitals   Temp 99.5 °F (37.5 °C)   Pulse 111   Resp (!) 31   BP 96/61   MAP (mmHg) 72   Level of Consciousness 0   MEWS Score 6   Oxygen Therapy   SpO2 96 %   O2 Device Nasal cannula   O2 Flow Rate (L/min) 1 L/min   Vital signs stable. Pt is alert and oriented and denies any complaints of worsening SOB or pain at the moment. Nothing new noted on head to toe assessment. Mondragon remains in place and draining clear, yellow urine. Pt with a very weak, hoarse voice but answers questions approprietly. Pt is ST on the monitor. Scattered wheezes noted upon auscultation of lung sounds. Evening medication administration completed via small sips of water. Pt tolerated well. PRN trazodone provided to promote rest. Normal saline continues to infuse at 75 ml/hr. Pt repositioned in bed for comfort. Pt denies any further assistance at the moment. Will continue to monitor.

## 2020-07-08 NOTE — PROGRESS NOTES
Pulmonary & Critical Care Medicine ICU Progress Note    CC: Drug overdose    Events of Last 24 hours:   Extubated yesterday  On 1L this am  NS 75 cc/hr       Invasive Lines: IV: Left femoral - PICC      MV: -  Vent Mode: CPAP Rate Set: 0 bmp/Vt Ordered: 420 mL/ /FiO2 : 25 %  Recent Labs     20  0531 20  1042   PHART 7.421 7.418   JHZ7HVV 46.0* 43.6   PO2ART 83.9 66.3*       IV:   sodium chloride 75 mL/hr at 20 0337    dextrose         Vitals:  Blood pressure (!) 142/92, pulse 107, temperature 98.3 °F (36.8 °C), temperature source Oral, resp. rate 24, height 5' 2\" (1.575 m), weight 125 lb (56.7 kg), SpO2 94 %. on 1LPM   Temp  Av.5 °F (36.9 °C)  Min: 98.2 °F (36.8 °C)  Max: 99.5 °F (37.5 °C)    Intake/Output Summary (Last 24 hours) at 2020 0777  Last data filed at 2020 0455  Gross per 24 hour   Intake 2089 ml   Output 3600 ml   Net -1511 ml     EXAM:  General: ill appearing    Eyes: PERRL. No sclera icterus. No conjunctival injection. ENT: No discharge. Pharynx clear. Neck: Trachea midline. Normal thyroid. Resp: + Accessory muscle use. No crackles. Few wheezing. Bilateral rhonchi. No dullness on percussion. CV: Regular rate. Regular rhythm. No mumur or rub. No edema. GI: Non-tender. Non-distended. No masses. No organomegaly. Normal bowel sounds. No hernia. Skin: Warm and dry. No nodule on exposed extremities. No rash on exposed extremities. Lymph: No cervical LAD. No supraclavicular LAD. M/S: No cyanosis. No joint deformity. No clubbing. Neuro: AAOx3. Followed commands.     Psych: No agitation, no anxiety, affect is full       Scheduled Meds:   ipratropium-albuterol  1 ampule Inhalation 4x daily    mupirocin   Topical BID    famotidine (PEPCID) injection  20 mg Intravenous BID    lidocaine 1 % injection  5 mL Intradermal Once    sodium chloride flush  10 mL Intravenous 2 times per day    gabapentin  300 mg Oral TID    sodium chloride flush  10 mL Intravenous 2 times per day    enoxaparin  40 mg Subcutaneous Daily    cefTRIAXone (ROCEPHIN) IV  1 g Intravenous Q24H    melatonin  3 mg Oral Nightly     PRN Meds:  potassium chloride, sodium chloride flush, cloNIDine, traZODone, sodium chloride flush, acetaminophen **OR** acetaminophen, polyethylene glycol, promethazine **OR** ondansetron, albuterol, glucose, dextrose, glucagon (rDNA), dextrose, dextrose, [] traMADol **AND** cloNIDine, dicyclomine, ibuprofen, gabapentin, hydrOXYzine, LORazepam    Results:  CBC:   Recent Labs     20  0510 20  0717 20  0440   WBC 8.6 6.6 5.4   HGB 14.5 13.2* 13.3*   HCT 45.1 41.5 41.4   MCV 85.8 86.9 88.5    223 245     BMP:   Recent Labs     20  0520 20  1400 20  1510 20  0440    144  --  140   K 2.5* 5.9* 5.1 4.3   * 108  --  102   CO2 17* 27  --  26   PHOS 2.8  --   --  3.6   BUN <2* 3*  --  7   CREATININE <0.5* 0.6*  --  0.7*     LIVER PROFILE:   No results for input(s): AST, ALT, LIPASE, BILIDIR, BILITOT, ALKPHOS in the last 72 hours. Invalid input(s): AMYLASE,  ALB    Cultures:  7/3 UC NGTD  7/3 BC   COVID-19 negative    Films:  CXR  was reviewed by me and it showed   Clear lungs   Satisfactory ETT and PICC position         ASSESSMENT:  · Acute hypercapnic respiratory failure  · Drug overdose-positive for meth possible opiate  · Abnormal CXR with L sided opacification-probable aspiration.   Improved  · Methamphetamine abuse  · Heroin abuse and currently in withdrawal  · Acute encephalopathy/Metabolic encephalopathy   · Electrolytes disorder  · Rhabdomyolysis   · Possible UTI  · Hepatitis C  · Elevated procalcitonin      PLAN:  Supplemental oxygen to maintain SaO2 >92%; wean as tolerated  Inhaled bronchodilators- DuoNeb   Change IVF 75 cc NS   Ceftriaxone D#6  Follow CK -improving  Follow-up Cx   Electrolytes replacement   Psych consult   PT OT and swallow evaluation   Blood sugar control, with goal 150-180  GI prophylaxis: Pepcid  DVT prophylaxis: Lovenox  MRSA prophylaxis: Bactroban   Code status: Full code   Okay to move out of the ICU from our perspective

## 2020-07-08 NOTE — PROGRESS NOTES
C/o nausea. Zofran 4 mg IV given. Call light in reach. Bed alarm in place and turned on. Tele sitter placed in room due to pt trying to get up and leave. Pt agreeable to stay the night.

## 2020-07-08 NOTE — PROGRESS NOTES
assistance from friends and family, patient is currently home less. Pt. independent for transfers and gait and walked with No Device  History of falls No    Pain  No  Rating:NA  Location:  Pain Medicine Status: No request made      Cognition    A&O Person and Place - initially stated \"June 2021\" for date but able to later correctly state \"July 2020\" after initial reorientation, stated \"OD'd\" for situation  Able to follow 1 step commands    Subjective  Patient lying supine in bed with no family present. Pt agreeable to this OT eval & tx. Upper Extremity ROM:    WFL    Upper Extremity Strength:    BUE strength impaired but not formally assessed w/ MMT    Upper Extremity Sensation    NT    Upper Extremity Proprioception:  WFL    Coordination and Tone  WFL    Balance  Functional Sitting Balance:  NT at EOB, able to long sit in bed with Supervision  Functional Standing Balance:NT    Bed mobility:    Supine to sit:   Not Tested  Sit to supine:   Not Tested  Rolling:    CGA  Scooting in sitting:  Not Tested  Scooting to head of bed:   Min A  and 2 persons    Bridging:   CGA     CGA to move from supine to long sitting in bed. Unable to further tolerate mobility due to SpO2 with minimal movement/exertion. Transfers:    Sit to stand:  Not Tested  Stand to sit:  Not Tested  Bed to chair:   Not Tested  Standard toilet: Not Tested  Bed to [facility]:  Not Tested    Dressing:      UE:   Not Tested  LE:    Min A don socks long sitting in bed    Bathing:    UE:  Not Tested  LE:  Not Tested    Eating:   Not Tested    Toileting:  Not Tested    Activity Tolerance   Pt completed therapy session with Spo2: At rest: 100%, with bed mobility fluctuating between 60-98% recovery within 30 sec to 1 min. HR = 107 BPM rest, with bed mobility 117 bpm  BP = 126/82 at rest, 113/82 after bed mobility    Positioning Needs:   Pt in bed, alarm set, positioned in proper neutral alignment and pressure relief provided.    Call light provided and all needs within reach    Exercise / Activities Initiated: NA  N/A    Patient/Family Education:   Role of OT  Recommendations for DC  Energy conservation techniques-PLB  Reorientation    Assessment of Deficits: Pt seen for Occupational therapy evaluation in acute care setting. Pt demonstrated decreased Activity tolerance, ADLs, Balance , Bed mobility, Safety Awareness, Strength, Transfers and Cognition. Pt functioning below baseline and will likely benefit from skilled occupational therapy services to maximize safety and independence. Goal(s) : To be met in 3 Visits:  1). Bed to toilet/BSC: Min A     To be met in 5 Visits:  1). Supine to/from Sit:  CGA  2). Upper Body Bathing:   Min A   3). Lower Body Bathing:   Min A   4). Upper Body Dressing:  Min A   5). Lower Body Dressing:  Min A   6). Pt to demonstrate UE exs x 15 reps with minimal cues    Rehabilitation Potential:  Good for goals listed above. Strengths for achieving goals include: PLOF and Pt cooperative  Barriers to achieving goals include:  Complexity of condition and Weakness     Plan: To be seen 2-3 x/wk until activity tolerance improves while in acute care setting for therapeutic exercises, bed mobility, transfers, dressing, bathing, family/patient education, ADL/IADL retraining, energy conservation training.      Christal Curling, OTR/L [id number]            If patient discharges from this facility prior to next visit, this note will serve as the Discharge Summary

## 2020-07-08 NOTE — PROGRESS NOTES
IM Progress Note    Admit Date:  7/3/2020  5    Interval history:       Pt with H/O polysustance abuse   Admitted for AMS , unintentional drug overdose - meth/ opiates   PCT elevated  Started developing withdrawal symptoms 7/4. Did not improve with COWS protcol --> patient with increasing agitation and delirium--> Intubated on 7/4 night   Patient extubated on 7/7. Subjective:  Mr. William Hayes  is awake , alert and oriented . He is extremely weak . seen by physical therapy . gets tachycardic and hypoxic with minimal activity . Oxygen saturation stable at rest .   Seen by speech therapy . Dysphagia 3 diet recommended . Currently on supplemental oxygen 1 L    Objective:   /85   Pulse 92   Temp 98.7 °F (37.1 °C) (Oral)   Resp 26   Ht 5' 2\" (1.575 m)   Wt 125 lb (56.7 kg)   SpO2 98%   BMI 22.86 kg/m²       Intake/Output Summary (Last 24 hours) at 7/8/2020 1501  Last data filed at 7/8/2020 0455  Gross per 24 hour   Intake 1603 ml   Output 2475 ml   Net -872 ml       Physical Exam:  General: Awake and alert, no distress, appears to be oriented to place and person  He is very weak and fatigued  Mucous Membranes:  Pink , anicteric  Neck: No JVD, no carotid bruit, no thyromegaly  Chest: Clear to auscultation, no wheezes, rhonchi  Cardiovascular: Tachycardic,  no murmurs or gallops  Abdomen:  Soft, undistended, non tender, no organomegaly, BS present  Extremities: No edema or cyanosis.  Distal pulses well felt  Neurological :  nonfocal   Psych: No anxiety or agitation        Medications:   Scheduled Medications:    ipratropium-albuterol  1 ampule Inhalation 4x daily    mupirocin   Topical BID    famotidine (PEPCID) injection  20 mg Intravenous BID    lidocaine 1 % injection  5 mL Intradermal Once    sodium chloride flush  10 mL Intravenous 2 times per day    gabapentin  300 mg Oral TID    sodium chloride flush  10 mL Intravenous 2 times per day    enoxaparin  40 mg Subcutaneous Daily    cefTRIAXone (ROCEPHIN) IV  1 g Intravenous Q24H    melatonin  3 mg Oral Nightly     I   dextrose       ondansetron, potassium chloride, sodium chloride flush, cloNIDine, traZODone, sodium chloride flush, acetaminophen **OR** acetaminophen, polyethylene glycol, promethazine **OR** ondansetron, albuterol, glucose, dextrose, glucagon (rDNA), dextrose, dextrose, [] traMADol **AND** cloNIDine, dicyclomine, ibuprofen, gabapentin, hydrOXYzine, LORazepam    Lab Data:  Recent Labs     20  0510 20  0717 20  0440   WBC 8.6 6.6 5.4   HGB 14.5 13.2* 13.3*   HCT 45.1 41.5 41.4   MCV 85.8 86.9 88.5    223 245     Recent Labs     20  0520 20  1400 20  1510 20  0440    144  --  140   K 2.5* 5.9* 5.1 4.3   * 108  --  102   CO2 17* 27  --  26   PHOS 2.8  --   --  3.6   BUN <2* 3*  --  7   CREATININE <0.5* 0.6*  --  0.7*     Recent Labs     20  0520 20  0440   CKTOTAL 424* 569*       Coagulation:   Lab Results   Component Value Date    INR 0.99 2020     Cardiac markers:   Lab Results   Component Value Date    CKTOTAL 569 2020    TROPONINI <0.01 2020         Lab Results   Component Value Date    ALT 19 2020    AST 33 2020    ALKPHOS 84 2020    BILITOT 0.5 2020       Lab Results   Component Value Date    INR 0.99 2020    PROTIME 11.5 2020          Radiology  XR CHEST PORTABLE   Final Result   Clear lungs. IR PICC WO SQ PORT/PUMP > 5 YEARS   Final Result      XR CHEST PORTABLE   Final Result   Improved aeration of the lungs         XR CHEST PORTABLE   Final Result   1. Endotracheal tube tip is in the distal trachea. 2. The tip of the enteric tube remains at the gastroesophageal junction. The   tube should be advanced 12 cm. 3. Improved aeration of the left lung. XR CHEST PORTABLE   Final Result   Endotracheal tube with tip in the right mainstem bronchus. Recommend   repositioning. Opacification throughout the left lung, new. Hyperinflation of the right   lung. XR CHEST PORTABLE   Final Result   No acute cardiopulmonary disease. XR CHEST PORTABLE   Final Result   Pulmonary vascular congestion. XR CHEST PORTABLE    (Results Pending)       Cultures:   Urine cultures  NGTD  Blood cultures pending  COVID-19 negative          ASSESSMENT and PLAN :     Altered mental status  Acute toxic encephalopathy  Delirium   - secondary to drug overdose meth/ opiates   - H/O polysubstance abuse, (+) methamphetamine, poss fentanyl  - Patient required Benadryl Haldol and Ativan in the ED for agitation.  -Patient with history of IV drug abuse  -Worsening agitation and withdrawal symptoms due to opiate and methamphetamine withdrawal  -No improvement on COWS protocol, with clonidine and tramadol and PRN  Ativan   -Intubated On 7/4, sedated on propofol, fentanyl  Drip  -Extubated on 7/7. Withdrawal symptoms seems to have improved. Off of all drips. Acute hypoxic respiratory failure  -O2 sats documented at 63% on arrival to the ED. this was transient and sats improved. -Subsequent worsening respiratory failure in the setting of agitation and delirium, intubated, placed  on mechanical ventilation.    -Extubated  7/7/2020. Oxygen saturation stable on 1-2 L at rest.  He does desaturate with activity. -Ruled out COVID-19, off droplet plus precautions    UTI  -Procalcitonin was  elevated. Procalcitonin levels improved from 50-> 26.8   -Continue Rocephin  -Follow-up on cultures-negative    Nontraumatic rhabdomyolysis  -Secondary to agitation   -Hydrated with IV fluids  - CK levels elevated at 1115--> 1317--> 569    Hypophosphatemia  Hypokalemia    - repleted    Debility   -Seen by PT,OT       DVT Prophylaxis: lovenox  DIET DYSPHAGIA SOFT AND BITE-SIZED; Moderately Thick (Honey)  Code Status: Full Code       Patient stable. Will transfer to [employer] with telemetry monitoring. continue PT OT. Might need SNF placement.       Laurie Richey MD 7/8/2020 3:01 PM

## 2020-07-08 NOTE — PLAN OF CARE
Problem: Nutrition  Intervention: Swallowing evaluation  Note: Bedside swallow evaluation completed this date. Eleonora Sr M.S. [id number] [facility]  Speech-language pathologist  [id number]      Intervention: Aspiration precautions  Note: Bedside swallow evaluation completed this date.     Eleonora Sr M.S. [id number] [facility]  Speech-language pathologist  [id number]

## 2020-07-08 NOTE — PROGRESS NOTES
Speech Language Pathology  Facility/Department: [facility] ICU   CLINICAL BEDSIDE SWALLOW EVALUATION        Recommended Diet and Intervention  Diet Solids Recommendation: Dysphagia Soft and Bite-Sized (Dysphagia III)  Liquid Consistency Recommendation: Moderately Thick (Honey)  Recommended Form of Meds: Meds in puree  *If pt has overt s/s of aspiration with PO intake or worsening respiratory status, recommend downgrade to strict NPO pending re-assessment by ST.        NAME: Nitin Dotson  : 1970  MRN: [id number]    ADMISSION DATE: 7/3/2020  ADMITTING DIAGNOSIS: has MRSA bacteremia; Pleural effusion, left; Leukocytosis; Illicit drug use; Hepatitis; Chest pain; Pulmonary embolism ([address] AMS (altered mental status); Acute hypercapnic respiratory failure ([address] Drug overdose; Abnormal chest x-ray; Acute encephalopathy; Disorder of electrolytes; Non-traumatic rhabdomyolysis; Elevated procalcitonin; and Methamphetamine abuse ([facility] [address] [facility] on their problem list.  ONSET DATE: Pt admitted to [facility] on 7/3/20    Recent Chest Xray/CT of Chest (20): Impression   Clear lungs. Date of Eval: 2020  Evaluating Therapist: Camilo Gonzales    Current Diet level:  Current Diet : NPO  Current Liquid Diet : NPO      Primary Complaint  Patient Complaint: Per MD H&P, \"49 y.o. male brought in following complaint of SOB, patient was found to be hypoxemic at 63%, obtunded with white powder substance next to him. He was also found to have a syringe. Patient became agitated, required anxiolytics in the ER, and has been somnolent since. Intialy was hypercapnic as well as hypoxemic. Patient awakens to name, but responds incoherently. Appears to be in no respiratory distress on O2 per nasal canula\".     Pain:  Pain Assessment  Pain Assessment: 0-10  Pain Level: 0  Ann-Baker Pain Rating: No hurt  Pain Type: Acute pain  Pain Location: Generalized  Pain Descriptors: Aching, Discomfort  Pain Frequency: Continuous  Pain Onset: *If pt has overt s/s of aspiration with PO intake or worsening respiratory status, recommend downgrade to strict NPO pending re-assessment by ST.  RN aware of recs. Treatment Plan  Requires SLP Intervention: Yes  Duration/Frequency of Treatment: 3-5x/week for LOS  D/C Recommendations: To be determined       Recommended Diet and Intervention  Diet Solids Recommendation: Dysphagia Soft and Bite-Sized (Dysphagia III)  Liquid Consistency Recommendation: Moderately Thick (Honey)  Recommended Form of Meds: Meds in puree  Recommendations: Dysphagia treatment  Therapeutic Interventions: Diet tolerance monitoring;Patient/Family education; Therapeutic PO trials with SLP;Oral care    Compensatory Swallowing Strategies  Compensatory Swallowing Strategies: Remain upright for 30-45 minutes after meals;Eat/Feed slowly;Upright as possible for all oral intake;Small bites/sips; Alternate solids and liquids    Treatment/Goals  Short-term Goals  Timeframe for Short-term Goals: 5 days (7/13/20)  Long-term Goals  Timeframe for Long-term Goals: 7 days (7/15/20)  Goal 1: The pt will tolerate safest and least restrictive diet without s/s of aspiration. Dysphagia Goals: The patient will tolerate recommended diet without observed clinical signs of aspiration; The patient/caregiver will demonstrate understanding of compensatory strategies for improved swallowing safety. ;The patient will tolerate nectar thick liquids without signs and symptoms of aspiration 10/10 via cup. ;The patient will tolerate thin liquids without signs and symptoms of aspiration 10/10 via cup. ;The patient will tolerate regular consistency solids 10/10. General  Chart Reviewed: Yes  Comments: Pt admitted d/t AMS, UTI, methamphetamine abuse. Pt has hx of drug abuse, PNA, hep C, and brain surgery per chart. Pt was intubated from 7/4-7/7. Behavior/Cognition: Alert; Cooperative; Requires cueing  Respiratory Status: O2 via nasual cannula  O2 Device: Nasal cannula  Liters of referral, results and recommendations  Patient Education Response: Verbalizes understanding;Needs reinforcement  Safety Devices in place: Yes  Type of devices: Bed alarm in place; Left in bed;Call light within reach;Nurse notified       Therapy Time  SLP Individual Minutes  Time In: 2143  Time Out: 3363  Minutes: 24; dysphagia eval and CHAPINCITO ArredondoS. [id number] [facility]  Speech-language pathologist  [id number]

## 2020-07-08 NOTE — PROGRESS NOTES
Reassessment completed. Pt with complaints of mild nausea. PRN zofran provided per Pt request. No further changes noted from previous assessment. Will continue to monitor.

## 2020-07-08 NOTE — PROGRESS NOTES
Down via bed in stable condition to room 224-1 from ICU in stable condition. Alert and oriented. Bed alarm in place and turned on. Oriented to room and call light. Pt pulled out inhaler from belongings. Informed pt that home medications have to be locked up. Locked inhaler up in drawer. Call light in reach.

## 2020-07-08 NOTE — PROGRESS NOTES
Patient has been transferred to [place] [id number], care transferred at this time. Patient denied any pain or needs before leaving his room.

## 2020-07-08 NOTE — PROGRESS NOTES
Report given at bedside to [facility]. Patient will be transported by bed, waiting to place transport request for telemetry monitor to arrive.

## 2020-07-08 NOTE — CARE COORDINATION
INTERDISCIPLINARY PLAN OF CARE CONFERENCE    Date/Time: 7/8/2020 11:10 AM  Completed by: Zena Khoury, Case Management      Patient Name:  Malinda Dia  YOB: 1970  Admitting Diagnosis: AMS (altered mental status) [R41.82]     Admit Date/Time:  7/3/2020  1:25 PM    Chart reviewed. Interdisciplinary team met to discuss patient progress and discharge plans. Disciplines included Case Management, Nursing, and Dietitian. Current Status:Inpt status    PT/OT recommendation:n/a  Anticipated Discharge Date: TBD  Expected D/C Disposition:  car  Confirmed plan with patient and/or family Yes with PtDischarge Plan Comments: Chart reviewed. Met with Pt at bedside in ICU. Pt states he is I-PTA. Is homeless. Lives in care or with friends. CM to follow. The Plan for Transition of Care is related to the following treatment goals: return to car or friends.  Follow for needs    Home O2 in place on admit: yes 1L nc   Pt informed of need to bring portable home O2 tank on day of discharge for nursing to connect prior to leaving:  Not indicated  Verbalized agreement/Understanding:  Not Indicated

## 2020-07-09 VITALS
OXYGEN SATURATION: 98 % | HEIGHT: 62 IN | SYSTOLIC BLOOD PRESSURE: 117 MMHG | BODY MASS INDEX: 23 KG/M2 | HEART RATE: 94 BPM | TEMPERATURE: 98 F | RESPIRATION RATE: 16 BRPM | DIASTOLIC BLOOD PRESSURE: 81 MMHG | WEIGHT: 125 LBS

## 2020-07-09 LAB
ANION GAP SERPL CALCULATED.3IONS-SCNC: 10 MMOL/L (ref 3–16)
BASE EXCESS ARTERIAL: 3.5 MMOL/L (ref -3–3)
BASOPHILS ABSOLUTE: 0.1 K/UL (ref 0–0.2)
BASOPHILS RELATIVE PERCENT: 1 %
BUN BLDV-MCNC: 13 MG/DL (ref 7–20)
CALCIUM SERPL-MCNC: 9.3 MG/DL (ref 8.3–10.6)
CARBOXYHEMOGLOBIN ARTERIAL: 0.5 % (ref 0–1.5)
CHLORIDE BLD-SCNC: 99 MMOL/L (ref 99–110)
CO2: 33 MMOL/L (ref 21–32)
CREAT SERPL-MCNC: 0.8 MG/DL (ref 0.9–1.3)
EOSINOPHILS ABSOLUTE: 0.1 K/UL (ref 0–0.6)
EOSINOPHILS RELATIVE PERCENT: 0.8 %
GFR AFRICAN AMERICAN: >60
GFR NON-AFRICAN AMERICAN: >60
GLUCOSE BLD-MCNC: 137 MG/DL (ref 70–99)
HCO3 ARTERIAL: 28.7 MMOL/L (ref 21–29)
HCT VFR BLD CALC: 45.7 % (ref 40.5–52.5)
HEMOGLOBIN, ART, EXTENDED: 15.5 G/DL (ref 13.5–17.5)
HEMOGLOBIN: 14.8 G/DL (ref 13.5–17.5)
LYMPHOCYTES ABSOLUTE: 1.5 K/UL (ref 1–5.1)
LYMPHOCYTES RELATIVE PERCENT: 17.7 %
MAGNESIUM: 2.5 MG/DL (ref 1.8–2.4)
MCH RBC QN AUTO: 28.1 PG (ref 26–34)
MCHC RBC AUTO-ENTMCNC: 32.3 G/DL (ref 31–36)
MCV RBC AUTO: 87.3 FL (ref 80–100)
METHEMOGLOBIN ARTERIAL: 0 %
MONOCYTES ABSOLUTE: 0.6 K/UL (ref 0–1.3)
MONOCYTES RELATIVE PERCENT: 7.2 %
NEUTROPHILS ABSOLUTE: 6.3 K/UL (ref 1.7–7.7)
NEUTROPHILS RELATIVE PERCENT: 73.3 %
O2 CONTENT ARTERIAL: 21 ML/DL
O2 SAT, ARTERIAL: 95.7 %
O2 THERAPY: ABNORMAL
PCO2 ARTERIAL: 45 MMHG (ref 35–45)
PDW BLD-RTO: 13.7 % (ref 12.4–15.4)
PH ARTERIAL: 7.42 (ref 7.35–7.45)
PHOSPHORUS: 3.8 MG/DL (ref 2.5–4.9)
PLATELET # BLD: 285 K/UL (ref 135–450)
PMV BLD AUTO: 6.9 FL (ref 5–10.5)
PO2 ARTERIAL: 77.9 MMHG (ref 75–108)
POTASSIUM SERPL-SCNC: 3.7 MMOL/L (ref 3.5–5.1)
RBC # BLD: 5.24 M/UL (ref 4.2–5.9)
SODIUM BLD-SCNC: 142 MMOL/L (ref 136–145)
TCO2 ARTERIAL: 30 MMOL/L
TOTAL CK: 230 U/L (ref 39–308)
WBC # BLD: 8.6 K/UL (ref 4–11)

## 2020-07-09 PROCEDURE — 94761 N-INVAS EAR/PLS OXIMETRY MLT: CPT

## 2020-07-09 PROCEDURE — 94640 AIRWAY INHALATION TREATMENT: CPT

## 2020-07-09 PROCEDURE — 82550 ASSAY OF CK (CPK): CPT

## 2020-07-09 PROCEDURE — 82803 BLOOD GASES ANY COMBINATION: CPT

## 2020-07-09 PROCEDURE — 85025 COMPLETE CBC W/AUTO DIFF WBC: CPT

## 2020-07-09 PROCEDURE — 84100 ASSAY OF PHOSPHORUS: CPT

## 2020-07-09 PROCEDURE — 80048 BASIC METABOLIC PNL TOTAL CA: CPT

## 2020-07-09 PROCEDURE — 90792 PSYCH DIAG EVAL W/MED SRVCS: CPT | Performed by: NURSE PRACTITIONER

## 2020-07-09 PROCEDURE — 6370000000 HC RX 637 (ALT 250 FOR IP): Performed by: INTERNAL MEDICINE

## 2020-07-09 PROCEDURE — 2580000003 HC RX 258: Performed by: INTERNAL MEDICINE

## 2020-07-09 PROCEDURE — 99233 SBSQ HOSP IP/OBS HIGH 50: CPT | Performed by: INTERNAL MEDICINE

## 2020-07-09 PROCEDURE — 83735 ASSAY OF MAGNESIUM: CPT

## 2020-07-09 PROCEDURE — 92526 ORAL FUNCTION THERAPY: CPT

## 2020-07-09 PROCEDURE — 2700000000 HC OXYGEN THERAPY PER DAY

## 2020-07-09 PROCEDURE — 6360000002 HC RX W HCPCS: Performed by: INTERNAL MEDICINE

## 2020-07-09 PROCEDURE — 36600 WITHDRAWAL OF ARTERIAL BLOOD: CPT

## 2020-07-09 PROCEDURE — 99232 SBSQ HOSP IP/OBS MODERATE 35: CPT | Performed by: INTERNAL MEDICINE

## 2020-07-09 PROCEDURE — 36592 COLLECT BLOOD FROM PICC: CPT

## 2020-07-09 PROCEDURE — 2500000003 HC RX 250 WO HCPCS: Performed by: INTERNAL MEDICINE

## 2020-07-09 RX ADMIN — DICYCLOMINE HYDROCHLORIDE 20 MG: 20 TABLET ORAL at 04:03

## 2020-07-09 RX ADMIN — FAMOTIDINE 20 MG: 10 INJECTION INTRAVENOUS at 09:30

## 2020-07-09 RX ADMIN — Medication 10 ML: at 09:37

## 2020-07-09 RX ADMIN — LORAZEPAM 2 MG: 2 INJECTION INTRAMUSCULAR; INTRAVENOUS at 05:03

## 2020-07-09 RX ADMIN — HYDROXYZINE PAMOATE 50 MG: 50 CAPSULE ORAL at 04:02

## 2020-07-09 RX ADMIN — PROMETHAZINE HYDROCHLORIDE 12.5 MG: 25 TABLET ORAL at 04:03

## 2020-07-09 RX ADMIN — IPRATROPIUM BROMIDE AND ALBUTEROL SULFATE 1 AMPULE: .5; 3 SOLUTION RESPIRATORY (INHALATION) at 06:56

## 2020-07-09 NOTE — PROGRESS NOTES
Pt in bed, very anxoius. Resp e/e. Shift assessment completed and charted. Pt requesting medications for withdrawal symptoms or pt stating he will leave. Pt with multiple symptoms, COWS scored 9. Pt given other prn's for all current symptoms pt experiencing per pt. Bed alarm on for pt safety. Telesitter in place for pt safety. No other needs. Will monitor.  Stacy Putnam

## 2020-07-09 NOTE — CONSULTS
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  Trauma: None known  Legal: None known     Current Medications Ordered:   ipratropium-albuterol  1 ampule Inhalation 4x daily    mupirocin   Topical BID    famotidine (PEPCID) injection  20 mg Intravenous BID    lidocaine 1 % injection  5 mL Intradermal Once    sodium chloride flush  10 mL Intravenous 2 times per day    gabapentin  300 mg Oral TID    sodium chloride flush  10 mL Intravenous 2 times per day    enoxaparin  40 mg Subcutaneous Daily    cefTRIAXone (ROCEPHIN) IV  1 g Intravenous Q24H    melatonin  3 mg Oral Nightly      PRN Meds: ondansetron, potassium chloride, sodium chloride flush, cloNIDine, traZODone, sodium chloride flush, acetaminophen **OR** acetaminophen, polyethylene glycol, promethazine **OR** ondansetron, albuterol, glucose, dextrose, glucagon (rDNA), dextrose, dextrose, [] traMADol **AND** cloNIDine, dicyclomine, ibuprofen, gabapentin, hydrOXYzine, LORazepam     ROS:  See Medical H&PE     PE:    /81   Pulse 94   Temp 98 °F (36.7 °C) (Oral)   Resp 16   Ht 5' 2\" (1.575 m)   Wt 125 lb (56.7 kg)   SpO2 98%   BMI 22.86 kg/m²       Motor / Gait: Observed while seated on edge of bed, no involuntary or abnormal movements noted  AIMS: 0    Mental Status Examination:    Appearance: WM, appears older than stated age, seated on edge of bed, multiple tattoos, thin, wearing hospital attire, disheveled  Behavior/Attitude Toward Examiner: Cooperative to the best of his ability, poor eye contact  Speech: Spontaneous, normal rate, decreased volume, mumbling, difficult to understand at times  Mood: \"Normal\"  Affect: Blunted  Thought Processes: Appeared to be overall logical but responses were brief with minimal elaboration despite prompting so difficult to determine full extent at this time  Thought Content: Denies SI, denies HI, no delusions voiced, no obsessions  Perceptions: Denies AVH, did not appear to be RTIS  Attention: Impaired  Cognition: Alert and oriented to person, place  Insight: Impaired insight   Judgment: Impaired judgment      LAB: Reviewed all labs obtained during admission to date. Dx:   Primary Psychiatric (DSM V) Diagnosis: Stimulant Use Disorder, recent overdose  Secondary Psychiatric (DSM V) Diagnoses: None   Chemical Dependency Diagnoses: History of opiate abuse    Recommendations:    1. Patient does not require inpatient psychiatric admission at this time. He denies depression, recent or current suicidal ideations, denies that this was a suicide attempt. 2. Recommend follow-up with substance abuse treatment and that patient be provided with SA referrals prior to or at time of discharge. Patient reports he has a history of being established with [facility], although it is unclear if this is remote or if he is currently established with them, and found SA treatment helpful. Spent >80 minutes face to face with patient of which >50% was spent counseling and providing education regarding diagnosis, treatment options, and prognosis. Thank you for consult. Please do not hesitate to contact provider if there are additional questions regarding patient.     Trevor Soto, MPH, APRN, PMHNP-BC  07/09/20

## 2020-07-09 NOTE — PROGRESS NOTES
Intravenous 2 times per day    enoxaparin  40 mg Subcutaneous Daily    cefTRIAXone (ROCEPHIN) IV  1 g Intravenous Q24H    melatonin  3 mg Oral Nightly     PRN Meds:  ondansetron, potassium chloride, sodium chloride flush, cloNIDine, traZODone, sodium chloride flush, acetaminophen **OR** acetaminophen, polyethylene glycol, promethazine **OR** ondansetron, albuterol, glucose, dextrose, glucagon (rDNA), dextrose, dextrose, [] traMADol **AND** cloNIDine, dicyclomine, ibuprofen, gabapentin, hydrOXYzine, LORazepam    Results:  CBC:   Recent Labs     20  0717 20  0440 20  0409   WBC 6.6 5.4 8.6   HGB 13.2* 13.3* 14.8   HCT 41.5 41.4 45.7   MCV 86.9 88.5 87.3    245 285     BMP:   Recent Labs     20  0520 20  1400 20  1510 20  0440 20  0409    144  --  140 142   K 2.5* 5.9* 5.1 4.3 3.7   * 108  --  102 99   CO2 17* 27  --  26 33*   PHOS 2.8  --   --  3.6 3.8   BUN <2* 3*  --  7 13   CREATININE <0.5* 0.6*  --  0.7* 0.8*     LIVER PROFILE:   No results for input(s): AST, ALT, LIPASE, BILIDIR, BILITOT, ALKPHOS in the last 72 hours. Invalid input(s): AMYLASE,  ALB    Cultures:  7/3 UC NGTD  7/3 BC   COVID-19 negative    Films:  CXR  was reviewed by me and it showed   Clear lungs   Satisfactory ETT and PICC position         ASSESSMENT:  · Acute hypercapnic respiratory failure  · Drug overdose-positive for meth possible opiate  · Abnormal CXR with L sided opacification-probable aspiration.   Improved  · Methamphetamine abuse  · Heroin abuse and currently in withdrawal  · Acute encephalopathy/Metabolic encephalopathy -worse today  · Rhabdomyolysis   · Possible UTI  · Hepatitis C        PLAN:  Supplemental oxygen to maintain SaO2 >92%; wean as tolerated  Check ABG  Inhaled bronchodilators- DuoNeb   DC IV fluid  Ceftriaxone D#  Follow CK -normalized and DC daily CK  Follow-up Cx   Minimize sedation  PT OT and swallow evaluation   DVT prophylaxis: Lovenox

## 2020-07-09 NOTE — PROGRESS NOTES
IM Progress Note    Admit Date:  7/3/2020  6    Interval history:       Pt with H/O polysustance abuse   Admitted for AMS , unintentional drug overdose - meth/ opiates   PCT elevated  Started developing withdrawal symptoms 7/4. Did not improve with COWS protcol --> patient with increasing agitation and delirium--> Intubated on 7/4 night   Patient extubated on 7/7. Subjective:  Mr. Quinten Stapleton  is awake , alert and oriented . He is extremely weak . Now off O2    Wants t ogo home today  Objective:   /81   Pulse 94   Temp 98 °F (36.7 °C) (Oral)   Resp 16   Ht 5' 2\" (1.575 m)   Wt 125 lb (56.7 kg)   SpO2 98%   BMI 22.86 kg/m²       Intake/Output Summary (Last 24 hours) at 7/9/2020 1046  Last data filed at 7/9/2020 2287  Gross per 24 hour   Intake 291 ml   Output 550 ml   Net -259 ml       Physical Exam:  General: Awake and alert, no distress, appears to be oriented to place and person  He is very weak and fatigued  Mucous Membranes:  Pink , anicteric  Neck: No JVD, no carotid bruit, no thyromegaly  Chest: Clear to auscultation, no wheezes, rhonchi  Cardiovascular:   no murmurs or gallops  Abdomen:  Soft, undistended, non tender, no organomegaly, BS present  Extremities: No edema or cyanosis.  Distal pulses well felt  Neurological :  nonfocal   Psych: No anxiety or agitation        Medications:   Scheduled Medications:    ipratropium-albuterol  1 ampule Inhalation 4x daily    mupirocin   Topical BID    famotidine (PEPCID) injection  20 mg Intravenous BID    lidocaine 1 % injection  5 mL Intradermal Once    sodium chloride flush  10 mL Intravenous 2 times per day    gabapentin  300 mg Oral TID    sodium chloride flush  10 mL Intravenous 2 times per day    enoxaparin  40 mg Subcutaneous Daily    cefTRIAXone (ROCEPHIN) IV  1 g Intravenous Q24H    melatonin  3 mg Oral Nightly     I   dextrose       ondansetron, potassium chloride, sodium chloride flush, cloNIDine, traZODone, sodium chloride flush, acetaminophen **OR** acetaminophen, polyethylene glycol, promethazine **OR** ondansetron, albuterol, glucose, dextrose, glucagon (rDNA), dextrose, dextrose, [] traMADol **AND** cloNIDine, dicyclomine, ibuprofen, gabapentin, hydrOXYzine, LORazepam    Lab Data:  Recent Labs     20  0717 20  0440 20  0409   WBC 6.6 5.4 8.6   HGB 13.2* 13.3* 14.8   HCT 41.5 41.4 45.7   MCV 86.9 88.5 87.3    245 285     Recent Labs     20  0520 20  1400 20  1510 20  0440 20  0409    144  --  140 142   K 2.5* 5.9* 5.1 4.3 3.7   * 108  --  102 99   CO2 17* 27  --  26 33*   PHOS 2.8  --   --  3.6 3.8   BUN <2* 3*  --  7 13   CREATININE <0.5* 0.6*  --  0.7* 0.8*     Recent Labs     20  0440 20  0409   CKTOTAL 569* 230       Coagulation:   Lab Results   Component Value Date    INR 0.99 2020     Cardiac markers:   Lab Results   Component Value Date    CKTOTAL 230 2020    TROPONINI <0.01 2020         Lab Results   Component Value Date    ALT 19 2020    AST 33 2020    ALKPHOS 84 2020    BILITOT 0.5 2020       Lab Results   Component Value Date    INR 0.99 2020    PROTIME 11.5 2020          Radiology  XR CHEST PORTABLE   Final Result   Clear lungs. IR PICC WO SQ PORT/PUMP > 5 YEARS   Final Result      XR CHEST PORTABLE   Final Result   Improved aeration of the lungs         XR CHEST PORTABLE   Final Result   1. Endotracheal tube tip is in the distal trachea. 2. The tip of the enteric tube remains at the gastroesophageal junction. The   tube should be advanced 12 cm. 3. Improved aeration of the left lung. XR CHEST PORTABLE   Final Result   Endotracheal tube with tip in the right mainstem bronchus. Recommend   repositioning. Opacification throughout the left lung, new. Hyperinflation of the right   lung. XR CHEST PORTABLE   Final Result   No acute cardiopulmonary disease. XR CHEST PORTABLE   Final Result   Pulmonary vascular congestion. Cultures:   Urine cultures  NGTD  Blood cultures pending  COVID-19 negative          ASSESSMENT and PLAN :     Altered mental status  Acute toxic encephalopathy  Delirium   - secondary to drug overdose meth/ opiates   - H/O polysubstance abuse, (+) methamphetamine, poss fentanyl  - Patient required Benadryl Haldol and Ativan in the ED for agitation.  -Patient with history of IV drug abuse  -Worsening agitation and withdrawal symptoms due to opiate and methamphetamine withdrawal  -No improvement on COWS protocol, with clonidine and tramadol and PRN  Ativan   -Intubated On 7/4, sedated on propofol, fentanyl  Drip  -Extubated on 7/7. Withdrawal symptoms seems to have improved. Off of all drips. Encephalopathy resolving      Acute hypoxic respiratory failure  -O2 sats documented at 63% on arrival to the ED. this was transient and sats improved. -Subsequent worsening respiratory failure in the setting of agitation and delirium, intubated, placed  on mechanical ventilation.    -Extubated  7/7/2020.  -Ruled out COVID-19, off droplet plus precautions  Now on RA     UTI  -Procalcitonin was  elevated. Procalcitonin levels improved from 50-> 26.8   -Continue Rocephin  -Follow-up on cultures-negative  D/c Abx    Nontraumatic rhabdomyolysis  -Secondary to agitation   -Hydrated with IV fluids  - CK levels elevated at 1115--> 1317--> 569-- 230    Hypophosphatemia  Hypokalemia    - repleted    Debility   -Seen by PT,OT       DVT Prophylaxis: lovenox  DIET DYSPHAGIA SOFT AND BITE-SIZED; Moderately Thick (Honey)  Code Status: Full Code          continue PT OT.  recommended SNF placement.   Patient refusing     He left Josias Russo MD 7/9/2020 10:46 AM

## 2020-07-09 NOTE — PROGRESS NOTES
Pt called out and yelling out, stating \"I need something I'm a heroin addict and I need medicine, give me demerol or morphine or something\". Pt stated if no medicine given the pt will leave. Pt stating anxiety and withdrawal symptoms and previous prn medications did not help. Ativan given per [facility] order and pt demanding for medication to help symptoms. Will monitor.  Glenna Trevino

## 2020-07-09 NOTE — FLOWSHEET NOTE
07/09/20 0915   Vital Signs   Temp 98 °F (36.7 °C)   Temp Source Oral   Pulse 94   Heart Rate Source Monitor   Resp 16   /81   BP Location Left upper arm   BP Upper/Lower Upper   Patient Position Supine   Level of Consciousness 0   MEWS Score 1   Oxygen Therapy   SpO2 98 %   O2 Device Nasal cannula   O2 Flow Rate (L/min) 1 L/min   Patient Observation   Observations placed on RA    AM assessment completed, see flow sheet. Pt is very drowsy and falls asleep while trying to talk and while being asked questions. Difficult to understand, voice mumbles. Vital signs are WNL. Respirations are even & easy. No s/s of distress. PO meds held at this time d/t drowsiness. SR up x 2, and bed in low position. Call light is within reach. Telesitter in place for pt safety.

## 2020-07-09 NOTE — FLOWSHEET NOTE
07/08/20 2333   Vital Signs   Temp 98 °F (36.7 °C)   Temp Source Oral   Pulse 88   Heart Rate Source Monitor   Resp 17   /87   BP Location Left upper arm   Patient Position Prone   Level of Consciousness 0   MEWS Score 1   Oxygen Therapy   SpO2 100 %   O2 Device Nasal cannula   O2 Flow Rate (L/min) 1.5 L/min   monitor tech called stating the pt had a run of v-tach. VS were checked, and stable, pt was repositioned with no complaints.  Primary nurse updated

## 2020-07-09 NOTE — PROGRESS NOTES
diet without s/s of aspiration. 07/09, progressing, see above     Short-term Goals  Timeframe for Short-term Goals: 5 days (7/13/20)  Dysphagia Goals: The patient will tolerate recommended diet without observed clinical signs of aspiration. 07/09, progressing, see above  The patient/caregiver will demonstrate understanding of compensatory strategies for improved swallowing safety. 07/09, suspect poor carryover  The patient will tolerate nectar thick liquids without signs and symptoms of aspiration 10/10 via cup. 07/09, upgraded to NTL  The patient will tolerate thin liquids without signs and symptoms of aspiration 10/10 via cup. 07/09, attempted this date  The patient will tolerate regular consistency solids 10/10.  07/09, did not address this date    Recommendations:  Solid Consistency: Soft and bite sized, Dysphagia 3  Liquid Consistency: Upgrade to Mildly thick (nectar) liquids  Medication: Medications whole in puree    Patient/Family/Caregiver Education: Diet recommendations and compensatory strategies. Suspect patient requires additional education at this time. Compensatory Strategies: Sit up for all meals and thereafter for 30 minutes, Eat with small bites (1/2 tsp; 1 tsp), No straws, Alternate solids with liquids and Take your medication with apple sauce    Plan:    Continued Dysphagia treatment with goals per plan of care. Discharge Recommendations: TBD, defer to OT/PT, continued SLP services at this time    If pt discharges from hospital prior to Speech/Swallowing discharge, this note serves as tx and discharge summary. Total Treatment Time / Charges     Time in Time out Total Time / units   Cognitive Tx         Speech Tx      Dysphagia Tx 942 [id number] 12 min / 1 unit     Signature: Jamia Tillman. LAVINIA Leavitt  Speech-Language Pathologist [id number]

## 2020-07-13 NOTE — DISCHARGE SUMMARY
Abx     Nontraumatic rhabdomyolysis - improved  - Secondary to agitation   - Hydrated with IV fluids  - CK levels elevated at 1115--> 1317--> 569-- 230     Hypophosphatemia - Resolved  Hypokalemia - Resolved   - repleted     Debility   - Seen by PT,OT  - recommended SNF    Procedures (Please Review Full Report for Details)  Central Line  Intubation    Consults    Critical Care  Psychiatry    Physical Exam at Discharge:    /81   Pulse 94   Temp 98 °F (36.7 °C) (Oral)   Resp 16   Ht 5' 2\" (1.575 m)   Wt 125 lb (56.7 kg)   SpO2 98%   BMI 22.86 kg/m²   General: Awake and alert, no distress, appears to be oriented to place and person  He is very weak and fatigued  Mucous Membranes:  Pink , anicteric  Neck: No JVD, no carotid bruit, no thyromegaly  Chest: Clear to auscultation, no wheezes, rhonchi  Cardiovascular:   no murmurs or gallops  Abdomen:  Soft, undistended, non tender, no organomegaly, BS present  Extremities: No edema or cyanosis. Distal pulses well felt  Neurological :  nonfocal   Psych: No anxiety or agitation    CULTURES    Urine cx: NGTD    RADIOLOGY    XR CHEST PORTABLE 7/7/2020   Final Result   Clear lungs. IR PICC WO SQ PORT/PUMP > 5 YEARS 7/6/2020   Final Result      XR CHEST PORTABLE 7/6/2020   Final Result   Improved aeration of the lungs         XR CHEST PORTABLE 7/5/2020   Final Result   1. Endotracheal tube tip is in the distal trachea. 2. The tip of the enteric tube remains at the gastroesophageal junction. The   tube should be advanced 12 cm. 3. Improved aeration of the left lung. XR CHEST PORTABLE 7/5/2020   Final Result   Endotracheal tube with tip in the right mainstem bronchus. Recommend   repositioning. Opacification throughout the left lung, new. Hyperinflation of the right   lung. XR CHEST PORTABLE 7/4/2020   Final Result   No acute cardiopulmonary disease. XR CHEST PORTABLE 7/3/2020   Final Result   Pulmonary vascular congestion.

## 2020-11-07 ENCOUNTER — HOSPITAL ENCOUNTER (INPATIENT)
Age: 50
LOS: 1 days | Discharge: LEFT BEFORE DISCHARGE | DRG: 894 | End: 2020-11-08
Attending: EMERGENCY MEDICINE | Admitting: INTERNAL MEDICINE
Payer: MEDICARE

## 2020-11-07 ENCOUNTER — APPOINTMENT (OUTPATIENT)
Dept: GENERAL RADIOLOGY | Age: 50
DRG: 894 | End: 2020-11-07
Payer: MEDICARE

## 2020-11-07 PROBLEM — F11.93 HEROIN WITHDRAWAL (HCC): Status: ACTIVE | Noted: 2020-11-07

## 2020-11-07 LAB
A/G RATIO: 1.1 (ref 1.1–2.2)
ALBUMIN SERPL-MCNC: 3.6 G/DL (ref 3.4–5)
ALP BLD-CCNC: 210 U/L (ref 40–129)
ALT SERPL-CCNC: 53 U/L (ref 10–40)
AMPHETAMINE SCREEN, URINE: NORMAL
ANION GAP SERPL CALCULATED.3IONS-SCNC: 17 MMOL/L (ref 3–16)
ANISOCYTOSIS: ABNORMAL
AST SERPL-CCNC: 110 U/L (ref 15–37)
BANDED NEUTROPHILS RELATIVE PERCENT: 7 % (ref 0–7)
BARBITURATE SCREEN URINE: NORMAL
BASOPHILS ABSOLUTE: 0 K/UL (ref 0–0.2)
BASOPHILS RELATIVE PERCENT: 0 %
BENZODIAZEPINE SCREEN, URINE: NORMAL
BILIRUB SERPL-MCNC: 1.1 MG/DL (ref 0–1)
BUN BLDV-MCNC: 21 MG/DL (ref 7–20)
CALCIUM SERPL-MCNC: 8.5 MG/DL (ref 8.3–10.6)
CANNABINOID SCREEN URINE: NORMAL
CHLORIDE BLD-SCNC: 94 MMOL/L (ref 99–110)
CO2: 25 MMOL/L (ref 21–32)
COCAINE METABOLITE SCREEN URINE: NORMAL
CREAT SERPL-MCNC: 1 MG/DL (ref 0.9–1.3)
EKG ATRIAL RATE: 139 BPM
EKG DIAGNOSIS: NORMAL
EKG P AXIS: 75 DEGREES
EKG P-R INTERVAL: 90 MS
EKG Q-T INTERVAL: 286 MS
EKG QRS DURATION: 68 MS
EKG QTC CALCULATION (BAZETT): 435 MS
EKG R AXIS: 90 DEGREES
EKG T AXIS: 75 DEGREES
EKG VENTRICULAR RATE: 139 BPM
EOSINOPHILS ABSOLUTE: 0 K/UL (ref 0–0.6)
EOSINOPHILS RELATIVE PERCENT: 0 %
GFR AFRICAN AMERICAN: >60
GFR NON-AFRICAN AMERICAN: >60
GLOBULIN: 3.4 G/DL
GLUCOSE BLD-MCNC: 234 MG/DL (ref 70–99)
HCT VFR BLD CALC: 41.9 % (ref 40.5–52.5)
HEMOGLOBIN: 13.8 G/DL (ref 13.5–17.5)
LYMPHOCYTES ABSOLUTE: 0.2 K/UL (ref 1–5.1)
LYMPHOCYTES RELATIVE PERCENT: 1 %
Lab: NORMAL
MCH RBC QN AUTO: 27.8 PG (ref 26–34)
MCHC RBC AUTO-ENTMCNC: 33 G/DL (ref 31–36)
MCV RBC AUTO: 84.4 FL (ref 80–100)
METAMYELOCYTES RELATIVE PERCENT: 1 %
METHADONE SCREEN, URINE: NORMAL
MONOCYTES ABSOLUTE: 0.3 K/UL (ref 0–1.3)
MONOCYTES RELATIVE PERCENT: 2 %
NEUTROPHILS ABSOLUTE: 16.9 K/UL (ref 1.7–7.7)
NEUTROPHILS RELATIVE PERCENT: 89 %
OPIATE SCREEN URINE: NORMAL
OXYCODONE URINE: NORMAL
PDW BLD-RTO: 15 % (ref 12.4–15.4)
PH UA: 5
PHENCYCLIDINE SCREEN URINE: NORMAL
PLATELET # BLD: 285 K/UL (ref 135–450)
PMV BLD AUTO: 6.8 FL (ref 5–10.5)
POTASSIUM REFLEX MAGNESIUM: 3.7 MMOL/L (ref 3.5–5.1)
PROPOXYPHENE SCREEN: NORMAL
RBC # BLD: 4.96 M/UL (ref 4.2–5.9)
SARS-COV-2, NAAT: NOT DETECTED
SLIDE REVIEW: ABNORMAL
SODIUM BLD-SCNC: 136 MMOL/L (ref 136–145)
TOTAL PROTEIN: 7 G/DL (ref 6.4–8.2)
TROPONIN: <0.01 NG/ML
WBC # BLD: 17.4 K/UL (ref 4–11)

## 2020-11-07 PROCEDURE — 1200000000 HC SEMI PRIVATE

## 2020-11-07 PROCEDURE — 80307 DRUG TEST PRSMV CHEM ANLYZR: CPT

## 2020-11-07 PROCEDURE — 71045 X-RAY EXAM CHEST 1 VIEW: CPT

## 2020-11-07 PROCEDURE — 93010 ELECTROCARDIOGRAM REPORT: CPT | Performed by: INTERNAL MEDICINE

## 2020-11-07 PROCEDURE — 6370000000 HC RX 637 (ALT 250 FOR IP): Performed by: PHYSICIAN ASSISTANT

## 2020-11-07 PROCEDURE — 80053 COMPREHEN METABOLIC PANEL: CPT

## 2020-11-07 PROCEDURE — 6370000000 HC RX 637 (ALT 250 FOR IP): Performed by: INTERNAL MEDICINE

## 2020-11-07 PROCEDURE — 36415 COLL VENOUS BLD VENIPUNCTURE: CPT

## 2020-11-07 PROCEDURE — 6360000002 HC RX W HCPCS: Performed by: INTERNAL MEDICINE

## 2020-11-07 PROCEDURE — 6360000002 HC RX W HCPCS: Performed by: PHYSICIAN ASSISTANT

## 2020-11-07 PROCEDURE — 84484 ASSAY OF TROPONIN QUANT: CPT

## 2020-11-07 PROCEDURE — 99285 EMERGENCY DEPT VISIT HI MDM: CPT

## 2020-11-07 PROCEDURE — U0002 COVID-19 LAB TEST NON-CDC: HCPCS

## 2020-11-07 PROCEDURE — 93005 ELECTROCARDIOGRAM TRACING: CPT | Performed by: PHYSICIAN ASSISTANT

## 2020-11-07 PROCEDURE — 85025 COMPLETE CBC W/AUTO DIFF WBC: CPT

## 2020-11-07 PROCEDURE — 2580000003 HC RX 258: Performed by: PHYSICIAN ASSISTANT

## 2020-11-07 RX ORDER — PROMETHAZINE HYDROCHLORIDE 25 MG/1
25 TABLET ORAL EVERY 6 HOURS PRN
Status: DISCONTINUED | OUTPATIENT
Start: 2020-11-07 | End: 2020-11-08 | Stop reason: HOSPADM

## 2020-11-07 RX ORDER — DICYCLOMINE HCL 20 MG
20 TABLET ORAL EVERY 6 HOURS PRN
Status: DISCONTINUED | OUTPATIENT
Start: 2020-11-07 | End: 2020-11-08 | Stop reason: HOSPADM

## 2020-11-07 RX ORDER — CLONIDINE 0.1 MG/24H
1 PATCH, EXTENDED RELEASE TRANSDERMAL ONCE
Status: DISCONTINUED | OUTPATIENT
Start: 2020-11-07 | End: 2020-11-08 | Stop reason: HOSPADM

## 2020-11-07 RX ORDER — BUPRENORPHINE HYDROCHLORIDE AND NALOXONE HYDROCHLORIDE DIHYDRATE 8; 2 MG/1; MG/1
1 TABLET SUBLINGUAL ONCE
Status: COMPLETED | OUTPATIENT
Start: 2020-11-07 | End: 2020-11-07

## 2020-11-07 RX ORDER — HYDROXYZINE PAMOATE 50 MG/1
50 CAPSULE ORAL EVERY 8 HOURS PRN
Status: DISCONTINUED | OUTPATIENT
Start: 2020-11-07 | End: 2020-11-08 | Stop reason: HOSPADM

## 2020-11-07 RX ORDER — BUPRENORPHINE 2 MG/1
2 TABLET SUBLINGUAL PRN
Status: DISCONTINUED | OUTPATIENT
Start: 2020-11-07 | End: 2020-11-08 | Stop reason: HOSPADM

## 2020-11-07 RX ORDER — LANOLIN ALCOHOL/MO/W.PET/CERES
3 CREAM (GRAM) TOPICAL NIGHTLY
Status: DISCONTINUED | OUTPATIENT
Start: 2020-11-07 | End: 2020-11-08 | Stop reason: HOSPADM

## 2020-11-07 RX ORDER — QUETIAPINE FUMARATE 25 MG/1
50 TABLET, FILM COATED ORAL NIGHTLY PRN
Status: DISCONTINUED | OUTPATIENT
Start: 2020-11-07 | End: 2020-11-08 | Stop reason: HOSPADM

## 2020-11-07 RX ORDER — CLONIDINE HYDROCHLORIDE 0.1 MG/1
0.1 TABLET ORAL PRN
Status: DISCONTINUED | OUTPATIENT
Start: 2020-11-07 | End: 2020-11-08 | Stop reason: HOSPADM

## 2020-11-07 RX ORDER — BUPRENORPHINE HYDROCHLORIDE AND NALOXONE HYDROCHLORIDE DIHYDRATE 8; 2 MG/1; MG/1
1 TABLET SUBLINGUAL DAILY
Status: DISCONTINUED | OUTPATIENT
Start: 2020-11-07 | End: 2020-11-07

## 2020-11-07 RX ORDER — IBUPROFEN 800 MG/1
800 TABLET ORAL EVERY 8 HOURS PRN
Status: DISCONTINUED | OUTPATIENT
Start: 2020-11-07 | End: 2020-11-08 | Stop reason: HOSPADM

## 2020-11-07 RX ORDER — 0.9 % SODIUM CHLORIDE 0.9 %
1000 INTRAVENOUS SOLUTION INTRAVENOUS ONCE
Status: COMPLETED | OUTPATIENT
Start: 2020-11-07 | End: 2020-11-07

## 2020-11-07 RX ORDER — GABAPENTIN 300 MG/1
300 CAPSULE ORAL 3 TIMES DAILY
Status: DISCONTINUED | OUTPATIENT
Start: 2020-11-07 | End: 2020-11-08 | Stop reason: HOSPADM

## 2020-11-07 RX ORDER — ONDANSETRON 2 MG/ML
4 INJECTION INTRAMUSCULAR; INTRAVENOUS EVERY 6 HOURS PRN
Status: DISCONTINUED | OUTPATIENT
Start: 2020-11-07 | End: 2020-11-07

## 2020-11-07 RX ORDER — GABAPENTIN 300 MG/1
300 CAPSULE ORAL EVERY 8 HOURS PRN
Status: DISCONTINUED | OUTPATIENT
Start: 2020-11-07 | End: 2020-11-08 | Stop reason: HOSPADM

## 2020-11-07 RX ADMIN — BUPRENORPHINE HCL 2 MG: 2 TABLET SUBLINGUAL at 22:51

## 2020-11-07 RX ADMIN — SODIUM CHLORIDE 1000 ML: 9 INJECTION, SOLUTION INTRAVENOUS at 15:23

## 2020-11-07 RX ADMIN — Medication 3 MG: at 22:51

## 2020-11-07 RX ADMIN — GABAPENTIN 300 MG: 300 CAPSULE ORAL at 22:50

## 2020-11-07 RX ADMIN — SODIUM CHLORIDE 1000 ML: 9 INJECTION, SOLUTION INTRAVENOUS at 16:07

## 2020-11-07 RX ADMIN — ONDANSETRON HYDROCHLORIDE 4 MG: 2 INJECTION, SOLUTION INTRAMUSCULAR; INTRAVENOUS at 16:07

## 2020-11-07 RX ADMIN — BUPRENORPHINE HYDROCHLORIDE AND NALOXONE HYDROCHLORIDE DIHYDRATE 1 TABLET: 8; 2 TABLET SUBLINGUAL at 17:30

## 2020-11-07 RX ADMIN — BUPRENORPHINE HYDROCHLORIDE AND NALOXONE HYDROCHLORIDE DIHYDRATE 1 TABLET: 8; 2 TABLET SUBLINGUAL at 18:47

## 2020-11-07 RX ADMIN — ENOXAPARIN SODIUM 40 MG: 40 INJECTION SUBCUTANEOUS at 22:51

## 2020-11-07 RX ADMIN — CLONIDINE HYDROCHLORIDE 0.1 MG: 0.1 TABLET ORAL at 22:51

## 2020-11-07 ASSESSMENT — ENCOUNTER SYMPTOMS
VOMITING: 1
DIARRHEA: 1
NAUSEA: 1
SHORTNESS OF BREATH: 1

## 2020-11-07 ASSESSMENT — PAIN SCALES - GENERAL
PAINLEVEL_OUTOF10: 6
PAINLEVEL_OUTOF10: 8

## 2020-11-07 ASSESSMENT — PAIN DESCRIPTION - PAIN TYPE: TYPE: ACUTE PAIN

## 2020-11-07 ASSESSMENT — PAIN DESCRIPTION - LOCATION: LOCATION: ABDOMEN

## 2020-11-07 NOTE — ED PROVIDER NOTES
[address] ED  EMERGENCY DEPARTMENT ENCOUNTER        Pt Name: Kavita Montanez  MRN: [id number]  [place] 1970  Date of evaluation: 11/7/2020  Provider: Jeanie Zapata PA-C  PCP: No primary care provider on file. I have seen and evaluated this patient with my supervising physician Ree Morales MD.    [address] [place] [address]       Chief Complaint   Patient presents with    Shortness of Breath     started feeling sob this morning. pt states he uses heroin daily. hasnt had any today. in withdrawal.       HISTORY OF PRESENT ILLNESS   (Location, Timing/Onset, Context/Setting, Quality, Duration, Modifying Factors, Severity, Associated Signs and Symptoms)  Note limiting factors. Kavita Montanez is a 48 y.o. male brought in today by squad from home for evaluation of increased shortness of breath and chest pain. Patient reports he does use heroin daily. He last used one day ago. He states he has not had any heroin today and he feels as though his body is going through withdrawal.  Onset of symptoms started today. Duration of symptoms have been constant since onset. Context included heroin withdrawal. No aggravating or alleviating complaints. He states he does feel short of breath and has chest pain. He also reports nausea vomiting and diarrhea. He denies any other illicit drug use. Denies any alcohol use. He otherwise denies any other complaints at this time. Nothing seems to make symptoms better or worse. He has not tried anything at home for symptomatic relief. He reports he would like help with his heroin addiction. Nursing Notes were all reviewed and agreed with or any disagreements were addressed in the HPI. REVIEW OF SYSTEMS    (2-9 systems for level 4, 10 or more for level 5)     Review of Systems   Constitutional: Negative. HENT: Negative. Respiratory: Positive for shortness of breath. Cardiovascular: Positive for chest pain.    Gastrointestinal: Positive for diarrhea, nausea and vomiting. Genitourinary: Negative. Musculoskeletal: Negative. Skin: Negative. Neurological: Negative. Positives and Pertinent negatives as per HPI. Except as noted above in the ROS, all other systems were reviewed and negative. 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 ONDANSETRON (ZOFRAN ODT) 4 MG DISINTEGRATING TABLET    Take 1-2 tablets by mouth every 12 hours as needed for Nausea    TRAZODONE (DESYREL) 100 MG TABLET    Take 1 tablet by mouth nightly as needed for Sleep         ALLERGIES     Patient has no known allergies. FAMILYHISTORY     History reviewed. No pertinent family history. SOCIAL HISTORY       Social History     Tobacco Use    Smoking status: Current Every Day Smoker     Packs/day: 2.00     Types: Cigarettes    Smokeless tobacco: Never Used   Substance Use Topics    Alcohol use: No    Drug use: Yes     Types: IV       SCREENINGS             PHYSICAL EXAM    (up to 7 for level 4, 8 or more for level 5)     ED Triage Vitals [11/07/20 1425]   BP Temp Temp Source Pulse Resp SpO2 Height Weight   93/76 99.5 °F (37.5 °C) Oral 142 25 92 % -- 125 lb (56.7 kg)       Physical Exam  Vitals signs and nursing note reviewed.    Constitutional: General: He is awake. He is not in acute distress. Appearance: Normal appearance. He is well-developed. He is ill-appearing. He is not toxic-appearing or diaphoretic. Interventions: Nasal cannula in place. HENT:      Head: Normocephalic and atraumatic. Nose: Nose normal.   Eyes:      General:         Right eye: No discharge. Left eye: No discharge. Neck:      Musculoskeletal: Normal range of motion and neck supple. Cardiovascular:      Rate and Rhythm: Regular rhythm. Tachycardia present. Heart sounds: Normal heart sounds. No murmur. No gallop. Pulmonary:      Effort: Pulmonary effort is normal. No respiratory distress. Breath sounds: Normal breath sounds. No wheezing or rales. Chest:      Chest wall: No tenderness. Musculoskeletal: Normal range of motion. General: No deformity. Skin:     General: Skin is warm and dry. Neurological:      General: No focal deficit present. Mental Status: He is alert and oriented to person, place, and time. GCS: GCS eye subscore is 4. GCS verbal subscore is 5. GCS motor subscore is 6. Psychiatric:         Behavior: Behavior normal. Behavior is cooperative.          DIAGNOSTIC RESULTS   LABS:    Labs Reviewed   CBC WITH AUTO DIFFERENTIAL - Abnormal; Notable for the following components:       Result Value    WBC 17.4 (*)     Neutrophils Absolute 16.9 (*)     Lymphocytes Absolute 0.2 (*)     Metamyelocytes Relative 1 (*)     Anisocytosis Occasional (*)     All other components within normal limits    Narrative:     Performed at:  [place] [address],  [place], [facility]   Phone [phone]   COMPREHENSIVE METABOLIC PANEL W/ REFLEX TO MG FOR LOW K - Abnormal; Notable for the following components:    Chloride 94 (*)     Anion Gap 17 (*)     Glucose 234 (*)     BUN 21 (*)     Total Bilirubin 1.1 (*)     Alkaline Phosphatase 210 (*)     ALT 53 (*)      (*)     All other components within normal limits    Narrative:     Performed at:  [facility],  [place], [facility]   Phone [phone]   TROPONIN    Narrative:     Performed at:  [facility],  [place], [facility]   Phone [phone]    Narrative:     Performed at:  [facility],  [place], [facility]   Phone [phone]   URINE DRUG SCREEN       All other labs were within normal range or not returned as of this dictation. EKG: All EKG's are interpreted by the Emergency Department Physician in the absence of a cardiologist.  Please see their note for interpretation of EKG. RADIOLOGY:   Non-plain film images such as CT, Ultrasound and MRI are read by the radiologist. Plain radiographic images are visualized and preliminarily interpreted by the ED Provider with the below findings:        Interpretation per the Radiologist below, if available at the time of this note:    XR CHEST PORTABLE   Final Result   Rotated exam, without gross acute process. Xr Chest Portable    Result Date: 11/7/2020  EXAMINATION: ONE XRAY VIEW OF THE CHEST 11/7/2020 2:58 pm COMPARISON: 07/07/2020 HISTORY: ORDERING SYSTEM PROVIDED HISTORY: cough TECHNOLOGIST PROVIDED HISTORY: Reason for exam:->cough Reason for Exam: SOB; withdrawal Acuity: Acute Type of Exam: Initial FINDINGS: Patient is rotated. Cardiac leads project over the chest.  Prominence of pulmonary vasculature is similar to prior. No new confluent airspace disease. No pleural effusion. No pneumothorax. Biapical pleuroparenchymal thickening. Cardiac and mediastinal silhouettes are reflective of patient rotation. Rotated exam, without gross acute process.            PROCEDURES   Unless otherwise noted below, none     Procedures    CRITICAL CARE TIME   N/A    CONSULTS:  IP CONSULT TO HOSPITALIST      EMERGENCY DEPARTMENT COURSE and DIFFERENTIAL DIAGNOSIS/MDM:   Vitals:    Vitals:    11/07/20 1425   BP: 93/76   Pulse: 142   Resp: 25   Temp: 99.5 °F (37.5 °C)   TempSrc: Oral   SpO2: 92%   Weight: 125 lb (56.7 kg)       Patient was given the following medications:  Medications   ondansetron (ZOFRAN) injection 4 mg (4 mg Intravenous Given 11/7/20 1607)   cloNIDine (CATAPRES) 0.1 MG/24HR 1 patch (1 patch Transdermal Patch Applied 11/7/20 1524)   buprenorphine-naloxone (SUBOXONE) 8-2 MG SL tablet 1 tablet (1 tablet Sublingual Given 11/7/20 1847)   0.9 % sodium chloride bolus (0 mLs Intravenous Stopped 11/7/20 1847)   0.9 % sodium chloride bolus (1,000 mLs Intravenous New Bag 11/7/20 1607)   buprenorphine-naloxone (SUBOXONE) 8-2 MG SL tablet 1 tablet (1 tablet Sublingual Given 11/7/20 1730)           Patient brought in today by private vehicle for complaints of shortness of breath and chest pain as well as nausea vomiting and diarrhea. On exam he is tachycardic in the 140s hypoxic requiring 2 L of nasal cannula low-grade temperature of 99.5 blood pressure of 93/76. Old labs and records reviewed at this time. Patient seen and evaluated by my attending Dr. Riddhi Vaughan as well as myself. Patient has a leukocytosis of 17.4. Hemoglobin of 13.8. Blood glucose of 235. BUN of 21 creatinine of 1. No acute electrolyte abnormalities. Liver enzymes elevated. Troponin is less than 0.01. EKG reviewed by my attending see note for dictation. Chest x-ray reveals no acute findings. While in the ED patient received 2 L of fluids as well as Zofran and Suboxone. Patient scores an 8 on the COWS. Patient reports that he is agreeable to rehab for his heroin addiction. Patient still remains tachycardic after first round of Suboxone will administer a second round of Suboxone. I spoke to the hospitalist Dr. Jeff Mims did agree to admit at this time. Patient stable at time of admission. FINAL IMPRESSION      1.  Heroin withdrawal ([address] 2. Tachycardia    3. Hypoxia    4. Leukocytosis, unspecified type          DISPOSITION/PLAN   DISPOSITION Admitted 11/07/2020 06:14:10 PM      PATIENT REFERREDTO:  No follow-up provider specified.     DISCHARGE MEDICATIONS:  New Prescriptions    No medications on file       DISCONTINUED MEDICATIONS:  Discontinued Medications    No medications on file              (Please note that portions of this note were completed with a voice recognition program.  Efforts were made to edit the dictations but occasionally words are mis-transcribed.)    Brody Ness PA-C (electronically signed)            Brody Ness PA-C  11/07/20 [id number]

## 2020-11-07 NOTE — ED PROVIDER NOTES
The Ekg interpreted by me shows  sinus tachycardia, mqyp=[id number] , occasional PVCs  Axis is   borderline  QTc is  normal  Intervals and Durations are unremarkable.       ST Segments: nonspecific changes  Previous for comparison performed 7/8/2020           Lemont Cheadle, MD  11/07/20 6534

## 2020-11-08 VITALS
OXYGEN SATURATION: 99 % | TEMPERATURE: 97.9 F | RESPIRATION RATE: 16 BRPM | HEART RATE: 98 BPM | HEIGHT: 62 IN | SYSTOLIC BLOOD PRESSURE: 98 MMHG | BODY MASS INDEX: 18.03 KG/M2 | WEIGHT: 98 LBS | DIASTOLIC BLOOD PRESSURE: 80 MMHG

## 2020-11-08 PROCEDURE — 99238 HOSP IP/OBS DSCHRG MGMT 30/<: CPT | Performed by: INTERNAL MEDICINE

## 2020-11-08 NOTE — PROGRESS NOTES
Pt is lying in bed with their eyes closed. Respirations are easy and even. COWS score of 1. Call light within reach bed in lowest position with the wheels locked. Will continue to monitor.  Alexsander Wasserman

## 2020-11-08 NOTE — PROGRESS NOTES
The patient has decided to leave against medical advice. I ordered a CTPA for dyspnea but he refuses to have it done. He has normal mental status and adequate capacity to make medical decisions. The patient refuses hospital admission and wants to be discharged. The risks have been explained to the patient, including worsening illness, chronic pain, permanent disability, and death. The benefits of continued stay in the hospital have also been explained, including the availability and proximity of nurses, physicians, monitoring, diagnostic testing, and treatment. The patient was able to understand and state the risks and benefits. This was witnessed by nurse Charlotte Villeda and me. He had the opportunity to ask questions about his medical condition. The patient was treated to the extent that he would allow, and knows that he may return for care at any time.       Mendy Began 11/8/2020 11:17 AM

## 2020-11-08 NOTE — PLAN OF CARE
Problem: Falls - Risk of:  Goal: Will remain free from falls  Description: Will remain free from falls  Outcome: Completed     Problem: Falls - Risk of:  Goal: Absence of physical injury  Description: Absence of physical injury  Outcome: Completed     Problem: Confusion - Acute:  Goal: Absence of continued neurological deterioration signs and symptoms  Description: Absence of continued neurological deterioration signs and symptoms  Outcome: Completed     Problem: Confusion - Acute:  Goal: Mental status will be restored to baseline  Description: Mental status will be restored to baseline  Outcome: Completed     Problem: Discharge Planning:  Goal: Ability to perform activities of daily living will improve  Description: Ability to perform activities of daily living will improve  Outcome: Completed     Problem: Discharge Planning:  Goal: Participates in care planning  Description: Participates in care planning  Outcome: Completed     Problem: Injury - Risk of, Physical Injury:  Goal: Will remain free from falls  Description: Will remain free from falls  Outcome: Completed     Problem: Injury - Risk of, Physical Injury:  Goal: Absence of physical injury  Description: Absence of physical injury  Outcome: Completed     Problem: Mood - Altered:  Goal: Mood stable  Description: Mood stable  Outcome: Completed     Problem: Mood - Altered:  Goal: Absence of abusive behavior  Description: Absence of abusive behavior  Outcome: Completed     Problem: Mood - Altered:  Goal: Verbalizations of feeling emotionally comfortable while being cared for will increase  Description: Verbalizations of feeling emotionally comfortable while being cared for will increase  Outcome: Completed     Problem: Psychomotor Activity - Altered:  Goal: Absence of psychomotor disturbance signs and symptoms  Description: Absence of psychomotor disturbance signs and symptoms  Outcome: Completed     Problem: Sensory Perception - Impaired:  Goal: Demonstrations of improved sensory functioning will increase  Description: Demonstrations of improved sensory functioning will increase  Outcome: Completed     Problem: Sensory Perception - Impaired:  Goal: Decrease in sensory misperception frequency  Description: Decrease in sensory misperception frequency  Outcome: Completed     Problem: Sensory Perception - Impaired:  Goal: Able to refrain from responding to false sensory perceptions  Description: Able to refrain from responding to false sensory perceptions  Outcome: Completed     Problem: Sensory Perception - Impaired:  Goal: Able to distinguish between reality-based and nonreality-based thinking  Description: Able to distinguish between reality-based and nonreality-based thinking  Outcome: Completed     Problem: Sensory Perception - Impaired:  Goal: Able to interrupt nonreality-based thinking  Description: Able to interrupt nonreality-based thinking  Outcome: Completed     Problem: Sleep Pattern Disturbance:  Goal: Appears well-rested  Description: Appears well-rested  Outcome: Completed

## 2020-11-08 NOTE — ED NOTES
Patient transported to floor via stretcher with belongings. Report given at bedside to Staff RN.      Ari Yao RN  11/07/20 2008

## 2020-11-08 NOTE — H&P
Hospital Medicine History & Physical      PCP: No primary care provider on file. Date of Admission: 11/7/2020    Date of Service: Pt seen/examined on 11/7/2020    Chief Complaint: Heroin withdrawal    History Of Present Illness:    48 y.o. male who presented to the hospital with a chief complaint of increased shortness of breath and chest pain. Patient is a heroin abuser and his last use was yesterday. He called and states that he felt he was going through withdrawal.  The patient was actively hallucinating and screaming when I saw him in the emergency department. He cannot provide much history but endorsed generalized pain. He will be admitted for opiate withdrawal.    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 Authorizing Provider   gabapentin (NEURONTIN) 300 MG capsule Take 1 capsule by mouth 3 times daily. 4/21/15  Yes JOSIAS Franco CNP   traZODone (DESYREL) 100 MG tablet Take 1 tablet by mouth nightly as needed for Sleep 6/24/15   GIA Elizabeth   ondansetron (ZOFRAN ODT) 4 MG disintegrating tablet Take 1-2 tablets by mouth every 12 hours as needed for Nausea 6/24/15   GIA Elizabeth   cloNIDine (CATAPRES) 0.1 MG tablet Take 1 tablet by mouth every 4 hours as needed 6/24/15   GIA Elizabeth       Allergies:  Patient has no known allergies.     Social History:      TOBACCO:   reports that he has been smoking cigarettes. He has been smoking about 2.00 packs per day. He has never used smokeless tobacco.  ETOH:   reports no history of alcohol use. Family History:      History reviewed. No pertinent family history. REVIEW OF SYSTEMS:   Not obtained given patient's mental state    PHYSICAL EXAM:  /83   Pulse 113   Temp 98.4 °F (36.9 °C) (Oral)   Resp 18   Ht 5' 2\" (1.575 m)   Wt 98 lb (44.5 kg)   SpO2 100%   BMI 17.92 kg/m²     General appearance: Disheveled and ill-appearing male  HEENT:  Normal cephalic, atraumatic without obvious deformity. Pupils equal, round, and reactive to light. Extra ocular muscles intact. Conjunctivae/corneas clear. Neck: Supple, with full range of motion. No jugular venous distention. Trachea midline. Respiratory:  Normal respiratory effort. Clear to auscultation, bilaterally without Rales/Wheezes/Rhonchi. Cardiovascular: Tachycardic  Abdomen: Soft, non-tender, non-distended with normal bowel sounds. Musculoskeletal:  No clubbing, cyanosis or edema bilaterally. Full range of motion without deformity. Skin: Skin color, texture, turgor normal.  No rashes or lesions. Neurologic:    Psychiatric:  Alert and oriented, thought content appropriate, normal insight  Capillary Refill: Brisk,< 3 seconds   Peripheral Pulses: +2 palpable, equal bilaterally       Labs:   Recent Labs     11/07/20  1434   WBC 17.4*   HGB 13.8   HCT 41.9        Recent Labs     11/07/20  1434      K 3.7   CL 94*   CO2 25   BUN 21*   CREATININE 1.0   CALCIUM 8.5     Recent Labs     11/07/20  1434   *   ALT 53*   BILITOT 1.1*   ALKPHOS 210*     No results for input(s): INR in the last 72 hours. Recent Labs     11/07/20  1434   TROPONINI <0.01         Radiology:   XR CHEST PORTABLE   Final Result   Rotated exam, without gross acute process.              ASSESSMENT:  Acute opiate withdrawal  Polysubstance abuse  Heroin abuse  Hepatitis C  Transaminitis  SIRS with no evidence of infection  Leukocytosis  Moderate malnutrition    PLAN:  Cows protocol with Suboxone  Supportive care, as needed clonidine  Seroquel as needed for sleep    DVT Prophylaxis: Lovenox  Diet: DIET GENERAL;  Code Status: Prior    Dispo -admit to [facility]      Thank you for the opportunity to be involved in this patient's care.       (Please note that portions of this note were completed with a voice recognition program. Efforts were made to edit the dictations but occasionally words are mis-transcribed.)

## 2020-11-08 NOTE — PROGRESS NOTES
Patient admitted to room 229from ER. Patient oriented to room, call light, bed rails, phone, lights and bathroom. Patient instructed about the schedule of the day including: vital sign frequency, lab draws, possible tests, frequency of MD and staff rounds, daily weights, I &O's and prescribed diet. bed alarm in place, patient aware of placement and reason. Bed locked, in lowest position, side rails up 2/4, call light within reach. Recliner Assessment  Patient is not able to demonstrated the ability to move from a reclining position to an upright position within the recliner. Patient is confused, demented and /or unable to follow instruction. 4 Eyes Skin Assessment     The patient is being assess for   Admission    I agree that 2 RN's have performed a thorough Head to Toe Skin Assessment on the patient. ALL assessment sites listed below have been assessed. Areas assessed by both nurses:   [x]   Head, Face, and Ears   [x]   Shoulders, Back, and Chest, Abdomen  [x]   Arms, Elbows, and Hands   []   Coccyx, Sacrum, and Ischium  []   Legs, Feet, and Heels        Pt refused four eyes skin assessment. Pt has multiple tatoos. No open areas on legs, arms, chest or back. Pt remains in jeans at this time. **SHARE this note so that the co-signing nurse is able to place an eSignature**    Co-signer eSignature: Electronically signed by Judy Haynes RN on 11/7/20 at 11:18 PM EST    Does the Patient have Skin Breakdown?   No          Candido Prevention initiated:  No   Wound Care Orders initiated:  No      [facility] nurse consulted for Pressure Injury (Stage 3,4, Unstageable, DTI, NWPT, Complex wounds)and New or Established Ostomies:  No      Primary Nurse eSignature: Electronically signed by Judy Haynes RN on 11/7/20 at 11:17 PM EST

## 2020-11-08 NOTE — PROGRESS NOTES
Pt continues to rest quietly with eyes closed. Bed exit alarm on. Call light within reach.    COWS score of 2

## 2020-11-09 NOTE — DISCHARGE SUMMARY
Name:  Raj Farmer  Room:  0229/0229-02  MRN:    [id number]    Discharge Summary  AMA    This discharge summary is in conjunction with a complete physical exam done on the day of discharge. Discharging Physician: Dr. Lopez Cleaves: 11/7/2020  Discharge AMA:  11/8/2020    HPI taken from admission H&P:    48 y.o. male who presented to the hospital with a chief complaint of increased shortness of breath and chest pain. Patient is a heroin abuser and his last use was yesterday. He called and states that he felt he was going through withdrawal.  The patient was actively hallucinating and screaming when I saw him in the emergency department. He cannot provide much history but endorsed generalized pain. He will be admitted for opiate withdrawal.    Diagnoses this Admission and Hospital Course     Patient admitted with symptoms of opiate withdrawal and was started on COWS protocol with suboxone. The day following admission he complained of dyspnea and a CTPA was ordered, but he did not want to stay in the hospital and left AMA before CT completed    The patient has decided to leave against medical advice. I ordered a CTPA for dyspnea but he refuses to have it done. He has normal mental status and adequate capacity to make medical decisions. The patient refuses hospital admission and wants to be discharged. The risks have been explained to the patient, including worsening illness, chronic pain, permanent disability, and death. The benefits of continued stay in the hospital have also been explained, including the availability and proximity of nurses, physicians, monitoring, diagnostic testing, and treatment. The patient was able to understand and state the risks and benefits. This was witnessed by nurse Juan Francisco Carrillo and me. He had the opportunity to ask questions about his medical condition.   The patient was treated to the extent that he would allow, and knows that he may return for care at any time.    Procedures (Please Review Full Report for Details)  None     Consults    None       Physical Exam at Discharge:    BP 98/80   Pulse 98   Temp 97.9 °F (36.6 °C) (Oral)   Resp 16   Ht 5' 2\" (1.575 m)   Wt 98 lb (44.5 kg)   SpO2 99%   BMI 17.92 kg/m²     General appearance: Disheveled and ill-appearing male  HEENT:  Normal cephalic, atraumatic without obvious deformity. Pupils equal, round, and reactive to light. Extra ocular muscles intact. Conjunctivae/corneas clear. Neck: Supple, with full range of motion. No jugular venous distention. Trachea midline. Respiratory:  Normal respiratory effort. Clear to auscultation, bilaterally without Rales/Wheezes/Rhonchi. Cardiovascular: Tachycardic  Abdomen: Soft, non-tender, non-distended with normal bowel sounds. Musculoskeletal:  No clubbing, cyanosis or edema bilaterally. Full range of motion without deformity. Skin: Skin color, texture, turgor normal.  No rashes or lesions. Neurologic:    Psychiatric:  Alert and oriented, thought content appropriate, normal insight  Capillary Refill: Brisk,< 3 seconds   Peripheral Pulses: +2 palpable, equal bilaterally       CBC:   Recent Labs     11/07/20  1434   WBC 17.4*   HGB 13.8   HCT 41.9   MCV 84.4        BMP:   Recent Labs     11/07/20  1434      K 3.7   CL 94*   CO2 25   BUN 21*   CREATININE 1.0     LIVER PROFILE:   Recent Labs     11/07/20  1434   *   ALT 53*   BILITOT 1.1*   ALKPHOS 210*     PT/INR: No results for input(s): PROTIME, INR in the last 72 hours. APTT: No results for input(s): APTT in the last 72 hours. UA:  Recent Labs     11/07/20  1843   PHUR 5.0         RADIOLOGY  XR CHEST PORTABLE   Final Result   Rotated exam, without gross acute process.                DISPO: IZABELLA Mckenzie 11/10/2020 11:07 AM

## 2020-11-20 ENCOUNTER — HOSPITAL ENCOUNTER (EMERGENCY)
Age: 50
Discharge: HOME | End: 2020-11-21
Attending: EMERGENCY MEDICINE
Payer: MEDICARE

## 2020-11-20 ENCOUNTER — APPOINTMENT (OUTPATIENT)
Dept: GENERAL RADIOLOGY | Age: 50
End: 2020-11-20
Payer: MEDICARE

## 2020-11-20 VITALS
SYSTOLIC BLOOD PRESSURE: 109 MMHG | BODY MASS INDEX: 18.65 KG/M2 | OXYGEN SATURATION: 98 % | TEMPERATURE: 97.3 F | HEART RATE: 102 BPM | HEIGHT: 60 IN | RESPIRATION RATE: 20 BRPM | WEIGHT: 95 LBS | DIASTOLIC BLOOD PRESSURE: 88 MMHG

## 2020-11-20 LAB
A/G RATIO: 1.1 (ref 1.1–2.2)
ALBUMIN SERPL-MCNC: 4 G/DL (ref 3.4–5)
ALP BLD-CCNC: 123 U/L (ref 40–129)
ALT SERPL-CCNC: 16 U/L (ref 10–40)
ANION GAP SERPL CALCULATED.3IONS-SCNC: 10 MMOL/L (ref 3–16)
AST SERPL-CCNC: 21 U/L (ref 15–37)
BASOPHILS ABSOLUTE: 0.1 K/UL (ref 0–0.2)
BASOPHILS RELATIVE PERCENT: 0.7 %
BILIRUB SERPL-MCNC: 0.5 MG/DL (ref 0–1)
BUN BLDV-MCNC: 25 MG/DL (ref 7–20)
CALCIUM SERPL-MCNC: 9.2 MG/DL (ref 8.3–10.6)
CHLORIDE BLD-SCNC: 101 MMOL/L (ref 99–110)
CO2: 24 MMOL/L (ref 21–32)
CREAT SERPL-MCNC: 0.7 MG/DL (ref 0.9–1.3)
EOSINOPHILS ABSOLUTE: 0.1 K/UL (ref 0–0.6)
EOSINOPHILS RELATIVE PERCENT: 1 %
GFR AFRICAN AMERICAN: >60
GFR NON-AFRICAN AMERICAN: >60
GLOBULIN: 3.6 G/DL
GLUCOSE BLD-MCNC: 113 MG/DL (ref 70–99)
HCT VFR BLD CALC: 49.7 % (ref 40.5–52.5)
HEMOGLOBIN: 16 G/DL (ref 13.5–17.5)
LYMPHOCYTES ABSOLUTE: 4.2 K/UL (ref 1–5.1)
LYMPHOCYTES RELATIVE PERCENT: 33.7 %
MCH RBC QN AUTO: 26.9 PG (ref 26–34)
MCHC RBC AUTO-ENTMCNC: 32.2 G/DL (ref 31–36)
MCV RBC AUTO: 83.6 FL (ref 80–100)
MONOCYTES ABSOLUTE: 0.7 K/UL (ref 0–1.3)
MONOCYTES RELATIVE PERCENT: 5.8 %
NEUTROPHILS ABSOLUTE: 7.3 K/UL (ref 1.7–7.7)
NEUTROPHILS RELATIVE PERCENT: 58.8 %
PDW BLD-RTO: 13.8 % (ref 12.4–15.4)
PLATELET # BLD: 334 K/UL (ref 135–450)
PLATELET SLIDE REVIEW: ADEQUATE
PMV BLD AUTO: 6.9 FL (ref 5–10.5)
POTASSIUM REFLEX MAGNESIUM: 4.3 MMOL/L (ref 3.5–5.1)
RBC # BLD: 5.95 M/UL (ref 4.2–5.9)
SLIDE REVIEW: ABNORMAL
SODIUM BLD-SCNC: 135 MMOL/L (ref 136–145)
TOTAL PROTEIN: 7.6 G/DL (ref 6.4–8.2)
TROPONIN: <0.01 NG/ML
WBC # BLD: 12.4 K/UL (ref 4–11)

## 2020-11-20 PROCEDURE — 93005 ELECTROCARDIOGRAM TRACING: CPT | Performed by: EMERGENCY MEDICINE

## 2020-11-20 PROCEDURE — 6370000000 HC RX 637 (ALT 250 FOR IP): Performed by: EMERGENCY MEDICINE

## 2020-11-20 PROCEDURE — 99284 EMERGENCY DEPT VISIT MOD MDM: CPT

## 2020-11-20 PROCEDURE — 80053 COMPREHEN METABOLIC PANEL: CPT

## 2020-11-20 PROCEDURE — 71045 X-RAY EXAM CHEST 1 VIEW: CPT

## 2020-11-20 PROCEDURE — 6360000002 HC RX W HCPCS: Performed by: EMERGENCY MEDICINE

## 2020-11-20 PROCEDURE — 84484 ASSAY OF TROPONIN QUANT: CPT

## 2020-11-20 PROCEDURE — 96374 THER/PROPH/DIAG INJ IV PUSH: CPT

## 2020-11-20 PROCEDURE — 85025 COMPLETE CBC W/AUTO DIFF WBC: CPT

## 2020-11-20 PROCEDURE — 36415 COLL VENOUS BLD VENIPUNCTURE: CPT

## 2020-11-20 PROCEDURE — 2580000003 HC RX 258: Performed by: EMERGENCY MEDICINE

## 2020-11-20 RX ORDER — KETOROLAC TROMETHAMINE 30 MG/ML
30 INJECTION, SOLUTION INTRAMUSCULAR; INTRAVENOUS ONCE
Status: COMPLETED | OUTPATIENT
Start: 2020-11-20 | End: 2020-11-20

## 2020-11-20 RX ORDER — 0.9 % SODIUM CHLORIDE 0.9 %
1000 INTRAVENOUS SOLUTION INTRAVENOUS ONCE
Status: COMPLETED | OUTPATIENT
Start: 2020-11-20 | End: 2020-11-21

## 2020-11-20 RX ORDER — IPRATROPIUM BROMIDE AND ALBUTEROL SULFATE 2.5; .5 MG/3ML; MG/3ML
1 SOLUTION RESPIRATORY (INHALATION) ONCE
Status: COMPLETED | OUTPATIENT
Start: 2020-11-20 | End: 2020-11-20

## 2020-11-20 RX ADMIN — IPRATROPIUM BROMIDE AND ALBUTEROL SULFATE 1 AMPULE: .5; 3 SOLUTION RESPIRATORY (INHALATION) at 22:58

## 2020-11-20 RX ADMIN — SODIUM CHLORIDE 1000 ML: 9 INJECTION, SOLUTION INTRAVENOUS at 23:25

## 2020-11-20 RX ADMIN — KETOROLAC TROMETHAMINE 30 MG: 30 INJECTION, SOLUTION INTRAMUSCULAR at 23:26

## 2020-11-20 ASSESSMENT — PAIN SCALES - GENERAL
PAINLEVEL_OUTOF10: 9
PAINLEVEL_OUTOF10: 10

## 2020-11-20 ASSESSMENT — PAIN DESCRIPTION - ORIENTATION: ORIENTATION: LEFT;MID

## 2020-11-20 ASSESSMENT — PAIN DESCRIPTION - LOCATION: LOCATION: CHEST

## 2020-11-20 ASSESSMENT — PAIN DESCRIPTION - PAIN TYPE: TYPE: ACUTE PAIN

## 2020-11-20 ASSESSMENT — PAIN DESCRIPTION - DESCRIPTORS: DESCRIPTORS: SHARP

## 2020-11-20 ASSESSMENT — PAIN DESCRIPTION - FREQUENCY: FREQUENCY: CONTINUOUS

## 2020-11-21 ENCOUNTER — APPOINTMENT (OUTPATIENT)
Dept: GENERAL RADIOLOGY | Age: 50
End: 2020-11-21
Payer: MEDICARE

## 2020-11-21 VITALS
BODY MASS INDEX: 18.65 KG/M2 | RESPIRATION RATE: 20 BRPM | HEART RATE: 110 BPM | HEIGHT: 60 IN | WEIGHT: 95 LBS | OXYGEN SATURATION: 98 % | DIASTOLIC BLOOD PRESSURE: 77 MMHG | TEMPERATURE: 97.8 F | SYSTOLIC BLOOD PRESSURE: 125 MMHG

## 2020-11-21 LAB
A/G RATIO: 1.2 (ref 1.1–2.2)
ALBUMIN SERPL-MCNC: 3.8 G/DL (ref 3.4–5)
ALP BLD-CCNC: 128 U/L (ref 40–129)
ALT SERPL-CCNC: 12 U/L (ref 10–40)
ANION GAP SERPL CALCULATED.3IONS-SCNC: 10 MMOL/L (ref 3–16)
AST SERPL-CCNC: 17 U/L (ref 15–37)
BILIRUB SERPL-MCNC: <0.2 MG/DL (ref 0–1)
BUN BLDV-MCNC: 16 MG/DL (ref 7–20)
CALCIUM SERPL-MCNC: 9.1 MG/DL (ref 8.3–10.6)
CHLORIDE BLD-SCNC: 108 MMOL/L (ref 99–110)
CO2: 20 MMOL/L (ref 21–32)
CREAT SERPL-MCNC: 0.7 MG/DL (ref 0.9–1.3)
EKG ATRIAL RATE: 111 BPM
EKG DIAGNOSIS: NORMAL
EKG P AXIS: 73 DEGREES
EKG P-R INTERVAL: 94 MS
EKG Q-T INTERVAL: 330 MS
EKG QRS DURATION: 64 MS
EKG QTC CALCULATION (BAZETT): 448 MS
EKG R AXIS: 90 DEGREES
EKG T AXIS: 77 DEGREES
EKG VENTRICULAR RATE: 111 BPM
GFR AFRICAN AMERICAN: >60
GFR NON-AFRICAN AMERICAN: >60
GLOBULIN: 3.3 G/DL
GLUCOSE BLD-MCNC: 137 MG/DL (ref 70–99)
POTASSIUM REFLEX MAGNESIUM: 4.1 MMOL/L (ref 3.5–5.1)
SODIUM BLD-SCNC: 138 MMOL/L (ref 136–145)
TOTAL PROTEIN: 7.1 G/DL (ref 6.4–8.2)
TROPONIN: <0.01 NG/ML

## 2020-11-21 PROCEDURE — 80053 COMPREHEN METABOLIC PANEL: CPT

## 2020-11-21 PROCEDURE — 71045 X-RAY EXAM CHEST 1 VIEW: CPT

## 2020-11-21 PROCEDURE — 93005 ELECTROCARDIOGRAM TRACING: CPT | Performed by: EMERGENCY MEDICINE

## 2020-11-21 PROCEDURE — 93010 ELECTROCARDIOGRAM REPORT: CPT | Performed by: INTERNAL MEDICINE

## 2020-11-21 PROCEDURE — 36415 COLL VENOUS BLD VENIPUNCTURE: CPT

## 2020-11-21 PROCEDURE — 84484 ASSAY OF TROPONIN QUANT: CPT

## 2020-11-21 PROCEDURE — 99285 EMERGENCY DEPT VISIT HI MDM: CPT

## 2020-11-21 PROCEDURE — 6370000000 HC RX 637 (ALT 250 FOR IP)

## 2020-11-21 PROCEDURE — 6370000000 HC RX 637 (ALT 250 FOR IP): Performed by: EMERGENCY MEDICINE

## 2020-11-21 RX ORDER — ACETAMINOPHEN 500 MG
1000 TABLET ORAL ONCE
Status: COMPLETED | OUTPATIENT
Start: 2020-11-21 | End: 2020-11-21

## 2020-11-21 RX ORDER — ALBUTEROL SULFATE 90 UG/1
2 AEROSOL, METERED RESPIRATORY (INHALATION) EVERY 4 HOURS PRN
Qty: 1 INHALER | Refills: 0 | Status: SHIPPED | OUTPATIENT
Start: 2020-11-21 | End: 2021-01-31

## 2020-11-21 RX ORDER — PREDNISONE 20 MG/1
40 TABLET ORAL DAILY
Qty: 10 TABLET | Refills: 0 | Status: SHIPPED | OUTPATIENT
Start: 2020-11-21 | End: 2020-11-26

## 2020-11-21 RX ORDER — IPRATROPIUM BROMIDE AND ALBUTEROL SULFATE 2.5; .5 MG/3ML; MG/3ML
1 SOLUTION RESPIRATORY (INHALATION) ONCE
Status: COMPLETED | OUTPATIENT
Start: 2020-11-21 | End: 2020-11-21

## 2020-11-21 RX ORDER — IPRATROPIUM BROMIDE AND ALBUTEROL SULFATE 2.5; .5 MG/3ML; MG/3ML
SOLUTION RESPIRATORY (INHALATION)
Status: COMPLETED
Start: 2020-11-21 | End: 2020-11-21

## 2020-11-21 RX ADMIN — IPRATROPIUM BROMIDE AND ALBUTEROL SULFATE 1 AMPULE: .5; 3 SOLUTION RESPIRATORY (INHALATION) at 19:25

## 2020-11-21 RX ADMIN — ACETAMINOPHEN 1000 MG: 500 TABLET ORAL at 20:12

## 2020-11-21 RX ADMIN — IPRATROPIUM BROMIDE AND ALBUTEROL SULFATE 1 AMPULE: 2.5; .5 SOLUTION RESPIRATORY (INHALATION) at 18:15

## 2020-11-21 RX ADMIN — IPRATROPIUM BROMIDE AND ALBUTEROL SULFATE 1 AMPULE: .5; 3 SOLUTION RESPIRATORY (INHALATION) at 18:15

## 2020-11-21 RX ADMIN — PREDNISONE 50 MG: 20 TABLET ORAL at 19:22

## 2020-11-21 ASSESSMENT — PAIN SCALES - GENERAL: PAINLEVEL_OUTOF10: 9

## 2020-11-21 NOTE — ED NOTES
Attempt at IV access unsuccessful x 2. Shiraz Tang RN to bedside for attempt.       Derek Carvalho, FAISAL  11/21/20 2212

## 2020-11-21 NOTE — ED PROVIDER NOTES
Emergency Department Attending Note    Jeny Sheffield MD    Date of ED VIsit: 11/21/2020    CHIEF COMPLAINT  Shortness of Breath      HISTORY OF PRESENT ILLNESS  Roger Eddy is a 48 y.o. male  With Vital signs of /76   Pulse 119   Temp 97.8 °F (36.6 °C) (Oral)   Resp 20   Ht 5' (1.524 m)   Wt 95 lb (43.1 kg)   SpO2 98%   BMI 18.55 kg/m²  who presents to the ED with a complaint of shortness of breath. Patient seen and evaluated in room 1. Patient is complaining of shortness of breath has been continuous since yesterday when I saw him. He looks much better yesterday on discharge but now comes in complaining of continued shortness of breath. He also says he has chest pain which he said he had yesterday as well he is unable to describe the chest pain but says it is in the middle of his chest..  No other complaints, modifying factors or associated symptoms. 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 No family history on file.   Social History     Socioeconomic History    Marital status: Single     Spouse name: Not on file    Number of children: Not on file    Years of education: Not on file    Highest education level: Not on file   Occupational History    Not on file   Social Needs    Financial resource strain: Not on file    Food insecurity     Worry: Not on file     Inability: Not on file   Eric Juarez Transportation needs     Medical: Not on file     Non-medical: Not on file   Tobacco Use    Smoking status: Former Smoker     Packs/day: 2.00     Types: Cigarettes    Smokeless tobacco: Never Used    Tobacco comment: states 5 yrs ago   Substance and Sexual Activity    Alcohol use: No    Drug use: Not Currently     Types: IV     Comment: states last use 5 wks ago    Sexual activity: Yes     Partners: Female   Lifestyle    Physical activity     Days per week: Not on file     Minutes per session: Not on file    Stress: Not on file   Relationships    Social connections     Talks on phone: Not on file     Gets together: Not on file     Attends [employer] service: Not on file     Active member of club or organization: Not on file     Attends meetings of clubs or organizations: Not on file     Relationship status: Not on file    Intimate partner violence     Fear of current or ex partner: Not on file     Emotionally abused: Not on file     Physically abused: Not on file     Forced sexual activity: Not on file   Other Topics Concern    Not on file   Social History Narrative    ** Merged History Encounter **          Current Facility-Administered Medications   Medication Dose Route Frequency Provider Last Rate Last Dose    ipratropium-albuterol (DUONEB) nebulizer solution 1 ampule  1 ampule Inhalation Once Fredia Home, MD        predniSONE (DELTASONE) tablet 50 mg  50 mg Oral Once [facility], MD         No current outpatient medications on file. No Known Allergies    REVIEW OF SYSTEMS  10 systems reviewed, pertinent positives per HPI otherwise noted to be negative     PHYSICAL EXAM  /76   Pulse 119   Temp 97.8 °F (36.6 °C) (Oral)   Resp 20   Ht 5' (1.524 m)   Wt 95 lb (43.1 kg)   SpO2 98%   BMI 18.55 kg/m²   GENERAL APPEARANCE: Awake and alert. Cooperative. In minimal respiratory distress. HEAD: Normocephalic. Atraumatic. EYES: PERRL. EOM's grossly intact.    ENT: Mucous membranes are pink and moist.   NECK: Supple. HEART: RRR. No murmurs. LUNGS: Respirations unlabored. Mild wheezing. Good air exchange. ABDOMEN: Soft. Non-distended. Non-tender. No masses. No organomegaly. No guarding or rebound. EXTREMITIES: No peripheral edema. Moves all extremities equally. All extremities neurovascularly intact. SKIN: Warm and dry. No acute rashes. NEUROLOGICAL: Alert and oriented. Strength 5/5, sensation intact. Gait normal.   PSYCHIATRIC: Normal mood and affect. No HI or SI expressed to me. RADIOLOGY    If acquired see below     EKG:     If acquired see below       ED COURSE/MDM        ED Course as of Nov 21 2119   Sat Nov 21, 2020 1929 FINDINGS:  Chronic interstitial opacities are seen throughout both lungs. There is  hyperlucency within the upper lungs, suggesting possible obstructive lung  disease. No focal infiltrates are identified. No pneumothorax. No free  air. Cardial pericardial silhouette is unremarkable. No acute bony  abnormalities.     IMPRESSION:  No acute abnormality identified. XR CHEST PORTABLE [DL]   2118 Patient's troponin was less than 0.01   Troponin:    Troponin <0.01 [DL]   2119 Patient felt much better with the treatments that we gave him so we will send him out with an MDI albuterol as well as a 5-day burst of steroids to prevent a relapse. [DL]      ED Course User Index  [DL] Yulia Navarro MD       The ED course and plan were reviewed and results discussed with the patient    The patient understood and agreed with the Discharge/transfer planning.     CLINICAL IMPRESSION and DISPOSITION    Kandice Johnson was stable and diagnosed with COPD exacerbation    Patient was treated with prednisone and albuterol       Yulia Navarro MD  11/21/20 2120

## 2020-11-21 NOTE — ED NOTES
Bed: 07  Expected date:   Expected time:   Means of arrival: [facility]  Comments:     Maxi Garay RN  11/20/20 8209

## 2020-11-21 NOTE — ED PROVIDER NOTES
Emergency Department Attending Note    Rusty Barajas MD    Date of ED VIsit: 11/20/2020    CHIEF COMPLAINT  Shortness of Breath (pt states he has been having asthma attacks off and on for a while, is having some CP now as well in mid left chest non radiating sharp pain with deep breathing. states he has been fighting this breathing episode most of the day. )      HISTORY OF PRESENT ILLNESS  Mer Salgado is a 48 y.o. male  With Vital signs of There were no vitals taken for this visit. who presents to the ED with a complaint of shortness of breath. Patient seen and evaluated in room 7. Patient is a 59-year-old male without previous smoking history as well as a previous PE history comes into the emergency department with 2 to 3-day complaint of shortness of breath. He states that he has had counts like this over the past month or so causing him shortness of breath. He initially thought it might have been anxiety. But there definitely seems to be a respiratory component to it. Today he has some mild chest complaints that are not very distinct. They seem to change with respirations. No fevers or chills. No nausea or vomiting no diaphoresis. No abdominal pain. No leg swelling. .  No other complaints, modifying factors or associated symptoms. 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 No family history on file.   Social History     Socioeconomic History    Marital status: Single     Spouse name: Not on file    Number of children: Not on file    Years of education: Not on file    Highest education level: Not on file   Occupational History    Not on file   Social Needs    Financial resource strain: Not on file    Food insecurity     Worry: Not on file     Inability: Not on file    Transportation needs     Medical: Not on file     Non-medical: Not on file   Tobacco Use    Smoking status: Current Every Day Smoker     Packs/day: 2.00     Types: Cigarettes    Smokeless tobacco: Never Used   Substance and Sexual Activity    Alcohol use: No    Drug use: Yes     Types: IV    Sexual activity: Yes     Partners: Female   Lifestyle    Physical activity     Days per week: Not on file     Minutes per session: Not on file    Stress: Not on file   Relationships    Social connections     Talks on phone: Not on file     Gets together: Not on file     Attends Hoahaoism service: Not on file     Active member of club or organization: Not on file     Attends meetings of clubs or organizations: Not on file     Relationship status: Not on file    Intimate partner violence     Fear of current or ex partner: Not on file     Emotionally abused: Not on file     Physically abused: Not on file     Forced sexual activity: Not on file   Other Topics Concern    Not on file   Social History Narrative    ** Merged History Encounter **          No current facility-administered medications for this encounter.       Current Outpatient Medications   Medication Sig Dispense Refill    traZODone (DESYREL) 100 MG tablet Take 1 tablet by mouth nightly as needed for Sleep 4 tablet 0    ondansetron (ZOFRAN ODT) 4 MG disintegrating tablet Take 1-2 tablets by mouth every 12 hours as needed for Nausea 12 tablet 0    cloNIDine (CATAPRES) 0.1 MG tablet Take 1 tablet by mouth every 4 hours as needed 12 tablet 0    gabapentin (NEURONTIN) 300 MG capsule Take 1 capsule by mouth 3 times daily. 90 capsule 3     No Known Allergies    REVIEW OF SYSTEMS  10 systems reviewed, pertinent positives per HPI otherwise noted to be negative    PHYSICAL EXAM  There were no vitals taken for this visit. GENERAL APPEARANCE: Awake and alert. Cooperative. In mild aspiratory distress. HEAD: Normocephalic. Atraumatic. EYES: PERRL. EOM's grossly intact. ENT: Mucous membranes are pink and moist.   NECK: Supple. HEART: RRR. No murmurs. LUNGS: Respirations unlabored. He is got wheezing on the right side with a room air saturation of 100%. Good air exchange. ABDOMEN: Soft. Non-distended. Non-tender. No masses. No organomegaly. No guarding or rebound. EXTREMITIES: No peripheral edema. Moves all extremities equally. All extremities neurovascularly intact. SKIN: Warm and dry. No acute rashes. NEUROLOGICAL: Alert and oriented. Strength 5/5, sensation intact. Gait normal.   PSYCHIATRIC: Normal mood and affect. No HI or SI expressed to me. RADIOLOGY    If acquired see below     EKG:     If acquired see below       ED COURSE/MDM    Patient was ankit for some narcotic pain medication I gave him Tylenol for the pain. His work-up was negative so he will be discharged with a diagnosis of COPD exacerbation which improved with the albuterol treatment. ED Course as of Nov 20 2353   Elli Saleh Nov 20, 2020 2217 Patient seen on arrival Case discussed with EMS    [DL]   2237 EKG: Indication: Shortness of breath, it shows a rhythm of sinus tachycardia at a rate of 111, with no acute ST-Twave changes to indicate ischemia.   Intervals are normal and the axis is normal. This  interpreted by me without a cardiologist.     [DL]   8493 Patient CBC revealed a white count of 12.4 H&H of 19 and 49 with a platelet count of adequate   CBC Auto Differential(!):    WBC 12.4(!)   RBC 5.95(!)   Hemoglobin Quant 16.0   Hematocrit 49.7   MCV 83.6   MCH 26.9   MCHC 32.2   RDW 13.8   Neutrophils % 58.8   Lymphocyte % 33.7   Monocytes % 5.8   Eosinophils % 1.0   Basophils % 0.7   Neutrophils Absolute 7.3   Lymphocytes Absolute 4.2   Monocytes Absolute 0.7   Eosinophils Absolute 0.1   Basophils Absolute 0.1 [DL]   8612 Patient's troponin was less than 0.01   Troponin:    Troponin <0.01 [DL]   0334 Patient's comprehensive metabolic panel revealed a sodium of 135 a glucose of 113 BUN of 25 creatinine of 1.7 with a GFR of greater than 60   Comprehensive Metabolic Panel w/ Reflex to MG(!):    Sodium 135(!)   Potassium 4.3   Chloride 101   CO2 24   Anion Gap 10   Glucose 113(!)   BUN 25(!)   Creatinine 0.7(!)   GFR Non- >60   GFR African American >60   Calcium 9.2   Total Protein 7.6   Albumin 4.0   Albumin/Globulin Ratio 1.1   Bilirubin 0.5   Alk Phos 123   ALT 16   AST 21   Globulin 3.6 [DL]   2342 IMPRESSION:  Hyperexpanded lung volume can be seen in the setting of COPD. No focal  airspace disease.      XR CHEST PORTABLE [DL]      ED Course User Index  [DL] Malnea Rosales MD       The ED course and plan were reviewed and results discussed with the patient    The patient understood and agreed with the Discharge/transfer planning.     CLINICAL IMPRESSION and DISPOSITION    Keith Comer was stable and diagnosed with chest wall pain COPD exacerbation    Patient was treated with DuoNeb and Tylenol       Malena Rosales MD  11/20/20 1677

## 2020-11-22 LAB
EKG ATRIAL RATE: 118 BPM
EKG DIAGNOSIS: NORMAL
EKG P AXIS: 75 DEGREES
EKG P-R INTERVAL: 94 MS
EKG Q-T INTERVAL: 332 MS
EKG QRS DURATION: 66 MS
EKG QTC CALCULATION (BAZETT): 465 MS
EKG R AXIS: 98 DEGREES
EKG T AXIS: 59 DEGREES
EKG VENTRICULAR RATE: 118 BPM

## 2020-11-22 PROCEDURE — 93010 ELECTROCARDIOGRAM REPORT: CPT | Performed by: INTERNAL MEDICINE

## 2020-11-22 NOTE — ED NOTES
Multiple attempts made for IV access and/or blood specimen for labs are unsuccessful. Dr. Kayla flores.       Linnell Boeck, FAISAL  11/21/20 1927

## 2020-12-05 ENCOUNTER — APPOINTMENT (OUTPATIENT)
Dept: GENERAL RADIOLOGY | Age: 50
DRG: 208 | End: 2020-12-05
Payer: MEDICARE

## 2020-12-05 ENCOUNTER — TELEPHONE (OUTPATIENT)
Dept: OTHER | Facility: CLINIC | Age: 50
End: 2020-12-05

## 2020-12-05 ENCOUNTER — HOSPITAL ENCOUNTER (INPATIENT)
Age: 50
LOS: 6 days | Discharge: LEFT BEFORE DISCHARGE | DRG: 208 | End: 2020-12-11
Attending: EMERGENCY MEDICINE | Admitting: INTERNAL MEDICINE
Payer: MEDICARE

## 2020-12-05 ENCOUNTER — APPOINTMENT (OUTPATIENT)
Dept: CT IMAGING | Age: 50
DRG: 208 | End: 2020-12-05
Payer: MEDICARE

## 2020-12-05 PROBLEM — J96.01 ACUTE HYPOXEMIC RESPIRATORY FAILURE DUE TO COVID-19 (HCC): Status: ACTIVE | Noted: 2020-12-05

## 2020-12-05 PROBLEM — U07.1 ACUTE HYPOXEMIC RESPIRATORY FAILURE DUE TO COVID-19 (HCC): Status: ACTIVE | Noted: 2020-12-05

## 2020-12-05 LAB
A/G RATIO: 1.1 (ref 1.1–2.2)
ABO/RH: NORMAL
ALBUMIN SERPL-MCNC: 3.9 G/DL (ref 3.4–5)
ALP BLD-CCNC: 125 U/L (ref 40–129)
ALT SERPL-CCNC: 986 U/L (ref 10–40)
AMPHETAMINE SCREEN, URINE: POSITIVE
ANION GAP SERPL CALCULATED.3IONS-SCNC: 8 MMOL/L (ref 3–16)
ANISOCYTOSIS: ABNORMAL
ANTIBODY SCREEN: NORMAL
AST SERPL-CCNC: 190 U/L (ref 15–37)
BANDED NEUTROPHILS RELATIVE PERCENT: 6 % (ref 0–7)
BARBITURATE SCREEN URINE: ABNORMAL
BASE EXCESS ARTERIAL: 4.7 MMOL/L (ref -3–3)
BASE EXCESS VENOUS: 3.4 MMOL/L (ref -3–3)
BASE EXCESS VENOUS: 4.8 MMOL/L (ref -3–3)
BASOPHILS ABSOLUTE: 0 K/UL (ref 0–0.2)
BASOPHILS RELATIVE PERCENT: 0 %
BENZODIAZEPINE SCREEN, URINE: ABNORMAL
BILIRUB SERPL-MCNC: 0.5 MG/DL (ref 0–1)
BILIRUBIN URINE: NEGATIVE
BLOOD, URINE: ABNORMAL
BUN BLDV-MCNC: 9 MG/DL (ref 7–20)
CALCIUM SERPL-MCNC: 9.1 MG/DL (ref 8.3–10.6)
CANNABINOID SCREEN URINE: ABNORMAL
CARBOXYHEMOGLOBIN ARTERIAL: 0.2 % (ref 0–1.5)
CARBOXYHEMOGLOBIN: 1.7 % (ref 0–1.5)
CARBOXYHEMOGLOBIN: 1.7 % (ref 0–1.5)
CHLORIDE BLD-SCNC: 98 MMOL/L (ref 99–110)
CLARITY: CLEAR
CO2: 33 MMOL/L (ref 21–32)
COCAINE METABOLITE SCREEN URINE: ABNORMAL
COLOR: YELLOW
CREAT SERPL-MCNC: <0.5 MG/DL (ref 0.9–1.3)
EKG ATRIAL RATE: 115 BPM
EKG DIAGNOSIS: NORMAL
EKG P AXIS: 75 DEGREES
EKG P-R INTERVAL: 94 MS
EKG Q-T INTERVAL: 352 MS
EKG QRS DURATION: 64 MS
EKG QTC CALCULATION (BAZETT): 486 MS
EKG R AXIS: 93 DEGREES
EKG T AXIS: 68 DEGREES
EKG VENTRICULAR RATE: 115 BPM
EOSINOPHILS ABSOLUTE: 0 K/UL (ref 0–0.6)
EOSINOPHILS RELATIVE PERCENT: 0 %
GFR AFRICAN AMERICAN: >60
GFR NON-AFRICAN AMERICAN: >60
GLOBULIN: 3.6 G/DL
GLUCOSE BLD-MCNC: 89 MG/DL (ref 70–99)
GLUCOSE URINE: NEGATIVE MG/DL
HCO3 ARTERIAL: 30.9 MMOL/L (ref 21–29)
HCO3 VENOUS: 33.2 MMOL/L (ref 23–29)
HCO3 VENOUS: 36.2 MMOL/L (ref 23–29)
HCT VFR BLD CALC: 43.1 % (ref 40.5–52.5)
HEMOGLOBIN, ART, EXTENDED: 13.5 G/DL (ref 13.5–17.5)
HEMOGLOBIN: 14 G/DL (ref 13.5–17.5)
KETONES, URINE: NEGATIVE MG/DL
LEUKOCYTE ESTERASE, URINE: NEGATIVE
LYMPHOCYTES ABSOLUTE: 1.2 K/UL (ref 1–5.1)
LYMPHOCYTES RELATIVE PERCENT: 23 %
Lab: ABNORMAL
MCH RBC QN AUTO: 27.8 PG (ref 26–34)
MCHC RBC AUTO-ENTMCNC: 32.6 G/DL (ref 31–36)
MCV RBC AUTO: 85.2 FL (ref 80–100)
METHADONE SCREEN, URINE: ABNORMAL
METHEMOGLOBIN ARTERIAL: 0.3 %
METHEMOGLOBIN VENOUS: 0.3 %
METHEMOGLOBIN VENOUS: 0.3 %
MICROSCOPIC EXAMINATION: YES
MONOCYTES ABSOLUTE: 0.3 K/UL (ref 0–1.3)
MONOCYTES RELATIVE PERCENT: 5 %
MUCUS: ABNORMAL /LPF
NEUTROPHILS ABSOLUTE: 3.8 K/UL (ref 1.7–7.7)
NEUTROPHILS RELATIVE PERCENT: 66 %
NITRITE, URINE: NEGATIVE
O2 CONTENT ARTERIAL: 17 ML/DL
O2 CONTENT, VEN: 15 VOL %
O2 CONTENT, VEN: 16 VOL %
O2 SAT, ARTERIAL: 90.5 %
O2 SAT, VEN: 74 %
O2 SAT, VEN: 77 %
O2 THERAPY: ABNORMAL
OPIATE SCREEN URINE: ABNORMAL
OXYCODONE URINE: ABNORMAL
PCO2 ARTERIAL: 52.5 MMHG (ref 35–45)
PCO2, VEN: 77.1 MMHG (ref 40–50)
PCO2, VEN: 90.8 MMHG (ref 40–50)
PDW BLD-RTO: 15.6 % (ref 12.4–15.4)
PH ARTERIAL: 7.39 (ref 7.35–7.45)
PH UA: 6
PH UA: 8 (ref 5–8)
PH VENOUS: 7.22 (ref 7.35–7.45)
PH VENOUS: 7.25 (ref 7.35–7.45)
PHENCYCLIDINE SCREEN URINE: ABNORMAL
PLATELET # BLD: 86 K/UL (ref 135–450)
PLATELET SLIDE REVIEW: ABNORMAL
PMV BLD AUTO: 7.5 FL (ref 5–10.5)
PO2 ARTERIAL: 60.3 MMHG (ref 75–108)
PO2, VEN: 46.9 MMHG (ref 25–40)
PO2, VEN: 51.8 MMHG (ref 25–40)
POTASSIUM REFLEX MAGNESIUM: 3.7 MMOL/L (ref 3.5–5.1)
PRO-BNP: 664 PG/ML (ref 0–124)
PROCALCITONIN: 0.16 NG/ML (ref 0–0.15)
PROPOXYPHENE SCREEN: ABNORMAL
PROTEIN UA: ABNORMAL MG/DL
RAPID INFLUENZA  B AGN: NEGATIVE
RAPID INFLUENZA A AGN: NEGATIVE
RBC # BLD: 5.06 M/UL (ref 4.2–5.9)
RBC UA: ABNORMAL /HPF (ref 0–4)
SARS-COV-2, NAAT: DETECTED
SLIDE REVIEW: ABNORMAL
SODIUM BLD-SCNC: 139 MMOL/L (ref 136–145)
SPECIFIC GRAVITY UA: 1.01 (ref 1–1.03)
TCO2 ARTERIAL: 32.5 MMOL/L
TCO2 CALC VENOUS: 36 MMOL/L
TCO2 CALC VENOUS: 39 MMOL/L
TOTAL PROTEIN: 7.5 G/DL (ref 6.4–8.2)
TROPONIN: 0.02 NG/ML
TROPONIN: <0.01 NG/ML
TROPONIN: <0.01 NG/ML
URINE REFLEX TO CULTURE: ABNORMAL
URINE TYPE: ABNORMAL
UROBILINOGEN, URINE: 1 E.U./DL
WBC # BLD: 5.3 K/UL (ref 4–11)
WBC UA: ABNORMAL /HPF (ref 0–5)

## 2020-12-05 PROCEDURE — U0002 COVID-19 LAB TEST NON-CDC: HCPCS

## 2020-12-05 PROCEDURE — 86850 RBC ANTIBODY SCREEN: CPT

## 2020-12-05 PROCEDURE — 84484 ASSAY OF TROPONIN QUANT: CPT

## 2020-12-05 PROCEDURE — 36600 WITHDRAWAL OF ARTERIAL BLOOD: CPT

## 2020-12-05 PROCEDURE — 99291 CRITICAL CARE FIRST HOUR: CPT | Performed by: INTERNAL MEDICINE

## 2020-12-05 PROCEDURE — 94761 N-INVAS EAR/PLS OXIMETRY MLT: CPT

## 2020-12-05 PROCEDURE — 2580000003 HC RX 258

## 2020-12-05 PROCEDURE — 85025 COMPLETE CBC W/AUTO DIFF WBC: CPT

## 2020-12-05 PROCEDURE — 2500000003 HC RX 250 WO HCPCS

## 2020-12-05 PROCEDURE — 94750 HC PULMONARY COMPLIANCE STUDY: CPT

## 2020-12-05 PROCEDURE — 96374 THER/PROPH/DIAG INJ IV PUSH: CPT

## 2020-12-05 PROCEDURE — 71045 X-RAY EXAM CHEST 1 VIEW: CPT

## 2020-12-05 PROCEDURE — 6360000002 HC RX W HCPCS: Performed by: INTERNAL MEDICINE

## 2020-12-05 PROCEDURE — XW13325 TRANSFUSION OF CONVALESCENT PLASMA (NONAUTOLOGOUS) INTO PERIPHERAL VEIN, PERCUTANEOUS APPROACH, NEW TECHNOLOGY GROUP 5: ICD-10-PCS | Performed by: INTERNAL MEDICINE

## 2020-12-05 PROCEDURE — 36415 COLL VENOUS BLD VENIPUNCTURE: CPT

## 2020-12-05 PROCEDURE — 2500000003 HC RX 250 WO HCPCS: Performed by: EMERGENCY MEDICINE

## 2020-12-05 PROCEDURE — 87040 BLOOD CULTURE FOR BACTERIA: CPT

## 2020-12-05 PROCEDURE — 80307 DRUG TEST PRSMV CHEM ANLYZR: CPT

## 2020-12-05 PROCEDURE — 0BH17EZ INSERTION OF ENDOTRACHEAL AIRWAY INTO TRACHEA, VIA NATURAL OR ARTIFICIAL OPENING: ICD-10-PCS | Performed by: EMERGENCY MEDICINE

## 2020-12-05 PROCEDURE — 70450 CT HEAD/BRAIN W/O DYE: CPT

## 2020-12-05 PROCEDURE — 2580000003 HC RX 258: Performed by: EMERGENCY MEDICINE

## 2020-12-05 PROCEDURE — 2700000000 HC OXYGEN THERAPY PER DAY

## 2020-12-05 PROCEDURE — 6360000002 HC RX W HCPCS: Performed by: EMERGENCY MEDICINE

## 2020-12-05 PROCEDURE — 6370000000 HC RX 637 (ALT 250 FOR IP): Performed by: EMERGENCY MEDICINE

## 2020-12-05 PROCEDURE — 83880 ASSAY OF NATRIURETIC PEPTIDE: CPT

## 2020-12-05 PROCEDURE — 5A1945Z RESPIRATORY VENTILATION, 24-96 CONSECUTIVE HOURS: ICD-10-PCS | Performed by: EMERGENCY MEDICINE

## 2020-12-05 PROCEDURE — 6360000002 HC RX W HCPCS

## 2020-12-05 PROCEDURE — 86901 BLOOD TYPING SEROLOGIC RH(D): CPT

## 2020-12-05 PROCEDURE — 82803 BLOOD GASES ANY COMBINATION: CPT

## 2020-12-05 PROCEDURE — 94002 VENT MGMT INPAT INIT DAY: CPT

## 2020-12-05 PROCEDURE — 93010 ELECTROCARDIOGRAM REPORT: CPT | Performed by: INTERNAL MEDICINE

## 2020-12-05 PROCEDURE — 86900 BLOOD TYPING SEROLOGIC ABO: CPT

## 2020-12-05 PROCEDURE — 93005 ELECTROCARDIOGRAM TRACING: CPT | Performed by: EMERGENCY MEDICINE

## 2020-12-05 PROCEDURE — 2000000000 HC ICU R&B

## 2020-12-05 PROCEDURE — 96372 THER/PROPH/DIAG INJ SC/IM: CPT

## 2020-12-05 PROCEDURE — 84145 PROCALCITONIN (PCT): CPT

## 2020-12-05 PROCEDURE — 99285 EMERGENCY DEPT VISIT HI MDM: CPT

## 2020-12-05 PROCEDURE — 80053 COMPREHEN METABOLIC PANEL: CPT

## 2020-12-05 PROCEDURE — 87804 INFLUENZA ASSAY W/OPTIC: CPT

## 2020-12-05 PROCEDURE — 6370000000 HC RX 637 (ALT 250 FOR IP): Performed by: INTERNAL MEDICINE

## 2020-12-05 PROCEDURE — 81001 URINALYSIS AUTO W/SCOPE: CPT

## 2020-12-05 RX ORDER — MIDAZOLAM HYDROCHLORIDE 5 MG/ML
INJECTION INTRAMUSCULAR; INTRAVENOUS
Status: DISCONTINUED
Start: 2020-12-05 | End: 2020-12-06

## 2020-12-05 RX ORDER — ASPIRIN 325 MG
325 TABLET ORAL ONCE
Status: COMPLETED | OUTPATIENT
Start: 2020-12-05 | End: 2020-12-05

## 2020-12-05 RX ORDER — 0.9 % SODIUM CHLORIDE 0.9 %
20 INTRAVENOUS SOLUTION INTRAVENOUS ONCE
Status: DISCONTINUED | OUTPATIENT
Start: 2020-12-05 | End: 2020-12-11 | Stop reason: HOSPADM

## 2020-12-05 RX ORDER — SUCCINYLCHOLINE CHLORIDE 20 MG/ML
10 INJECTION INTRAMUSCULAR; INTRAVENOUS ONCE
Status: COMPLETED | OUTPATIENT
Start: 2020-12-05 | End: 2020-12-05

## 2020-12-05 RX ORDER — FAMOTIDINE 20 MG/1
20 TABLET, FILM COATED ORAL 2 TIMES DAILY
Status: DISCONTINUED | OUTPATIENT
Start: 2020-12-05 | End: 2020-12-11 | Stop reason: HOSPADM

## 2020-12-05 RX ORDER — PROMETHAZINE HYDROCHLORIDE 25 MG/1
12.5 TABLET ORAL EVERY 6 HOURS PRN
Status: DISCONTINUED | OUTPATIENT
Start: 2020-12-05 | End: 2020-12-11 | Stop reason: HOSPADM

## 2020-12-05 RX ORDER — MIDAZOLAM HYDROCHLORIDE 1 MG/ML
2 INJECTION INTRAMUSCULAR; INTRAVENOUS ONCE
Status: COMPLETED | OUTPATIENT
Start: 2020-12-05 | End: 2020-12-05

## 2020-12-05 RX ORDER — ACETAMINOPHEN 650 MG/1
650 SUPPOSITORY RECTAL EVERY 6 HOURS PRN
Status: DISCONTINUED | OUTPATIENT
Start: 2020-12-05 | End: 2020-12-11 | Stop reason: HOSPADM

## 2020-12-05 RX ORDER — OLANZAPINE 10 MG/1
5 INJECTION, POWDER, LYOPHILIZED, FOR SOLUTION INTRAMUSCULAR ONCE
Status: COMPLETED | OUTPATIENT
Start: 2020-12-05 | End: 2020-12-05

## 2020-12-05 RX ORDER — DEXAMETHASONE SODIUM PHOSPHATE 10 MG/ML
6 INJECTION, SOLUTION INTRAMUSCULAR; INTRAVENOUS DAILY
Status: DISCONTINUED | OUTPATIENT
Start: 2020-12-05 | End: 2020-12-11 | Stop reason: HOSPADM

## 2020-12-05 RX ORDER — MIDAZOLAM HYDROCHLORIDE 1 MG/ML
5 INJECTION INTRAMUSCULAR; INTRAVENOUS ONCE
Status: DISCONTINUED | OUTPATIENT
Start: 2020-12-05 | End: 2020-12-09

## 2020-12-05 RX ORDER — PROPOFOL 10 MG/ML
10 INJECTION, EMULSION INTRAVENOUS
Status: DISCONTINUED | OUTPATIENT
Start: 2020-12-05 | End: 2020-12-08

## 2020-12-05 RX ORDER — IPRATROPIUM BROMIDE AND ALBUTEROL SULFATE 2.5; .5 MG/3ML; MG/3ML
3 SOLUTION RESPIRATORY (INHALATION) ONCE
Status: COMPLETED | OUTPATIENT
Start: 2020-12-05 | End: 2020-12-05

## 2020-12-05 RX ORDER — PROPOFOL 10 MG/ML
10 INJECTION, EMULSION INTRAVENOUS
Status: DISCONTINUED | OUTPATIENT
Start: 2020-12-05 | End: 2020-12-05 | Stop reason: SDUPTHER

## 2020-12-05 RX ORDER — POLYETHYLENE GLYCOL 3350 17 G/17G
17 POWDER, FOR SOLUTION ORAL DAILY PRN
Status: DISCONTINUED | OUTPATIENT
Start: 2020-12-05 | End: 2020-12-11 | Stop reason: HOSPADM

## 2020-12-05 RX ORDER — SODIUM CHLORIDE 0.9 % (FLUSH) 0.9 %
10 SYRINGE (ML) INJECTION EVERY 12 HOURS SCHEDULED
Status: DISCONTINUED | OUTPATIENT
Start: 2020-12-05 | End: 2020-12-11 | Stop reason: HOSPADM

## 2020-12-05 RX ORDER — MIDAZOLAM HYDROCHLORIDE 1 MG/ML
2 INJECTION INTRAMUSCULAR; INTRAVENOUS
Status: DISCONTINUED | OUTPATIENT
Start: 2020-12-05 | End: 2020-12-08

## 2020-12-05 RX ORDER — ONDANSETRON 2 MG/ML
4 INJECTION INTRAMUSCULAR; INTRAVENOUS EVERY 6 HOURS PRN
Status: DISCONTINUED | OUTPATIENT
Start: 2020-12-05 | End: 2020-12-11 | Stop reason: HOSPADM

## 2020-12-05 RX ORDER — LEVOFLOXACIN 500 MG/1
500 TABLET, FILM COATED ORAL DAILY
Status: COMPLETED | OUTPATIENT
Start: 2020-12-05 | End: 2020-12-09

## 2020-12-05 RX ORDER — SODIUM CHLORIDE 0.9 % (FLUSH) 0.9 %
10 SYRINGE (ML) INJECTION PRN
Status: DISCONTINUED | OUTPATIENT
Start: 2020-12-05 | End: 2020-12-11 | Stop reason: HOSPADM

## 2020-12-05 RX ORDER — FENTANYL CITRATE 50 UG/ML
50 INJECTION, SOLUTION INTRAMUSCULAR; INTRAVENOUS ONCE
Status: COMPLETED | OUTPATIENT
Start: 2020-12-05 | End: 2020-12-05

## 2020-12-05 RX ORDER — ETOMIDATE 2 MG/ML
10 INJECTION INTRAVENOUS ONCE
Status: COMPLETED | OUTPATIENT
Start: 2020-12-05 | End: 2020-12-05

## 2020-12-05 RX ORDER — ACETAMINOPHEN 325 MG/1
650 TABLET ORAL EVERY 6 HOURS PRN
Status: DISCONTINUED | OUTPATIENT
Start: 2020-12-05 | End: 2020-12-11 | Stop reason: HOSPADM

## 2020-12-05 RX ORDER — SODIUM CHLORIDE 9 MG/ML
INJECTION, SOLUTION INTRAVENOUS CONTINUOUS
Status: DISCONTINUED | OUTPATIENT
Start: 2020-12-05 | End: 2020-12-10

## 2020-12-05 RX ORDER — PREDNISONE 20 MG/1
40 TABLET ORAL DAILY
Status: DISCONTINUED | OUTPATIENT
Start: 2020-12-06 | End: 2020-12-05

## 2020-12-05 RX ORDER — METHYLPREDNISOLONE SODIUM SUCCINATE 125 MG/2ML
125 INJECTION, POWDER, LYOPHILIZED, FOR SOLUTION INTRAMUSCULAR; INTRAVENOUS ONCE
Status: COMPLETED | OUTPATIENT
Start: 2020-12-05 | End: 2020-12-05

## 2020-12-05 RX ORDER — FENTANYL CITRATE 50 UG/ML
25 INJECTION, SOLUTION INTRAMUSCULAR; INTRAVENOUS
Status: DISCONTINUED | OUTPATIENT
Start: 2020-12-05 | End: 2020-12-08

## 2020-12-05 RX ORDER — SODIUM CHLORIDE 9 MG/ML
INJECTION, SOLUTION INTRAVENOUS CONTINUOUS
Status: DISCONTINUED | OUTPATIENT
Start: 2020-12-05 | End: 2020-12-05

## 2020-12-05 RX ORDER — ALBUTEROL SULFATE 2.5 MG/3ML
2.5 SOLUTION RESPIRATORY (INHALATION)
Status: DISCONTINUED | OUTPATIENT
Start: 2020-12-05 | End: 2020-12-05

## 2020-12-05 RX ADMIN — FENTANYL CITRATE 50 MCG: 50 INJECTION, SOLUTION INTRAMUSCULAR; INTRAVENOUS at 16:30

## 2020-12-05 RX ADMIN — AZITHROMYCIN 500 MG: 500 INJECTION, POWDER, LYOPHILIZED, FOR SOLUTION INTRAVENOUS at 10:08

## 2020-12-05 RX ADMIN — MIDAZOLAM HYDROCHLORIDE 2 MG: 1 INJECTION, SOLUTION INTRAMUSCULAR; INTRAVENOUS at 16:31

## 2020-12-05 RX ADMIN — ALBUTEROL SULFATE 2.5 MG: 2.5 SOLUTION RESPIRATORY (INHALATION) at 07:29

## 2020-12-05 RX ADMIN — CEFTRIAXONE SODIUM 1 G: 1 INJECTION, POWDER, FOR SOLUTION INTRAMUSCULAR; INTRAVENOUS at 09:17

## 2020-12-05 RX ADMIN — WATER 1 ML: 1 INJECTION INTRAMUSCULAR; INTRAVENOUS; SUBCUTANEOUS at 07:32

## 2020-12-05 RX ADMIN — SODIUM CHLORIDE: 9 INJECTION, SOLUTION INTRAVENOUS at 12:05

## 2020-12-05 RX ADMIN — MIDAZOLAM 2 MG: 1 INJECTION INTRAMUSCULAR; INTRAVENOUS at 14:15

## 2020-12-05 RX ADMIN — ASPIRIN 325 MG: 325 TABLET, FILM COATED ORAL at 10:17

## 2020-12-05 RX ADMIN — SUCCINYLCHOLINE CHLORIDE 10 MG: 20 INJECTION, SOLUTION INTRAMUSCULAR; INTRAVENOUS at 13:40

## 2020-12-05 RX ADMIN — METHYLPREDNISOLONE SODIUM SUCCINATE 125 MG: 125 INJECTION, POWDER, FOR SOLUTION INTRAMUSCULAR; INTRAVENOUS at 06:58

## 2020-12-05 RX ADMIN — IPRATROPIUM BROMIDE AND ALBUTEROL SULFATE 3 AMPULE: 2.5; .5 SOLUTION RESPIRATORY (INHALATION) at 09:17

## 2020-12-05 RX ADMIN — FAMOTIDINE 20 MG: 20 TABLET, FILM COATED ORAL at 20:58

## 2020-12-05 RX ADMIN — OLANZAPINE 5 MG: 10 INJECTION, POWDER, LYOPHILIZED, FOR SOLUTION INTRAMUSCULAR at 07:32

## 2020-12-05 RX ADMIN — MIDAZOLAM HYDROCHLORIDE 2 MG: 1 INJECTION, SOLUTION INTRAMUSCULAR; INTRAVENOUS at 15:29

## 2020-12-05 RX ADMIN — SODIUM CHLORIDE 0.2 MCG/KG/HR: 9 INJECTION, SOLUTION INTRAVENOUS at 11:00

## 2020-12-05 RX ADMIN — DEXAMETHASONE SODIUM PHOSPHATE 6 MG: 10 INJECTION, SOLUTION INTRAMUSCULAR; INTRAVENOUS at 20:00

## 2020-12-05 RX ADMIN — PROPOFOL 10 MCG/KG/MIN: 10 INJECTION, EMULSION INTRAVENOUS at 20:58

## 2020-12-05 RX ADMIN — SODIUM CHLORIDE: 9 INJECTION, SOLUTION INTRAVENOUS at 06:58

## 2020-12-05 RX ADMIN — ETOMIDATE INJECTION 10 MG: 2 SOLUTION INTRAVENOUS at 13:40

## 2020-12-05 RX ADMIN — FENTANYL CITRATE 50 MCG/HR: 50 INJECTION, SOLUTION INTRAMUSCULAR; INTRAVENOUS at 14:27

## 2020-12-05 RX ADMIN — MUPIROCIN: 20 OINTMENT TOPICAL at 20:58

## 2020-12-05 RX ADMIN — LEVOFLOXACIN 500 MG: 500 TABLET, FILM COATED ORAL at 20:58

## 2020-12-05 RX ADMIN — IPRATROPIUM BROMIDE 0.5 MG: 0.5 SOLUTION RESPIRATORY (INHALATION) at 07:30

## 2020-12-05 RX ADMIN — MIDAZOLAM HYDROCHLORIDE 2 MG/HR: 5 INJECTION, SOLUTION INTRAMUSCULAR; INTRAVENOUS at 15:13

## 2020-12-05 RX ADMIN — MIDAZOLAM HYDROCHLORIDE 1 MG/HR: 5 INJECTION, SOLUTION INTRAMUSCULAR; INTRAVENOUS at 14:26

## 2020-12-05 ASSESSMENT — PAIN SCALES - GENERAL
PAINLEVEL_OUTOF10: 8
PAINLEVEL_OUTOF10: 0
PAINLEVEL_OUTOF10: 6

## 2020-12-05 ASSESSMENT — PULMONARY FUNCTION TESTS
PIF_VALUE: 21
PIF_VALUE: 20
PIF_VALUE: 18

## 2020-12-05 NOTE — ED NOTES
Report received. Pt found to have bipap off  All monitoring equipment off restless grunting trying to get out of bed. Tele sitter on but no communication from tele sitter have been received since taking over this pt. Monitor is plugged in and light is on. Precedex started. RN remaining 1:1 with pt - but will not be able to stay with pt due to 4;1 ratio assignment and door must remain closed due to + COVID. Clinical and Charge RN aware of this difficult situation. Will continue to check on pt as frequently as possible. All monitoring equipment back on pt at this time. Sats 96% with 50% bipap. Precedex titrating up with slight decrease in agitation.          Adam Gonzales RN  12/05/20 2998

## 2020-12-05 NOTE — PLAN OF CARE
Admit to ICU  COVID 19 pneumonia   Acute hypoxic and hypercapnic respiratory failure   Methamphetamine abuse with acute toxic encephalopathy   Transaminitis   History of Hep B and Hep C  Thrombocytopenia   Mild trop elevation 0.02   [facility]      Ankit Theodore PA-C  12/5/2020 8:59 AM

## 2020-12-05 NOTE — ED NOTES
1440 - RSI Intubation with Kamran Lax RT, B Damion CORREAD intubating, C Etta RN. Tolerated well. No difficulty. Placed on ventilator / RT  7.5 cm 23 at lip wong applied / RT  + Color Change  PCXR done ETT placement conf / ERMD  Started on Fentanyl and Versed drips for sedation.   Precedex drip d/c'd  Bedside report to Claudine Parra RN  12/05/20 9208

## 2020-12-05 NOTE — ED NOTES
1071 ~ PS msg sent to Dr. Héctor Zhong for Dr. Brie Rocha. Call returned at [phone].      Tiara Zavala  12/05/20 7128

## 2020-12-05 NOTE — PROGRESS NOTES
16:00 Pt to ICU intubated and sedated , pt very agitated thrashing, pt cold 94.1 F  Ernesto yin applied   19:00 Handoff report given to night nurse Osei, pt stable intubated and sedated , pt temp 98.1 bear hugger removed

## 2020-12-05 NOTE — ED PROVIDER NOTES
Emergency Physician Note  [address] ED  288 [facility] [place]. [address]  Dept: [phone]  Loc: [phone]  Open Note Time:  6:37 AM EST    Chief Complaint  Shortness of Breath (hx of COPD.  squad arrived and oxygen sat was in 70's, hhn and oxygen and sat up to 90'2)       History of Present Illness  Julia Magdaleno is a 48 y.o. male  has a past medical history of H/O brain surgery, Hepatitis C antibody test positive, History of surgery, Methamphetamine abuse ([facility] [address] MRSA (methicillin resistant staph aureus) culture positive, Paralysis of upper limb ([facility] [address] PE (pulmonary thromboembolism) ([facility] [address] and Pneumonia. who presents to the ED for shortness of breath. Patient is a very poor historian, he is writhing around in the bed, claiming that he has pain everywhere, keeps answering yes to all of my questions. EMS reports when they arrived they found him to be desaturating to 70%, they treated him with supplemental oxygen and improved to the 92%. Patient has a known history of methamphetamine abuse    Unable to assess review of systems as patient is a poor historian    Unless otherwise stated in this report or unable to obtain because of the patient's clinical or mental status as evidenced by the medical record, this patient's positive and negative responses for review of systems, constitutional, psych, eyes, ENT, cardiovascular, respiratory, gastrointestinal, neurological, genitourinary, musculoskeletal, integument systems and systems related to the presenting problem are either stated in the preceding paragraph or were not pertinent or were negative for the symptoms and/or complaints related to the medical problem. I have reviewed the following from the nursing documentation:      Prior to Admission medications    Medication Sig Start Date End Date Taking?  Authorizing Provider   albuterol sulfate  (90 Base) MCG/ACT inhaler Inhale 2 puffs into the lungs every 4 hours as needed for Wheezing or Shortness of Breath 11/21/20 11/28/20  Lori Reyes MD       Allergies as of 12/05/2020    (No Known Allergies)       Past Medical History:   Diagnosis Date    H/O brain surgery     Hepatitis C antibody test positive 7/17/14    History of surgery     L leg surgery    [facility]     MRSA (methicillin resistant staph aureus) culture positive 7/12/14    blood cx    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    PE (pulmonary thromboembolism) ([facility] [address]     Pneumonia         Surgical History:   Past Surgical History:   Procedure Laterality Date    BRAIN SURGERY  1986    s/p trauma    BRAIN SURGERY      FRACTURE SURGERY      LEG SURGERY          Family History:  History reviewed. No pertinent family history.     Social History     Socioeconomic History    Marital status: Single     Spouse name: Not on file    Number of children: Not on file    Years of education: Not on file    Highest education level: Not on file   Occupational History    Not on file   Social Needs    Financial resource strain: Not on file    Food insecurity     Worry: Not on file     Inability: Not on file    Transportation needs     Medical: Not on file     Non-medical: Not on file   Tobacco Use    Smoking status: Former Smoker     Packs/day: 2.00     Types: Cigarettes    Smokeless tobacco: Never Used    Tobacco comment: states 5 yrs ago   Substance and Sexual Activity    Alcohol use: No    Drug use: Not Currently     Types: IV     Comment: states last use 5 wks ago    Sexual activity: Yes     Partners: Female   Lifestyle    Physical activity     Days per week: Not on file     Minutes per session: Not on file    Stress: Not on file   Relationships    Social connections     Talks on phone: Not on file     Gets together: Not on file     Attends Baptism service: Not on file     Active member of club or organization: Not on file     Attends meetings of clubs or organizations: Not on file     Relationship status: Not on file    Intimate partner violence     Fear of current or ex partner: Not on file     Emotionally abused: Not on file     Physically abused: Not on file     Forced sexual activity: Not on file   Other Topics Concern    Not on file   Social History Narrative    ** Merged History Encounter **            Nursing notes reviewed. ED Triage Vitals [12/05/20 0609]   Enc Vitals Group      BP (!) 158/100      Pulse 118      Resp 30      Temp       Temp src       SpO2 100 %      Weight 95 lb (43.1 kg)      Height 5' (1.524 m)      Head Circumference       Peak Flow       Pain Score       Pain Loc       Pain Edu? Excl. in [address]? GENERAL:   Body mass index is 18.55 kg/m². Awake, alert. Slumped down to the end of the bed, keeping his legs bent over the rail, very restless, constantly moaning and groaning  Well developed, well nourished with apparent distress. Nontoxic-appearing, non-ill-appearing. HENT:   Normocephalic, Atraumatic, no lacerations. No ENT exam due to PPE. EYES:   Patient would not open his eyes  NECK:  Trachea is midline. No stridor. CHEST:  Elevated rate and regular rhythm, no murmurs/rubs/gallops,   normal S1/S2, chest wall non-tender. LUNGS:  Moderate respiratory distress. +abdominal retractions, no sternal retractions. Poor respiratory effort, with poor air movement, shallow breathing, diffuse end expiratory high-pitched wheezing, no rhochi, no rales  No Speaking comfortably in full sentences  ABDOMEN:  Soft, non-tender, non-distended. No guarding. No rebound. EXTREMITIES:  Moves extremities x4 with purpose. no edema,   no deformity,   distal pulses present and equal bilaterally. SKIN:  Warm, dry and intact.    NEUROLOGIC:  Minimally cooperative, however there is no focal motor deficits  PSYCHIATRIC:  anxious,   Bizarre mood with flat affect,     LABS and DIAGNOSTIC RESULTS    RADIOLOGY  X-RAYS:  I have reviewed radiologic plain film image(s). ALL OTHER NON-PLAIN FILM IMAGES SUCH AS CT, ULTRASOUND AND MRI HAVE BEEN READ BY THE RADIOLOGIST. XR CHEST PORTABLE    (Results Pending)         LABS  No results found for this visit on 12/05/20. SCREENINGS  NIH Score     Glascow  Odin Coma Scale  Eye Opening: Spontaneous  [facility]    Heart Score              PROCEDURES    MEDICAL DECISION MAKING    Procedures/interventions/images ordered for this visit  Orders Placed This Encounter   Procedures    XR CHEST PORTABLE    Comprehensive Metabolic Panel w/ Reflex to MG    CBC auto differential    Drug screen multi urine    Diet NPO Effective Now    Up with assistance    Straight cath    Full Code    Initiate Oxygen Therapy Protocol    Saline lock IV       Medications ordered for this visit  Orders Placed This Encounter   Medications    0.9 % sodium chloride infusion    ipratropium (ATROVENT) 0.02 % nebulizer solution 0.5 mg    albuterol (PROVENTIL) nebulizer solution 2.5 mg    methylPREDNISolone sodium (SOLU-MEDROL) injection 125 mg    DISCONTD: predniSONE (DELTASONE) tablet 40 mg       ED course notes for this visit       I wore   N95 Envo mask with filter protection, facial shield and gloves when I evaluated the patient. I evaluated the patient in room 03/03    I have signed out [place] [address] Emergency Department care to my colleague, Dr. Rosalinda Jose. We discussed the pertinent history, physical exam, completed/pending test results (if applicable) and current treatment plan. Please refer to his/her chart for the patients remaining Emergency Department course and final disposition. The note was completed using Dragon voice recognition transcription. Every effort was made to ensure accuracy; however, inadvertent transcription errors may be present despite my best efforts to edit errors.     Favian Sanchez MD  [address] [place] [address] MD Taty  12/05/20 5357

## 2020-12-05 NOTE — TELEPHONE ENCOUNTER
Writer attempted to contact ED provider to inform of 30 day readmission risk. Writer's attempt was unsuccessful.

## 2020-12-05 NOTE — ED NOTES
Hospitalist in to evaluate pt and situation. Updated to dangerous situation with patient being alone with door closed - with agitation and pulling monitoring equipment off. Pt has been restrained with a posey vest, still struggling to pull at things. Precedex drip was disconnected probably for past half hour as pt had managed to disconnect line. Plan - Intubate pt. And send to ICU asap when bed available.       Radha Natarajan RN  12/05/20 1203

## 2020-12-05 NOTE — ED NOTES
2914 ~ call placed to Pulmonology for Dr. Ronal Schimdt. Dr. Diana Zacarias returned call at [phone].      Josr Scott  12/05/20 6544

## 2020-12-05 NOTE — CONSULTS
Patient is being seen at the request of Citlaly Gross MD for a consultation for COVID-19 hypoxemic respiratory failure      HISTORY OF PRESENT ILLNESS:   48years old with history of hepatitis C methamphetamine abuse presented with shortness of breath. Found to be hypoxemic saturation in the 70s. VBG showed hypercapnia placed on BiPAP. Rapid COVID-19 positive. Agitated confused on BiPAP despite being on Precedex drip pulling off BiPAP mask. PAST MEDICAL HISTORY:  Past Medical History:   Diagnosis Date    H/O brain surgery     Hepatitis C antibody test positive 7/17/14    History of surgery     L leg surgery    Methamphetamine abuse ([facility] [address] [facility]     MRSA (methicillin resistant staph aureus) culture positive 7/12/14    blood cx    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    PE (pulmonary thromboembolism) ([facility]     Pneumonia      PAST SURGICAL HISTORY:  Past Surgical History:   Procedure Laterality Date    BRAIN SURGERY  1986    s/p trauma    BRAIN SURGERY      FRACTURE SURGERY      LEG SURGERY         FAMILY HISTORY:  Confused on BiPAP unable to obtain      SOCIAL HISTORY:   reports that he has quit smoking. His smoking use included cigarettes. He smoked 2.00 packs per day. He has never used smokeless tobacco.    Scheduled Meds:    Continuous Infusions:   sodium chloride 100 mL/hr at 12/05/20 1205    dexmedetomidine (PRECEDEX) IV infusion 0.4 mcg/kg/hr (12/05/20 1205)     PRN Meds:  albuterol    ALLERGIES:  Patient has No Known Allergies. REVIEW OF SYSTEMS: Confused on BiPAP not able to obtain    PHYSICAL EXAM:  Blood pressure (!) 134/102, pulse 111, resp. rate (!) 32, height 5' (1.524 m), weight 95 lb (43.1 kg), SpO2 96 %.' on BiPAP FiO2 50%  Gen: + Distress. Ill-appearing  Eyes: PERRL. No sclera icterus. No conjunctival injection. ENT: No discharge. Pharynx clear. Neck: Trachea midline. No obvious mass. Resp: + Accessory muscle use. No crackles. Bilateral wheezes. No rhonchi. awakening trial   Fentanyl and Versed PRN, gtt as needed  Head of bed 30 degrees or higher at all times  Daily spontaneous breathing trial once PEEP less than 8, FiO2 less than 55%  Closely monitory airways, clinical status, cardiac rhythm, vital signs, and urine output   Droplet plus isolation (surgical mask, eye protection, gown, glove)  IV antibiotics to include Levaquin for now  Blood culture and sputum culture  Check procalcitonin  Check CK  Dexamethasone 6 mg for 10 days or until discharge, whichever is shorter  Not candidate for remdesivir given elevated LFTs   1 units of convalescence plasma-tried to call number listed in the chart for consent with no answer [phone], and [phone]. Inhaled bronchodilators  Nutrition: Tube feeding when able  Blood sugar control, with goal 150-180  GI prophylaxis: Pepcid  DVT prophylaxis: Lovenox for DVT prophylaxis  MRSA prophylaxis: Bactroban   Code status: Full code                   Total critical care time caring for this patient with life threatening, unstable organ failure, including direct patient contact, management of life support systems, review of data including imaging and labs, discussions with other team members and physicians is 33 minutes, excluding procedures.

## 2020-12-05 NOTE — ED NOTES
Call placed to Radiology for tube placement, spoke to Vincent Dacosta.      Antonio Mancilla  12/05/20 7292

## 2020-12-05 NOTE — ED NOTES
Several IV attempts but pt pulling away or moving arm when attempting to start IV.      Rasta Hu, [facility]  12/05/20 2486

## 2020-12-05 NOTE — ED PROVIDER NOTES
The patient's care was signed out to my by the preceding provider. Please refer to their documentation for further information. CHIEF COMPLAINT  Chief Complaint   Patient presents with    Shortness of Breath     hx of COPD.  squad arrived and oxygen sat was in 70's, hhn and oxygen and sat up to 90'2       Briefly, Africa Goins is a 48 y.o. male  who presents to the ED complaining of shortness of breath. FOCUSED PHYSICAL EXAMINATION  /86   Pulse 118   Resp 30   Ht 5' (1.524 m)   Wt 95 lb (43.1 kg)   SpO2 100%   BMI 18.55 kg/m²      Focused physical examination:  General appearance: Agitated and restless  Skin:  Warm. Dry. Eye:  Extraocular movements intact. Ears, nose, mouth and throat:  Oral mucosa moist,  Neck:  Trachea midline. Heart: Tachycardic but regular  Perfusion:  intact  Respiratory: Increased work of breathing with very prolonged expiratory phase. Expiratory wheezes heard throughout all lung fields with poor air movement  Abdominal:   Non distended. Nontender  Neurological:  Alert and oriented x 3. Moves all extremities spontaneously  Musculoskeletal:   Normal ROM, no deformities          Psychiatric: Agitated and restless in bed      MDM: Patient presented to the emergency department today with complaints of shortness of breath. Was tachycardic and hypoxic with oxygen saturations in the 70s on room air. Placed on nasal cannula oxygen. Patient with increased psychomotor agitation and history of methamphetamine abuse concerning for the same. Previous provider did give him a low-dose of Zyprexa for some sedation. Laboratory studies concerning for acute hypercapnic respiratory failure in addition to his hypoxic respiratory failure. He was placed on by BiPAP. Chest x-ray concerning for infiltrate. Was started on Rocephin and azithromycin for community-acquired pneumonia as the likely underlying etiology to his symptoms.   COVID-19 testing performed and pending at this time. Patient found to have a slightly elevated troponin but having no complaints of chest pain. Was given an aspirin here. Will be admitted to the hospital for continued treatment. Received prolonged bronchodilator treatments with steroids for presumed underlying COPD. Additionally found to have elevated liver function studies perhaps secondary to known history of hepatitis. No abdominal pain at present. Addendum 0 838: Patient was ultimately found to be Covid positive    EKG: Sinus tachycardia rate of 115 bpm.  Rightward axis. No ST elevation. Compared to prior from 11/21/2020 no significant change    Addendum: Patient ultimately became more agitated requiring Precedex. Was not tolerating BiPAP. Was seen by pulmonology who ultimately requested intubation which was performed successfully without complication. Intubation    Date/Time: 12/5/2020 2:18 PM  Performed by: Maeve Santizo MD  Authorized by: Maeve Santizo MD     Consent:     Consent obtained:  Emergent situation    Risks discussed:  Aspiration, death and hypoxia  Pre-procedure details:     Patient status:  Altered mental status    Mallampati score: I    Pretreatment meds: precedex, etomidate. Paralytics:  Succinylcholine  Procedure details:     Preoxygenation:  BiPAP    CPR in progress: no      Intubation method:  Oral    Oral intubation technique:  Video-assisted    Laryngoscope blade:   Mac 4    Tube size (mm):  7.5    Tube type:  Cuffed    Number of attempts:  1    Ventilation between attempts: no      Cricoid pressure: no      Tube visualized through cords: yes    Placement assessment:     ETT to lip:  23    Tube secured with:  ETT wong    Breath sounds:  Equal and absent over the epigastrium    Placement verification: chest rise, condensation, CXR verification, direct visualization, equal breath sounds and ETCO2 detector      CXR findings:  ETT in proper place  Post-procedure details:     Patient tolerance of procedure: of this technology.         Bull Camarena MD  12/05/20 6667       Bull Camarena MD  12/05/20 9381       Bull Camarena MD  12/05/20 1120 Juan Pablo Hoffman MD  12/05/20 1459

## 2020-12-06 ENCOUNTER — APPOINTMENT (OUTPATIENT)
Dept: INTERVENTIONAL RADIOLOGY/VASCULAR | Age: 50
DRG: 208 | End: 2020-12-06
Payer: MEDICARE

## 2020-12-06 ENCOUNTER — APPOINTMENT (OUTPATIENT)
Dept: GENERAL RADIOLOGY | Age: 50
DRG: 208 | End: 2020-12-06
Payer: MEDICARE

## 2020-12-06 LAB
A/G RATIO: 1.1 (ref 1.1–2.2)
ALBUMIN SERPL-MCNC: 3.5 G/DL (ref 3.4–5)
ALP BLD-CCNC: 100 U/L (ref 40–129)
ALT SERPL-CCNC: 595 U/L (ref 10–40)
ANION GAP SERPL CALCULATED.3IONS-SCNC: 7 MMOL/L (ref 3–16)
AST SERPL-CCNC: 81 U/L (ref 15–37)
BASE EXCESS ARTERIAL: 1.6 MMOL/L (ref -3–3)
BASE EXCESS ARTERIAL: 1.9 MMOL/L (ref -3–3)
BASOPHILS ABSOLUTE: 0 K/UL (ref 0–0.2)
BASOPHILS RELATIVE PERCENT: 0.1 %
BILIRUB SERPL-MCNC: 0.4 MG/DL (ref 0–1)
BILIRUBIN DIRECT: <0.2 MG/DL (ref 0–0.3)
BILIRUBIN, INDIRECT: NORMAL MG/DL (ref 0–1)
BLOOD BANK DISPENSE STATUS: NORMAL
BLOOD BANK PRODUCT CODE: NORMAL
BPU ID: NORMAL
BUN BLDV-MCNC: 16 MG/DL (ref 7–20)
CALCIUM SERPL-MCNC: 8.7 MG/DL (ref 8.3–10.6)
CARBOXYHEMOGLOBIN ARTERIAL: 0.3 % (ref 0–1.5)
CARBOXYHEMOGLOBIN ARTERIAL: 0.4 % (ref 0–1.5)
CHLORIDE BLD-SCNC: 104 MMOL/L (ref 99–110)
CO2: 26 MMOL/L (ref 21–32)
CREAT SERPL-MCNC: <0.5 MG/DL (ref 0.9–1.3)
DESCRIPTION BLOOD BANK: NORMAL
EOSINOPHILS ABSOLUTE: 0 K/UL (ref 0–0.6)
EOSINOPHILS RELATIVE PERCENT: 0 %
GFR AFRICAN AMERICAN: >60
GFR NON-AFRICAN AMERICAN: >60
GLOBULIN: 3.1 G/DL
GLUCOSE BLD-MCNC: 119 MG/DL (ref 70–99)
GLUCOSE BLD-MCNC: 121 MG/DL (ref 70–99)
HCO3 ARTERIAL: 26 MMOL/L (ref 21–29)
HCO3 ARTERIAL: 26.5 MMOL/L (ref 21–29)
HCT VFR BLD CALC: 40 % (ref 40.5–52.5)
HEMOGLOBIN, ART, EXTENDED: 13.5 G/DL (ref 13.5–17.5)
HEMOGLOBIN, ART, EXTENDED: 13.8 G/DL (ref 13.5–17.5)
HEMOGLOBIN: 12.9 G/DL (ref 13.5–17.5)
INR BLD: 0.87 (ref 0.86–1.14)
LYMPHOCYTES ABSOLUTE: 0.6 K/UL (ref 1–5.1)
LYMPHOCYTES RELATIVE PERCENT: 8.1 %
MAGNESIUM: 1.9 MG/DL (ref 1.8–2.4)
MCH RBC QN AUTO: 27.1 PG (ref 26–34)
MCHC RBC AUTO-ENTMCNC: 32.3 G/DL (ref 31–36)
MCV RBC AUTO: 83.8 FL (ref 80–100)
METHEMOGLOBIN ARTERIAL: 0.3 %
METHEMOGLOBIN ARTERIAL: 0.3 %
MONOCYTES ABSOLUTE: 0.2 K/UL (ref 0–1.3)
MONOCYTES RELATIVE PERCENT: 3.1 %
NEUTROPHILS ABSOLUTE: 6.2 K/UL (ref 1.7–7.7)
NEUTROPHILS RELATIVE PERCENT: 88.7 %
O2 CONTENT ARTERIAL: 18 ML/DL
O2 CONTENT ARTERIAL: 19 ML/DL
O2 SAT, ARTERIAL: 94.8 %
O2 SAT, ARTERIAL: 99.4 %
O2 THERAPY: ABNORMAL
O2 THERAPY: ABNORMAL
PCO2 ARTERIAL: 39 MMHG (ref 35–45)
PCO2 ARTERIAL: 42.9 MMHG (ref 35–45)
PDW BLD-RTO: 15.5 % (ref 12.4–15.4)
PERFORMED ON: ABNORMAL
PH ARTERIAL: 7.41 (ref 7.35–7.45)
PH ARTERIAL: 7.44 (ref 7.35–7.45)
PHOSPHORUS: 3.2 MG/DL (ref 2.5–4.9)
PLATELET # BLD: 96 K/UL (ref 135–450)
PMV BLD AUTO: 7.9 FL (ref 5–10.5)
PO2 ARTERIAL: 195.9 MMHG (ref 75–108)
PO2 ARTERIAL: 73.3 MMHG (ref 75–108)
POTASSIUM REFLEX MAGNESIUM: 4.3 MMOL/L (ref 3.5–5.1)
POTASSIUM SERPL-SCNC: 4.3 MMOL/L (ref 3.5–5.1)
PROTHROMBIN TIME: 10.1 SEC (ref 10–13.2)
RBC # BLD: 4.77 M/UL (ref 4.2–5.9)
SODIUM BLD-SCNC: 137 MMOL/L (ref 136–145)
TCO2 ARTERIAL: 27.2 MMOL/L
TCO2 ARTERIAL: 27.8 MMOL/L
TOTAL PROTEIN: 6.6 G/DL (ref 6.4–8.2)
WBC # BLD: 6.9 K/UL (ref 4–11)

## 2020-12-06 PROCEDURE — 36415 COLL VENOUS BLD VENIPUNCTURE: CPT

## 2020-12-06 PROCEDURE — 99291 CRITICAL CARE FIRST HOUR: CPT | Performed by: INTERNAL MEDICINE

## 2020-12-06 PROCEDURE — 6360000002 HC RX W HCPCS: Performed by: INTERNAL MEDICINE

## 2020-12-06 PROCEDURE — 2580000003 HC RX 258: Performed by: INTERNAL MEDICINE

## 2020-12-06 PROCEDURE — 2700000000 HC OXYGEN THERAPY PER DAY

## 2020-12-06 PROCEDURE — 94761 N-INVAS EAR/PLS OXIMETRY MLT: CPT

## 2020-12-06 PROCEDURE — 2000000000 HC ICU R&B

## 2020-12-06 PROCEDURE — 36573 INSJ PICC RS&I 5 YR+: CPT

## 2020-12-06 PROCEDURE — 82803 BLOOD GASES ANY COMBINATION: CPT

## 2020-12-06 PROCEDURE — 80053 COMPREHEN METABOLIC PANEL: CPT

## 2020-12-06 PROCEDURE — 85610 PROTHROMBIN TIME: CPT

## 2020-12-06 PROCEDURE — 2580000003 HC RX 258: Performed by: PHYSICIAN ASSISTANT

## 2020-12-06 PROCEDURE — C1769 GUIDE WIRE: HCPCS

## 2020-12-06 PROCEDURE — 6370000000 HC RX 637 (ALT 250 FOR IP): Performed by: INTERNAL MEDICINE

## 2020-12-06 PROCEDURE — 36592 COLLECT BLOOD FROM PICC: CPT

## 2020-12-06 PROCEDURE — 71045 X-RAY EXAM CHEST 1 VIEW: CPT

## 2020-12-06 PROCEDURE — 2500000003 HC RX 250 WO HCPCS: Performed by: INTERNAL MEDICINE

## 2020-12-06 PROCEDURE — 99233 SBSQ HOSP IP/OBS HIGH 50: CPT | Performed by: INTERNAL MEDICINE

## 2020-12-06 PROCEDURE — 85025 COMPLETE CBC W/AUTO DIFF WBC: CPT

## 2020-12-06 PROCEDURE — 84100 ASSAY OF PHOSPHORUS: CPT

## 2020-12-06 PROCEDURE — 02HV33Z INSERTION OF INFUSION DEVICE INTO SUPERIOR VENA CAVA, PERCUTANEOUS APPROACH: ICD-10-PCS | Performed by: RADIOLOGY

## 2020-12-06 PROCEDURE — 94003 VENT MGMT INPAT SUBQ DAY: CPT

## 2020-12-06 PROCEDURE — 94750 HC PULMONARY COMPLIANCE STUDY: CPT

## 2020-12-06 PROCEDURE — 83735 ASSAY OF MAGNESIUM: CPT

## 2020-12-06 RX ORDER — LIDOCAINE HYDROCHLORIDE 10 MG/ML
5 INJECTION, SOLUTION EPIDURAL; INFILTRATION; INTRACAUDAL; PERINEURAL ONCE
Status: DISCONTINUED | OUTPATIENT
Start: 2020-12-06 | End: 2020-12-09

## 2020-12-06 RX ORDER — SODIUM CHLORIDE 0.9 % (FLUSH) 0.9 %
10 SYRINGE (ML) INJECTION PRN
Status: DISCONTINUED | OUTPATIENT
Start: 2020-12-06 | End: 2020-12-06

## 2020-12-06 RX ORDER — SODIUM CHLORIDE 0.9 % (FLUSH) 0.9 %
10 SYRINGE (ML) INJECTION EVERY 12 HOURS SCHEDULED
Status: DISCONTINUED | OUTPATIENT
Start: 2020-12-06 | End: 2020-12-06

## 2020-12-06 RX ORDER — HALOPERIDOL 5 MG/ML
2 INJECTION INTRAMUSCULAR ONCE
Status: COMPLETED | OUTPATIENT
Start: 2020-12-06 | End: 2020-12-06

## 2020-12-06 RX ADMIN — Medication 10 ML: at 08:05

## 2020-12-06 RX ADMIN — Medication 10 ML: at 20:17

## 2020-12-06 RX ADMIN — FAMOTIDINE 20 MG: 20 TABLET, FILM COATED ORAL at 08:07

## 2020-12-06 RX ADMIN — ENOXAPARIN SODIUM 30 MG: 100 INJECTION SUBCUTANEOUS at 19:58

## 2020-12-06 RX ADMIN — FAMOTIDINE 20 MG: 20 TABLET, FILM COATED ORAL at 19:59

## 2020-12-06 RX ADMIN — FENTANYL CITRATE 25 MCG: 50 INJECTION, SOLUTION INTRAMUSCULAR; INTRAVENOUS at 13:44

## 2020-12-06 RX ADMIN — DEXAMETHASONE SODIUM PHOSPHATE 6 MG: 10 INJECTION, SOLUTION INTRAMUSCULAR; INTRAVENOUS at 08:06

## 2020-12-06 RX ADMIN — ENOXAPARIN SODIUM 30 MG: 100 INJECTION SUBCUTANEOUS at 08:05

## 2020-12-06 RX ADMIN — FENTANYL CITRATE 25 MCG: 50 INJECTION, SOLUTION INTRAMUSCULAR; INTRAVENOUS at 10:39

## 2020-12-06 RX ADMIN — PROPOFOL 50 MCG/KG/MIN: 10 INJECTION, EMULSION INTRAVENOUS at 12:52

## 2020-12-06 RX ADMIN — PROPOFOL 50 MCG/KG/MIN: 10 INJECTION, EMULSION INTRAVENOUS at 21:13

## 2020-12-06 RX ADMIN — LEVOFLOXACIN 500 MG: 500 TABLET, FILM COATED ORAL at 08:08

## 2020-12-06 RX ADMIN — MUPIROCIN: 20 OINTMENT TOPICAL at 08:05

## 2020-12-06 RX ADMIN — MUPIROCIN: 20 OINTMENT TOPICAL at 19:59

## 2020-12-06 RX ADMIN — MIDAZOLAM HYDROCHLORIDE 2 MG: 1 INJECTION, SOLUTION INTRAMUSCULAR; INTRAVENOUS at 12:06

## 2020-12-06 RX ADMIN — SODIUM CHLORIDE 0.4 MCG/KG/HR: 9 INJECTION, SOLUTION INTRAVENOUS at 10:40

## 2020-12-06 RX ADMIN — HALOPERIDOL LACTATE 2 MG: 5 INJECTION, SOLUTION INTRAMUSCULAR at 15:58

## 2020-12-06 RX ADMIN — SODIUM CHLORIDE: 9 INJECTION, SOLUTION INTRAVENOUS at 10:40

## 2020-12-06 RX ADMIN — MIDAZOLAM HYDROCHLORIDE 2 MG: 1 INJECTION, SOLUTION INTRAMUSCULAR; INTRAVENOUS at 07:58

## 2020-12-06 RX ADMIN — MIDAZOLAM HYDROCHLORIDE 2 MG: 1 INJECTION, SOLUTION INTRAMUSCULAR; INTRAVENOUS at 06:19

## 2020-12-06 RX ADMIN — FENTANYL CITRATE 25 MCG: 50 INJECTION, SOLUTION INTRAMUSCULAR; INTRAVENOUS at 08:06

## 2020-12-06 RX ADMIN — SODIUM CHLORIDE 0.8 MCG/KG/HR: 9 INJECTION, SOLUTION INTRAVENOUS at 19:05

## 2020-12-06 ASSESSMENT — PULMONARY FUNCTION TESTS
PIF_VALUE: 22
PIF_VALUE: 34
PIF_VALUE: 21

## 2020-12-06 ASSESSMENT — PAIN SCALES - GENERAL
PAINLEVEL_OUTOF10: 0
PAINLEVEL_OUTOF10: 0

## 2020-12-06 NOTE — PROGRESS NOTES
IM Progress Note    Admit Date:  12/5/2020  1    Interval history:  Admitted for confusion  covid pna and resp failure  Unable to control agitation with increasing restlessness and   Worsened hypoxia needing intubation       Subjective:  Mr. Shane Diaz seen sedated on vent, no distress  picc placed this am       Objective:   BP (!) 145/99   Pulse 75   Temp 98.7 °F (37.1 °C)   Resp 19   Ht 5' (1.524 m)   Wt 92 lb 12.8 oz (42.1 kg)   SpO2 97%   BMI 18.12 kg/m²       Intake/Output Summary (Last 24 hours) at 12/6/2020 1422  Last data filed at 12/6/2020 0000  Gross per 24 hour   Intake --   Output 525 ml   Net -525 ml       Physical Exam:        General: thin middle aged male, sedated and on vent  . Appears to be not in any distress  Oral ETT and OG noted   Mucous Membranes:  Pink , anicteric  Neck: No JVD, no carotid bruit, no thyromegaly  Chest: diminished tina with scattered crackles   Cardiovascular:  RRR S1S2 heard, no murmurs or gallops  Abdomen:  Soft, undistended, non tender, no organomegaly, BS present  Extremities: large tattoos to both upper ext  Right UE picc    No edema or cyanosis.  Clubbing noted +  Distal pulses well felt  Neurological :sedated        Medications:   Scheduled Medications:    lidocaine 1 % injection  5 mL Intradermal Once    sodium chloride flush  10 mL Intravenous 2 times per day    levoFLOXacin  500 mg Oral Daily    sodium chloride  20 mL Intravenous Once    dexamethasone  6 mg Intravenous Daily    mupirocin   Nasal BID    famotidine  20 mg Oral BID    midazolam  5 mg Intravenous Once    influenza virus vaccine  0.5 mL Intramuscular Prior to discharge    enoxaparin  30 mg Subcutaneous BID     I   dexmedetomidine (PRECEDEX) IV infusion 0.4 mcg/kg/hr (12/06/20 1040)    sodium chloride 75 mL/hr at 12/06/20 1040    propofol 50 mcg/kg/min (12/06/20 1252)     acetaminophen **OR** acetaminophen, sodium chloride flush, polyethylene glycol, promethazine **OR** ondansetron, midazolam, fentanNYL    Lab Data:  Recent Labs     12/05/20  0609 12/06/20  0820   WBC 5.3 6.9   HGB 14.0 12.9*   HCT 43.1 40.0*   MCV 85.2 83.8   PLT 86* 96*     Recent Labs     12/05/20  0609 12/06/20  0820    137   K 3.7 4.3  4.3   CL 98* 104   CO2 33* 26   PHOS  --  3.2   BUN 9 16   CREATININE <0.5* <0.5*     Recent Labs     12/05/20  1848 12/05/20  2244   TROPONINI <0.01 <0.01       Coagulation:   Lab Results   Component Value Date    INR 0.87 12/06/2020     Cardiac markers:   Lab Results   Component Value Date    CKTOTAL 230 07/09/2020    TROPONINI <0.01 12/05/2020         Lab Results   Component Value Date     (H) 12/06/2020    AST 81 (H) 12/06/2020    ALKPHOS 100 12/06/2020    BILITOT 0.4 12/06/2020       Lab Results   Component Value Date    INR 0.87 12/06/2020    INR 0.99 07/06/2020    PROTIME 10.1 12/06/2020    PROTIME 11.5 07/06/2020       Radiology    XR CHEST PORTABLE   Final Result   Supportive tubing projects in normal position.       No acute cardiopulmonary disease.           CT Head WO Contrast   Final Result   No acute intracranial abnormality.           XR CHEST PORTABLE   Final Result   Hazy opacity at the right costophrenic angle which may reflect atelectasis   versus airspace disease.           cxr    Stable position of supportive tubing.         No acute cardiopulmonary disease. covid positive - 12/5  Blood - Pending  Rapid flu - neg      Urine drug - amphetamine     ASSESSMENT and PLAN      Acute hypoxemic and hypercapnic respiratory failure  Multifocal pneumonia secondary to Covid 19 infection     - failed bipap with continued agitation and was intubated in ER   - Continue mechanical ventilation, pulmonary critical care consulted.   - started on decadron D 2, no Remdesivr for liver abn    - sp CCP today   - levaquin added per pulm  - cx pending     Acute toxic encephalopathy- likely drug use   - presented with confusion and agitation , tried precedex with no help  - was on bipap and eventually intubated  - urine tox positive for amphetamine, hx of heroin abuse noted    Elevated troponin- likely demand ischemia   - repeat troponins normal. ekg with no acute changes      Transaminitis- likely related to hep c and acute viral infection   - continue to monitor , improving slightly     History of polysubstance abuse    TCP - new , monitor while on anticoagulation     DVT Prophylaxis: Lovenox, pepcid   Diet: Diet NPO Effective Now  Code Status: Full Code     Dispo - ICU      Stewart Heath MD 12/6/2020 2:22 PM

## 2020-12-06 NOTE — PROGRESS NOTES
Called pt mother, Elizabeth Hayes () for consent regarding PICC line, Reviewed risk vs benefits. Mrs Rolando Licona agreed to PICC line, Michael Ball RN witnessed phone consent.  Update was given at this time on pt status

## 2020-12-06 NOTE — PLAN OF CARE
Problem: Airway Clearance - Ineffective  Goal: Achieve or maintain patent airway  Outcome: Ongoing     Problem: Gas Exchange - Impaired  Goal: Absence of hypoxia  Outcome: Ongoing  Goal: Promote optimal lung function  Outcome: Ongoing     Problem: Breathing Pattern - Ineffective  Goal: Ability to achieve and maintain a regular respiratory rate  Outcome: Ongoing     Problem:  Body Temperature -  Risk of, Imbalanced  Goal: Ability to maintain a body temperature within defined limits  Outcome: Ongoing  Goal: Will regain or maintain usual level of consciousness  Outcome: Ongoing  Goal: Complications related to the disease process, condition or treatment will be avoided or minimized  Outcome: Ongoing     Problem: Isolation Precautions - Risk of Spread of Infection  Goal: Prevent transmission of infection  Outcome: Ongoing     Problem: Nutrition Deficits  Goal: Optimize nutrtional status  Outcome: Ongoing     Problem: Risk for Fluid Volume Deficit  Goal: Maintain normal heart rhythm  Outcome: Ongoing  Goal: Maintain absence of muscle cramping  Outcome: Ongoing  Goal: Maintain normal serum potassium, sodium, calcium, phosphorus, and pH  Outcome: Ongoing     Problem: Loneliness or Risk for Loneliness  Goal: Demonstrate positive use of time alone when socialization is not possible  Outcome: Ongoing     Problem: Fatigue  Goal: Verbalize increase energy and improved vitality  Outcome: Ongoing     Problem: Patient Education: Go to Patient Education Activity  Goal: Patient/Family Education  Outcome: Ongoing     Problem: Falls - Risk of:  Goal: Will remain free from falls  Description: Will remain free from falls  Outcome: Ongoing  Goal: Absence of physical injury  Description: Absence of physical injury  Outcome: Ongoing     Problem: Skin Integrity:  Goal: Will show no infection signs and symptoms  Description: Will show no infection signs and symptoms  Outcome: Ongoing  Goal: Absence of new skin breakdown  Description: Absence of new skin breakdown  Outcome: Ongoing     Problem: Pain:  Goal: Pain level will decrease  Description: Pain level will decrease  Outcome: Ongoing  Goal: Control of acute pain  Description: Control of acute pain  Outcome: Ongoing  Goal: Control of chronic pain  Description: Control of chronic pain  Outcome: Ongoing     Problem: Restraint Use - Nonviolent/Non-Self-Destructive Behavior:  Goal: Absence of restraint indications  Description: Absence of restraint indications  Outcome: Ongoing  Goal: Absence of restraint-related injury  Description: Absence of restraint-related injury  Outcome: Ongoing

## 2020-12-06 NOTE — PROGRESS NOTES
Reported to writer that temp was 94. Rectal temp taken at this time as 95.1. Changed almaraz cath out for a temp sensing almaraz cath so that temp can be monitored closely and frequently. Almaraz changed without concerns using sterile technique. Joana Elizondo placed on pt at this time.

## 2020-12-06 NOTE — H&P
Hospital Medicine History & Physical      PCP: No primary care provider on file. Date of Admission: 12/5/2020    Date of Service: Pt seen/examined on 12/5/2020    Chief Complaint: Acute respiratory failure    History Of Present Illness:    48 y.o. male who presented to the emergency department early this morning with shortness of breath, altered mental status and hypoxia. Apparently when he presented to hospital he was encephalopathic. He has a history of polysubstance abuse. He was placed on supplemental oxygen improvement of his symptoms. He however tested positive for COVID-19 pneumonia. Given his agitation, he was not tolerating supplemental oxygen and attempt on BiPAP. He was ultimately intubated to help protect his airway. He was admitted to the ICU and is tolerating vent support. 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 Authorizing Provider   albuterol sulfate  (90 Base) MCG/ACT inhaler Inhale 2 puffs into the lungs every 4 hours as needed for Wheezing or Shortness of Breath 11/21/20 11/28/20  Alverda Mohs, MD       Allergies:  Patient has no known allergies. Social History:      TOBACCO:   reports that he has quit smoking. His smoking use included cigarettes. He smoked 2.00 packs per day.  He has never used smokeless tobacco.  ETOH:   reports no history of alcohol use. Family History:      History reviewed. No pertinent family history. REVIEW OF SYSTEMS:   Could not obtain given patient's mental status and intubation    PHYSICAL EXAM:    BP 99/71   Pulse 74   Temp 98.1 °F (36.7 °C) (Oral)   Resp 18   Ht 5' (1.524 m)   Wt 92 lb 12.8 oz (42.1 kg)   SpO2 99%   BMI 18.12 kg/m²     General appearance: Intubated and sedated  HEENT: Tattoos on skull, ET tube in place. Neck: Supple, with full range of motion. No jugular venous distention. Trachea midline. Respiratory: Mechanical breath sounds  Cardiovascular:  Regular rate and rhythm with normal S1/S2 without murmurs, rubs or gallops. Abdomen: Soft, non-tender, non-distended with normal bowel sounds. Musculoskeletal: No edema noted  Skin: Skin color, texture, turgor normal.  No rashes or lesions. Multiple skin tattoos  Neurologic: Sedated  Psychiatric: Sedated  Capillary Refill: Brisk,< 3 seconds   Peripheral Pulses: +2 palpable, equal bilaterally       Labs:   Recent Labs     12/05/20  0609   WBC 5.3   HGB 14.0   HCT 43.1   PLT 86*     Recent Labs     12/05/20  0609      K 3.7   CL 98*   CO2 33*   BUN 9   CREATININE <0.5*   CALCIUM 9.1     Recent Labs     12/05/20  0609   *   *   BILITOT 0.5   ALKPHOS 125     No results for input(s): INR in the last 72 hours. Recent Labs     12/05/20  0609 12/05/20  1848   TROPONINI 0.02* <0.01       Urinalysis:      Lab Results   Component Value Date    NITRU Negative 07/03/2020    WBCUA 21-50 07/03/2020    BACTERIA 2+ 07/03/2020    RBCUA 11-20 07/03/2020    BLOODU SMALL 07/03/2020    SPECGRAV >=1.030 07/03/2020    GLUCOSEU Negative 07/03/2020       Radiology:   XR CHEST PORTABLE   Final Result   Supportive tubing projects in normal position. No acute cardiopulmonary disease. CT Head WO Contrast   Final Result   No acute intracranial abnormality.          XR CHEST PORTABLE   Final Result   Hazy opacity at the right costophrenic angle which may reflect atelectasis   versus airspace disease. XR CHEST PORTABLE    (Results Pending)       ASSESSMENT:  Acute hypoxemic and hypercapnic respiratory failure  Multifocal pneumonia secondary to Covid  COVID-19 pneumonia  Acute encephalopathy  Elevated troponin  Transaminitis  History of polysubstance abuse    PLAN:  Continue mechanical ventilation, pulmonary critical care consulted. Empiric IV antibiotic therapy  Decadron, 1 unit of convalescent plasma ordered  Blood and sputum cultures obtained  Spontaneous breathing trials    DVT Prophylaxis: Lovenox  Diet: Diet NPO Effective Now  Code Status: Full Code    Dispo -admit to ICU      Thank you for the opportunity to be involved in this patient's care.       (Please note that portions of this note were completed with a voice recognition program. Efforts were made to edit the dictations but occasionally words are mis-transcribed.)

## 2020-12-06 NOTE — PROGRESS NOTES
Shift assessment done; see flow sheet. Pt remains intubated and sedated with Fentanyl @150mcg/hr and Versed @4mg/hr; pt does respond to pain and attempts to open his eyes. Mondragon and OG in place. Daughter called for updates. 2100: Propofol started; titration of Fentanyl and Versed started as they have been d/c'd.    2200: UA and Flu swab sent. 0000: Re-assessment complete; no changes see flow sheet. 0130: Convalescent plasma arrived in blood bank and given; pt tolerated well. 0345: Pt attempted to pull out ET tube; pt was restrained appropriately but manipulated his body and head to his left hand; RT called and bedside to help with control of tube and replaced the tube wong. Increased propofol for better sedation. 0400: Re-assessment complete; see flow sheet. Pt continues to be agitated; pt is redirectable. Lab unsuccessful with drawing labs; dayshift will be up to attempt.

## 2020-12-06 NOTE — PROGRESS NOTES
12/05/20 2328   Vent Information   Vent Type 980   Vent Mode AC/VC   Vt Ordered 350 mL   Rate Set 18 bmp   Peak Flow 60 L/min   FiO2  40 %   SpO2 99 %   SpO2/FiO2 ratio 247.5   Sensitivity 3   PEEP/CPAP 5   Humidification Source HME   Vent Patient Data   Peak Inspiratory Pressure 21 cmH2O   Mean Airway Pressure 8.2 cmH20   Rate Measured 18 br/min   Vt Exhaled 363 mL   Minute Volume 6.5 Liters   I:E Ratio 1:4.2   Cough/Sputum   Sputum How Obtained Endotracheal   Cough Non-productive   Sputum Amount None   Spontaneous Breathing Trial (SBT) RT Doc   Pulse 68   Breath Sounds   Right Upper Lobe Diminished   Right Middle Lobe Diminished   Right Lower Lobe Diminished   Left Upper Lobe Diminished   Left Lower Lobe Diminished   Additional Respiratory  Assessments   Resp 18   Alarm Settings   High Pressure Alarm 40 cmH2O   Low Minute Volume Alarm 5 L/min   Apnea (secs) 20 secs   High Respiratory Rate 35 br/min   Low Exhaled Vt  250 mL   Patient Observation   Observations 7.5 ett 24 at lip

## 2020-12-06 NOTE — PROGRESS NOTES
Pulmonary & Critical Care Medicine ICU Progress Note    CC:Acute respiratory failure with hypoxemia/covid 19 pneumonia    Events of Last 24 hours: Patient agitated at times. Invasive Lines:   IV Line: PIVs    MV:  12/5  Vent Mode: AC/VC Rate Set: 18 bmp/Vt Ordered: 350 mL/ /FiO2 : 30 %  Recent Labs     12/05/20  1856   PHART 7.388   DSP3UEA 52.5*   PO2ART 60.3*       IV:   sodium chloride 75 mL/hr at 12/05/20 2105    propofol 50 mcg/kg/min (12/06/20 0543)       EXAM:  /65   Pulse 72   Temp 98.7 °F (37.1 °C)   Resp 20   Ht 5' (1.524 m)   Wt 92 lb 12.8 oz (42.1 kg)   SpO2 98%   BMI 18.12 kg/m²  on 30%  Tmax: 98.7  CVP:      Intake/Output Summary (Last 24 hours) at 12/6/2020 5207  Last data filed at 12/6/2020 0000  Gross per 24 hour   Intake --   Output 525 ml   Net -525 ml       Constitutional:  No acute distress. Appears stated age. Eyes: No sclera icterus. EOM intact. No visible discharge. HENT: Head is normocephalic and atraumatic. Mucus membranes are moist and the tongue appears normal. Normal appearing nose. External Ears normal.   Neck: No visualized mass. Candance Whit is midline   Resp: No accessory muscle use. Respiratory effort normal. No visualized signs of difficulty breathing or respiratory distress. Cardiovascular: No LE edema   Skin: No significant exanthematous lesions or discoloration noted on facial skin    Neuro: sedated , not moving extremities    .      Medications:   sodium chloride flush  10 mL Intravenous 2 times per day    levoFLOXacin  500 mg Oral Daily    sodium chloride  20 mL Intravenous Once    dexamethasone  6 mg Intravenous Daily    mupirocin   Nasal BID    famotidine  20 mg Oral BID    midazolam  5 mg Intravenous Once    influenza virus vaccine  0.5 mL Intramuscular Prior to discharge    enoxaparin  30 mg Subcutaneous BID     PRN Meds:  acetaminophen **OR** acetaminophen, sodium chloride flush, polyethylene glycol, promethazine **OR** ondansetron, midazolam, fentanNYL    Results:  CBC:   Recent Labs     12/05/20  0609   WBC 5.3   HGB 14.0   HCT 43.1   MCV 85.2   PLT 86*     BMP:   Recent Labs     12/05/20  0609      K 3.7   CL 98*   CO2 33*   BUN 9   CREATININE <0.5*     LIVER PROFILE:   Recent Labs     12/05/20  0609   *   *   BILITOT 0.5   ALKPHOS 125     PT/INR: No results for input(s): PROTIME, INR in the last 72 hours. APTT: No results for input(s): APTT in the last 72 hours. UA:  Recent Labs     12/05/20  2214   COLORU Yellow   PHUR 8.0   WBCUA 0-2   RBCUA *   MUCUS 1+*   CLARITYU Clear   SPECGRAV 1.015   LEUKOCYTESUR Negative   UROBILINOGEN 1.0   BILIRUBINUR Negative   BLOODU MODERATE*   GLUCOSEU Negative       Cultures:  12/5 sars Cov2- detected  12/5 blood- ngtd  12/5 rapid flu neg    Films:  CXR 12/6 reviewed by me and it showed: The endotracheal tube terminates 2.5 cm cephalad to the lilo.  Nasogastric tube has a normal course.       Cardiac silhouette is normal.  Calcified granuloma project over the right  paratracheal region.  The lungs remain hyperinflated.  Scattered calcified are in the lungs.  No consolidation, pleural effusion or   pneumothorax is seen. 12/5 head CT no acute intracranial abnormalities     ASSESSMENT:  · Acute hypoxemic hypercapnic respiratory failure  · COVID-19 viral pneumonia  · Acute encephalopathy/metabolic encephalopathy  · Elevated troponin-likely demand ischemia- 0.02  · Elevated transaminases  ·  thrombocytopenia-normal back in November 2020-probably related to COVID-19 infection   · History of methamphetamine and heroin abuse with history of drug overdose, and opiates withdrawal most recently November 2020. · Hepatitis C     PLAN:  · Mechanical ventilation per my orders. The ventilator was adjusted by me at the bedside for unstable, life threatening respiratory failure. Wean oxygen as tolerated.    · Follow ABG/VBG and chest x-ray while on the ventilator  · Supplemental oxygen to maintain

## 2020-12-06 NOTE — PROGRESS NOTES
12/06/20 0326   Vent Information   Vent Type 980   Vent Mode AC/VC   Vt Ordered 350 mL   Rate Set 18 bmp   Peak Flow 60 L/min   FiO2  30 %   SpO2 98 %   SpO2/FiO2 ratio 326.67   Sensitivity 3   PEEP/CPAP 5   Humidification Source HME   Vent Patient Data   Peak Inspiratory Pressure 22 cmH2O   Mean Airway Pressure 8.2 cmH20   Rate Measured 18 br/min   Vt Exhaled 360 mL   Minute Volume 6.5 Liters   I:E Ratio 1:4.2   Cough/Sputum   Sputum How Obtained Endotracheal   Cough Non-productive   Sputum Amount None   Spontaneous Breathing Trial (SBT) RT Doc   Pulse 58   Breath Sounds   Right Upper Lobe Diminished   Right Middle Lobe Diminished   Right Lower Lobe Diminished   Left Upper Lobe Diminished   Left Lower Lobe Diminished   Additional Respiratory  Assessments   Resp 18   Alarm Settings   High Pressure Alarm 40 cmH2O   Low Minute Volume Alarm 5 L/min   Apnea (secs) 20 secs   High Respiratory Rate 35 br/min   Low Exhaled Vt  250 mL   Patient Observation   Observations 7.5 ett 24 at lip

## 2020-12-07 ENCOUNTER — APPOINTMENT (OUTPATIENT)
Dept: GENERAL RADIOLOGY | Age: 50
DRG: 208 | End: 2020-12-07
Payer: MEDICARE

## 2020-12-07 LAB
A/G RATIO: 1.2 (ref 1.1–2.2)
ALBUMIN SERPL-MCNC: 3.3 G/DL (ref 3.4–5)
ALP BLD-CCNC: 86 U/L (ref 40–129)
ALT SERPL-CCNC: 396 U/L (ref 10–40)
ANION GAP SERPL CALCULATED.3IONS-SCNC: 7 MMOL/L (ref 3–16)
AST SERPL-CCNC: 38 U/L (ref 15–37)
BASE EXCESS ARTERIAL: 0.8 MMOL/L (ref -3–3)
BASOPHILS ABSOLUTE: 0 K/UL (ref 0–0.2)
BASOPHILS RELATIVE PERCENT: 0.4 %
BILIRUB SERPL-MCNC: 0.3 MG/DL (ref 0–1)
BILIRUBIN DIRECT: <0.2 MG/DL (ref 0–0.3)
BILIRUBIN, INDIRECT: NORMAL MG/DL (ref 0–1)
BUN BLDV-MCNC: 18 MG/DL (ref 7–20)
CALCIUM SERPL-MCNC: 8.6 MG/DL (ref 8.3–10.6)
CARBOXYHEMOGLOBIN ARTERIAL: 0.5 % (ref 0–1.5)
CHLORIDE BLD-SCNC: 106 MMOL/L (ref 99–110)
CO2: 27 MMOL/L (ref 21–32)
CREAT SERPL-MCNC: 0.6 MG/DL (ref 0.9–1.3)
EOSINOPHILS ABSOLUTE: 0 K/UL (ref 0–0.6)
EOSINOPHILS RELATIVE PERCENT: 0.1 %
GFR AFRICAN AMERICAN: >60
GFR NON-AFRICAN AMERICAN: >60
GLOBULIN: 2.8 G/DL
GLUCOSE BLD-MCNC: 108 MG/DL (ref 70–99)
GLUCOSE BLD-MCNC: 110 MG/DL (ref 70–99)
GLUCOSE BLD-MCNC: 115 MG/DL (ref 70–99)
GLUCOSE BLD-MCNC: 116 MG/DL (ref 70–99)
GLUCOSE BLD-MCNC: 135 MG/DL (ref 70–99)
HCO3 ARTERIAL: 24.7 MMOL/L (ref 21–29)
HCT VFR BLD CALC: 41.3 % (ref 40.5–52.5)
HEMOGLOBIN, ART, EXTENDED: 14.1 G/DL (ref 13.5–17.5)
HEMOGLOBIN: 13.3 G/DL (ref 13.5–17.5)
LYMPHOCYTES ABSOLUTE: 1.2 K/UL (ref 1–5.1)
LYMPHOCYTES RELATIVE PERCENT: 16.4 %
MAGNESIUM: 2 MG/DL (ref 1.8–2.4)
MCH RBC QN AUTO: 27 PG (ref 26–34)
MCHC RBC AUTO-ENTMCNC: 32.2 G/DL (ref 31–36)
MCV RBC AUTO: 83.8 FL (ref 80–100)
METHEMOGLOBIN ARTERIAL: 0.3 %
MONOCYTES ABSOLUTE: 0.4 K/UL (ref 0–1.3)
MONOCYTES RELATIVE PERCENT: 6.3 %
NEUTROPHILS ABSOLUTE: 5.4 K/UL (ref 1.7–7.7)
NEUTROPHILS RELATIVE PERCENT: 76.8 %
O2 CONTENT ARTERIAL: 19 ML/DL
O2 SAT, ARTERIAL: 97 %
O2 THERAPY: NORMAL
PCO2 ARTERIAL: 37.1 MMHG (ref 35–45)
PDW BLD-RTO: 16.1 % (ref 12.4–15.4)
PERFORMED ON: ABNORMAL
PH ARTERIAL: 7.44 (ref 7.35–7.45)
PHOSPHORUS: 3.5 MG/DL (ref 2.5–4.9)
PLATELET # BLD: 101 K/UL (ref 135–450)
PMV BLD AUTO: 8 FL (ref 5–10.5)
PO2 ARTERIAL: 88.1 MMHG (ref 75–108)
POTASSIUM REFLEX MAGNESIUM: 4.3 MMOL/L (ref 3.5–5.1)
POTASSIUM SERPL-SCNC: 4.3 MMOL/L (ref 3.5–5.1)
RBC # BLD: 4.93 M/UL (ref 4.2–5.9)
SODIUM BLD-SCNC: 140 MMOL/L (ref 136–145)
TCO2 ARTERIAL: 25.8 MMOL/L
TOTAL PROTEIN: 6.1 G/DL (ref 6.4–8.2)
TRIGL SERPL-MCNC: 155 MG/DL (ref 0–150)
WBC # BLD: 7 K/UL (ref 4–11)

## 2020-12-07 PROCEDURE — 80053 COMPREHEN METABOLIC PANEL: CPT

## 2020-12-07 PROCEDURE — 2580000003 HC RX 258: Performed by: INTERNAL MEDICINE

## 2020-12-07 PROCEDURE — 94761 N-INVAS EAR/PLS OXIMETRY MLT: CPT

## 2020-12-07 PROCEDURE — 6360000002 HC RX W HCPCS: Performed by: INTERNAL MEDICINE

## 2020-12-07 PROCEDURE — 83735 ASSAY OF MAGNESIUM: CPT

## 2020-12-07 PROCEDURE — 2580000003 HC RX 258: Performed by: PHYSICIAN ASSISTANT

## 2020-12-07 PROCEDURE — 2500000003 HC RX 250 WO HCPCS: Performed by: INTERNAL MEDICINE

## 2020-12-07 PROCEDURE — 6370000000 HC RX 637 (ALT 250 FOR IP): Performed by: INTERNAL MEDICINE

## 2020-12-07 PROCEDURE — 85025 COMPLETE CBC W/AUTO DIFF WBC: CPT

## 2020-12-07 PROCEDURE — 99291 CRITICAL CARE FIRST HOUR: CPT | Performed by: INTERNAL MEDICINE

## 2020-12-07 PROCEDURE — 94750 HC PULMONARY COMPLIANCE STUDY: CPT

## 2020-12-07 PROCEDURE — 99233 SBSQ HOSP IP/OBS HIGH 50: CPT | Performed by: INTERNAL MEDICINE

## 2020-12-07 PROCEDURE — 94003 VENT MGMT INPAT SUBQ DAY: CPT

## 2020-12-07 PROCEDURE — 84100 ASSAY OF PHOSPHORUS: CPT

## 2020-12-07 PROCEDURE — 36415 COLL VENOUS BLD VENIPUNCTURE: CPT

## 2020-12-07 PROCEDURE — 84478 ASSAY OF TRIGLYCERIDES: CPT

## 2020-12-07 PROCEDURE — 2000000000 HC ICU R&B

## 2020-12-07 PROCEDURE — 71045 X-RAY EXAM CHEST 1 VIEW: CPT

## 2020-12-07 PROCEDURE — 82803 BLOOD GASES ANY COMBINATION: CPT

## 2020-12-07 PROCEDURE — 36592 COLLECT BLOOD FROM PICC: CPT

## 2020-12-07 PROCEDURE — 2700000000 HC OXYGEN THERAPY PER DAY

## 2020-12-07 RX ADMIN — LEVOFLOXACIN 500 MG: 500 TABLET, FILM COATED ORAL at 09:25

## 2020-12-07 RX ADMIN — Medication 10 ML: at 09:26

## 2020-12-07 RX ADMIN — MUPIROCIN: 20 OINTMENT TOPICAL at 09:26

## 2020-12-07 RX ADMIN — ENOXAPARIN SODIUM 30 MG: 100 INJECTION SUBCUTANEOUS at 09:25

## 2020-12-07 RX ADMIN — PROPOFOL 50 MCG/KG/MIN: 10 INJECTION, EMULSION INTRAVENOUS at 16:42

## 2020-12-07 RX ADMIN — PROPOFOL 50 MCG/KG/MIN: 10 INJECTION, EMULSION INTRAVENOUS at 11:31

## 2020-12-07 RX ADMIN — Medication 10 ML: at 20:17

## 2020-12-07 RX ADMIN — MUPIROCIN: 20 OINTMENT TOPICAL at 20:17

## 2020-12-07 RX ADMIN — SODIUM CHLORIDE 1.2 MCG/KG/HR: 9 INJECTION, SOLUTION INTRAVENOUS at 16:40

## 2020-12-07 RX ADMIN — FAMOTIDINE 20 MG: 20 TABLET, FILM COATED ORAL at 09:25

## 2020-12-07 RX ADMIN — SODIUM CHLORIDE 1.2 MCG/KG/HR: 9 INJECTION, SOLUTION INTRAVENOUS at 08:11

## 2020-12-07 RX ADMIN — DEXAMETHASONE SODIUM PHOSPHATE 6 MG: 10 INJECTION, SOLUTION INTRAMUSCULAR; INTRAVENOUS at 09:25

## 2020-12-07 RX ADMIN — SODIUM CHLORIDE: 9 INJECTION, SOLUTION INTRAVENOUS at 16:40

## 2020-12-07 RX ADMIN — MIDAZOLAM HYDROCHLORIDE 2 MG: 1 INJECTION, SOLUTION INTRAMUSCULAR; INTRAVENOUS at 11:31

## 2020-12-07 RX ADMIN — MIDAZOLAM HYDROCHLORIDE 2 MG: 1 INJECTION, SOLUTION INTRAMUSCULAR; INTRAVENOUS at 22:39

## 2020-12-07 RX ADMIN — ENOXAPARIN SODIUM 30 MG: 100 INJECTION SUBCUTANEOUS at 20:17

## 2020-12-07 RX ADMIN — FAMOTIDINE 20 MG: 20 TABLET, FILM COATED ORAL at 20:17

## 2020-12-07 ASSESSMENT — PULMONARY FUNCTION TESTS
PIF_VALUE: 23
PIF_VALUE: 23
PIF_VALUE: 22
PIF_VALUE: 23
PIF_VALUE: 22

## 2020-12-07 NOTE — PROGRESS NOTES
12/06/20 2323   Vent Information   Vent Type 980   Vent Mode AC/VC   Vt Ordered 350 mL   Rate Set 18 bmp   Peak Flow 55 L/min   FiO2  24 %   SpO2 94 %   SpO2/FiO2 ratio 391.67   Sensitivity 3   PEEP/CPAP 5   Humidification Source HME   Vent Patient Data   Peak Inspiratory Pressure 22 cmH2O   Mean Airway Pressure 7 cmH20   Rate Measured 19 br/min   Vt Exhaled 422 mL   Minute Volume 9.2 Liters   I:E Ratio 1:2.4   Cough/Sputum   Sputum How Obtained Endotracheal   Cough Non-productive   Sputum Amount None   Spontaneous Breathing Trial (SBT) RT Doc   Pulse 63   Breath Sounds   Right Upper Lobe Diminished   Right Middle Lobe Diminished   Right Lower Lobe Diminished   Left Upper Lobe Diminished   Left Lower Lobe Diminished   Additional Respiratory  Assessments   Resp 19   Alarm Settings   High Pressure Alarm 40 cmH2O   Low Minute Volume Alarm 5 L/min   Apnea (secs) 20 secs   High Respiratory Rate 35 br/min   Low Exhaled Vt  250 mL   Patient Observation   Observations 7.5 ett 24 at lip

## 2020-12-07 NOTE — PROGRESS NOTES
Requirements:  Current        Fluid (ml/day):  1050 - 1176 ml; Method Used for Fluid Requirements:  1 ml/kcal      Nutrition Related Findings:  patient is currently intubated and sedated on 50 mcg propofol x 24 hours; patient is homeless and was living with his mother for 2 weeks PTA; patient has a camper, per patient's mother; patient also has a daughter that is supportive but patient's mother Clement Peterson) was unsure whether patient could stay with his daughter upon d/c; + poor dentition; patient has a hx of meth use and a hx of Hep B + C; + scattered bruising and abrasions; patient has scattered scabs on his face; AST/ALT are elevated; patient is receiving decadronb, pepcid, versed, precedex, and NS at 75 ml/hr      Wounds:  None       Current Nutrition Therapies:    Current Tube Feeding (TF) Orders:  · Feeding Route: Orogastric  · Formula: Semi-Elemental  · Schedule: Continuous  · Additives/Modulars: Protein(one proteinex P2Go once daily)  · Water Flushes: 60 ml every 6 hours or per MD guidance  · Current TF & Flush Orders Provides: TF recommendations only  · Goal TF & Flush Orders Provides: Vital AF 1.2 with a goal rate of 30 ml/hr x 20 hours = 600 ml TV, 720 kcals, 45 g protein, and 487 ml free water + 60 ml water flushes every 6 hours or per MD guidance + 26 g protein and 104 kcals from one proteinex P2Go once daily (71 g protein and 824 kcals total)      Anthropometric Measures:  · Height: 5' (152.4 cm)  · Current Body Weight: 92 lb 12.8 oz (42.1 kg)(obtained on 12/5/20)   · Admission Body Weight: 92 lb 12.8 oz (42.1 kg)(obtained on 12/5/20)    · Usual Body Weight: 98 lb (44.5 kg)(obtained on 11/7/20)     · Ideal Body Weight: 106 lbs; % Ideal Body Weight 87.5 %   · BMI: 18.1  · BMI Categories: Underweight (BMI less than 18.5)       Nutrition Diagnosis:   · Inadequate oral intake related to inadequate protein-energy intake, impaired respiratory function, psychological cause or life stress as evidenced by NPO or

## 2020-12-07 NOTE — PROGRESS NOTES
12/06/20 1945   Vent Information   $Ventilation $Subsequent Day   Skin Assessment Clean, dry, & intact   Vent Type 980   Vent Mode AC/VC   Vt Ordered 350 mL   Rate Set 18 bmp   Peak Flow 55 L/min   FiO2  24 %   SpO2 93 %   SpO2/FiO2 ratio 387.5   Sensitivity 3   PEEP/CPAP 5   Humidification Source HME   Vent Patient Data   Peak Inspiratory Pressure 22 cmH2O   Mean Airway Pressure 8.5 cmH20   Rate Measured 18 br/min   Vt Exhaled 362 mL   Minute Volume 6.5 Liters   I:E Ratio 1:3.8   Plateau Pressure 13 TDW52   Static Compliance 45 mL/cmH2O   Dynamic Compliance 21 mL/cmH2O   Cough/Sputum   Sputum How Obtained Endotracheal   Cough Productive   Sputum Amount Small   Sputum Color Red   Tenacity Thick   Spontaneous Breathing Trial (SBT) RT Doc   Pulse 62   Breath Sounds   Right Upper Lobe Diminished   Right Middle Lobe Diminished   Right Lower Lobe Diminished   Left Upper Lobe Diminished   Left Lower Lobe Diminished   Additional Respiratory  Assessments   Resp 18   Alarm Settings   High Pressure Alarm 40 cmH2O   Low Minute Volume Alarm 5 L/min   Apnea (secs) 20 secs   High Respiratory Rate 35 br/min   Low Exhaled Vt  250 mL   Patient Observation   Observations 7.5 ett 24 at lip

## 2020-12-07 NOTE — PROGRESS NOTES
Shift assessment completed, see flowsheet. Pt sedated and mechanically ventilated. Lung sounds diminished in bases, Heart Sounds s1s2, SNB  on the monitor, Bowel Sounds +x4 , Pulses +2x4 , Mondragon patent to bsd, clear yellow urine without hematuria or sedimentation noted. No signs of pain or discomfort. IV's infusing per md order . Call light within reach, bed in lowest position, side rails x 4, bed rotation system on . Will continue to monitor.

## 2020-12-07 NOTE — CARE COORDINATION
Case Management Assessment  Initial Evaluation      Patient Name: Arvind Brunner  YOB: 1970  Diagnosis: Acute hypoxemic respiratory failure due to COVID-19 [facility]) [U07.1, J96.01]  Date / Time: 12/5/2020  5:32 AM    Admission status/Date: 12/05/2020 Inpatient   Chart Reviewed: Yes. Vent and in restraints. Patient Interviewed: No -pt on a vent  Family Interviewed:  Yes - pt's mother Moira Raza at [phone] as she is listed first in epic under the emergency contacts and  notes that pt's RN Ildefonso Linares) spoke with pt's mother Kavon Daily on 12/6/2020. Hospitalization in the last 30 days:  Yes from 11/7/2020 to 11/8/2020. Pt left AMA. Health Care Decision Maker :     (CM - must 1st enter selection under Navigator - emergency contact- [place] Relationship and pick relationship)   Who do you trust or have selected to make healthcare decisions for you      Met with: Spoke with pt's mother Moira Raza via phone call as pt is on a vent. Interview conducted  (bedside/phone): phone call to pt's mother    Current PCP: Unknown    Financial  Medicare and Medicaid  Precert required for SNF : N          3 night stay required -  N- waived due to covid-19    ADLS  Support Systems/Care Needs:  family and friends  Transportation: self    Meal Preparation: self    Housing  Living Arrangements: Pt was staying with his mother Kavon Daily for the last 2 weeks as she reported pt being homeless. Steps: unknown  Intent for return to present living arrangements: Pt's mother reports she is trying to find him a place to go. Unclear if he can return there.    Identified Issues: Pt is homeless     [address] with [address] : No Agency:(Services)     Passport/Waiver : No  :                      Phone Number:    Passport/Waiver Services: n/a          Durable Medical Equiptment   DME Provider: n/a  Equipment:   Walker___Cane___RTS___ BSC___Shower Chair___Hospital Bed___W/C____Other________  02 at ____Liter(s)---wears(frequency)_______ [facility] ___ CPAP___ BiPap___   N/A__x__      Home O2 Use :  No    If No for home O2---if presently on O2 during hospitalization:  Yes  if yes CM to follow for potential DC O2 need  Informed of need for care provider to bring portable home O2 tank on day of discharge for nursing to connect prior to leaving:   Not Indicated  Verbalized agreement/Understanding:   Not Indicated    Community Service Affiliation  Dialysis:  No    · Agency:  · Location:  · Dialysis Schedule:  · Phone:   · Fax: Other Community Services: n/a    DISCHARGE PLAN: Explained Case Management role/services. Chart review completed. Pt is on a Vent. Called and spoke with pt's mother/emergency contact number 1 Ramon Hathaway who completed the assessment. She reported pt being independent prior to admission and has been staying with her for the last 2 weeks. She reported pt is homeless but wants to try to find an apartment to go to where he can pay based on his income, etc. Explained resources could be provided to him when appropriate and she stated understanding. Yeimi Dunnyoandy stated that pt's daughter Fransisco Carter is a good support but is unsure if he could stay with her as she has not asked her. Yeimi Ching stated that pt has a camper. Yeimi Ching asked if pt would go to rehab on discharge. Explained therapy could be ordered when appropriate to assist with discharge recommendations but pt would have to be agreeable to going to a SNF. She stated understanding and denied current needs from CM. PT/OT eval would be beneficial when appropriate to assist with discharge recommendations. CM will provide resources to pt when appropriate. Please notify CM needs or concerns arise.

## 2020-12-07 NOTE — PLAN OF CARE
Nutrition Problem #1: Inadequate oral intake  Intervention: Food and/or Nutrient Delivery: Continue NPO, Start Tube Feeding  Nutritional Goals: patient will tolerate Vital AF 1.2 at goal rate of 30 ml/hr x 20 hours without GI distress, without s/s of aspiration, and without additional lab/fluid disturbances

## 2020-12-07 NOTE — PROGRESS NOTES
Pulmonary & Critical Care Medicine ICU Progress Note    CC:Acute respiratory failure with hypoxemia/covid 19 pneumonia    Events of Last 24 hours:   Bradycardic and hypothermic    Invasive Lines:   12/6/2020 PICC    MV: 12/5/2020-  Vent Mode: AC/VC Rate Set: 18 bmp/Vt Ordered: 350 mL/ /FiO2 : 24 %  Recent Labs     12/06/20  0820 12/07/20  0453   PHART 7.409 7. 441   TAR5NSM 42.9 37.1   PO2ART 73.3* 88.1       IV:   dexmedetomidine (PRECEDEX) IV infusion 0.8 mcg/kg/hr (12/06/20 1905)    sodium chloride 75 mL/hr at 12/06/20 1040    propofol 50 mcg/kg/min (12/06/20 2113)       EXAM:  /87   Pulse 54   Temp 97.9 °F (36.6 °C) (Bladder)   Resp 18   Ht 5' (1.524 m)   Wt 92 lb 12.8 oz (42.1 kg)   SpO2 97%   BMI 18.12 kg/m²  on 30%        Intake/Output Summary (Last 24 hours) at 12/7/2020 0659  Last data filed at 12/7/2020 0400  Gross per 24 hour   Intake 3169 ml   Output 1675 ml   Net 1494 ml     General: intubated, ill appearing    Eyes: PERRL. No sclera icterus. No conjunctival injection. ENT: No discharge. Pharynx with ETT. Neck: Trachea midline. Normal thyroid. Resp: No accessory muscle use. No crackles. No wheezing. No rhonchi. No dullness on percussion. CV: Regular rate. Regular rhythm. No mumur or rub. No edema. GI: Non-tender. Non-distended. No masses. No organomegaly. Normal bowel sounds. No hernia. Skin: Warm and dry. No nodule on exposed extremities. No rash on exposed extremities. Lymph: No cervical LAD. No supraclavicular LAD. M/S: No cyanosis. No joint deformity. No clubbing. Neuro: Sedated, not following commands.  Patellar reflexes are symmetric  Psych: Unable to obtain because the patient is non-communicative      Medications:   lidocaine 1 % injection  5 mL Intradermal Once    sodium chloride flush  10 mL Intravenous 2 times per day    levoFLOXacin  500 mg Oral Daily    sodium chloride  20 mL Intravenous Once    dexamethasone  6 mg Intravenous Daily    mupirocin   Nasal BID    famotidine  20 mg Oral BID    midazolam  5 mg Intravenous Once    influenza virus vaccine  0.5 mL Intramuscular Prior to discharge    enoxaparin  30 mg Subcutaneous BID     PRN Meds:  acetaminophen **OR** acetaminophen, sodium chloride flush, polyethylene glycol, promethazine **OR** ondansetron, midazolam, fentanNYL    Results:  CBC:   Recent Labs     12/05/20  0609 12/06/20  0820 12/07/20  0415   WBC 5.3 6.9 7.0   HGB 14.0 12.9* 13.3*   HCT 43.1 40.0* 41.3   MCV 85.2 83.8 83.8   PLT 86* 96* 101*     BMP:   Recent Labs     12/05/20  0609 12/06/20  0820 12/07/20  0415    137 140   K 3.7 4.3  4.3 4.3  4.3   CL 98* 104 106   CO2 33* 26 27   PHOS  --  3.2 3.5   BUN 9 16 18   CREATININE <0.5* <0.5* 0.6*     LIVER PROFILE:   Recent Labs     12/05/20  0609 12/06/20  0820 12/07/20  0415   * 81* 38*   * 595* 396*   BILIDIR  --  <0.2 <0.2   BILITOT 0.5 0.4 0.3   ALKPHOS 125 100 86     PT/INR:   Recent Labs     12/06/20 0820   PROTIME 10.1   INR 0.87     APTT: No results for input(s): APTT in the last 72 hours. UA:  Recent Labs     12/05/20  2214   COLORU Yellow   PHUR 8.0   WBCUA 0-2   RBCUA *   MUCUS 1+*   CLARITYU Clear   SPECGRAV 1.015   LEUKOCYTESUR Negative   UROBILINOGEN 1.0   BILIRUBINUR Negative   BLOODU MODERATE*   GLUCOSEU Negative       Cultures:  12/5/2020 SARS-CoV-2 positive  12/5 blood- ngtd  12/5 rapid flu neg    Films:  CXR 12/6/2020 ET tube okay      HCT no acute intracranial abnormalities     ASSESSMENT:  · Acute hypoxemic respiratory failure   · COVID-19 pneumonia  · Acute metabolic encephalopathy  · Elevated troponin-likely demand ischemia   · Elevated transaminases  · Rhrombocytopenia-normal in Nov 2020  · History of methamphetamine and heroin abuse with history of drug overdose and opiate withdrawa,  most recently November 2020. · Hepatitis C     PLAN:  - COVID-19 isolation, droplet plus  Mechanical ventilation as per my orders.   The ventilator was adjusted by me at the bedside for unstable, life threatening respiratory failure. IV Propofol for sedation, target RASS -2, with daily spontaneous awakening trial.  Fentanyl PRN pain, gtt as needed  Head of bed 30 degrees or higher at all times  · Daily spontaneous breathing trial once PEEP less than 8, FiO2 less than 55%. · Levaquin D#3  · D#3 Dexamethasone 6 mg  · Not a candidate for remdesivir given very elevated LFTs   · 1 U CCP 12/5/20   · Blood sugar control, with goal 150-180  · TF and Podis boots if not extubated  · Prophylaxis: Bactroban, Lovenox, Pepcid    Total critical care time caring for this patient with life threatening, unstable organ failure, including direct patient contact, management of life support systems, review of data including imaging and labs, discussions with other team members and physicians is 31 minutes so far today, excluding procedures.

## 2020-12-07 NOTE — PROGRESS NOTES
IM Progress Note    Admit Date:  12/5/2020  2    Interval history:  Admitted for confusion  covid pna and resp failure  Unable to control agitation with increasing restlessness and   Worsened hypoxia needing intubation       Subjective:  Mr. Neves Rm seen sedated on vent, no distress  picc placed. Objective:   /87   Pulse 61   Temp 97.9 °F (36.6 °C) (Bladder)   Resp 19   Ht 5' (1.524 m)   Wt 92 lb 12.8 oz (42.1 kg)   SpO2 99%   BMI 18.12 kg/m²       Intake/Output Summary (Last 24 hours) at 12/7/2020 1016  Last data filed at 12/7/2020 0400  Gross per 24 hour   Intake 3169 ml   Output 1675 ml   Net 1494 ml       Physical Exam:        General: thin middle aged male, sedated and on vent  . Appears to be not in any distress  Oral ETT and OG noted   Mucous Membranes:  Pink , anicteric  Neck: No JVD, no carotid bruit, no thyromegaly  Chest: diminished tina with scattered crackles   Cardiovascular:  RRR S1S2 heard, no murmurs or gallops  Abdomen:  Soft, undistended, non tender, no organomegaly, BS present  Extremities: large tattoos to both upper ext  Right UE picc    No edema or cyanosis.  Clubbing noted +  Distal pulses well felt  Neurological :sedated        Medications:   Scheduled Medications:    lidocaine 1 % injection  5 mL Intradermal Once    sodium chloride flush  10 mL Intravenous 2 times per day    levoFLOXacin  500 mg Oral Daily    sodium chloride  20 mL Intravenous Once    dexamethasone  6 mg Intravenous Daily    mupirocin   Nasal BID    famotidine  20 mg Oral BID    midazolam  5 mg Intravenous Once    influenza virus vaccine  0.5 mL Intramuscular Prior to discharge    enoxaparin  30 mg Subcutaneous BID     I   dexmedetomidine (PRECEDEX) IV infusion 1.2 mcg/kg/hr (12/07/20 0811)    sodium chloride 75 mL/hr at 12/06/20 1040    propofol 50 mcg/kg/min (12/07/20 0930)     acetaminophen **OR** acetaminophen, sodium chloride flush, polyethylene glycol, promethazine **OR** ondansetron, midazolam, fentanNYL    Lab Data:  Recent Labs     12/05/20  0609 12/06/20  0820 12/07/20  0415   WBC 5.3 6.9 7.0   HGB 14.0 12.9* 13.3*   HCT 43.1 40.0* 41.3   MCV 85.2 83.8 83.8   PLT 86* 96* 101*     Recent Labs     12/05/20  0609 12/06/20  0820 12/07/20  0415    137 140   K 3.7 4.3  4.3 4.3  4.3   CL 98* 104 106   CO2 33* 26 27   PHOS  --  3.2 3.5   BUN 9 16 18   CREATININE <0.5* <0.5* 0.6*     Recent Labs     12/05/20  1848 12/05/20  2244   TROPONINI <0.01 <0.01       Coagulation:   Lab Results   Component Value Date    INR 0.87 12/06/2020     Cardiac markers:   Lab Results   Component Value Date    CKTOTAL 230 07/09/2020    TROPONINI <0.01 12/05/2020         Lab Results   Component Value Date     (H) 12/07/2020    AST 38 (H) 12/07/2020    ALKPHOS 86 12/07/2020    BILITOT 0.3 12/07/2020       Lab Results   Component Value Date    INR 0.87 12/06/2020    INR 0.99 07/06/2020    PROTIME 10.1 12/06/2020    PROTIME 11.5 07/06/2020       Radiology    XR CHEST PORTABLE   Final Result   Supportive tubing projects in normal position.       No acute cardiopulmonary disease.           CT Head WO Contrast   Final Result   No acute intracranial abnormality.           XR CHEST PORTABLE   Final Result   Hazy opacity at the right costophrenic angle which may reflect atelectasis   versus airspace disease.           cxr    Stable position of supportive tubing.         No acute cardiopulmonary disease. covid positive - 12/5  Blood - Pending  Rapid flu - neg      Urine drug - amphetamine     ASSESSMENT and PLAN      Acute hypoxemic and hypercapnic respiratory failure  Multifocal pneumonia secondary to Covid 19 infection     - failed bipap with continued agitation and was intubated in ER   - Continue mechanical ventilation, pulmonary critical care consulted. - started on decadron D 3, no Remdesivir given liver abn    - sp CCP   - levaquin added per pulm  - cx pending.     Acute toxic encephalopathy- likely drug

## 2020-12-07 NOTE — PROGRESS NOTES
12/07/20 0323   Vent Information   Vent Type 840   Vent Mode AC/VC   Vt Ordered 350 mL   Rate Set 18 bmp   Peak Flow 55 L/min   FiO2  24 %   SpO2 94 %   SpO2/FiO2 ratio 391.67   Sensitivity 3   PEEP/CPAP 5   Humidification Source HME   Vent Patient Data   Peak Inspiratory Pressure 23 cmH2O   Mean Airway Pressure 8.7 cmH20   Rate Measured 18 br/min   Vt Exhaled 362 mL   Minute Volume 6.5 Liters   I:E Ratio 1:3.8   Plateau Pressure 13 [id number]   Cough/Sputum   Cough None   Sputum Amount None   Spontaneous Breathing Trial (SBT) RT Doc   Pulse 58   Breath Sounds   Right Upper Lobe Diminished   Right Middle Lobe Diminished   Right Lower Lobe Diminished   Left Upper Lobe Diminished   Left Lower Lobe Diminished   Additional Respiratory  Assessments   Resp 18   Position Semi-Zaldivar's   Alarm Settings   High Pressure Alarm 40 cmH2O   Low Minute Volume Alarm 5 L/min   Apnea (secs) 20 secs   High Respiratory Rate 35 br/min   Low Exhaled Vt  250 mL   ETT (adult)   Placement Date/Time: 12/05/20 1345   Preoxygenation: Yes  Technique: Video laryngoscopy  Type: Cuffed  Tube Size: 7.5 mm  Laryngoscope: GlideScope  Location: Oral  Insertion attempts: 1  Placement Verified By[de-identified] Auscultation;Colorimetric EtCO2 device;C. ..    Secured at 23 cm   Measured From [address] [place] [address],[address] By Commercial tube wong   Site Condition Dry

## 2020-12-08 ENCOUNTER — APPOINTMENT (OUTPATIENT)
Dept: GENERAL RADIOLOGY | Age: 50
DRG: 208 | End: 2020-12-08
Payer: MEDICARE

## 2020-12-08 LAB
A/G RATIO: 1.1 (ref 1.1–2.2)
ALBUMIN SERPL-MCNC: 3.1 G/DL (ref 3.4–5)
ALP BLD-CCNC: 80 U/L (ref 40–129)
ALT SERPL-CCNC: 260 U/L (ref 10–40)
ANION GAP SERPL CALCULATED.3IONS-SCNC: 5 MMOL/L (ref 3–16)
AST SERPL-CCNC: 21 U/L (ref 15–37)
BASE EXCESS ARTERIAL: 1 MMOL/L (ref -3–3)
BASOPHILS ABSOLUTE: 0.1 K/UL (ref 0–0.2)
BASOPHILS RELATIVE PERCENT: 0.8 %
BILIRUB SERPL-MCNC: 0.3 MG/DL (ref 0–1)
BILIRUBIN DIRECT: <0.2 MG/DL (ref 0–0.3)
BILIRUBIN, INDIRECT: NORMAL MG/DL (ref 0–1)
BUN BLDV-MCNC: 21 MG/DL (ref 7–20)
CALCIUM SERPL-MCNC: 8.3 MG/DL (ref 8.3–10.6)
CARBOXYHEMOGLOBIN ARTERIAL: 0.6 % (ref 0–1.5)
CHLORIDE BLD-SCNC: 105 MMOL/L (ref 99–110)
CO2: 27 MMOL/L (ref 21–32)
CREAT SERPL-MCNC: <0.5 MG/DL (ref 0.9–1.3)
EOSINOPHILS ABSOLUTE: 0 K/UL (ref 0–0.6)
EOSINOPHILS RELATIVE PERCENT: 0.1 %
GFR AFRICAN AMERICAN: >60
GFR NON-AFRICAN AMERICAN: >60
GLOBULIN: 2.7 G/DL
GLUCOSE BLD-MCNC: 104 MG/DL (ref 70–99)
GLUCOSE BLD-MCNC: 113 MG/DL (ref 70–99)
GLUCOSE BLD-MCNC: 121 MG/DL (ref 70–99)
GLUCOSE BLD-MCNC: 133 MG/DL (ref 70–99)
GLUCOSE BLD-MCNC: 91 MG/DL (ref 70–99)
HCO3 ARTERIAL: 24.6 MMOL/L (ref 21–29)
HCT VFR BLD CALC: 42.2 % (ref 40.5–52.5)
HEMOGLOBIN, ART, EXTENDED: 14.7 G/DL (ref 13.5–17.5)
HEMOGLOBIN: 13.6 G/DL (ref 13.5–17.5)
LYMPHOCYTES ABSOLUTE: 1.5 K/UL (ref 1–5.1)
LYMPHOCYTES RELATIVE PERCENT: 21.3 %
MAGNESIUM: 2 MG/DL (ref 1.8–2.4)
MCH RBC QN AUTO: 27.5 PG (ref 26–34)
MCHC RBC AUTO-ENTMCNC: 32.3 G/DL (ref 31–36)
MCV RBC AUTO: 85.2 FL (ref 80–100)
METHEMOGLOBIN ARTERIAL: 0.3 %
MONOCYTES ABSOLUTE: 0.5 K/UL (ref 0–1.3)
MONOCYTES RELATIVE PERCENT: 7.8 %
NEUTROPHILS ABSOLUTE: 4.9 K/UL (ref 1.7–7.7)
NEUTROPHILS RELATIVE PERCENT: 70 %
O2 CONTENT ARTERIAL: 20 ML/DL
O2 SAT, ARTERIAL: 96.6 %
O2 THERAPY: ABNORMAL
PCO2 ARTERIAL: 36.1 MMHG (ref 35–45)
PDW BLD-RTO: 15.9 % (ref 12.4–15.4)
PERFORMED ON: ABNORMAL
PERFORMED ON: NORMAL
PH ARTERIAL: 7.45 (ref 7.35–7.45)
PHOSPHORUS: 2.7 MG/DL (ref 2.5–4.9)
PLATELET # BLD: 116 K/UL (ref 135–450)
PMV BLD AUTO: 8.2 FL (ref 5–10.5)
PO2 ARTERIAL: 82.7 MMHG (ref 75–108)
POTASSIUM REFLEX MAGNESIUM: 3.7 MMOL/L (ref 3.5–5.1)
POTASSIUM SERPL-SCNC: 3.7 MMOL/L (ref 3.5–5.1)
RBC # BLD: 4.96 M/UL (ref 4.2–5.9)
SODIUM BLD-SCNC: 137 MMOL/L (ref 136–145)
TCO2 ARTERIAL: 25.7 MMOL/L
TOTAL PROTEIN: 5.8 G/DL (ref 6.4–8.2)
WBC # BLD: 7 K/UL (ref 4–11)

## 2020-12-08 PROCEDURE — 99291 CRITICAL CARE FIRST HOUR: CPT | Performed by: INTERNAL MEDICINE

## 2020-12-08 PROCEDURE — 6370000000 HC RX 637 (ALT 250 FOR IP): Performed by: INTERNAL MEDICINE

## 2020-12-08 PROCEDURE — 6360000002 HC RX W HCPCS: Performed by: INTERNAL MEDICINE

## 2020-12-08 PROCEDURE — 2700000000 HC OXYGEN THERAPY PER DAY

## 2020-12-08 PROCEDURE — 82803 BLOOD GASES ANY COMBINATION: CPT

## 2020-12-08 PROCEDURE — 2500000003 HC RX 250 WO HCPCS: Performed by: INTERNAL MEDICINE

## 2020-12-08 PROCEDURE — 80053 COMPREHEN METABOLIC PANEL: CPT

## 2020-12-08 PROCEDURE — 83735 ASSAY OF MAGNESIUM: CPT

## 2020-12-08 PROCEDURE — 99232 SBSQ HOSP IP/OBS MODERATE 35: CPT | Performed by: INTERNAL MEDICINE

## 2020-12-08 PROCEDURE — 84100 ASSAY OF PHOSPHORUS: CPT

## 2020-12-08 PROCEDURE — 90686 IIV4 VACC NO PRSV 0.5 ML IM: CPT | Performed by: INTERNAL MEDICINE

## 2020-12-08 PROCEDURE — G0008 ADMIN INFLUENZA VIRUS VAC: HCPCS

## 2020-12-08 PROCEDURE — 2000000000 HC ICU R&B

## 2020-12-08 PROCEDURE — 71045 X-RAY EXAM CHEST 1 VIEW: CPT

## 2020-12-08 PROCEDURE — 94640 AIRWAY INHALATION TREATMENT: CPT

## 2020-12-08 PROCEDURE — 85025 COMPLETE CBC W/AUTO DIFF WBC: CPT

## 2020-12-08 PROCEDURE — 2580000003 HC RX 258: Performed by: INTERNAL MEDICINE

## 2020-12-08 RX ORDER — SODIUM CHLORIDE FOR INHALATION 0.9 %
3 VIAL, NEBULIZER (ML) INHALATION EVERY 4 HOURS PRN
Status: DISCONTINUED | OUTPATIENT
Start: 2020-12-08 | End: 2020-12-11 | Stop reason: HOSPADM

## 2020-12-08 RX ORDER — LORAZEPAM 2 MG/ML
1 INJECTION INTRAMUSCULAR
Status: DISCONTINUED | OUTPATIENT
Start: 2020-12-08 | End: 2020-12-11 | Stop reason: HOSPADM

## 2020-12-08 RX ORDER — POTASSIUM CHLORIDE 29.8 MG/ML
20 INJECTION INTRAVENOUS
Status: COMPLETED | OUTPATIENT
Start: 2020-12-08 | End: 2020-12-08

## 2020-12-08 RX ORDER — METHYLPREDNISOLONE SODIUM SUCCINATE 125 MG/2ML
125 INJECTION, POWDER, LYOPHILIZED, FOR SOLUTION INTRAMUSCULAR; INTRAVENOUS ONCE
Status: COMPLETED | OUTPATIENT
Start: 2020-12-08 | End: 2020-12-08

## 2020-12-08 RX ORDER — DIAZEPAM 10 MG/1
10 TABLET ORAL EVERY 8 HOURS
Status: COMPLETED | OUTPATIENT
Start: 2020-12-08 | End: 2020-12-09

## 2020-12-08 RX ADMIN — METHYLPREDNISOLONE SODIUM SUCCINATE 125 MG: 125 INJECTION, POWDER, FOR SOLUTION INTRAMUSCULAR; INTRAVENOUS at 10:45

## 2020-12-08 RX ADMIN — LORAZEPAM 1 MG: 2 INJECTION INTRAMUSCULAR; INTRAVENOUS at 10:40

## 2020-12-08 RX ADMIN — DIAZEPAM 10 MG: 10 TABLET ORAL at 16:57

## 2020-12-08 RX ADMIN — RACEPINEPHRINE HYDROCHLORIDE 11.25 MG: 11.25 SOLUTION RESPIRATORY (INHALATION) at 15:04

## 2020-12-08 RX ADMIN — SODIUM CHLORIDE 1 MCG/KG/HR: 9 INJECTION, SOLUTION INTRAVENOUS at 02:02

## 2020-12-08 RX ADMIN — DEXAMETHASONE SODIUM PHOSPHATE 6 MG: 10 INJECTION, SOLUTION INTRAMUSCULAR; INTRAVENOUS at 08:02

## 2020-12-08 RX ADMIN — SODIUM CHLORIDE 1.2 MCG/KG/HR: 9 INJECTION, SOLUTION INTRAVENOUS at 18:03

## 2020-12-08 RX ADMIN — LORAZEPAM 1 MG: 2 INJECTION INTRAMUSCULAR; INTRAVENOUS at 18:03

## 2020-12-08 RX ADMIN — SODIUM CHLORIDE 1.2 MCG/KG/HR: 9 INJECTION, SOLUTION INTRAVENOUS at 12:03

## 2020-12-08 RX ADMIN — INFLUENZA A VIRUS A/VICTORIA/2454/2019 IVR-207 (H1N1) ANTIGEN (PROPIOLACTONE INACTIVATED), INFLUENZA A VIRUS A/HONG KONG/2671/2019 IVR-208 (H3N2) ANTIGEN (PROPIOLACTONE INACTIVATED), INFLUENZA B VIRUS B/VICTORIA/705/2018 BVR-11 ANTIGEN (PROPIOLACTONE INACTIVATED), INFLUENZA B VIRUS B/PHUKET/3073/2013 BVR-1B ANTIGEN (PROPIOLACTONE INACTIVATED) 0.5 ML: 15; 15; 15; 15 INJECTION, SUSPENSION INTRAMUSCULAR at 08:02

## 2020-12-08 RX ADMIN — DIAZEPAM 10 MG: 10 TABLET ORAL at 08:04

## 2020-12-08 RX ADMIN — LEVOFLOXACIN 500 MG: 500 TABLET, FILM COATED ORAL at 08:04

## 2020-12-08 RX ADMIN — ENOXAPARIN SODIUM 30 MG: 100 INJECTION SUBCUTANEOUS at 08:03

## 2020-12-08 RX ADMIN — MIDAZOLAM HYDROCHLORIDE 2 MG: 1 INJECTION, SOLUTION INTRAMUSCULAR; INTRAVENOUS at 03:27

## 2020-12-08 RX ADMIN — Medication 20 MEQ: at 10:38

## 2020-12-08 RX ADMIN — FAMOTIDINE 20 MG: 20 TABLET, FILM COATED ORAL at 08:04

## 2020-12-08 RX ADMIN — Medication 20 MEQ: at 11:30

## 2020-12-08 RX ADMIN — MUPIROCIN: 20 OINTMENT TOPICAL at 08:04

## 2020-12-08 RX ADMIN — PROPOFOL 50 MCG/KG/MIN: 10 INJECTION, EMULSION INTRAVENOUS at 02:03

## 2020-12-08 RX ADMIN — RACEPINEPHRINE HYDROCHLORIDE 11.25 MG: 11.25 SOLUTION RESPIRATORY (INHALATION) at 10:08

## 2020-12-08 ASSESSMENT — PULMONARY FUNCTION TESTS
PIF_VALUE: 21
PIF_VALUE: 21

## 2020-12-08 NOTE — PROGRESS NOTES
12/07/20 1953   Vent Information   $Ventilation $Subsequent Day   Vent Type 980   Vent Mode AC/VC   Vt Ordered 350 mL   Rate Set 18 bmp   Peak Flow 55 L/min   FiO2  24 %   SpO2 99 %   SpO2/FiO2 ratio 412.5   Sensitivity 3   PEEP/CPAP 5   Humidification Source HME   Circuit Condensation Drained   Vent Patient Data   Peak Inspiratory Pressure 22 cmH2O   Mean Airway Pressure 9.4 cmH20   Rate Measured 21 br/min   Vt Exhaled 359 mL   Minute Volume 7.4 Liters   I:E Ratio 1:2.7   Plateau Pressure 15 [id number]   Static Compliance 36 mL/cmH2O   Dynamic Compliance 21 mL/cmH2O   Cough/Sputum   Sputum How Obtained Endotracheal;Suctioned   Cough Productive   Sputum Amount Small   Sputum Color Red;Creamy   Tenacity Thick   Spontaneous Breathing Trial (SBT) RT Doc   Pulse 50   Breath Sounds   Right Upper Lobe Diminished   Right Middle Lobe Diminished   Right Lower Lobe Diminished   Left Upper Lobe Diminished   Left Lower Lobe Diminished   Additional Respiratory  Assessments   Resp 18   Position Semi-Zaldivar's   Alarm Settings   High Pressure Alarm 40 cmH2O   Low Minute Volume Alarm 5 L/min   Apnea (secs) 20 secs   High Respiratory Rate 35 br/min   Low Exhaled Vt  250 mL   ETT (adult)   Placement Date/Time: 12/05/20 1345   Preoxygenation: Yes  Technique: Video laryngoscopy  Type: Cuffed  Tube Size: 7.5 mm  Laryngoscope: GlideScope  Location: Oral  Insertion attempts: 1  Placement Verified By[de-identified] Auscultation;Colorimetric EtCO2 device;C. ..    Secured at 23 cm   Measured From Lips   ET Placement Right   Secured By Commercial tube wong   Site Condition Dry

## 2020-12-08 NOTE — PROGRESS NOTES
Patient awake, kicking his right leg over the left rail of the bed. RASS +2. He is non-redirectable. Patient at high risk of pulling lines, tubing or other devices. Current infusions are propofol and precedex. Medicated with PRN versed 2 mg IVP. Patient is now resting more quietly.    Electronically signed by Annelise Gomez RN on 12/8/2020 at 3:33 AM

## 2020-12-08 NOTE — PROGRESS NOTES
26 27 27   PHOS 3.2 3.5 2.7   BUN 16 18 21*   CREATININE <0.5* 0.6* <0.5*     Recent Labs     12/05/20  1848 12/05/20  2244   TROPONINI <0.01 <0.01       Coagulation:   Lab Results   Component Value Date    INR 0.87 12/06/2020     Cardiac markers:   Lab Results   Component Value Date    CKTOTAL 230 07/09/2020    TROPONINI <0.01 12/05/2020         Lab Results   Component Value Date     (H) 12/08/2020    AST 21 12/08/2020    ALKPHOS 80 12/08/2020    BILITOT 0.3 12/08/2020       Lab Results   Component Value Date    INR 0.87 12/06/2020    INR 0.99 07/06/2020    PROTIME 10.1 12/06/2020    PROTIME 11.5 07/06/2020       Radiology    XR CHEST PORTABLE   Final Result   Supportive tubing projects in normal position.       No acute cardiopulmonary disease.           CT Head WO Contrast   Final Result   No acute intracranial abnormality.           XR CHEST PORTABLE   Final Result   Hazy opacity at the right costophrenic angle which may reflect atelectasis   versus airspace disease.           cxr    Stable position of supportive tubing.         No acute cardiopulmonary disease. covid positive - 12/5  Blood - Pending  Rapid flu - neg      Urine drug - amphetamine     ASSESSMENT and PLAN      Acute hypoxemic and hypercapnic respiratory failure  Multifocal pneumonia secondary to Covid 19 infection     - failed bipap with continued agitation and was intubated in ER   - Continue mechanical ventilation, pulmonary critical care consulted. - started on decadron D 3, no Remdesivir given liver abn    - sp CCP   - levaquin added per pulm  12/8  Extubated this morning. Likely to get re intubated today given significant tachycardia and tachypnea.     Acute toxic encephalopathy- likely drug use   - presented with confusion and agitation , tried precedex with no help  - was on bipap and eventually intubated  - urine tox positive for amphetamine, hx of heroin abuse noted    Elevated troponin- likely demand ischemia   - repeat

## 2020-12-08 NOTE — PROGRESS NOTES
Shift assessment done; see flow sheet. Pt remains sedated with Precedex @ 1.2 and Propofol @ 50mcg/kg/min and vented. Fluids running at 75mL/hr. Lungs are diminished; almaraz in place; OG at the 50 in place with TF currently running @ 20 and flushes 60mL/Q6. TF to be increased to goal of 30 at this time. Pt appears to be comfortable at this time with no signs of pain. Writer will continue to monitor. BP (!) 150/93   Pulse 54   Temp 97.9 °F (36.6 °C) (Bladder)   Resp 16   Ht 5' (1.524 m)   Wt 92 lb 12.8 oz (42.1 kg)   SpO2 99%   BMI 18.12 kg/m²      2245: Pt appears restless despite precedex and fentanyl; peeking the vent; /95 however HR willow @ 48; 2mg Versed given. Will continue to monitor. 0000: Reassessment done; see flow sheet. 0400: Reassessment done; see flow sheet. Pt became agitated previous to assessment; was given versed by another RN. Pt is calm at this time.

## 2020-12-08 NOTE — PROGRESS NOTES
12/07/20 2348   Vent Information   Vent Type 980   Vent Mode AC/VC   Vt Ordered 350 mL   Rate Set 18 bmp   Peak Flow 55 L/min   FiO2  24 %   SpO2 99 %   SpO2/FiO2 ratio 412.5   Sensitivity 3   PEEP/CPAP 5   Humidification Source HME   Vent Patient Data   Peak Inspiratory Pressure 23 cmH2O   Mean Airway Pressure 8.7 cmH20   Rate Measured 18 br/min   Vt Exhaled 363 mL   Minute Volume 6.5 Liters   I:E Ratio 1:3.8   Spontaneous Breathing Trial (SBT) RT Doc   Pulse (!) 47   Breath Sounds   Right Upper Lobe Diminished   Right Middle Lobe Diminished   Right Lower Lobe Diminished   Left Upper Lobe Diminished   Left Lower Lobe Diminished   Additional Respiratory  Assessments   Resp 18   Position Semi-Zaldivar's   Alarm Settings   High Pressure Alarm 40 cmH2O   Low Minute Volume Alarm 5 L/min   Apnea (secs) 20 secs   High Respiratory Rate 35 br/min   Low Exhaled Vt  250 mL   ETT (adult)   Placement Date/Time: 12/05/20 1345   Preoxygenation: Yes  Technique: Video laryngoscopy  Type: Cuffed  Tube Size: 7.5 mm  Laryngoscope: GlideScope  Location: Oral  Insertion attempts: 1  Placement Verified By[de-identified] Auscultation;Colorimetric EtCO2 device;C. ..    Secured at 23 cm   Measured From Lips   ET Placement Right   Secured By Commercial tube wong   Site Condition Dry

## 2020-12-08 NOTE — PROGRESS NOTES
08:30 Pt intubated,  BSWR on for safety, assessment as charted  09:30 Pt extubated by RT, placed on oxygen by RT  09:45 Critical care rounds completed with Dr. Cordelia Rossi   15:00 Pt remains extubated on NC, pt remains awake a&o to self, year reassessment as charted   19:00 Handoff report given to night nurseAna pt stable @ handoff on NC , bed alarm on at all times  for safety

## 2020-12-08 NOTE — PROGRESS NOTES
Pulmonary & Critical Care Medicine ICU Progress Note    CC:Acute respiratory failure with hypoxemia/covid 19 pneumonia    Events of Last 24 hours:   Bradycardic with sedation      Invasive Lines:   12/6/2020 PICC    MV: 12/5/2020-  Vent Mode: AC/VC Rate Set: 18 bmp/Vt Ordered: 350 mL/ /FiO2 : 24 %  Recent Labs     12/07/20  0453 12/08/20  0541   PHART 7.441 7.451*   YNT5DCG 37.1 36.1   PO2ART 88.1 82.7       IV:   dexmedetomidine (PRECEDEX) IV infusion 1.2 mcg/kg/hr (12/08/20 0444)    sodium chloride 75 mL/hr at 12/07/20 1640    propofol 50 mcg/kg/min (12/08/20 0203)       EXAM:  /83   Pulse (!) 48   Temp 97.5 °F (36.4 °C) (Bladder)   Resp 18   Ht 5' (1.524 m)   Wt 91 lb 7.9 oz (41.5 kg)   SpO2 98%   BMI 17.87 kg/m²  on 24%        Intake/Output Summary (Last 24 hours) at 12/8/2020 0718  Last data filed at 12/8/2020 0444  Gross per 24 hour   Intake 2019 ml   Output 1775 ml   Net 244 ml     General: intubated, ill appearing    Eyes: PERRL. No sclera icterus. No conjunctival injection. ENT: No discharge. Pharynx with ETT. Neck: Trachea midline. Normal thyroid. Resp: No accessory muscle use. No crackles. No wheezing. No rhonchi. No dullness on percussion. CV: Regular rate. Regular rhythm. No mumur or rub. No edema. GI: Non-tender. Non-distended. No masses. No organomegaly. Normal bowel sounds. No hernia. Skin: Warm and dry. No nodule on exposed extremities. No rash on exposed extremities. Lymph: No cervical LAD. No supraclavicular LAD. M/S: No cyanosis. No joint deformity. No clubbing. Neuro: awake and mildly agitated.  Patellar reflexes are symmetric  Psych: Unable to obtain because the patient is non-communicative      Medications:   lidocaine 1 % injection  5 mL Intradermal Once    sodium chloride flush  10 mL Intravenous 2 times per day    levoFLOXacin  500 mg Oral Daily    sodium chloride  20 mL Intravenous Once    dexamethasone  6 mg Intravenous Daily    mupirocin   Nasal BID  famotidine  20 mg Oral BID    midazolam  5 mg Intravenous Once    influenza virus vaccine  0.5 mL Intramuscular Prior to discharge    enoxaparin  30 mg Subcutaneous BID     PRN Meds:  acetaminophen **OR** acetaminophen, sodium chloride flush, polyethylene glycol, promethazine **OR** ondansetron, midazolam, fentanNYL    Results:  CBC:   Recent Labs     12/06/20 0820 12/07/20 0415 12/08/20 0430   WBC 6.9 7.0 7.0   HGB 12.9* 13.3* 13.6   HCT 40.0* 41.3 42.2   MCV 83.8 83.8 85.2   PLT 96* 101* 116*     BMP:   Recent Labs     12/06/20 0820 12/07/20 0415 12/08/20 0430    140 137   K 4.3  4.3 4.3  4.3 3.7    106 105   CO2 26 27 27   PHOS 3.2 3.5 2.7   BUN 16 18 21*   CREATININE <0.5* 0.6* <0.5*     LIVER PROFILE:   Recent Labs     12/06/20 0820 12/07/20 0415 12/08/20  0430   AST 81* 38* 21   * 396* 260*   BILIDIR <0.2 <0.2 <0.2   BILITOT 0.4 0.3 0.3   ALKPHOS 100 86 80     PT/INR:   Recent Labs     12/06/20 0820   PROTIME 10.1   INR 0.87     APTT: No results for input(s): APTT in the last 72 hours. UA:  Recent Labs     12/05/20  2214   COLORU Yellow   PHUR 8.0   WBCUA 0-2   RBCUA *   MUCUS 1+*   CLARITYU Clear   SPECGRAV 1.015   LEUKOCYTESUR Negative   UROBILINOGEN 1.0   BILIRUBINUR Negative   BLOODU MODERATE*   GLUCOSEU Negative       Cultures:  12/5/2020 SARS-CoV-2 positive  12/5 blood- ngtd  12/5 rapid flu neg    Films:  CXR 12/6/2020 ET tube okay      HCT no acute intracranial abnormalities     ASSESSMENT:  · Acute hypoxemic respiratory failure   · COVID-19 pneumonia  · Possible stridor  · Acute metabolic encephalopathy  · Elevated troponin-likely demand ischemia   · Elevated transaminases  · Rhrombocytopenia-normal in Nov 2020  · History of methamphetamine and heroin abuse with history of drug overdose and opiate withdrawa,  most recently November 2020. · Hepatitis C     PLAN:  - COVID-19 isolation, droplet plus  Mechanical ventilation as per my orders.   The ventilator was adjusted by me at the bedside for unstable, life threatening respiratory failure. Will plan a trial of extubation this am.  IV Propofol & precedex for sedation, target RASS -2, with daily spontaneous awakening trial.  Fentanyl PRN pain;versed PRN agitation; added PO valium today   Head of bed 30 degrees or higher at all times  · Daily spontaneous breathing trial once PEEP less than 8, FiO2 less than 55%. · Levaquin D#4  · D#4 Dexamethasone 6 mg  · Was not a candidate for remdesivir given very elevated LFTs   · 1 U CCP 12/5/20   · Blood sugar control, with goal 150-180  · Prophylaxis: Bactroban, Lovenox, Pepcid    ADDENDUM: after extubation the patient had mild increase work of breathing and I noted some upper airway wheezing, perhaps early stridor. I gave racemic epinephrine, solumedrol and precedex and patient improved. Will continue to monitor. Total critical care time caring for this patient with life threatening, unstable organ failure, including direct patient contact, management of life support systems, review of data including imaging and labs, discussions with other team members and physicians is 34 minutes so far today, excluding procedures.

## 2020-12-08 NOTE — PROGRESS NOTES
12/08/20 0332   Vent Information   Vent Type 980   Vent Mode AC/VC   Vt Ordered 350 mL   Rate Set 18 bmp   Peak Flow 55 L/min   FiO2  24 %   SpO2 97 %   SpO2/FiO2 ratio 404.17   Sensitivity 3   PEEP/CPAP 5   Humidification Source HME   Vent Patient Data   Peak Inspiratory Pressure 21 cmH2O   Mean Airway Pressure 9.6 cmH20   Rate Measured 23 br/min   Vt Exhaled 355 mL   Minute Volume 6.8 Liters   I:E Ratio 1:3.5   Spontaneous Breathing Trial (SBT) RT Doc   Pulse 63   Breath Sounds   Right Upper Lobe Diminished   Right Middle Lobe Diminished   Right Lower Lobe Diminished   Left Upper Lobe Diminished   Left Lower Lobe Diminished   Additional Respiratory  Assessments   Resp 25   Position Semi-Zaldivar's   Alarm Settings   High Pressure Alarm 40 cmH2O   Low Minute Volume Alarm 5 L/min   Apnea (secs) 20 secs   High Respiratory Rate 35 br/min   Low Exhaled Vt  250 mL   ETT (adult)   Placement Date/Time: 12/05/20 1345   Preoxygenation: Yes  Technique: Video laryngoscopy  Type: Cuffed  Tube Size: 7.5 mm  Laryngoscope: GlideScope  Location: Oral  Insertion attempts: 1  Placement Verified By[de-identified] Auscultation;Colorimetric EtCO2 device;C. ..    Secured at 23 cm   Measured From Lips   ET Placement Right   Secured By Commercial tube wong   Site Condition Dry

## 2020-12-09 ENCOUNTER — APPOINTMENT (OUTPATIENT)
Dept: GENERAL RADIOLOGY | Age: 50
DRG: 208 | End: 2020-12-09
Payer: MEDICARE

## 2020-12-09 LAB
A/G RATIO: 1.3 (ref 1.1–2.2)
ALBUMIN SERPL-MCNC: 3.5 G/DL (ref 3.4–5)
ALP BLD-CCNC: 76 U/L (ref 40–129)
ALT SERPL-CCNC: 229 U/L (ref 10–40)
ANION GAP SERPL CALCULATED.3IONS-SCNC: 6 MMOL/L (ref 3–16)
ANISOCYTOSIS: ABNORMAL
AST SERPL-CCNC: 26 U/L (ref 15–37)
ATYPICAL LYMPHOCYTE RELATIVE PERCENT: 8 % (ref 0–6)
BASOPHILS ABSOLUTE: 0 K/UL (ref 0–0.2)
BASOPHILS RELATIVE PERCENT: 0 %
BILIRUB SERPL-MCNC: 0.3 MG/DL (ref 0–1)
BILIRUBIN DIRECT: <0.2 MG/DL (ref 0–0.3)
BILIRUBIN, INDIRECT: NORMAL MG/DL (ref 0–1)
BLOOD CULTURE, ROUTINE: NORMAL
BUN BLDV-MCNC: 15 MG/DL (ref 7–20)
CALCIUM SERPL-MCNC: 8.8 MG/DL (ref 8.3–10.6)
CHLORIDE BLD-SCNC: 109 MMOL/L (ref 99–110)
CO2: 29 MMOL/L (ref 21–32)
CREAT SERPL-MCNC: <0.5 MG/DL (ref 0.9–1.3)
CULTURE, BLOOD 2: NORMAL
EOSINOPHILS ABSOLUTE: 0 K/UL (ref 0–0.6)
EOSINOPHILS RELATIVE PERCENT: 0 %
GFR AFRICAN AMERICAN: >60
GFR NON-AFRICAN AMERICAN: >60
GLOBULIN: 2.8 G/DL
GLUCOSE BLD-MCNC: 105 MG/DL (ref 70–99)
GLUCOSE BLD-MCNC: 108 MG/DL (ref 70–99)
GLUCOSE BLD-MCNC: 173 MG/DL (ref 70–99)
GLUCOSE BLD-MCNC: 201 MG/DL (ref 70–99)
HCT VFR BLD CALC: 38.9 % (ref 40.5–52.5)
HEMOGLOBIN: 12.7 G/DL (ref 13.5–17.5)
HYPOCHROMIA: ABNORMAL
LYMPHOCYTES ABSOLUTE: 1.6 K/UL (ref 1–5.1)
LYMPHOCYTES RELATIVE PERCENT: 15 %
MAGNESIUM: 2.1 MG/DL (ref 1.8–2.4)
MCH RBC QN AUTO: 27.8 PG (ref 26–34)
MCHC RBC AUTO-ENTMCNC: 32.7 G/DL (ref 31–36)
MCV RBC AUTO: 84.8 FL (ref 80–100)
MONOCYTES ABSOLUTE: 0.3 K/UL (ref 0–1.3)
MONOCYTES RELATIVE PERCENT: 5 %
NEUTROPHILS ABSOLUTE: 5 K/UL (ref 1.7–7.7)
NEUTROPHILS RELATIVE PERCENT: 72 %
PDW BLD-RTO: 16.1 % (ref 12.4–15.4)
PERFORMED ON: ABNORMAL
PHOSPHORUS: 2.8 MG/DL (ref 2.5–4.9)
PLATELET # BLD: 128 K/UL (ref 135–450)
PLATELET SLIDE REVIEW: ABNORMAL
PMV BLD AUTO: 7.3 FL (ref 5–10.5)
POTASSIUM REFLEX MAGNESIUM: 3.9 MMOL/L (ref 3.5–5.1)
POTASSIUM SERPL-SCNC: 3.9 MMOL/L (ref 3.5–5.1)
RBC # BLD: 4.59 M/UL (ref 4.2–5.9)
SLIDE REVIEW: ABNORMAL
SODIUM BLD-SCNC: 144 MMOL/L (ref 136–145)
TOTAL PROTEIN: 6.3 G/DL (ref 6.4–8.2)
WBC # BLD: 6.9 K/UL (ref 4–11)

## 2020-12-09 PROCEDURE — 99232 SBSQ HOSP IP/OBS MODERATE 35: CPT | Performed by: INTERNAL MEDICINE

## 2020-12-09 PROCEDURE — 2580000003 HC RX 258: Performed by: INTERNAL MEDICINE

## 2020-12-09 PROCEDURE — 2000000000 HC ICU R&B

## 2020-12-09 PROCEDURE — 6370000000 HC RX 637 (ALT 250 FOR IP): Performed by: INTERNAL MEDICINE

## 2020-12-09 PROCEDURE — 84100 ASSAY OF PHOSPHORUS: CPT

## 2020-12-09 PROCEDURE — 2700000000 HC OXYGEN THERAPY PER DAY

## 2020-12-09 PROCEDURE — 6360000002 HC RX W HCPCS: Performed by: INTERNAL MEDICINE

## 2020-12-09 PROCEDURE — 2580000003 HC RX 258: Performed by: PHYSICIAN ASSISTANT

## 2020-12-09 PROCEDURE — 92526 ORAL FUNCTION THERAPY: CPT

## 2020-12-09 PROCEDURE — 99233 SBSQ HOSP IP/OBS HIGH 50: CPT | Performed by: INTERNAL MEDICINE

## 2020-12-09 PROCEDURE — 94640 AIRWAY INHALATION TREATMENT: CPT

## 2020-12-09 PROCEDURE — 2500000003 HC RX 250 WO HCPCS: Performed by: INTERNAL MEDICINE

## 2020-12-09 PROCEDURE — 94761 N-INVAS EAR/PLS OXIMETRY MLT: CPT

## 2020-12-09 PROCEDURE — 80053 COMPREHEN METABOLIC PANEL: CPT

## 2020-12-09 PROCEDURE — 92610 EVALUATE SWALLOWING FUNCTION: CPT

## 2020-12-09 PROCEDURE — 85025 COMPLETE CBC W/AUTO DIFF WBC: CPT

## 2020-12-09 PROCEDURE — 83735 ASSAY OF MAGNESIUM: CPT

## 2020-12-09 RX ORDER — TRAZODONE HYDROCHLORIDE 50 MG/1
50 TABLET ORAL NIGHTLY PRN
Status: DISCONTINUED | OUTPATIENT
Start: 2020-12-09 | End: 2020-12-11 | Stop reason: HOSPADM

## 2020-12-09 RX ORDER — ALBUTEROL SULFATE 90 UG/1
2 AEROSOL, METERED RESPIRATORY (INHALATION) EVERY 4 HOURS
Status: DISCONTINUED | OUTPATIENT
Start: 2020-12-09 | End: 2020-12-11

## 2020-12-09 RX ORDER — CLONIDINE HYDROCHLORIDE 0.1 MG/1
0.1 TABLET ORAL PRN
Status: DISCONTINUED | OUTPATIENT
Start: 2020-12-09 | End: 2020-12-11 | Stop reason: HOSPADM

## 2020-12-09 RX ORDER — TRAMADOL HYDROCHLORIDE 50 MG/1
50 TABLET ORAL EVERY 6 HOURS PRN
Status: DISCONTINUED | OUTPATIENT
Start: 2020-12-09 | End: 2020-12-09

## 2020-12-09 RX ORDER — TRAMADOL HYDROCHLORIDE 50 MG/1
50 TABLET ORAL PRN
Status: DISCONTINUED | OUTPATIENT
Start: 2020-12-09 | End: 2020-12-11 | Stop reason: HOSPADM

## 2020-12-09 RX ORDER — CLONAZEPAM 0.5 MG/1
0.5 TABLET ORAL 2 TIMES DAILY
Status: DISCONTINUED | OUTPATIENT
Start: 2020-12-09 | End: 2020-12-11 | Stop reason: HOSPADM

## 2020-12-09 RX ORDER — ALBUTEROL SULFATE 90 UG/1
2 AEROSOL, METERED RESPIRATORY (INHALATION) EVERY 4 HOURS PRN
Status: DISCONTINUED | OUTPATIENT
Start: 2020-12-09 | End: 2020-12-11 | Stop reason: HOSPADM

## 2020-12-09 RX ADMIN — Medication 2 PUFF: at 12:19

## 2020-12-09 RX ADMIN — FAMOTIDINE 20 MG: 20 TABLET, FILM COATED ORAL at 08:45

## 2020-12-09 RX ADMIN — LORAZEPAM 1 MG: 2 INJECTION INTRAMUSCULAR; INTRAVENOUS at 02:08

## 2020-12-09 RX ADMIN — DIAZEPAM 10 MG: 10 TABLET ORAL at 08:41

## 2020-12-09 RX ADMIN — LEVOFLOXACIN 500 MG: 500 TABLET, FILM COATED ORAL at 08:41

## 2020-12-09 RX ADMIN — Medication 2 PUFF: at 15:45

## 2020-12-09 RX ADMIN — CLONIDINE HYDROCHLORIDE 0.1 MG: 0.1 TABLET ORAL at 19:58

## 2020-12-09 RX ADMIN — SODIUM CHLORIDE 1.2 MCG/KG/HR: 9 INJECTION, SOLUTION INTRAVENOUS at 17:21

## 2020-12-09 RX ADMIN — Medication 10 ML: at 08:41

## 2020-12-09 RX ADMIN — Medication 10 ML: at 20:00

## 2020-12-09 RX ADMIN — DIAZEPAM 10 MG: 10 TABLET ORAL at 00:12

## 2020-12-09 RX ADMIN — DEXAMETHASONE SODIUM PHOSPHATE 6 MG: 10 INJECTION, SOLUTION INTRAMUSCULAR; INTRAVENOUS at 08:41

## 2020-12-09 RX ADMIN — CLONAZEPAM 0.5 MG: 0.5 TABLET ORAL at 19:58

## 2020-12-09 RX ADMIN — CLONIDINE HYDROCHLORIDE 0.1 MG: 0.1 TABLET ORAL at 15:54

## 2020-12-09 RX ADMIN — TRAZODONE HYDROCHLORIDE 50 MG: 50 TABLET ORAL at 19:58

## 2020-12-09 RX ADMIN — TRAMADOL HYDROCHLORIDE 50 MG: 50 TABLET ORAL at 15:53

## 2020-12-09 RX ADMIN — RACEPINEPHRINE HYDROCHLORIDE 11.25 MG: 11.25 SOLUTION RESPIRATORY (INHALATION) at 01:42

## 2020-12-09 RX ADMIN — Medication 2 PUFF: at 20:27

## 2020-12-09 RX ADMIN — LORAZEPAM 1 MG: 2 INJECTION INTRAMUSCULAR; INTRAVENOUS at 13:09

## 2020-12-09 RX ADMIN — FAMOTIDINE 20 MG: 20 TABLET, FILM COATED ORAL at 19:59

## 2020-12-09 RX ADMIN — SODIUM CHLORIDE 1.4 MCG/KG/HR: 9 INJECTION, SOLUTION INTRAVENOUS at 07:15

## 2020-12-09 RX ADMIN — CLONAZEPAM 0.5 MG: 0.5 TABLET ORAL at 15:53

## 2020-12-09 RX ADMIN — LORAZEPAM 1 MG: 2 INJECTION INTRAMUSCULAR; INTRAVENOUS at 09:48

## 2020-12-09 RX ADMIN — Medication 2 PUFF: at 20:28

## 2020-12-09 RX ADMIN — TRAMADOL HYDROCHLORIDE 50 MG: 50 TABLET ORAL at 19:59

## 2020-12-09 ASSESSMENT — PAIN SCALES - GENERAL
PAINLEVEL_OUTOF10: 5
PAINLEVEL_OUTOF10: 0
PAINLEVEL_OUTOF10: 0

## 2020-12-09 NOTE — PROGRESS NOTES
positive for COVID-19 pneumonia. Given his agitation, he was not tolerating supplemental oxygen and attempt on BiPAP. He was ultimately intubated to help protect his airway. He was admitted to the ICU and is tolerating vent support. \"    Pain:  Pain Assessment  Pain Assessment: CPOT  Pain Level: 0  RASS Score: Restless    Reason for Referral  Alden Cintron was referred for a bedside swallow evaluation to assess the efficiency of his swallow function, identify signs and symptoms of aspiration and make recommendations regarding safe dietary consistencies, effective compensatory strategies, and safe eating environment. Impression  Dysphagia Diagnosis: Mild to moderate oral stage dysphagia;Mild to moderate pharyngeal stage dysphagia  Dysphagia Outcome Severity Scale: Level 3: Moderate dysphagia- Total assisstance, supervision or strategies. Two or more diet consistencies restricted     Medical record review/interview: Hx of dysphagia upon previous admission to [facility] (07/20). Per pt report, non-compliant with diet rec's on discharge. Note normal CXR, consistently   Predisposing dysphagia risk factors: Hx of substance abuse, hx of dysphagia  Clinical signs of possible chronic dysphagia: Time post onset, recurrent admissions  Precipitating dysphagia risk factors: Current COVID 19 dx, increased RR at baseline    Vitals/labs:   Temp: 97.5  SpO2: 90  RR: 27  WBCs: 6.9    Cranial nerve exam:   CN V: ophthalmic, maxillary, and mandibular facial sensation appear impaired 2/2 reduced cervical/mandibular ROM and strength  CN VII: WNL  CN IX/X: MPT:4.1; pitch range:impaired; vocal quality:dysphonic; cough:weak  Cannot r/o CN V, CN IX, and CN X involvement    Laryngeal function exam:   Secretions: Congested cough noted. Unable to expectorate secretions. No dried secretions noted to oral cavity  Vocal quality: Dysphonic  MPT: 4.1-impaired  S/Z ratio: Unable to complete. Pt politely declined  Pitch range: Impaired.  Pitch and phonation breaks noted during conversational speech  Cough: weak, congested, non-productive     PO trials:   Ice: WNL  IDDSI 0: Cough after the swallow 5/5, increase in RR to 38, Desaturation to 85  IDDSI 2: Wet vocal quality post swallow, no coughing/choking, stable RR and O2  IDDSI 3: Did not assess  IDDSI 4: WNL  IDDSI 5: Prolonged mastication, impaired A-P bolus transit, double swallow  IDDSI 6: Coughing after the swallow, increase in RR to 36, desat to 88  IDDSI 7: Prolonged mastication, increase in RR to 36, desat to 86  3 oz water: FAIL. Could not complete without stopping, cough during. Assessment: Suspect moderate oropharyngeal dysphagia. Likely chronic. Swallow prognosis is fair. Appreciate hx of substance abuse, multiple intubations, hx of dysphagia, hx with non-compliance to recommended diet. Despite this, consider consistent normal CXR results. Oral cavity is pink and moist, slight white coating to lingual surface. Pt denies odynophagia or globus sensation. Denies acid reflux sx's. Dysarthric speech is noted. Poor intelligibility at baseline. Consider neuro consult. See below for recommendations. Instrumentation: Warranted once pt is out of droplet+ precautions  Diet recommendation: IDDSI 5 Minced and Moist, Mildly thick (nectar) liquids. Risk management: Feed only when alert, small bites and sips, distant supervision, monitor RR and O2. Stop PO intake if RR passes 35 and/or O2 dips below 90. Frequent oral care. Treatment Plan  Requires SLP Intervention: Yes  Duration/Frequency of Treatment: 2-4x/wk  D/C Recommendations: To be determined  Referral To: Pulmonology    Recommended Diet and Intervention     Liquid Consistency Recommendation: Mildly Thick (Nectar)  Recommended Form of Meds: Crushed in puree as able  Recommendations: Dysphagia treatment; Modified barium swallow study  Therapeutic Interventions: Oral care;Diet tolerance monitoring; Therapeutic PO trials with SLP;Patient/Family education    Compensatory Swallowing Strategies  Compensatory Swallowing Strategies: Small bites/sips;Upright as possible for all oral intake; No straws;Eat/Feed slowly    Treatment/Goals  Short-term Goals  Timeframe for Short-term Goals: 4 days (12/13/2020)  Goal 1: The patient will tolerate recommended diet with no clinical s/s of aspiration 5/5  Goal 2: The patient will tolerate therapeutic diet upgrade trials with no clinical s/s of aspiration 5/5  Goal 3: The patient/caregiver will demonstrate understanding of recommended compensatory strategies for improved swallow safety  Long-term Goals  Timeframe for Long-term Goals: 7 days (12/16/2020)  Goal 1: The patient will tolerate recommended diet with no clinical s/s of aspiration for optimal nutrition and hydration    General  Chart Reviewed: Yes  Comments: Chart reviewed for completion of assessment  Subjective  Subjective: The patient is seen in room at bedside with RN permission. Pt is alert. Dysarthric at baseline. Behavior/Cognition: Alert; Cooperative;Confused; Agitated; Impulsive  Temperature Spikes Noted: No  Respiratory Status: Room air  O2 Device: None (Room air)  Communication Observation: Functional;Dysarthria  Follows Directions: Simple  Dentition: Some missing teeth  Patient Positioning: Upright in bed  Baseline Vocal Quality: Weak  Volitional Cough: Weak  Volitional Swallow: Delayed  Prior Dysphagia History: Assessed at [facility] 07/08/2020 with rec's for minced and moist and honey. Discharged on M&M and nectar thick. Reports not following with recommended diet upon discharge. Consistencies Administered: Dysphagia Soft and Bite-Sized (Dysphagia III); Dysphagia Minced and Moist (Dysphagia II); Dysphagia Pureed (Dysphagia I); Thin - cup;Nectar - cup       Vision/Hearing  Vision  Vision: Within Functional Limits  Hearing  Hearing: Within functional limits    Oral Motor Deficits  Oral/Motor  Oral Motor:  Within functional limits    Oral Phase Dysfunction  Oral Phase  Oral Phase: Exceptions  Oral Phase Dysfunction  Impaired Mastication: Reg Solid  Decreased Anterior to Posterior Transit: Mechanical soft  Suspected Premature Bolus Loss: Thin - cup  Lingual/Palatal Residue: Mechanical soft     Indicators of Pharyngeal Phase Dysfunction   Pharyngeal Phase  Pharyngeal Phase: Exceptions  Indicators of Pharyngeal Phase Dysfunction  Decreased Laryngeal Elevation: All  Wet Vocal Quality: Thin - cup  Cough - Immediate: Thin - cup  Change in Vital Signs: Thin - cup    Prognosis  Prognosis  Prognosis for safe diet advancement: fair  Barriers to reach goals: cognitive deficits;time post onset  Barriers/Prognosis Comment: Noncompliance with previous recommendations  Individuals consulted  Consulted and agree with results and recommendations: Patient;RN    Education  Patient Education: patient educated  Patient Education Response: No evidence of learning;Needs reinforcement  Safety Devices in place: Yes  Type of devices: All fall risk precautions in place; Left in bed;Bed alarm in place;Call light within reach       Therapy Time  SLP Individual Minutes  Time In: 8211  Time Out: 3328  Minutes: [address], SLP  12/9/2020 2:05 PM  Judson Bernheim, M.A., [phone]  Speech-Language Pathologist  Phone: [phone], [phone]

## 2020-12-09 NOTE — PROGRESS NOTES
1955:Shift assessment done; see flow chart. Pt remains on Precedex @ 1.2 and NS @ 75. Pt moves around in the bed well; pt continues to have almaraz in place and draining appropriately, R dbl lumen PICC in place with some old drainage, pt also has 2PIV's that flush well. Pt became belligerent during assessment stating he is \"starving\" and that he wants \"meat\"; RN explained to pt about why he can not have meat just yet, pt yelled out \"this is f*cking bullsh*t\" and proceeded to call this RN a \"f*cking b*tch\" and \"get out of my f*cking face\". Pt was told he could have something to drink to trial his swallowing, pt was not satisfied with that education and continued to be rude and continued to use profanity. Pt encouraged to be respectful. 2100:Pt redirected once again; reminded that the need for him talking to staff belligerently is not ideal. RN gave strong advice on the expectations for him to be respectful and discontinue his belligerent profanity. Pt repositioned in bed and given something to drink to trial swallowing. Pt apologized for his previous behavior. 2200: Pt stated that he feels like he is [place] die\" and that he \"doesn't have much longer to live\". Pt stated his \"body is dying inside\". Pt continues to ask for food. Pt has done well with clear liquids, re-education done on the reasoning for him not being able to eat yet. VSS. /82   Pulse 73   Temp 96.9 °F (36.1 °C) (Bladder)   Resp 26   Ht 5' (1.524 m)   Wt 91 lb 7.9 oz (41.5 kg)   SpO2 95%   BMI 17.87 kg/m²   0030: Reassessment done; see flow sheet. Pt continues to be restless despite the scheduled Valium; pt continues to say he feels like he is going to die. Pt redirected easily now; pt states he is no pain he just \"feels like sh*t\".   0100:Pt bathed with a full linen change. 0135: Pt called out wants to sit up; pt has audible wheezing. RN called RT for breathing tx.  Precedex increased d/t pt becoming increasingly agitated and restless. 0215: Pt yelled out; RN went bedside pt began to act as if he was going to be aggressive and raised his fist at this RN; pt wants pudding and writer once again explained the reason behind him not being able to have it and he then became belligerent again calling this RN a \"f*cking b*tch\" while raising his fist. This RN redirected and encouraged no further aggression. 1mg of ativan given for agitation. 0400: Reassessment done; see flow chart.

## 2020-12-09 NOTE — PLAN OF CARE
Problem: Nutrition  Intervention: Swallowing evaluation  Note: SLP completed evaluation. Please refer to notes in EMR. Intervention: Aspiration precautions  Note: SLP completed evaluation. Please refer to notes in EMR.   Costa Conteh M.A., [address] [facility] #[id number]  Speech-Language Pathologist

## 2020-12-09 NOTE — PROGRESS NOTES
IM Progress Note    Admit Date:  12/5/2020  4    Interval history:  Admitted for confusion  covid pna and resp failure  Unable to control agitation with increasing restlessness and   Worsened hypoxia needing intubation         Mr. Daniel Mark  Was Extubated 12/8  Now he is very tachycardic and tachypneic  placed on precedex    12/9  He was agitated overnight, very belligerent and threatening to the nurses. Continues to be on Precedex. Now on room air. Objective:   /70   Pulse 68   Temp 97.8 °F (36.6 °C) (Bladder)   Resp 21   Ht 5' (1.524 m)   Wt 91 lb 7.9 oz (41.5 kg)   SpO2 100%   BMI 17.87 kg/m²       Intake/Output Summary (Last 24 hours) at 12/9/2020 0946  Last data filed at 12/9/2020 0856  Gross per 24 hour   Intake 1940 ml   Output 2755 ml   Net -815 ml       Physical Exam:    Seen through the ICU glass doors  D/W nurse  Awake and alert. General:appears to be in mild resp distress  Sinus rhythm the monitor.   Now on precedex  oriented        Medications:   Scheduled Medications:    sodium chloride flush  10 mL Intravenous 2 times per day    sodium chloride  20 mL Intravenous Once    dexamethasone  6 mg Intravenous Daily    famotidine  20 mg Oral BID    enoxaparin  30 mg Subcutaneous BID     I   dexmedetomidine (PRECEDEX) IV infusion 1.2 mcg/kg/hr (12/09/20 0844)    sodium chloride 75 mL/hr at 12/07/20 1640     LORazepam, sodium chloride nebulizer, acetaminophen **OR** acetaminophen, sodium chloride flush, polyethylene glycol, promethazine **OR** ondansetron    Lab Data:  Recent Labs     12/07/20 0415 12/08/20  0430 12/09/20  0632   WBC 7.0 7.0 6.9   HGB 13.3* 13.6 12.7*   HCT 41.3 42.2 38.9*   MCV 83.8 85.2 84.8   * 116* 128*     Recent Labs     12/07/20  0415 12/08/20  0430 12/09/20  0632    137 144   K 4.3  4.3 3.7  3.7 3.9  3.9    105 109   CO2 27 27 29   PHOS 3.5 2.7 2.8   BUN 18 21* 15   CREATININE 0.6* <0.5* <0.5*     No results for input(s): CKTOTAL, CKMB, Quinn Lawrence in the last 72 hours. Coagulation:   Lab Results   Component Value Date    INR 0.87 12/06/2020     Cardiac markers:   Lab Results   Component Value Date    CKTOTAL 230 07/09/2020    TROPONINI <0.01 12/05/2020         Lab Results   Component Value Date     (H) 12/09/2020    AST 26 12/09/2020    ALKPHOS 76 12/09/2020    BILITOT 0.3 12/09/2020       Lab Results   Component Value Date    INR 0.87 12/06/2020    INR 0.99 07/06/2020    PROTIME 10.1 12/06/2020    PROTIME 11.5 07/06/2020       Radiology    XR CHEST PORTABLE   Final Result   Supportive tubing projects in normal position.       No acute cardiopulmonary disease.           CT Head WO Contrast   Final Result   No acute intracranial abnormality.           XR CHEST PORTABLE   Final Result   Hazy opacity at the right costophrenic angle which may reflect atelectasis   versus airspace disease.           cxr    Stable position of supportive tubing.         No acute cardiopulmonary disease. covid positive - 12/5  Blood - Pending  Rapid flu - neg      Urine drug - amphetamine     ASSESSMENT and PLAN      Acute hypoxemic and hypercapnic respiratory failure  Multifocal pneumonia secondary to Covid 19 infection     - failed bipap with continued agitation and was intubated in ER   - Continue mechanical ventilation, pulmonary critical care consulted. - started on decadron D 4, no Remdesivir given liver abn    - sp CCP   - levaquin 5/5 added per pulm    Extubated 12/8  Currently on room air. Continues to be on Precedex.     Acute toxic encephalopathy- likely drug use   - presented with confusion and agitation , tried precedex with no help  - was on bipap and eventually intubated  - urine tox positive for amphetamine, hx of heroin abuse noted    Elevated troponin- likely demand ischemia   - repeat troponins normal. ekg with no acute changes    Transaminitis- likely related to hep c and acute viral infection   - continue to monitor , improving slightly     History of polysubstance abuse  He will have to be placed on his home medications as he seems to be showing signs of some withdrawal.    TCP - new , monitor while on anticoagulation     DVT Prophylaxis: Lovenox, pepcid   Diet: Diet NPO Effective Now  Code Status: Full Code     Dispo - ICU      Hilario Galaviz MD 12/9/2020 9:46 AM

## 2020-12-09 NOTE — PROGRESS NOTES
Care rounds completed with [facility] and multidisciplinary team. Reviewed labs, meds, VS, assessment, & plan of care for today. Call to Verify dosage of Suboxone- per mother patient takes medications (unsure of dosage or where he gets it from). See dictated note and new orders for details. 12:46 PM  Call placed to [address]    Call placed to [facility]- pt has been there but has not been back after induction- received the equivalent of 16mg of Suboxone.     [facility]

## 2020-12-09 NOTE — PROGRESS NOTES
Reassessment completed (see Flowsheet). All ICU lines remain intact, ICU monitoring continued-   Infusing:  Precedex at 1.3mcg (see MAR). Pt given PRN COWs protocol (6). pt remains on 2L via NC SpO2 at 97%. Lung sounds diminished/ some wheeze  pt's blood pressures WDL. Continuing to monitor. Pt's daughter-daryl and Mother- Yeimi Ching updated on patients current condition.

## 2020-12-09 NOTE — PROGRESS NOTES
Physician Progress Note      Kandi Platt  CSN #:                  [id number]  :                       1970  ADMIT DATE:       2020 5:32 AM  DISCH DATE:  RESPONDING  PROVIDER #:        Luciano Munoz MD          QUERY TEXT:    Pt admitted with COVID 19. If possible, please document in the progress notes   and discharge summary if you are evaluating and /or treating any of the   following: The medical record reflects the following:  Risk Factors: COVID 19, viral PNA  Clinical Indicators: vitals on arrival: temp 94.1, RR 30-38, -118, acute   respiratory failure  Treatment: intubated, CCP, dexamethasone, Levaquin, serial labs, supportive   care    Thank you,  Corey Quinteros RN, CDS  [phone]  Options provided:  -- Viral sepsis, present on arrival  -- Viral Sepsis, present on arrival, now resolved  -- No Sepsis, COVID 19 only  -- Sepsis was ruled out  -- Other - I will add my own diagnosis  -- Disagree - Not applicable / Not valid  -- Disagree - Clinically unable to determine / Unknown  -- Refer to Clinical Documentation Reviewer    PROVIDER RESPONSE TEXT:    This patient has COVID 19 only, patient is not septic.     Query created by: Heavenly Yost on 2020 1:51 PM      Electronically signed by:  Luciano Munoz MD 2020 9:50 AM

## 2020-12-09 NOTE — PROGRESS NOTES
Pulmonary & Critical Care Medicine ICU Progress Note    CC:Acute respiratory failure with hypoxemia/covid 19 pneumonia    Events of Last 24 hours:   Extubated  Possible stridor  Patient agitated overnight, belligerent/threatening, see nursing notes   Much calmer on my exam.  Appropriate, oriented to person, hospital, Covid diagnosis    Invasive Lines:   12/6/2020 PICC    MV: 12/5/2020-  Vent Mode: CPAP Rate Set: 18 bmp/Vt Ordered: 350 mL/ /FiO2 : 24 %  Recent Labs     12/07/20  0453 12/08/20  0541   PHART 7.441 7.451*   ISR4SQP 37.1 36.1   PO2ART 88.1 82.7       IV:   dexmedetomidine (PRECEDEX) IV infusion 1.4 mcg/kg/hr (12/09/20 0147)    sodium chloride 75 mL/hr at 12/07/20 1640       EXAM:  BP (!) 152/86   Pulse 52   Temp 96.9 °F (36.1 °C) (Bladder)   Resp 29   Ht 5' (1.524 m)   Wt 91 lb 7.9 oz (41.5 kg)   SpO2 97%   BMI 17.87 kg/m²  on RA        Intake/Output Summary (Last 24 hours) at 12/9/2020 0637  Last data filed at 12/9/2020 0141  Gross per 24 hour   Intake 1820 ml   Output 2655 ml   Net -835 ml     General: Appears chronically unwell  Eyes: PERRL. No sclera icterus. No conjunctival injection. ENT: No discharge. Pharynx clear. Neck: Trachea midline. Normal thyroid. Resp: No accessory muscle use. No crackles. Very poor air movement with wheezing. No rhonchi. No dullness on percussion. CV: Regular rate. Regular rhythm. No mumur or rub. No edema. Peripheral pulses are 2+. Capillary refill is less than 3 seconds. GI: Non-tender. Non-distended. No masses. No organomegaly. Normal bowel sounds. No hernia. Skin: Warm and dry. No nodule on exposed extremities. No rash on exposed extremities. Lymph: No cervical LAD. No supraclavicular LAD. M/S: No cyanosis. No joint deformity. No clubbing. Neuro: Alert and oriented x3. Patellar reflexes are symmetric. Psych: No agitation, no anxiety, affect is full.      Medications:   diazePAM  10 mg Oral Q8H    lidocaine 1 % injection  5 mL Intradermal Once    sodium chloride flush  10 mL Intravenous 2 times per day    levoFLOXacin  500 mg Oral Daily    sodium chloride  20 mL Intravenous Once    dexamethasone  6 mg Intravenous Daily    mupirocin   Nasal BID    famotidine  20 mg Oral BID    midazolam  5 mg Intravenous Once    enoxaparin  30 mg Subcutaneous BID     PRN Meds:  LORazepam, racepinephrine HCl, sodium chloride nebulizer, acetaminophen **OR** acetaminophen, sodium chloride flush, polyethylene glycol, promethazine **OR** ondansetron    Results:  CBC:   Recent Labs     12/06/20 0820 12/07/20 0415 12/08/20 0430   WBC 6.9 7.0 7.0   HGB 12.9* 13.3* 13.6   HCT 40.0* 41.3 42.2   MCV 83.8 83.8 85.2   PLT 96* 101* 116*     BMP:   Recent Labs     12/06/20 0820 12/07/20 0415 12/08/20 0430    140 137   K 4.3  4.3 4.3  4.3 3.7  3.7    106 105   CO2 26 27 27   PHOS 3.2 3.5 2.7   BUN 16 18 21*   CREATININE <0.5* 0.6* <0.5*     LIVER PROFILE:   Recent Labs     12/06/20 0820 12/07/20 0415 12/08/20 0430   AST 81* 38* 21   * 396* 260*   BILIDIR <0.2 <0.2 <0.2   BILITOT 0.4 0.3 0.3   ALKPHOS 100 86 80     PT/INR:   Recent Labs     12/06/20 0820   PROTIME 10.1   INR 0.87     APTT: No results for input(s): APTT in the last 72 hours. UA:  No results for input(s): NITRITE, COLORU, PHUR, LABCAST, WBCUA, RBCUA, MUCUS, TRICHOMONAS, YEAST, BACTERIA, CLARITYU, SPECGRAV, LEUKOCYTESUR, UROBILINOGEN, BILIRUBINUR, BLOODU, GLUCOSEU, AMORPHOUS in the last 72 hours.     Invalid input(s): Curtis Plump    Cultures:  12/5/2020 SARS-CoV-2 positive  12/5 blood- ngtd  12/5 rapid flu neg    Films:  CXR 12/6/2020 ET tube okay      HCT no acute intracranial abnormalities     ASSESSMENT:  · Acute hypoxemic respiratory failure   · Suspected post extubation stridor, resolved with Solu-Medrol and racemic epinephrine  · COPD with acute exacerbation   · COVID-19 pneumonia  · Acute metabolic encephalopathy  · Elevated troponin-likely demand ischemia   · Elevated transaminases  · Thrombocytopenia-normal in Nov 2020  · History of methamphetamine and heroin abuse with history of drug overdose and opiate withdrawal,  most recently November 2020.   · Hepatitis C     PLAN:  - COVID-19 isolation, droplet plus  · Levaquin D#5/5  · D#5/10 Dexamethasone 6 mg, then taper for obstructive lung disease  · Scheduled albuterol  · Was not a candidate for remdesivir given very elevated LFTs   · 1 U CCP 12/5/20   · PRN IV Ativan for agitation, scheduled valium  · Prophylaxis:  Lovenox, Pepcid  · Okay to leave ICU

## 2020-12-09 NOTE — PROGRESS NOTES
.Reassessment completed (see Flowsheet). All ICU lines remain intact, ICU monitoring continued-   Infusing:  Precedex at 1.2- Gradual increase (see MAR). pt remains garbled speech- difficult to understand. A/O to place time and person. Pt asking for Suboxone/ Klonopin- Perfect serve sent. Lung sounds diminished with Wheeze- shallow respirs with Tachypnea. pt's blood pressures WDL. Continuing to monitor.       2:31 PM  Order obtained for COWs

## 2020-12-09 NOTE — PROGRESS NOTES
RESPIRATORY THERAPY ASSESSMENT    Name:  Brooks   Seth Sutherland Record Number:  [id number]  Age: 48 y.o. Gender: male  : 1970  Today's Date:  2020  Room:  3009/3009-01    Assessment     Is the patient being admitted for a COPD or Asthma exacerbation? No   (If yes the patient will be seen every 4 hours for the first 24 hours and then reassessed)    Patient Admission Diagnosis      Allergies  No Known Allergies    Minimum Predicted Vital Capacity:     na          Actual Vital Capacity:      na              Pulmonary History:COPD  Home Oxygen Therapy:  room air  Home Respiratory Therapy:albuterol bid   Current Respiratory Therapy:  Albuterol prn atrovent q4  Treatment Type: HHN  Medications: Racemic Epinephrine    Respiratory Severity Index(RSI)   Patients with orders for inhalation medications, oxygen, or any therapeutic treatment modality will be placed on Respiratory Protocol. They will be assessed with the first treatment and at least every 72 hours thereafter. The following severity scale will be used to determine frequency of treatment intervention.     Smoking History: Mild Exacerbation = 3    Social History  Social History     Tobacco Use    Smoking status: Former Smoker     Packs/day: 2.00     Types: Cigarettes    Smokeless tobacco: Never Used    Tobacco comment: states 5 yrs ago   Substance Use Topics    Alcohol use: No    Drug use: Not Currently     Types: IV     Comment: states last use 5 wks ago       Recent Surgical History: None = 0  Past Surgical History  Past Surgical History:   Procedure Laterality Date    BRAIN SURGERY      s/p trauma    BRAIN SURGERY      FRACTURE SURGERY      LEG SURGERY         Level of Consciousness: Disoriented and Uncooperative = 2    Level of Activity: Bedridden, unresponsive or quadriplegic = 4    Respiratory Pattern: Use of accessory muscles;prolonged expiration; or RR greater than 30 = 3    Breath Sounds: Absent bilaterally and/or with wheezes = 3    Sputum  Sputum Color: Red, Creamy, Tenacity: Thick, Sputum How Obtained: None  Cough: Weak, non-productive = 3    Vital Signs   BP (!) 157/85   Pulse 94   Temp 97.5 °F (36.4 °C) (Bladder)   Resp 28   Ht 5' (1.524 m)   Wt 91 lb 7.9 oz (41.5 kg)   SpO2 91%   BMI 17.87 kg/m²   SPO2 (COPD values may differ): Greater than or equal to 92% on room air = 0    Peak Flow (asthma only): not applicable = 0    RSI: 57-90 = Q4 (every four hours)        Plan       Goals: medication delivery, mobilize retained secretions, volume expansion and improve oxygenation    Patient/caregiver was educated on the proper method of use for Respiratory Care Devices:  Yes      Level of patient/caregiver understanding able to:   ? Verbalize understanding   ? Demonstrate understanding       ? Teach back        ? Needs reinforcement       ? No available caregiver               ? Other:     Response to education:  [address]     Is patient being placed on Home Treatment Regimen? No     Does the patient have everything they need prior to discharge? No     Comments: patient assessed. Chart reviewed. Plan of Care: q4 albuterol/atrovent    Electronically signed by Navneet Rock RCP on 12/9/2020 at 12:24 PM    Respiratory Protocol Guidelines     1. Assessment and treatment by Respiratory Therapy will be initiated for medication and therapeutic interventions upon initiation of aerosolized medication. 2. Physician will be contacted for respiratory rate (RR) greater than 35 breaths per minute. Therapy will be held for heart rate (HR) greater than 140 beats per minute, pending direction from physician. 3. Bronchodilators will be administered via Metered Dose Inhaler (MDI) with spacer when the following criteria are met:  a. Alert and cooperative     b. HR < 140 bpm  c. RR < 30 bpm                d. Can demonstrate a 2-3 second inspiratory hold  4.  Bronchodilators will be administered via Hand Held Nebulizer [facility]) to patients when ANY of the following criteria are met  a. Incognizant or uncooperative          b. Patients treated with HHN at Home        c. Unable to demonstrate proper use of MDI with spacer     d. RR > 30 bpm   5. Bronchodilators will be delivered via Metered Dose Inhaler (MDI), HHN, Aerogen to intubated patients on mechanical ventilation. 6. Inhalation medication orders will be delivered and/or substituted as outlined below. Aerosolized Medications Ordering and Administration Guidelines:    1. All Medications will be ordered by a physician, and their frequency and/or modality will be adjusted as defined by the patients Respiratory Severity Index (RSI) score. 2. If the patient does not have documented COPD, consider discontinuing anticholinergics when RSI is less than 9.  3. If the bronchospasm worsens (increased RSI), then the bronchodilator frequency can be increased to a maximum of every 4 hours. If greater than every 4 hours is required, the physician will be contacted. 4. If the bronchospasm improves, the frequency of the bronchodilator can be decreased, based on the patient's RSI, but not less than home treatment regimen frequency. 5. Bronchodilator(s) will be discontinued if patient has a RSI less than 9 and has received no scheduled or as needed treatment for 72  Hrs. Patients Ordered on a Mucolytic Agent:    1. Must always be administered with a bronchodilator. 2. Discontinue if patient experiences worsened bronchospasm, or secretions have lessened to the point that the patient is able to clear them with a cough. Anti-inflammatory and Combination Medications:    1. If the patient lacks prior history of lung disease, is not using inhaled anti-inflammatory medication at home, and lacks wheezing by examination or by history for at least 24 hours, contact physician for possible discontinuation.

## 2020-12-09 NOTE — PROGRESS NOTES
Dr. Kelvin Maloney at the bedside to examine patient. Call to mom to Verify use of Suboxone- Per mother patient does take suboxone but unsure dosage or clinic. Dr Kelvin Maloney Aware. Mercedes Buck for [employer]. . See progress note for details.

## 2020-12-10 ENCOUNTER — APPOINTMENT (OUTPATIENT)
Dept: GENERAL RADIOLOGY | Age: 50
DRG: 208 | End: 2020-12-10
Payer: MEDICARE

## 2020-12-10 PROBLEM — E44.0 MODERATE PROTEIN-CALORIE MALNUTRITION (HCC): Status: ACTIVE | Noted: 2020-12-10

## 2020-12-10 LAB
A/G RATIO: 1.3 (ref 1.1–2.2)
ALBUMIN SERPL-MCNC: 3.6 G/DL (ref 3.4–5)
ALP BLD-CCNC: 96 U/L (ref 40–129)
ALT SERPL-CCNC: 194 U/L (ref 10–40)
ANION GAP SERPL CALCULATED.3IONS-SCNC: 7 MMOL/L (ref 3–16)
AST SERPL-CCNC: 23 U/L (ref 15–37)
BASOPHILS ABSOLUTE: 0 K/UL (ref 0–0.2)
BASOPHILS RELATIVE PERCENT: 0.5 %
BILIRUB SERPL-MCNC: 0.3 MG/DL (ref 0–1)
BUN BLDV-MCNC: 17 MG/DL (ref 7–20)
CALCIUM SERPL-MCNC: 8.7 MG/DL (ref 8.3–10.6)
CHLORIDE BLD-SCNC: 106 MMOL/L (ref 99–110)
CO2: 28 MMOL/L (ref 21–32)
CREAT SERPL-MCNC: 0.6 MG/DL (ref 0.9–1.3)
EOSINOPHILS ABSOLUTE: 0.1 K/UL (ref 0–0.6)
EOSINOPHILS RELATIVE PERCENT: 0.6 %
GFR AFRICAN AMERICAN: >60
GFR NON-AFRICAN AMERICAN: >60
GLOBULIN: 2.7 G/DL
GLUCOSE BLD-MCNC: 100 MG/DL (ref 70–99)
GLUCOSE BLD-MCNC: 149 MG/DL (ref 70–99)
GLUCOSE BLD-MCNC: 173 MG/DL (ref 70–99)
GLUCOSE BLD-MCNC: 193 MG/DL (ref 70–99)
HCT VFR BLD CALC: 41.6 % (ref 40.5–52.5)
HEMOGLOBIN: 13.6 G/DL (ref 13.5–17.5)
LYMPHOCYTES ABSOLUTE: 1.1 K/UL (ref 1–5.1)
LYMPHOCYTES RELATIVE PERCENT: 13.7 %
MAGNESIUM: 1.9 MG/DL (ref 1.8–2.4)
MCH RBC QN AUTO: 27.6 PG (ref 26–34)
MCHC RBC AUTO-ENTMCNC: 32.6 G/DL (ref 31–36)
MCV RBC AUTO: 84.6 FL (ref 80–100)
MONOCYTES ABSOLUTE: 0.3 K/UL (ref 0–1.3)
MONOCYTES RELATIVE PERCENT: 3.7 %
NEUTROPHILS ABSOLUTE: 6.6 K/UL (ref 1.7–7.7)
NEUTROPHILS RELATIVE PERCENT: 81.5 %
PDW BLD-RTO: 15.5 % (ref 12.4–15.4)
PERFORMED ON: ABNORMAL
PHOSPHORUS: 2.5 MG/DL (ref 2.5–4.9)
PLATELET # BLD: 182 K/UL (ref 135–450)
PMV BLD AUTO: 7.6 FL (ref 5–10.5)
POTASSIUM REFLEX MAGNESIUM: 3.4 MMOL/L (ref 3.5–5.1)
POTASSIUM SERPL-SCNC: 3.4 MMOL/L (ref 3.5–5.1)
RBC # BLD: 4.91 M/UL (ref 4.2–5.9)
SODIUM BLD-SCNC: 141 MMOL/L (ref 136–145)
TOTAL PROTEIN: 6.3 G/DL (ref 6.4–8.2)
WBC # BLD: 8 K/UL (ref 4–11)

## 2020-12-10 PROCEDURE — 2060000000 HC ICU INTERMEDIATE R&B

## 2020-12-10 PROCEDURE — 99233 SBSQ HOSP IP/OBS HIGH 50: CPT | Performed by: INTERNAL MEDICINE

## 2020-12-10 PROCEDURE — 6370000000 HC RX 637 (ALT 250 FOR IP): Performed by: INTERNAL MEDICINE

## 2020-12-10 PROCEDURE — 2580000003 HC RX 258: Performed by: INTERNAL MEDICINE

## 2020-12-10 PROCEDURE — 97162 PT EVAL MOD COMPLEX 30 MIN: CPT

## 2020-12-10 PROCEDURE — 92526 ORAL FUNCTION THERAPY: CPT

## 2020-12-10 PROCEDURE — 97535 SELF CARE MNGMENT TRAINING: CPT

## 2020-12-10 PROCEDURE — 2580000003 HC RX 258: Performed by: PHYSICIAN ASSISTANT

## 2020-12-10 PROCEDURE — 85025 COMPLETE CBC W/AUTO DIFF WBC: CPT

## 2020-12-10 PROCEDURE — 84100 ASSAY OF PHOSPHORUS: CPT

## 2020-12-10 PROCEDURE — 97165 OT EVAL LOW COMPLEX 30 MIN: CPT

## 2020-12-10 PROCEDURE — 6360000002 HC RX W HCPCS: Performed by: INTERNAL MEDICINE

## 2020-12-10 PROCEDURE — 94640 AIRWAY INHALATION TREATMENT: CPT

## 2020-12-10 PROCEDURE — 97110 THERAPEUTIC EXERCISES: CPT

## 2020-12-10 PROCEDURE — 83735 ASSAY OF MAGNESIUM: CPT

## 2020-12-10 PROCEDURE — 99232 SBSQ HOSP IP/OBS MODERATE 35: CPT | Performed by: INTERNAL MEDICINE

## 2020-12-10 PROCEDURE — 80053 COMPREHEN METABOLIC PANEL: CPT

## 2020-12-10 PROCEDURE — 36592 COLLECT BLOOD FROM PICC: CPT

## 2020-12-10 PROCEDURE — 2500000003 HC RX 250 WO HCPCS: Performed by: INTERNAL MEDICINE

## 2020-12-10 PROCEDURE — 97530 THERAPEUTIC ACTIVITIES: CPT

## 2020-12-10 RX ORDER — POTASSIUM CHLORIDE 29.8 MG/ML
20 INJECTION INTRAVENOUS
Status: COMPLETED | OUTPATIENT
Start: 2020-12-10 | End: 2020-12-10

## 2020-12-10 RX ADMIN — TRAMADOL HYDROCHLORIDE 50 MG: 50 TABLET ORAL at 14:11

## 2020-12-10 RX ADMIN — Medication 2 PUFF: at 11:32

## 2020-12-10 RX ADMIN — CLONIDINE HYDROCHLORIDE 0.1 MG: 0.1 TABLET ORAL at 17:17

## 2020-12-10 RX ADMIN — CLONAZEPAM 0.5 MG: 0.5 TABLET ORAL at 08:44

## 2020-12-10 RX ADMIN — DEXAMETHASONE SODIUM PHOSPHATE 6 MG: 10 INJECTION, SOLUTION INTRAMUSCULAR; INTRAVENOUS at 08:45

## 2020-12-10 RX ADMIN — MUPIROCIN: 20 OINTMENT TOPICAL at 20:02

## 2020-12-10 RX ADMIN — Medication 2 PUFF: at 15:50

## 2020-12-10 RX ADMIN — SODIUM CHLORIDE 1.2 MCG/KG/HR: 9 INJECTION, SOLUTION INTRAVENOUS at 02:24

## 2020-12-10 RX ADMIN — LORAZEPAM 1 MG: 2 INJECTION INTRAMUSCULAR; INTRAVENOUS at 03:18

## 2020-12-10 RX ADMIN — TRAMADOL HYDROCHLORIDE 50 MG: 50 TABLET ORAL at 08:44

## 2020-12-10 RX ADMIN — FAMOTIDINE 20 MG: 20 TABLET, FILM COATED ORAL at 20:02

## 2020-12-10 RX ADMIN — LORAZEPAM 1 MG: 2 INJECTION INTRAMUSCULAR; INTRAVENOUS at 10:32

## 2020-12-10 RX ADMIN — TRAMADOL HYDROCHLORIDE 50 MG: 50 TABLET ORAL at 12:18

## 2020-12-10 RX ADMIN — ENOXAPARIN SODIUM 30 MG: 100 INJECTION SUBCUTANEOUS at 20:01

## 2020-12-10 RX ADMIN — CLONIDINE HYDROCHLORIDE 0.1 MG: 0.1 TABLET ORAL at 03:18

## 2020-12-10 RX ADMIN — POTASSIUM CHLORIDE 20 MEQ: 400 INJECTION, SOLUTION INTRAVENOUS at 10:32

## 2020-12-10 RX ADMIN — LORAZEPAM 1 MG: 2 INJECTION INTRAMUSCULAR; INTRAVENOUS at 16:06

## 2020-12-10 RX ADMIN — MUPIROCIN: 20 OINTMENT TOPICAL at 08:44

## 2020-12-10 RX ADMIN — TRAMADOL HYDROCHLORIDE 50 MG: 50 TABLET ORAL at 17:17

## 2020-12-10 RX ADMIN — CLONIDINE HYDROCHLORIDE 0.1 MG: 0.1 TABLET ORAL at 08:44

## 2020-12-10 RX ADMIN — TRAMADOL HYDROCHLORIDE 50 MG: 50 TABLET ORAL at 03:18

## 2020-12-10 RX ADMIN — CLONIDINE HYDROCHLORIDE 0.1 MG: 0.1 TABLET ORAL at 12:17

## 2020-12-10 RX ADMIN — POTASSIUM CHLORIDE 20 MEQ: 400 INJECTION, SOLUTION INTRAVENOUS at 10:39

## 2020-12-10 RX ADMIN — FAMOTIDINE 20 MG: 20 TABLET, FILM COATED ORAL at 08:44

## 2020-12-10 RX ADMIN — CLONIDINE HYDROCHLORIDE 0.1 MG: 0.1 TABLET ORAL at 14:11

## 2020-12-10 RX ADMIN — Medication 10 ML: at 20:02

## 2020-12-10 RX ADMIN — CLONIDINE HYDROCHLORIDE 0.1 MG: 0.1 TABLET ORAL at 20:07

## 2020-12-10 RX ADMIN — CLONAZEPAM 0.5 MG: 0.5 TABLET ORAL at 20:01

## 2020-12-10 RX ADMIN — TRAMADOL HYDROCHLORIDE 50 MG: 50 TABLET ORAL at 20:07

## 2020-12-10 RX ADMIN — Medication 10 ML: at 08:44

## 2020-12-10 ASSESSMENT — PAIN SCALES - GENERAL
PAINLEVEL_OUTOF10: 0
PAINLEVEL_OUTOF10: 5
PAINLEVEL_OUTOF10: 0
PAINLEVEL_OUTOF10: 0
PAINLEVEL_OUTOF10: 1

## 2020-12-10 NOTE — PROGRESS NOTES
Assist:52583}    Bathing:    UE:  {IP Level Of Assist:14872}  LE:  {IP Level Of Assist:05437}    Eating:   {IP Level Of Assist:11611}    Toileting:  {IP Level Of Assist:52336}    Activity Tolerance   Pt completed therapy session with {Activity Tolerance PT List:51846}    Positioning Needs:   {IP position:86207}    Exercise / Activities Initiated: {Marta:23018}  {UE exercises:06702}    Patient/Family Education:   {OT education:25397}    Assessment of Deficits: Pt seen for Occupational therapy evaluation in acute care setting. Pt demonstrated decreased {OT assessment:29065}. Pt functioning below baseline and will likely benefit from skilled occupational therapy services to maximize safety and independence. Goal(s) : To be met in 3 Visits:  1). Bed to toilet/BSC: {IP Level Of Assist:41052}    To be met in 5 Visits:  1). Supine to/from Sit:  {IP Level Of Assist:17612}  2). Upper Body Bathing:   {IP Level Of Assist:97021}  3). Lower Body Bathing:   {IP Level Of Assist:57093}  4). Upper Body Dressing:  {IP Level Of Assist:90238}  5). Lower Body Dressing:  {IP Level Of Assist:32199}  6). Pt to demonstrate UE exs x 15 reps with minimal cues    Rehabilitation Potential:  {Good Fair Poor:18446} for goals listed above. Strengths for achieving goals include: {Strengths for Achieving Goals:29995}  Barriers to achieving goals include:  {IP barriers:00375}     Plan: To be seen {IP plan of care/frequency :05108} while in acute care setting for therapeutic exercises, bed mobility, transfers, dressing, bathing, family/patient education, ADL/IADL retraining, energy conservation training.      ***          If patient discharges from this facility prior to next visit, this note will serve as the Discharge Summary

## 2020-12-10 NOTE — PLAN OF CARE
Nutrition Problem #1: Inadequate oral intake  Intervention: Food and/or Nutrient Delivery: Continue Current Diet  Nutritional Goals: patient will consume > 50% of his meals on general diet order with minced and moist solid consistency + mildly thick liquid consistency x 3 meals per day

## 2020-12-10 NOTE — PROGRESS NOTES
Comprehensive Nutrition Assessment    Type and Reason for Visit:  Reassess    Nutrition Recommendations/Plan:   1. Continue general diet order + minced and moist solid consistency and mildly thick liquid consistency - consistency changes, per SLP. 2. Monitor appetite, meal intake, and mental status/agitation. 3. Monitor nutrition-related labs, bowel function, and weight trends. Nutrition Assessment:  patient has neither improved nor declined from a nutritional standpoint since last RD assessment; he remains at risk for further compromise d/t need for altered po consistencies r/t difficulty chewing and/or swallowing, increased nutrition needs d/t COVID-19 virus, and agitation; will continue general diet order + minced and moist solid consistency and mildly thick liquids    Malnutrition Assessment:  Malnutrition Status: Moderate malnutrition    Context:  Acute Illness     Findings of the 6 clinical characteristics of malnutrition:  Energy Intake:  1 - 75% or less of estimated energy requirements for 7 or more days  Weight Loss:  7 - Greater than 5% over 1 month(- 7# or 6.6% weight loss x 1 month (since 11/7/20))     Body Fat Loss:  Unable to assess(COVID-19 +)     Muscle Mass Loss:  Unable to assess(COVID-19 +)    Fluid Accumulation:  No significant fluid accumulation     Strength:  Not Performed    Estimated Daily Nutrient Needs:  Energy (kcal):  1050 - 1176 kcals based on 25-28 kcals/kg/CBW; Weight Used for Energy Requirements:  Current     Protein (g):  71 - 76 g protein based on 1.7-1.8 g/kg/CBW;  Weight Used for Protein Requirements:  Current        Fluid (ml/day):  1050 - 1176 ml; Method Used for Fluid Requirements:  1 ml/kcal      Nutrition Related Findings:  patient was extubated on 12/8/20; he is A & O x 4; patient is agitated overnight but has become calmer with COWS protocol in place; patient remains on precedex but this is being weaned down; patient was evaluated by SLP on 12/9/20 and SLP recommended minced + moist and mildly thick liquids because patient is very weak; SLP may f/u with patient today if time allows; K is low and ALT is elevated; no BM documented for this admission thus far; patient is on decadron and pepcid, in addition to, COWS protocol and precedex; patient consumed 51-75% of one meal on 12/9/20      Wounds:  None       Current Nutrition Therapies:    DIET GENERAL; Dysphagia Minced and Moist; Mildly Thick (Nectar)    Anthropometric Measures:  · Height: 5' (152.4 cm)  · Current Body Weight: 91 lb 7.9 oz (41.5 kg)(obtained on 12/9/20)   · Admission Body Weight: 92 lb 12.8 oz (42.1 kg)(obtained on 12/5/20)    · Usual Body Weight: 98 lb (44.5 kg)(obtained on 11/7/20)     · Ideal Body Weight: 106 lbs; % Ideal Body Weight 86.3 %   · BMI: 17.9  · BMI Categories: Underweight (BMI less than 18.5)       Nutrition Diagnosis:   · Inadequate oral intake related to inadequate protein-energy intake, impaired respiratory function, psychological cause or life stress as evidenced by intake 51-75%, poor intake prior to admission, other (comment), swallow study results(agitation; COWS protocol; + precedex)      Nutrition Interventions:   Food and/or Nutrient Delivery:  Continue Current Diet  Nutrition Education/Counseling:  No recommendation at this time   Coordination of Nutrition Care:  Continue to monitor while inpatient, Speech Therapy, Interdisciplinary Rounds    Goals:  patient will consume > 50% of his meals on general diet order with minced and moist solid consistency + mildly thick liquid consistency x 3 meals per day       Nutrition Monitoring and Evaluation:   Behavioral-Environmental Outcomes:  Knowledge or Skill, Readiness for Change, Beliefs and Attitutes   Food/Nutrient Intake Outcomes:  Food and Nutrient Intake  Physical Signs/Symptoms Outcomes:  Biochemical Data, Chewing or Swallowing, Constipation, Hemodynamic Status, Nutrition Focused Physical Findings, Skin, Weight     Discharge Planning:     Too soon to determine     Electronically signed by Catherine Clay RD, LD on 12/10/20 at 2:22 PM EST    Contact: [phone]

## 2020-12-10 NOTE — PROGRESS NOTES
Pulmonary & Critical Care Medicine ICU Progress Note    CC:Acute respiratory failure with hypoxemia/covid 19 pneumonia    Events of Last 24 hours:   Seen by speech therapy, diet recommended; consider MBSS  Patient is requesting Suboxone but cannot provide a clinic location to be confirmed  Ongoing Precedex requirement  COWS initiated  Tells me that he is chronically short of breath    Invasive Lines:   12/6/2020 PICC    MV: 12/5/2020-12/8/2020  Vent Mode: CPAP Rate Set: 18 bmp/Vt Ordered: 350 mL/ /FiO2 : 24 %  Recent Labs     12/08/20  0541   PHART 7.451*   EUQ0YCQ 36.1   PO2ART 82.7       IV:   dexmedetomidine (PRECEDEX) IV infusion 1.2 mcg/kg/hr (12/10/20 0224)    sodium chloride 75 mL/hr at 12/07/20 1640       EXAM:  BP (!) 145/92   Pulse 60   Temp 97.9 °F (36.6 °C) (Bladder)   Resp 22   Ht 5' (1.524 m)   Wt 91 lb 7.9 oz (41.5 kg)   SpO2 98%   BMI 17.87 kg/m²  on RA        Intake/Output Summary (Last 24 hours) at 12/10/2020 0649  Last data filed at 12/10/2020 0000  Gross per 24 hour   Intake 834 ml   Output 1250 ml   Net -416 ml     General: Appears chronically unwell  Eyes: PERRL. No sclera icterus. No conjunctival injection. ENT: No discharge. Pharynx clear. Neck: Trachea midline. Normal thyroid. Resp: + accessory muscle use. No crackles. Very poor air movement with wheezing. No rhonchi. No dullness on percussion. CV: Regular rate. Regular rhythm. No mumur or rub. No edema. Peripheral pulses are 2+. Capillary refill is less than 3 seconds. GI: Non-tender. Non-distended. No masses. No organomegaly. Normal bowel sounds. No hernia. Skin: Warm and dry. No nodule on exposed extremities. No rash on exposed extremities. Lymph: No cervical LAD. No supraclavicular LAD. M/S: No cyanosis. No joint deformity. No clubbing. Neuro: Alert and oriented x3. Patellar reflexes are symmetric. Psych: No agitation, no anxiety, affect is full.      Medications:   ipratropium  2 puff Inhalation Q4H    albuterol sulfate HFA  2 puff Inhalation Q4H    clonazePAM  0.5 mg Oral BID    sodium chloride flush  10 mL Intravenous 2 times per day    sodium chloride  20 mL Intravenous Once    dexamethasone  6 mg Intravenous Daily    famotidine  20 mg Oral BID    enoxaparin  30 mg Subcutaneous BID     PRN Meds:  albuterol sulfate HFA, traMADol **AND** cloNIDine, traZODone, LORazepam, sodium chloride nebulizer, acetaminophen **OR** acetaminophen, sodium chloride flush, polyethylene glycol, promethazine **OR** ondansetron    Results:  CBC:   Recent Labs     12/08/20  0430 12/09/20  0632 12/10/20  0525   WBC 7.0 6.9 8.0   HGB 13.6 12.7* 13.6   HCT 42.2 38.9* 41.6   MCV 85.2 84.8 84.6   * 128* 182     BMP:   Recent Labs     12/08/20 0430 12/09/20  0632 12/10/20  0525    144 141   K 3.7  3.7 3.9  3.9 3.4*    109 106   CO2 27 29 28   PHOS 2.7 2.8 2.5   BUN 21* 15 17   CREATININE <0.5* <0.5* 0.6*     LIVER PROFILE:   Recent Labs     12/08/20 0430 12/09/20  0632 12/10/20  0525   AST 21 26 23   * 229* 194*   BILIDIR <0.2 <0.2  --    BILITOT 0.3 0.3 0.3   ALKPHOS 80 76 96     PT/INR:   No results for input(s): PROTIME, INR in the last 72 hours. APTT: No results for input(s): APTT in the last 72 hours. UA:  No results for input(s): NITRITE, COLORU, PHUR, LABCAST, WBCUA, RBCUA, MUCUS, TRICHOMONAS, YEAST, BACTERIA, CLARITYU, SPECGRAV, LEUKOCYTESUR, UROBILINOGEN, BILIRUBINUR, BLOODU, GLUCOSEU, AMORPHOUS in the last 72 hours.     Invalid input(s): Apryl Lopez    Cultures:  12/5/2020 SARS-CoV-2 positive  12/5 blood-no growth  12/5 rapid flu neg    Films:  CXR 12/6/2020 ET tube okay      HCT no acute intracranial abnormalities     ASSESSMENT:  · Acute hypoxemic respiratory failure   · Suspected post extubation stridor, resolved with Solu-Medrol and racemic epinephrine  · COPD with acute exacerbation   · COVID-19 pneumonia  · Acute metabolic encephalopathy  · Elevated troponin-likely demand ischemia · Elevated transaminases -improving  · Thrombocytopenia-normal in Nov 2020  · History of methamphetamine and heroin abuse with history of drug overdose and opiate withdrawal,  most recently November 2020. · Hepatitis C     PLAN:  - COVID-19 isolation, droplet plus  · Precedex drip for agitation, suspected substance withdrawal   · Levaquin completed 5 days  · D#6/10 Dexamethasone 6 mg, then taper for obstructive lung disease  · Scheduled albuterol  · Was not a candidate for remdesivir given very elevated LFTs   · 1 U CCP 12/5/20   · PRN IV Ativan for agitation, scheduled valium.   Ultram and clonidine per COWS  · Prophylaxis:  Lovenox BID , Pepcid  · Okay to leave ICU once stable off Precedex infusion

## 2020-12-10 NOTE — PROGRESS NOTES
Inpatient Physical Therapy Evaluation and Treatment    Unit: ICU  Date:  12/10/2020  Patient Name:    Landen Brody  Admitting diagnosis:  Acute hypoxemic respiratory failure due to COVID-19 [facility]) [U07.1, J96.01]  Admit Date:  12/5/2020  Precautions/Restrictions/WB Status/ Lines/ Wounds/ Oxygen: Fall risk, Bed/chair alarm, Lines -IV, Supplemental O2 (1.5) and Mondragon catheter, Telemetry, Continuous pulse oximetry and Isolation Precautions: Droplet Plus - COVID    Treatment Time: 1600 - 3143  Treatment Number:  1   Timed Code Treatment Minutes: 34 minutes  Total Treatment Minutes:  44  minutes    Patient Goals for Therapy: \" To get better \"          Discharge Recommendations: SNF  DME needs for discharge: defer to facility       Therapy recommendation for EMS Transport: requires transport by cot due to increased assistance needed for functional transfers. Therapy recommendations for staff:   Assist of 1 for bed mobility. History of Present Illness: 48 y.o. male who presented to the emergency department early this morning with shortness of breath, altered mental status and hypoxia. Apparently when he presented to hospital he was encephalopathic. He has a history of polysubstance abuse. He was placed on supplemental oxygen improvement of his symptoms. He however tested positive for COVID-19 pneumonia. Given his agitation, he was not tolerating supplemental oxygen and attempt on BiPAP. He was ultimately intubated to help protect his airway. He was admitted to the ICU and is tolerating vent support. 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 Lives with mother at this time.  Patient was homeless  Home environment:    one story home  Steps to enter first floor:   2 steps to enter and hand rail bilateral       Bathroom:       Walk-in Shower and Shower Chair and grab bars   Equipment owned:      [facility]       Preadmission Status / PLOF:  History of falls             No  Pt. Able to drive          Yes  Pt Fully independent with ADL's         Yes  Pt. Required assistance from family for: Independent PTA. Patient requires intermittent assistance depending how he feels . Pt. Fully independent for transfers and gait and walked with: No Device    Pain   No    Cognition    A&O x4   Able to follow 1 step commands    Subjective  Patient lying supine in bed with no family present. Pt agreeable to this PT eval & tx. Upper Extremity ROM/Strength  Please see OT evaluation. Lower Extremity ROM / Strength   AROM WFL: Yes  ROM limitations: N/A    Strength Assessment (measured on a 0-5 scale):  R LE   Quad   3   Ant Tib  3   Hamstring 3   Iliopsoas 3  L LE  Quad   3   Ant Tib  3   Hamstring 3   Iliopsoas 3    Lower Extremity Sensation    WFL    Lower Extremity Proprioception:   WFL    Coordination and Tone  WFL    Balance  Sitting:  Fair ; CGA  Comments:     Standing: Not tested; Not Tested  Comments: Patient had moderate SOB with abnormal vital parameter response    Bed Mobility   Supine to Sit:    CGA  Sit to Supine:   CGA  Rolling:   Not Tested  Scooting in sitting: CGA  Scooting in supine: Mod A     Transfer Training     Sit to stand:   Not Tested  Stand to sit:   Not Tested  Bed to Chair:   Not Tested with use of N/A    Gait gait deferred due to fatigue; pt ambulated 0 ft. Stair Training deferred, pt unsafe/ not appropriate to complete stairs at this time    Activity Tolerance   Pt completed therapy session with SOB noted with any functional mobility and during speaking. SpO2: 97% on 1.5L at rest  HR: 135 bpm  BP: 92/72    Positioning Needs   Pt up in chair, alarm set, positioned in proper neutral alignment and pressure relief provided.    Call light provided and all needs within reach    Exercises Initiated  all completed bilaterally unless indicated  Ankle Pumps x 10 reps  Heel slides x 10 reps    Other  None. Patient/Family Education   Pt educated on role of inpatient PT, POC, importance of continued activity, DC recommendations, safety awareness, transfer techniques, pursed lip breathing, energy conservation, pacing activity and calling for assist with mobility. Assessment  Pt seen for Physical Therapy evaluation in acute care setting. Patient showed abnormal vital parameter response for functional mobility and therapeutic exercises in the bed with high HR (tachycardia), desaturation inconsistently to 80% to 96%, hypotension with moderate SOB. RN notified. Pt demonstrated decreased Activity tolerance, Balance, ROM, Safety and Strength as well as decreased independence with Ambulation, Bed Mobility  and Transfers. Recommending SNF upon discharge as patient functioning well below baseline, demonstrates good rehab potential and unable to return home due to limited or no family support, burden of care beyond caregiver ability, home environment not conducive to patient recovery and limited safety awareness. Goals : To be met in 3 visits:  1). Independent with LE Ex x 10 reps    To be met in 6 visits:  1). Supine to/from sit: SBA  2). Sit to/from stand: SBA  3). Bed to chair: SBA  4). Gait: Ambulate 10 ft.   with  Mod A  and use of LRAD (least restrictive assistive device)  5). Tolerate B LE exercises 3 sets of 10-15 reps    Rehabilitation Potential: Fair  Strengths for achieving goals include:   Pt motivated, Family Support and Pt cooperative   Barriers to achieving goals include:    Complexity of condition and Weakness    Plan    To be seen 3-5 x / week  while in acute care setting for therapeutic exercises, bed mobility, transfers, progressive gait training, balance training, and family/patient education.     Signature: Brenda King MS PT, # [id number]     If patient discharges from this facility prior to next visit, this note will serve as the Discharge Summary.

## 2020-12-10 NOTE — PROGRESS NOTES
Shift assessment was completed (see flow sheet). Pt is A/O, Complains of Generalized pain- on COWS protocol (6). Weaning off precedex. Denies needs at this time. Repositioned- set up to eat. Respirations are labored with accessory muscle use and expiratory wheeze/diminished sounds. Scheduled medications to follow- crushed with pudding/ applesauce. Call light within reach. Bed in lowest position. Bed alarm on. Will continue to monitor.

## 2020-12-10 NOTE — PROGRESS NOTES
Shift handoff report given to [facility] RN at bedside. Pt is Awake and alert  IV handoff completed. 4 eyes to follow. Call light within reach, bed in lowest position, bed alarm on. End of shift checks completed. Pt has been free of falls for duration of shift. Lina Morin

## 2020-12-10 NOTE — PROGRESS NOTES
Shift assessment complete. Medications administered at this time. No s/s of distress. VSS. Pt expresses no further needs at this time. Call light in reach.

## 2020-12-10 NOTE — PROGRESS NOTES
Patient transferred to room 324 from ICU 9. Patient oriented to room, call light, bed rails, phone, lights and bathroom. Patient instructed about the schedule of the day including: vital sign frequency, lab draws, possible tests, frequency of MD and staff rounds, daily weights, I &O's and prescribed diet. bed alarm in place, patient aware of placement and reason. telemetry box in place, patient aware of placement and reason. Bed locked, in lowest position, side rails up 2/4, call light within reach. Recliner Assessment  Patient is not able to demonstrated the ability to move from a reclining position to an upright position within the recliner. however patient is alert, oriented and able to provide informed consent    4 Eyes Skin Assessment     The patient is being assess for   Transfer to New Unit    I agree that 2 RN's have performed a thorough Head to Toe Skin Assessment on the patient. ALL assessment sites listed below have been assessed. Areas assessed by both nurses:   []   Head, Face, and Ears   []   Shoulders, Back, and Chest, Abdomen  []   Arms, Elbows, and Hands   []   Coccyx, Sacrum, and Ischium  []   Legs, Feet, and Heels          Patient refused 4 eye skin assessment on transfer.

## 2020-12-10 NOTE — PROGRESS NOTES
Speech Language Pathology  Facility/Department: [facility] ICU  Dysphagia Daily Treatment Note    NAME: Africa Goins  : 1970  MRN: [id number]    Patient Diagnosis(es):   Patient Active Problem List    Diagnosis Date Noted    Moderate protein-calorie malnutrition ([facility] [address] 12/10/2020    Acute metabolic encephalopathy     COVID-19 virus infection     Acute hypoxemic respiratory failure due to COVID-19 [facility]) 2020    Acute respiratory failure with hypoxia and hypercapnia (HCC)     Pneumonia due to COVID-19 virus     Thrombocytopenia (HCC)     Elevated LFTs     Tachycardia     Hypoxia     Heroin withdrawal (HCC) 2020    Toxic encephalopathy     Acute hypercapnic respiratory failure (HCC)     Acute respiratory failure with hypoxia (HCC)     Drug overdose     Abnormal chest x-ray     Acute encephalopathy     Disorder of electrolytes     Non-traumatic rhabdomyolysis     Elevated procalcitonin     Methamphetamine abuse (HCC)     AMS (altered mental status) 2020    Chest pain 2014    Pulmonary embolism (HCC)     Hepatitis 2014    MRSA bacteremia 2014    Pleural effusion, left 2014    Leukocytosis     Illicit drug use      Allergies: No Known Allergies  Onset Date: 2020  Subjective: Pt seen in room at bedside. Alert and cooperative. Dysarthric speech. 40% intelligible. RN reports pt requesting diet upgrade. Reports doing better today. See with RN permission    Pain: The patient does not complain of pain    Current Diet: DIET GENERAL;    Diet Tolerance:  Patient tolerating current diet level without signs/symptoms of penetration / aspiration. P.O. Trials: Thin   x X 3 oz   Nectar / Mildly Thick   x X 1oz   Honey / Moderately Thick       Pudding / Extremely Thick       Puree   x X 2oz   Solid   x X 2 crackers     Dysphagia Treatment and Impressions: The patient is on RA with SPO2% of 97 and RR of 25.  SLP assessing the patient with nectar thick and thin liquids. No clinical s/s of aspiration noted with either viscosity. No change in vital signs which is improvement from previous assessment on 12/9. Pt assessed with puree solids with no clinical s/s of aspiration. Functional oral phase noted. Timely swallow with +swallow initiation noted via palpation to anterior neck. With regular solids there is prolonged yet grossly functional mastication. Vitals remain stable with no notable changes. Functional oral phase with + swallow initiation palpated digitally. Pt with baseline poor respiratory swallow coordination. Reviewed recommended compensatory strategies. The patient is able to recall from yesterday with 100% acc. Recommendations: SLP recommends the patient consume regular solids and thin liquids. Feed only when alert. Ensure upright posture. Monitor RR and O2 during intake. Meds crushed in puree as able. No straws. If s/s of aspiration develop, hold PO and contact SLP. SLP to follow. Dysphagia Goals:  Timeframe for Long-term Goals: 7 days (12/16/2020)  Goal 1: The patient will tolerate recommended diet with no clinical s/s of aspiration for optimal nutrition and hydration   12/10- ongoing, progressing  Short-term Goals  Timeframe for Short-term Goals: 4 days (12/13/2020)  Goal 1: The patient will tolerate recommended diet with no clinical s/s of aspiration 5/5  12/10: goal addressed. See above. Ongoing, progressing. Goal 2: The patient will tolerate therapeutic diet upgrade trials with no clinical s/s of aspiration 5/5  12/10: goal addressed. See above. Ongoing, progressing. Goal 3: The patient/caregiver will demonstrate understanding of recommended compensatory strategies for improved swallow safety  12/10: goal addressed. See above. Ongoing, progressing. Speech/Language/Cog Goals: n/a    Recommendations:  Solid Consistency: Thin  Liquid Consistency: Regular  Medication: Crushed in puree as able.     Patient/Family/Caregiver Education: Role of SLP    Compensatory Strategies: Sit up for all meals and thereafter for 30 minutes, Eat with small bites (1/2 tsp; 1 tsp), Drink from a cup only with small sips, No straws, Dry swallow after each bite/sip, Alternate solids with liquids, Take small sips of water at the end of snacks and meals and Take your medication with apple sauce    Plan:    Continued Dysphagia treatment with goals per plan of care. Discharge Recommendations: TBD    If pt discharges from hospital prior to Speech/Swallowing discharge, this note serves as tx and discharge summary.      Total Treatment Time / Charges     Time in Time out Total Time / units   Cognitive Tx         Speech Tx      Dysphagia Tx 1500 1514 14 mins / 1 unit     Signature:  Soha Gibbons M.A., Psiano Riya #[id number]  Speech-Language Pathologist  Phone: [phone], [address], [facility]

## 2020-12-10 NOTE — PROGRESS NOTES
transfers and gait and walked with: No Device    Pain  No  Rating:NA  Location:  Pain Medicine Status: Denies need      Cognition    A&O x4   Able to follow 1 step commands. Patient perseverating on R UE injury. Intermittent confusion throughout. Subjective  Patient lying supine in bed with no family present - no visitors present due to COVID-19 restrictions  Pt agreeable to this OT eval & tx. Upper Extremity ROM:    WFL,  pt able to perform all bed mobility, transfers, and gait without ROM limitation. 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 Limited supination/pronation and unable to grasp with R hand. Patient had brain surgery in 1986. Upper Extremity Sensation    Impaired    Upper Extremity Proprioception:  Impaired    Coordination and Tone  Impaired    Balance  Functional Sitting Balance:  Impaired  Functional Standing Balance:NT    Bed mobility:    Supine to long sit:  SBA. Did not attempt sitting at edge of bed due to tachycardia. Sit to supine:   SBA  Rolling:    Not Tested  Scooting in sitting:  SBA  Scooting to head of bed:   Not Tested    Bridging:   Not Tested    Transfers:    Sit to stand:  Not Tested  Stand to sit:  Not Tested  Bed to chair:   Not Tested  Standard toilet: Not Tested  Bed to [facility]:  Not Tested    Dressing:      UE: Min A to don gown  LE:    Min A to don socks     Bathing:    UE:  Not Tested  LE:  Not Tested    Eating:   Not Tested    Toileting:  Not Tested    Activity Tolerance   Pt completed therapy session with SOB noted with position changes and talking. SpO2: 97% on 1.5L at rest. Dropped to 84% with talking. HR: 135 bpm  BP: 92/72    Positioning Needs: In bed, call light and needs in reach.     Alarm Set    Exercise / Activities Initiated:   N/A    Patient/Family Education:   Role of OT  Recommendations for DC    Assessment of Deficits: Pt seen for Occupational therapy evaluation in acute care setting. Pt demonstrated decreased Activity tolerance, ADLs, IADLs, Balance , Bathing, Bed mobility, Dressing, ROM, Safety Awareness, Strength, Transfers, Cognition and Coping Skills. Pt functioning below baseline and will likely benefit from skilled occupational therapy services to maximize safety and independence. Goal(s) : To be met in 3 Visits:  1). Bed to toilet/BSC: CGA    To be met in 5 Visits:  1). Supine to/from Sit:  Supervision  2). Upper Body Bathing:   Supervision  3). Lower Body Bathing:   SBA  4). Upper Body Dressing:  Supervision  5). Lower Body Dressing:  SBA  6). Pt to demonstrate UE exs x 15 reps with minimal cues    Rehabilitation Potential:  Fair for goals listed above. Strengths for achieving goals include: Pt motivated, PLOF and Pt cooperative  Barriers to achieving goals include:  Complexity of condition     Plan: To be seen 3-5 x/wk while in acute care setting for therapeutic exercises, bed mobility, transfers, dressing, bathing, family/patient education, ADL/IADL retraining, energy conservation training.        Rosa Davis OTR/L #[id number]    If patient discharges from this facility prior to next visit, this note will serve as the Discharge Summary

## 2020-12-10 NOTE — PROGRESS NOTES
IM Progress Note    Admit Date:  12/5/2020  5    Interval history:  Admitted for confusion  covid pna and resp failure  Unable to control agitation with increasing restlessness and   Worsened hypoxia needing intubation         Mr. Smith Car  Was Extubated 12/8  Now he is very tachycardic and tachypneic  placed on precedex    12/9  He was agitated overnight, very belligerent and threatening to the nurses. Continues to be on Precedex. Now on room air. 12/10  He has been calm on COWS protocol with ultram and clonidine, and Klonopin  precedex being weaned   On 2 L of O2    Objective:   BP (!) 144/93   Pulse 78   Temp 98 °F (36.7 °C) (Bladder)   Resp 27   Ht 5' (1.524 m)   Wt 91 lb 7.9 oz (41.5 kg)   SpO2 98%   BMI 17.87 kg/m²       Intake/Output Summary (Last 24 hours) at 12/10/2020 1008  Last data filed at 12/10/2020 0000  Gross per 24 hour   Intake 714 ml   Output 750 ml   Net -36 ml       Physical Exam:    Seen through the ICU glass doors  D/W nurse  Awake and alert. Sinus rhythm the monitor.   Now on precedex  oriented        Medications:   Scheduled Medications:    mupirocin   Nasal BID    potassium chloride  20 mEq Intravenous Q1H    ipratropium  2 puff Inhalation Q4H    albuterol sulfate HFA  2 puff Inhalation Q4H    clonazePAM  0.5 mg Oral BID    sodium chloride flush  10 mL Intravenous 2 times per day    sodium chloride  20 mL Intravenous Once    dexamethasone  6 mg Intravenous Daily    famotidine  20 mg Oral BID    enoxaparin  30 mg Subcutaneous BID     I   dexmedetomidine (PRECEDEX) IV infusion 1.1 mcg/kg/hr (12/10/20 0843)    sodium chloride 75 mL/hr at 12/07/20 1640     albuterol sulfate HFA, traMADol **AND** cloNIDine, traZODone, LORazepam, sodium chloride nebulizer, acetaminophen **OR** acetaminophen, sodium chloride flush, polyethylene glycol, promethazine **OR** ondansetron    Lab Data:  Recent Labs     12/08/20  0430 12/09/20  0632 12/10/20  0525   WBC 7.0 6.9 8.0   HGB 13.6 12.7* 13.6   HCT 42.2 38.9* 41.6   MCV 85.2 84.8 84.6   * 128* 182     Recent Labs     12/08/20  0430 12/09/20  0632 12/10/20  0525    144 141   K 3.7  3.7 3.9  3.9 3.4*  3.4*    109 106   CO2 27 29 28   PHOS 2.7 2.8 2.5   BUN 21* 15 17   CREATININE <0.5* <0.5* 0.6*     No results for input(s): CKTOTAL, CKMB, CKMBINDEX, TROPONINI in the last 72 hours. Coagulation:   Lab Results   Component Value Date    INR 0.87 12/06/2020     Cardiac markers:   Lab Results   Component Value Date    CKTOTAL 230 07/09/2020    TROPONINI <0.01 12/05/2020         Lab Results   Component Value Date     (H) 12/10/2020    AST 23 12/10/2020    ALKPHOS 96 12/10/2020    BILITOT 0.3 12/10/2020       Lab Results   Component Value Date    INR 0.87 12/06/2020    INR 0.99 07/06/2020    PROTIME 10.1 12/06/2020    PROTIME 11.5 07/06/2020       Radiology    XR CHEST PORTABLE   Final Result   Supportive tubing projects in normal position.       No acute cardiopulmonary disease.           CT Head WO Contrast   Final Result   No acute intracranial abnormality.           XR CHEST PORTABLE   Final Result   Hazy opacity at the right costophrenic angle which may reflect atelectasis   versus airspace disease.           cxr    Stable position of supportive tubing.         No acute cardiopulmonary disease. covid positive - 12/5  Blood - Pending  Rapid flu - neg      Urine drug - amphetamine     ASSESSMENT and PLAN      Acute hypoxemic and hypercapnic respiratory failure  Multifocal pneumonia secondary to Covid 19 infection     - failed bipap with continued agitation and was intubated in ER   - Continue mechanical ventilation, pulmonary critical care consulted. - started on decadron D 5, no Remdesivir given liver abn    - sp CCP   - levaquin 5/5 added per pulm  Extubated 12/8  Currently on 2 L   Continues to be on Precedex.     Acute toxic encephalopathy- likely drug use   - presented with confusion and agitation , tried precedex with no help  - was on bipap and eventually intubated  - urine tox positive for amphetamine, hx of heroin abuse noted    Elevated troponin- likely demand ischemia   - repeat troponins normal. ekg with no acute changes    Transaminitis- likely related to hep c and acute viral infection   - continue to monitor , improving slightly     History of polysubstance abuse(opiates and meth)  He will have to be placed on his home medications as he seems to be showing signs of some withdrawal.  Now on COWS and Klonopin    TCP - new , monitor while on anticoagulation     DVT Prophylaxis: Lovenox, pepcid   Diet: general dysphagia III  Code Status: Full Code     Ot/pt    Transfer to  Sarah Gomez MD 12/10/2020 10:08 AM

## 2020-12-10 NOTE — PROGRESS NOTES
Bedside report given to Kaushik Dutton RN. Pt to be transferred to PCU room 324 via Bed. Tele monitoring continued. Pt denies any needs. 4 eyes Refused on transfer. Documented belongings leaving with patient. Perfect Serve to hospitalist to inform of Current /59 and .    6:44 PM  Call placed to Oseas [employer]- mom to inform of Transfer.

## 2020-12-10 NOTE — PROGRESS NOTES
12:00 PM    Reassess completed. Titrating Precedex down. Continued increased work of Breathing with SpO2- 95%. Lung sounds diminished with Expiratory wheeze. 06:45 PM    Reassessment completed. Precedex off- PT/OT working with pt.  HR elevated 120s- ST.

## 2020-12-10 NOTE — PROGRESS NOTES
Care rounds completed with Dr. Rita Romo and multidisciplinary team. Reviewed labs, meds, VS, assessment, & plan of care for today. add K+- 40meq. Discussed work of breathing, Agitation and its improvement with COWS. Decreasing precedex. See dictated note and new orders for details.      High risk vesicant drug infusing:  __________    Multiple incompatible medications infusing:  _________    CVP Monitoring:  _________    Extremely difficult IV access challenge:  ________    Continued need for central line access:  ___YES_______    Addressed with physician:  __YES______    RIGHT PATIENT, RIGHT TIME, RIGHT LINE    Patient is not able to demonstrated the ability to move from a reclining position to an upright position within the recliner.  however patient is alert, oriented and able to provide informed consent

## 2020-12-11 VITALS
DIASTOLIC BLOOD PRESSURE: 88 MMHG | WEIGHT: 87.08 LBS | SYSTOLIC BLOOD PRESSURE: 134 MMHG | HEART RATE: 124 BPM | TEMPERATURE: 99.2 F | RESPIRATION RATE: 22 BRPM | BODY MASS INDEX: 17.1 KG/M2 | HEIGHT: 60 IN | OXYGEN SATURATION: 93 %

## 2020-12-11 PROCEDURE — 2580000003 HC RX 258: Performed by: INTERNAL MEDICINE

## 2020-12-11 PROCEDURE — 99233 SBSQ HOSP IP/OBS HIGH 50: CPT | Performed by: INTERNAL MEDICINE

## 2020-12-11 PROCEDURE — 6370000000 HC RX 637 (ALT 250 FOR IP): Performed by: INTERNAL MEDICINE

## 2020-12-11 PROCEDURE — 6360000002 HC RX W HCPCS: Performed by: INTERNAL MEDICINE

## 2020-12-11 PROCEDURE — 92526 ORAL FUNCTION THERAPY: CPT

## 2020-12-11 PROCEDURE — 99232 SBSQ HOSP IP/OBS MODERATE 35: CPT | Performed by: INTERNAL MEDICINE

## 2020-12-11 RX ADMIN — CLONAZEPAM 0.5 MG: 0.5 TABLET ORAL at 08:40

## 2020-12-11 RX ADMIN — FAMOTIDINE 20 MG: 20 TABLET, FILM COATED ORAL at 08:44

## 2020-12-11 RX ADMIN — Medication 10 ML: at 08:40

## 2020-12-11 RX ADMIN — CLONIDINE HYDROCHLORIDE 0.1 MG: 0.1 TABLET ORAL at 08:44

## 2020-12-11 RX ADMIN — MUPIROCIN: 20 OINTMENT TOPICAL at 08:44

## 2020-12-11 RX ADMIN — ACETAMINOPHEN 650 MG: 325 TABLET ORAL at 05:20

## 2020-12-11 RX ADMIN — DEXAMETHASONE SODIUM PHOSPHATE 6 MG: 10 INJECTION, SOLUTION INTRAMUSCULAR; INTRAVENOUS at 08:44

## 2020-12-11 RX ADMIN — TRAMADOL HYDROCHLORIDE 50 MG: 50 TABLET ORAL at 08:44

## 2020-12-11 RX ADMIN — ENOXAPARIN SODIUM 30 MG: 100 INJECTION SUBCUTANEOUS at 08:40

## 2020-12-11 ASSESSMENT — PAIN DESCRIPTION - DESCRIPTORS
DESCRIPTORS: ACHING
DESCRIPTORS: ACHING

## 2020-12-11 ASSESSMENT — PAIN DESCRIPTION - LOCATION
LOCATION: GENERALIZED
LOCATION: GENERALIZED

## 2020-12-11 ASSESSMENT — PAIN DESCRIPTION - ORIENTATION: ORIENTATION: OTHER (COMMENT)

## 2020-12-11 ASSESSMENT — PAIN SCALES - GENERAL
PAINLEVEL_OUTOF10: 8
PAINLEVEL_OUTOF10: 10
PAINLEVEL_OUTOF10: 3
PAINLEVEL_OUTOF10: 10

## 2020-12-11 ASSESSMENT — PAIN DESCRIPTION - PAIN TYPE
TYPE: ACUTE PAIN
TYPE: ACUTE PAIN

## 2020-12-11 ASSESSMENT — PAIN DESCRIPTION - FREQUENCY: FREQUENCY: CONTINUOUS

## 2020-12-11 NOTE — PROGRESS NOTES
Tenacity: Thick, Sputum How Obtained: Spontaneous cough  Cough: Strong, spontaneous, non-productive = 0    Vital Signs   BP 98/66   Pulse 131   Temp 97.3 °F (36.3 °C) (Oral)   Resp 26   Ht 5' (1.524 m)   Wt 87 lb 1.3 oz (39.5 kg)   SpO2 98%   BMI 17.01 kg/m²   SPO2 (COPD values may differ): 90-91% on room air or greater than 92% on FiO2 24- 28% = 1    Peak Flow (asthma only): not applicable = 0    RSI: 0-4 = See once and convert to home regimen or discontinue        Plan       Goals: medication delivery and improve oxygenation    Patient/caregiver was educated on the proper method of use for Respiratory Care Devices:  Yes      Level of patient/caregiver understanding able to:   ? Verbalize understanding   ? Demonstrate understanding       ? Teach back        ? Needs reinforcement       ? No available caregiver               ? Other:     Response to education:  Very Good     Is patient being placed on Home Treatment Regimen? Yes     Does the patient have everything they need prior to discharge? NA     Comments: Patient chart reviewed, patient assessed. Plan of Care: change patient to PRN home use     Electronically signed by Guido Dancer, RCP on 12/11/2020 at 2:31 AM    Respiratory Protocol Guidelines     1. Assessment and treatment by Respiratory Therapy will be initiated for medication and therapeutic interventions upon initiation of aerosolized medication. 2. Physician will be contacted for respiratory rate (RR) greater than 35 breaths per minute. Therapy will be held for heart rate (HR) greater than 140 beats per minute, pending direction from physician. 3. Bronchodilators will be administered via Metered Dose Inhaler (MDI) with spacer when the following criteria are met:  a. Alert and cooperative     b. HR < 140 bpm  c. RR < 30 bpm                d. Can demonstrate a 2-3 second inspiratory hold  4.  Bronchodilators will be administered via Hand Held Nebulizer ROS[facility]) to patients when ANY of the following criteria are met  a. Incognizant or uncooperative          b. Patients treated with HHN at Home        c. Unable to demonstrate proper use of MDI with spacer     d. RR > 30 bpm   5. Bronchodilators will be delivered via Metered Dose Inhaler (MDI), HHN, Aerogen to intubated patients on mechanical ventilation. 6. Inhalation medication orders will be delivered and/or substituted as outlined below. Aerosolized Medications Ordering and Administration Guidelines:    1. All Medications will be ordered by a physician, and their frequency and/or modality will be adjusted as defined by the patients Respiratory Severity Index (RSI) score. 2. If the patient does not have documented COPD, consider discontinuing anticholinergics when RSI is less than 9.  3. If the bronchospasm worsens (increased RSI), then the bronchodilator frequency can be increased to a maximum of every 4 hours. If greater than every 4 hours is required, the physician will be contacted. 4. If the bronchospasm improves, the frequency of the bronchodilator can be decreased, based on the patient's RSI, but not less than home treatment regimen frequency. 5. Bronchodilator(s) will be discontinued if patient has a RSI less than 9 and has received no scheduled or as needed treatment for 72  Hrs. Patients Ordered on a Mucolytic Agent:    1. Must always be administered with a bronchodilator. 2. Discontinue if patient experiences worsened bronchospasm, or secretions have lessened to the point that the patient is able to clear them with a cough. Anti-inflammatory and Combination Medications:    1. If the patient lacks prior history of lung disease, is not using inhaled anti-inflammatory medication at home, and lacks wheezing by examination or by history for at least 24 hours, contact physician for possible discontinuation.

## 2020-12-11 NOTE — PROGRESS NOTES
disconnected. Pt returns to side of bed. · Pt assessed with thin liquids and solids. No clinical s/s of aspiration noted with either consistency. Functional oral phase noted. Pt with baseline poor respiratory swallow coordination. Pt continues to demonstrates increased work of breathing. Reviewed recommended compensatory strategies. The patient is able to recall from last session with 100% acc. Recommendations: SLP recommends the patient cotinue regular solids and thin liquids. Feed only when alert. Ensure upright posture. Monitor RR and O2 during intake. Meds crushed in puree as able. No straws. If s/s of aspiration develop, hold PO and contact SLP. SLP to f/u one additional session to ensure tolerance of recommended consistencies. Dysphagia Goals:  Timeframe for Long-term Goals: 7 days (12/16/2020)  Goal 1: The patient will tolerate recommended diet with no clinical s/s of aspiration for optimal nutrition and hydration 12/11- ongoing, progressing. See above  Short-term Goals  Timeframe for Short-term Goals: 4 days (12/13/2020)  Goal 1: The patient will tolerate recommended diet with no clinical s/s of aspiration 5/5 12/11: goal addressed. See above. Ongoing, progressing. Goal 2: The patient will tolerate therapeutic diet upgrade trials with no clinical s/s of aspiration 5/5 12/11: goal addressed. See above. Ongoing, progressing. Goal 3: The patient/caregiver will demonstrate understanding of recommended compensatory strategies for improved swallow safety  12/11: goal addressed. See above. Ongoing, progressing. Speech/Language/Cog Goals: n/a    Recommendations:  Solid Consistency: Thin  Liquid Consistency: Regular  Medication: Crushed in puree as able.     Patient/Family/Caregiver Education: Role of SLP    Compensatory Strategies: Sit up for all meals and thereafter for 30 minutes, Eat with small bites (1/2 tsp; 1 tsp), Drink from a cup only with small sips, No straws, Take your medication with apple sauce    Plan:    Continued Dysphagia treatment with goals per plan of care. Discharge Recommendations: TBD    If pt discharges from hospital prior to Speech/Swallowing discharge, this note serves as tx and discharge summary. Total Treatment Time / Charges     Time in Time out Total Time / units   Cognitive Tx         Speech Tx      Dysphagia Tx 1113 1135  22 mins / 1 unit     Signature:  Claudean Sines. Randy Robbins M.A.  [id number] [facility], [place]  Speech-Language Pathologist             Hattie Bence, SLP

## 2020-12-11 NOTE — PROGRESS NOTES
IM Progress Note    Admit Date:  12/5/2020  6    Interval history:  Admitted for confusion  covid pna and resp failure  Unable to control agitation with increasing restlessness and   Worsened hypoxia needing intubation         Mr. Jocelyne Manuel  Was Extubated 12/8  Now he is very tachycardic and tachypneic  placed on precedex    12/9  He was agitated overnight, very belligerent and threatening to the nurses. Continues to be on Precedex. Now on room air. 12/10  He has been calm on COWS protocol with ultram and clonidine, and Klonopin  precedex being weaned   On 2 L of O2    12/11  Alert,oriented   Off precedex    Objective:   BP 98/61   Pulse 104   Temp 97.9 °F (36.6 °C) (Oral)   Resp 24   Ht 5' (1.524 m)   Wt 87 lb 1.3 oz (39.5 kg)   SpO2 98%   BMI 17.01 kg/m²       Intake/Output Summary (Last 24 hours) at 12/11/2020 0800  Last data filed at 12/11/2020 2013  Gross per 24 hour   Intake 588 ml   Output 1200 ml   Net -612 ml       Physical Exam:    General:alert,oriented   Mucous Membranes:  Pink , anicteric  Neck: No JVD, no carotid bruit, no thyromegaly  Chest: diminished tina with scattered crackles   Cardiovascular:  RRR S1S2 heard, no murmurs or gallops  Abdomen:  Soft, undistended, non tender, no organomegaly, BS present  Extremities: large tattoos to both upper ext  Right UE picc    No edema or cyanosis.  Clubbing noted +  Distal pulses well felt  Neurological :alert,oriented         Medications:   Scheduled Medications:    mupirocin   Nasal BID    clonazePAM  0.5 mg Oral BID    sodium chloride flush  10 mL Intravenous 2 times per day    sodium chloride  20 mL Intravenous Once    dexamethasone  6 mg Intravenous Daily    famotidine  20 mg Oral BID    enoxaparin  30 mg Subcutaneous BID     I   dexmedetomidine (PRECEDEX) IV infusion Stopped (12/10/20 1600)     albuterol sulfate HFA, traMADol **AND** cloNIDine, traZODone, LORazepam, sodium chloride nebulizer, acetaminophen **OR** acetaminophen, sodium chloride flush, polyethylene glycol, promethazine **OR** ondansetron    Lab Data:  Recent Labs     12/09/20  0632 12/10/20  0525   WBC 6.9 8.0   HGB 12.7* 13.6   HCT 38.9* 41.6   MCV 84.8 84.6   * 182     Recent Labs     12/09/20  0632 12/10/20  0525    141   K 3.9  3.9 3.4*  3.4*    106   CO2 29 28   PHOS 2.8 2.5   BUN 15 17   CREATININE <0.5* 0.6*     No results for input(s): CKTOTAL, CKMB, CKMBINDEX, TROPONINI in the last 72 hours. Coagulation:   Lab Results   Component Value Date    INR 0.87 12/06/2020     Cardiac markers:   Lab Results   Component Value Date    CKTOTAL 230 07/09/2020    TROPONINI <0.01 12/05/2020         Lab Results   Component Value Date     (H) 12/10/2020    AST 23 12/10/2020    ALKPHOS 96 12/10/2020    BILITOT 0.3 12/10/2020       Lab Results   Component Value Date    INR 0.87 12/06/2020    INR 0.99 07/06/2020    PROTIME 10.1 12/06/2020    PROTIME 11.5 07/06/2020       Radiology    XR CHEST PORTABLE   Final Result   Supportive tubing projects in normal position.       No acute cardiopulmonary disease.           CT Head WO Contrast   Final Result   No acute intracranial abnormality.           XR CHEST PORTABLE   Final Result   Hazy opacity at the right costophrenic angle which may reflect atelectasis   versus airspace disease.           cxr    Stable position of supportive tubing.         No acute cardiopulmonary disease. covid positive - 12/5  Blood - Pending  Rapid flu - neg      Urine drug - amphetamine     ASSESSMENT and PLAN      Acute hypoxemic and hypercapnic respiratory failure  Multifocal pneumonia secondary to Covid 19 infection     - failed bipap with continued agitation and was intubated in ER   - Continue mechanical ventilation, pulmonary critical care consulted. - started on decadron D 5, no Remdesivir given liver abn    - sp CCP   - levaquin 5/5 added per pulm  Extubated 12/8  Currently on 2 L   Continues to be on Precedex.   Off precedex on RA     Acute toxic encephalopathy- likely drug use   - presented with confusion and agitation , tried precedex with no help  - was on bipap and eventually intubated  - urine tox positive for amphetamine, hx of heroin abuse noted  Now alert,oriented    Elevated troponin- likely demand ischemia   - repeat troponins normal. ekg with no acute changes    Transaminitis- likely related to hep c and acute viral infection   - continue to monitor , improving slightly     History of polysubstance abuse(opiates and meth)  He will have to be placed on his home medications as he seems to be showing signs of some withdrawal.  Now on COWS and Klonopin    TCP - new , monitor while on anticoagulation     DVT Prophylaxis: Lovenox, pepcid   Diet: general dysphagia III  Code Status: Full Code     Ot/pt- SNF recommended   D/c [facility]   D/c planning    Kizzy Bruce MD 12/11/2020 8:00 AM

## 2020-12-11 NOTE — PROGRESS NOTES
Pulmonary & Critical Care Medicine Progress Note    CC:Acute respiratory failure with hypoxemia/covid 19 pneumonia    Events of Last 24 hours:   Patient weaned off precedex. Invasive Lines:   12/6/2020 PICC    MV: 12/5/2020-12/8/2020  Vent Mode: CPAP Rate Set: 18 bmp/Vt Ordered: 350 mL/ /FiO2 : 24 %  No results for input(s): PHART, OES3UIP, PO2ART in the last 72 hours. IV:      EXAM:  /88   Pulse 124   Temp 99.2 °F (37.3 °C) (Oral)   Resp 22   Ht 5' (1.524 m)   Wt 87 lb 1.3 oz (39.5 kg)   SpO2 93%   BMI 17.01 kg/m²  on RA        Intake/Output Summary (Last 24 hours) at 12/11/2020 1046  Last data filed at 12/11/2020 0237  Gross per 24 hour   Intake 588 ml   Output 1200 ml   Net -612 ml     General: Appears chronically unwell, NCAT  Eyes: PERRL. No sclera icterus. No conjunctival injection. ENT: No discharge. Pharynx clear. Neck: Trachea midline. Normal thyroid. Resp: No accessory muscle use. No crackles. Very poor air movement with wheezing. No rhonchi. No dullness on percussion. CV: Regular rate. Regular rhythm. No mumur or rub. No edema  GI: Non-tender. Non-distended. No masses. Skin: Warm and dry. No nodule on exposed extremities. No rash on exposed extremities. Heavily tattooed. Lymph: No cervical LAD. No supraclavicular LAD. M/S: No cyanosis. No joint deformity. No clubbing. Neuro: Awake and alert, moving all extremities.      Medications:   mupirocin   Nasal BID    clonazePAM  0.5 mg Oral BID    sodium chloride flush  10 mL Intravenous 2 times per day    sodium chloride  20 mL Intravenous Once    dexamethasone  6 mg Intravenous Daily    famotidine  20 mg Oral BID    enoxaparin  30 mg Subcutaneous BID     PRN Meds:  albuterol sulfate HFA, traMADol **AND** cloNIDine, traZODone, LORazepam, sodium chloride nebulizer, acetaminophen **OR** acetaminophen, sodium chloride flush, polyethylene glycol, promethazine **OR** ondansetron    Results:  CBC:   Recent Labs

## 2020-12-11 NOTE — PROGRESS NOTES
Shift assessment completed. See flow sheet. Medications given. Patient is A&O x4. Patient states everything hurts and always does that it never goes away. COWS completed. Patient denies further needs at this time. Call light within reach. Will continue to monitor.

## 2020-12-11 NOTE — PROGRESS NOTES
Patient attempted to leave AMA by walking out with all IV's still present. Security was contacted and he was located and returned to the room. IV's were removed and patient was educated about his choice to leave AMA. Patient signed AMA paperwork in my presence as well as a witness.

## 2020-12-11 NOTE — FLOWSHEET NOTE
12/10/20 1945   Vital Signs   Temp 97.3 °F (36.3 °C)   Temp Source Oral   Pulse 131   Heart Rate Source Monitor   Resp 26   BP 98/66   Level of Consciousness Alert (0)   MEWS Score 6   Oxygen Therapy   SpO2 98 %   O2 Device Nasal cannula   O2 Flow Rate (L/min) 2 L/min   Pt. Resting in bed. Call light in reach. Shift assessment completed see flow sheet. Denies any needs at this time. Will continue to monitor.

## 2020-12-14 ENCOUNTER — APPOINTMENT (OUTPATIENT)
Dept: CT IMAGING | Age: 50
DRG: 871 | End: 2020-12-14
Payer: MEDICARE

## 2020-12-14 ENCOUNTER — HOSPITAL ENCOUNTER (INPATIENT)
Age: 50
LOS: 2 days | Discharge: HOME | DRG: 871 | End: 2020-12-17
Attending: STUDENT IN AN ORGANIZED HEALTH CARE EDUCATION/TRAINING PROGRAM | Admitting: HOSPITALIST
Payer: MEDICARE

## 2020-12-14 ENCOUNTER — APPOINTMENT (OUTPATIENT)
Dept: GENERAL RADIOLOGY | Age: 50
DRG: 871 | End: 2020-12-14
Payer: MEDICARE

## 2020-12-14 DIAGNOSIS — J96.01 ACUTE RESPIRATORY FAILURE WITH HYPOXIA (HCC): ICD-10-CM

## 2020-12-14 DIAGNOSIS — A41.9 SEPTICEMIA (HCC): ICD-10-CM

## 2020-12-14 DIAGNOSIS — R91.1 PULMONARY NODULE: ICD-10-CM

## 2020-12-14 DIAGNOSIS — J18.9 PNEUMONIA DUE TO ORGANISM: ICD-10-CM

## 2020-12-14 DIAGNOSIS — R79.89 ELEVATED D-DIMER: ICD-10-CM

## 2020-12-14 DIAGNOSIS — U07.1 COVID-19: Primary | ICD-10-CM

## 2020-12-14 DIAGNOSIS — I27.82 OTHER CHRONIC PULMONARY EMBOLISM WITHOUT ACUTE COR PULMONALE (HCC): ICD-10-CM

## 2020-12-14 LAB
A/G RATIO: 1.6 (ref 1.1–2.2)
ALBUMIN SERPL-MCNC: 3.8 G/DL (ref 3.4–5)
ALP BLD-CCNC: 90 U/L (ref 40–129)
ALT SERPL-CCNC: 75 U/L (ref 10–40)
ANION GAP SERPL CALCULATED.3IONS-SCNC: 7 MMOL/L (ref 3–16)
APTT: 24.3 SEC (ref 24.2–36.2)
AST SERPL-CCNC: 29 U/L (ref 15–37)
BASOPHILS ABSOLUTE: 0 K/UL (ref 0–0.2)
BASOPHILS RELATIVE PERCENT: 0.2 %
BILIRUB SERPL-MCNC: 1 MG/DL (ref 0–1)
BUN BLDV-MCNC: 23 MG/DL (ref 7–20)
CALCIUM SERPL-MCNC: 8.3 MG/DL (ref 8.3–10.6)
CHLORIDE BLD-SCNC: 95 MMOL/L (ref 99–110)
CO2: 30 MMOL/L (ref 21–32)
CREAT SERPL-MCNC: 0.6 MG/DL (ref 0.9–1.3)
D DIMER: 288 NG/ML DDU (ref 0–229)
EOSINOPHILS ABSOLUTE: 0 K/UL (ref 0–0.6)
EOSINOPHILS RELATIVE PERCENT: 0.2 %
GFR AFRICAN AMERICAN: >60
GFR NON-AFRICAN AMERICAN: >60
GLOBULIN: 2.4 G/DL
GLUCOSE BLD-MCNC: 143 MG/DL (ref 70–99)
HCT VFR BLD CALC: 35.7 % (ref 40.5–52.5)
HEMOGLOBIN: 11.5 G/DL (ref 13.5–17.5)
INR BLD: 0.99 (ref 0.86–1.14)
LACTIC ACID, SEPSIS: 1.9 MMOL/L (ref 0.4–1.9)
LYMPHOCYTES ABSOLUTE: 0.6 K/UL (ref 1–5.1)
LYMPHOCYTES RELATIVE PERCENT: 2.9 %
MCH RBC QN AUTO: 27.6 PG (ref 26–34)
MCHC RBC AUTO-ENTMCNC: 32.3 G/DL (ref 31–36)
MCV RBC AUTO: 85.4 FL (ref 80–100)
MONOCYTES ABSOLUTE: 1.1 K/UL (ref 0–1.3)
MONOCYTES RELATIVE PERCENT: 5.2 %
NEUTROPHILS ABSOLUTE: 19 K/UL (ref 1.7–7.7)
NEUTROPHILS RELATIVE PERCENT: 91.5 %
PDW BLD-RTO: 16.2 % (ref 12.4–15.4)
PLATELET # BLD: 253 K/UL (ref 135–450)
PMV BLD AUTO: 6.4 FL (ref 5–10.5)
POTASSIUM REFLEX MAGNESIUM: 3.9 MMOL/L (ref 3.5–5.1)
PRO-BNP: 1097 PG/ML (ref 0–124)
PROCALCITONIN: 6.66 NG/ML (ref 0–0.15)
PROTHROMBIN TIME: 11.5 SEC (ref 10–13.2)
RBC # BLD: 4.18 M/UL (ref 4.2–5.9)
SODIUM BLD-SCNC: 132 MMOL/L (ref 136–145)
TOTAL PROTEIN: 6.2 G/DL (ref 6.4–8.2)
TROPONIN: <0.01 NG/ML
WBC # BLD: 20.8 K/UL (ref 4–11)

## 2020-12-14 PROCEDURE — 84145 PROCALCITONIN (PCT): CPT

## 2020-12-14 PROCEDURE — 85730 THROMBOPLASTIN TIME PARTIAL: CPT

## 2020-12-14 PROCEDURE — 85379 FIBRIN DEGRADATION QUANT: CPT

## 2020-12-14 PROCEDURE — 71260 CT THORAX DX C+: CPT

## 2020-12-14 PROCEDURE — 6370000000 HC RX 637 (ALT 250 FOR IP): Performed by: PHYSICIAN ASSISTANT

## 2020-12-14 PROCEDURE — 85025 COMPLETE CBC W/AUTO DIFF WBC: CPT

## 2020-12-14 PROCEDURE — 2580000003 HC RX 258: Performed by: PHYSICIAN ASSISTANT

## 2020-12-14 PROCEDURE — 84484 ASSAY OF TROPONIN QUANT: CPT

## 2020-12-14 PROCEDURE — 85610 PROTHROMBIN TIME: CPT

## 2020-12-14 PROCEDURE — 80053 COMPREHEN METABOLIC PANEL: CPT

## 2020-12-14 PROCEDURE — 99284 EMERGENCY DEPT VISIT MOD MDM: CPT

## 2020-12-14 PROCEDURE — 96361 HYDRATE IV INFUSION ADD-ON: CPT

## 2020-12-14 PROCEDURE — 96375 TX/PRO/DX INJ NEW DRUG ADDON: CPT

## 2020-12-14 PROCEDURE — 87040 BLOOD CULTURE FOR BACTERIA: CPT

## 2020-12-14 PROCEDURE — 71045 X-RAY EXAM CHEST 1 VIEW: CPT

## 2020-12-14 PROCEDURE — 83880 ASSAY OF NATRIURETIC PEPTIDE: CPT

## 2020-12-14 PROCEDURE — 6360000002 HC RX W HCPCS: Performed by: PHYSICIAN ASSISTANT

## 2020-12-14 PROCEDURE — 83605 ASSAY OF LACTIC ACID: CPT

## 2020-12-14 RX ORDER — 0.9 % SODIUM CHLORIDE 0.9 %
1000 INTRAVENOUS SOLUTION INTRAVENOUS ONCE
Status: COMPLETED | OUTPATIENT
Start: 2020-12-14 | End: 2020-12-15

## 2020-12-14 RX ORDER — DEXAMETHASONE SODIUM PHOSPHATE 10 MG/ML
10 INJECTION, SOLUTION INTRAMUSCULAR; INTRAVENOUS ONCE
Status: COMPLETED | OUTPATIENT
Start: 2020-12-14 | End: 2020-12-14

## 2020-12-14 RX ORDER — IPRATROPIUM BROMIDE AND ALBUTEROL SULFATE 2.5; .5 MG/3ML; MG/3ML
1 SOLUTION RESPIRATORY (INHALATION) ONCE
Status: COMPLETED | OUTPATIENT
Start: 2020-12-14 | End: 2020-12-14

## 2020-12-14 RX ADMIN — DEXAMETHASONE SODIUM PHOSPHATE 10 MG: 10 INJECTION, SOLUTION INTRAMUSCULAR; INTRAVENOUS at 21:37

## 2020-12-14 RX ADMIN — SODIUM CHLORIDE 1000 ML: 9 INJECTION, SOLUTION INTRAVENOUS at 21:37

## 2020-12-14 RX ADMIN — IPRATROPIUM BROMIDE AND ALBUTEROL SULFATE 1 AMPULE: .5; 3 SOLUTION RESPIRATORY (INHALATION) at 21:37

## 2020-12-14 NOTE — DISCHARGE SUMMARY
He will have to be placed on his home medications as he seems to be showing signs of some withdrawal.  Now on COWS and Klonopin     TCP   - new , monitor while on anticoagulation    Procedures (Please Review Full Report for Details)  Intubation/Extubation   PICC    Consults    pulmonology    Physical Exam at Discharge:    /88   Pulse 124   Temp 99.2 °F (37.3 °C) (Oral)   Resp 22   Ht 5' (1.524 m)   Wt 87 lb 1.3 oz (39.5 kg)   SpO2 93%   BMI 17.01 kg/m²     General:alert,oriented   Mucous Membranes:  Pink , anicteric  Neck: No JVD, no carotid bruit, no thyromegaly  Chest: diminished tina with scattered crackles   Cardiovascular:  RRR S1S2 heard, no murmurs or gallops  Abdomen:  Soft, undistended, non tender, no organomegaly, BS present  Extremities: large tattoos to both upper ext  Right UE picc    No edema or cyanosis. Clubbing noted +  Distal pulses well felt  Neurological :alert,oriented      Recent Labs     12/09/20  0632 12/10/20  0525   WBC 6.9 8.0   HGB 12.7* 13.6   HCT 38.9* 41.6   MCV 84.8 84.6   * 182           Recent Labs     12/09/20  0632 12/10/20  0525    141   K 3.9  3.9 3.4*  3.4*    106   CO2 29 28   PHOS 2.8 2.5   BUN 15 17   CREATININE <0.5* 0.6*        CULTURES  SARS-CoV-2, NAAT DETECTEDPanic     Blood Cx: NGTD  Rapid Flu: negative     RADIOLOGY  XR CHEST PORTABLE   Final Result   No acute airspace disease. XR CHEST PORTABLE   Final Result   Stable lines and tubes. No acute cardiopulmonary disease. IR PICC WO SQ PORT/PUMP > 5 YEARS   Final Result   Right PICC line placement. Please refer to nursing note for VPS evaluation   of line placement. XR CHEST PORTABLE   Final Result   Stable position of supportive tubing. No acute cardiopulmonary disease. XR CHEST PORTABLE   Final Result   Supportive tubing projects in normal position. No acute cardiopulmonary disease.          CT Head WO Contrast   Final Result

## 2020-12-15 PROBLEM — J18.9 HCAP (HEALTHCARE-ASSOCIATED PNEUMONIA): Status: ACTIVE | Noted: 2020-12-15

## 2020-12-15 LAB
ALBUMIN SERPL-MCNC: 3.6 G/DL (ref 3.4–5)
ALP BLD-CCNC: 88 U/L (ref 40–129)
ALT SERPL-CCNC: 75 U/L (ref 10–40)
AMPHETAMINE SCREEN, URINE: ABNORMAL
ANION GAP SERPL CALCULATED.3IONS-SCNC: 6 MMOL/L (ref 3–16)
AST SERPL-CCNC: 31 U/L (ref 15–37)
BARBITURATE SCREEN URINE: ABNORMAL
BASOPHILS ABSOLUTE: 0 K/UL (ref 0–0.2)
BASOPHILS RELATIVE PERCENT: 0.2 %
BENZODIAZEPINE SCREEN, URINE: POSITIVE
BILIRUB SERPL-MCNC: 0.6 MG/DL (ref 0–1)
BILIRUBIN DIRECT: <0.2 MG/DL (ref 0–0.3)
BILIRUBIN URINE: NEGATIVE
BILIRUBIN, INDIRECT: ABNORMAL MG/DL (ref 0–1)
BLOOD, URINE: NEGATIVE
BUN BLDV-MCNC: 16 MG/DL (ref 7–20)
CALCIUM SERPL-MCNC: 8.2 MG/DL (ref 8.3–10.6)
CANNABINOID SCREEN URINE: ABNORMAL
CHLORIDE BLD-SCNC: 99 MMOL/L (ref 99–110)
CLARITY: CLEAR
CO2: 27 MMOL/L (ref 21–32)
COCAINE METABOLITE SCREEN URINE: ABNORMAL
COLOR: YELLOW
CREAT SERPL-MCNC: 0.6 MG/DL (ref 0.9–1.3)
EOSINOPHILS ABSOLUTE: 0 K/UL (ref 0–0.6)
EOSINOPHILS RELATIVE PERCENT: 0 %
GFR AFRICAN AMERICAN: >60
GFR NON-AFRICAN AMERICAN: >60
GLUCOSE BLD-MCNC: 112 MG/DL (ref 70–99)
GLUCOSE URINE: NEGATIVE MG/DL
HCT VFR BLD CALC: 36.4 % (ref 40.5–52.5)
HEMOGLOBIN: 11.8 G/DL (ref 13.5–17.5)
KETONES, URINE: NEGATIVE MG/DL
LACTIC ACID: 1.7 MMOL/L (ref 0.4–2)
LACTIC ACID: 2.2 MMOL/L (ref 0.4–2)
LEUKOCYTE ESTERASE, URINE: NEGATIVE
LYMPHOCYTES ABSOLUTE: 0.5 K/UL (ref 1–5.1)
LYMPHOCYTES RELATIVE PERCENT: 4.5 %
Lab: ABNORMAL
MCH RBC QN AUTO: 27.8 PG (ref 26–34)
MCHC RBC AUTO-ENTMCNC: 32.3 G/DL (ref 31–36)
MCV RBC AUTO: 85.9 FL (ref 80–100)
METHADONE SCREEN, URINE: ABNORMAL
MICROSCOPIC EXAMINATION: NORMAL
MONOCYTES ABSOLUTE: 0.2 K/UL (ref 0–1.3)
MONOCYTES RELATIVE PERCENT: 2.2 %
NEUTROPHILS ABSOLUTE: 10.4 K/UL (ref 1.7–7.7)
NEUTROPHILS RELATIVE PERCENT: 93.1 %
NITRITE, URINE: NEGATIVE
OPIATE SCREEN URINE: ABNORMAL
OXYCODONE URINE: ABNORMAL
PDW BLD-RTO: 17.4 % (ref 12.4–15.4)
PH UA: 5.5
PH UA: 5.5 (ref 5–8)
PHENCYCLIDINE SCREEN URINE: ABNORMAL
PLATELET # BLD: 211 K/UL (ref 135–450)
PMV BLD AUTO: 6.6 FL (ref 5–10.5)
POTASSIUM REFLEX MAGNESIUM: 4.1 MMOL/L (ref 3.5–5.1)
PROPOXYPHENE SCREEN: ABNORMAL
PROTEIN UA: NEGATIVE MG/DL
RBC # BLD: 4.24 M/UL (ref 4.2–5.9)
SODIUM BLD-SCNC: 132 MMOL/L (ref 136–145)
SPECIFIC GRAVITY UA: >=1.03 (ref 1–1.03)
TOTAL PROTEIN: 6.1 G/DL (ref 6.4–8.2)
URINE REFLEX TO CULTURE: NORMAL
URINE TYPE: NORMAL
UROBILINOGEN, URINE: 0.2 E.U./DL
WBC # BLD: 11.2 K/UL (ref 4–11)

## 2020-12-15 PROCEDURE — 80048 BASIC METABOLIC PNL TOTAL CA: CPT

## 2020-12-15 PROCEDURE — 85025 COMPLETE CBC W/AUTO DIFF WBC: CPT

## 2020-12-15 PROCEDURE — 81003 URINALYSIS AUTO W/O SCOPE: CPT

## 2020-12-15 PROCEDURE — 6370000000 HC RX 637 (ALT 250 FOR IP): Performed by: STUDENT IN AN ORGANIZED HEALTH CARE EDUCATION/TRAINING PROGRAM

## 2020-12-15 PROCEDURE — 6360000002 HC RX W HCPCS: Performed by: STUDENT IN AN ORGANIZED HEALTH CARE EDUCATION/TRAINING PROGRAM

## 2020-12-15 PROCEDURE — 2580000003 HC RX 258: Performed by: HOSPITALIST

## 2020-12-15 PROCEDURE — 6360000004 HC RX CONTRAST MEDICATION: Performed by: PHYSICIAN ASSISTANT

## 2020-12-15 PROCEDURE — 94761 N-INVAS EAR/PLS OXIMETRY MLT: CPT

## 2020-12-15 PROCEDURE — 2580000003 HC RX 258: Performed by: PHYSICIAN ASSISTANT

## 2020-12-15 PROCEDURE — 99221 1ST HOSP IP/OBS SF/LOW 40: CPT | Performed by: NURSE PRACTITIONER

## 2020-12-15 PROCEDURE — 6360000002 HC RX W HCPCS: Performed by: HOSPITALIST

## 2020-12-15 PROCEDURE — G0480 DRUG TEST DEF 1-7 CLASSES: HCPCS

## 2020-12-15 PROCEDURE — 83605 ASSAY OF LACTIC ACID: CPT

## 2020-12-15 PROCEDURE — 80076 HEPATIC FUNCTION PANEL: CPT

## 2020-12-15 PROCEDURE — 6360000002 HC RX W HCPCS: Performed by: PHYSICIAN ASSISTANT

## 2020-12-15 PROCEDURE — 2700000000 HC OXYGEN THERAPY PER DAY

## 2020-12-15 PROCEDURE — 96365 THER/PROPH/DIAG IV INF INIT: CPT

## 2020-12-15 PROCEDURE — 6370000000 HC RX 637 (ALT 250 FOR IP): Performed by: HOSPITALIST

## 2020-12-15 PROCEDURE — 6360000002 HC RX W HCPCS: Performed by: NURSE PRACTITIONER

## 2020-12-15 PROCEDURE — 2060000000 HC ICU INTERMEDIATE R&B

## 2020-12-15 PROCEDURE — 2580000003 HC RX 258: Performed by: STUDENT IN AN ORGANIZED HEALTH CARE EDUCATION/TRAINING PROGRAM

## 2020-12-15 PROCEDURE — 80307 DRUG TEST PRSMV CHEM ANLYZR: CPT

## 2020-12-15 RX ORDER — SODIUM CHLORIDE 0.9 % (FLUSH) 0.9 %
10 SYRINGE (ML) INJECTION PRN
Status: DISCONTINUED | OUTPATIENT
Start: 2020-12-15 | End: 2020-12-17 | Stop reason: HOSPADM

## 2020-12-15 RX ORDER — TRAZODONE HYDROCHLORIDE 50 MG/1
50 TABLET ORAL NIGHTLY PRN
Status: DISCONTINUED | OUTPATIENT
Start: 2020-12-15 | End: 2020-12-17 | Stop reason: HOSPADM

## 2020-12-15 RX ORDER — ACETAMINOPHEN 325 MG/1
650 TABLET ORAL EVERY 6 HOURS PRN
Status: DISCONTINUED | OUTPATIENT
Start: 2020-12-15 | End: 2020-12-17 | Stop reason: HOSPADM

## 2020-12-15 RX ORDER — ONDANSETRON 2 MG/ML
4 INJECTION INTRAMUSCULAR; INTRAVENOUS EVERY 6 HOURS PRN
Status: DISCONTINUED | OUTPATIENT
Start: 2020-12-15 | End: 2020-12-17 | Stop reason: HOSPADM

## 2020-12-15 RX ORDER — DICYCLOMINE HCL 20 MG
20 TABLET ORAL EVERY 6 HOURS PRN
Status: DISCONTINUED | OUTPATIENT
Start: 2020-12-15 | End: 2020-12-17 | Stop reason: HOSPADM

## 2020-12-15 RX ORDER — ACETAMINOPHEN 650 MG/1
650 SUPPOSITORY RECTAL EVERY 6 HOURS PRN
Status: DISCONTINUED | OUTPATIENT
Start: 2020-12-15 | End: 2020-12-17 | Stop reason: HOSPADM

## 2020-12-15 RX ORDER — SODIUM CHLORIDE 9 MG/ML
INJECTION, SOLUTION INTRAVENOUS CONTINUOUS
Status: DISCONTINUED | OUTPATIENT
Start: 2020-12-15 | End: 2020-12-16

## 2020-12-15 RX ORDER — CLONIDINE HYDROCHLORIDE 0.1 MG/1
0.1 TABLET ORAL PRN
Status: DISCONTINUED | OUTPATIENT
Start: 2020-12-15 | End: 2020-12-17 | Stop reason: HOSPADM

## 2020-12-15 RX ORDER — TRAMADOL HYDROCHLORIDE 50 MG/1
50 TABLET ORAL PRN
Status: DISCONTINUED | OUTPATIENT
Start: 2020-12-15 | End: 2020-12-17 | Stop reason: HOSPADM

## 2020-12-15 RX ORDER — IBUPROFEN 800 MG/1
800 TABLET ORAL EVERY 8 HOURS PRN
Status: DISCONTINUED | OUTPATIENT
Start: 2020-12-15 | End: 2020-12-17 | Stop reason: HOSPADM

## 2020-12-15 RX ORDER — ALBUTEROL SULFATE 90 UG/1
2 AEROSOL, METERED RESPIRATORY (INHALATION) EVERY 4 HOURS PRN
Status: DISCONTINUED | OUTPATIENT
Start: 2020-12-15 | End: 2020-12-16

## 2020-12-15 RX ORDER — PROMETHAZINE HYDROCHLORIDE 25 MG/1
25 TABLET ORAL EVERY 6 HOURS PRN
Status: DISCONTINUED | OUTPATIENT
Start: 2020-12-15 | End: 2020-12-17 | Stop reason: HOSPADM

## 2020-12-15 RX ORDER — NALOXONE HYDROCHLORIDE 0.4 MG/ML
0.4 INJECTION, SOLUTION INTRAMUSCULAR; INTRAVENOUS; SUBCUTANEOUS PRN
Status: DISCONTINUED | OUTPATIENT
Start: 2020-12-15 | End: 2020-12-17 | Stop reason: HOSPADM

## 2020-12-15 RX ORDER — ALBUTEROL SULFATE 90 UG/1
2 AEROSOL, METERED RESPIRATORY (INHALATION) EVERY 4 HOURS PRN
Status: DISCONTINUED | OUTPATIENT
Start: 2020-12-15 | End: 2020-12-15

## 2020-12-15 RX ORDER — HYDROXYZINE PAMOATE 50 MG/1
50 CAPSULE ORAL EVERY 8 HOURS PRN
Status: DISCONTINUED | OUTPATIENT
Start: 2020-12-15 | End: 2020-12-17 | Stop reason: HOSPADM

## 2020-12-15 RX ORDER — PROMETHAZINE HYDROCHLORIDE 25 MG/1
12.5 TABLET ORAL EVERY 6 HOURS PRN
Status: DISCONTINUED | OUTPATIENT
Start: 2020-12-15 | End: 2020-12-17 | Stop reason: HOSPADM

## 2020-12-15 RX ORDER — GABAPENTIN 300 MG/1
300 CAPSULE ORAL EVERY 8 HOURS PRN
Status: DISCONTINUED | OUTPATIENT
Start: 2020-12-15 | End: 2020-12-17 | Stop reason: HOSPADM

## 2020-12-15 RX ORDER — POLYETHYLENE GLYCOL 3350 17 G/17G
17 POWDER, FOR SOLUTION ORAL DAILY PRN
Status: DISCONTINUED | OUTPATIENT
Start: 2020-12-15 | End: 2020-12-17 | Stop reason: HOSPADM

## 2020-12-15 RX ORDER — SODIUM CHLORIDE 0.9 % (FLUSH) 0.9 %
10 SYRINGE (ML) INJECTION EVERY 12 HOURS SCHEDULED
Status: DISCONTINUED | OUTPATIENT
Start: 2020-12-15 | End: 2020-12-17 | Stop reason: HOSPADM

## 2020-12-15 RX ADMIN — CEFEPIME 2 G: 2 INJECTION, POWDER, FOR SOLUTION INTRAVENOUS at 05:57

## 2020-12-15 RX ADMIN — VANCOMYCIN HYDROCHLORIDE 1000 MG: 1 INJECTION, POWDER, LYOPHILIZED, FOR SOLUTION INTRAVENOUS at 04:38

## 2020-12-15 RX ADMIN — ENOXAPARIN SODIUM 30 MG: 100 INJECTION SUBCUTANEOUS at 20:55

## 2020-12-15 RX ADMIN — SODIUM CHLORIDE 1000 ML: 9 INJECTION, SOLUTION INTRAVENOUS at 00:35

## 2020-12-15 RX ADMIN — Medication 10 ML: at 20:55

## 2020-12-15 RX ADMIN — SODIUM CHLORIDE: 9 INJECTION, SOLUTION INTRAVENOUS at 13:10

## 2020-12-15 RX ADMIN — IOPAMIDOL 85 ML: 755 INJECTION, SOLUTION INTRAVENOUS at 00:07

## 2020-12-15 RX ADMIN — CEFTRIAXONE SODIUM 1 G: 1 INJECTION, POWDER, FOR SOLUTION INTRAMUSCULAR; INTRAVENOUS at 00:35

## 2020-12-15 RX ADMIN — ENOXAPARIN SODIUM 30 MG: 100 INJECTION SUBCUTANEOUS at 09:22

## 2020-12-15 RX ADMIN — DEXAMETHASONE 6 MG: 4 TABLET ORAL at 18:20

## 2020-12-15 RX ADMIN — AZITHROMYCIN 500 MG: 500 INJECTION, POWDER, LYOPHILIZED, FOR SOLUTION INTRAVENOUS at 03:21

## 2020-12-15 RX ADMIN — VANCOMYCIN HYDROCHLORIDE 750 MG: 10 INJECTION, POWDER, LYOPHILIZED, FOR SOLUTION INTRAVENOUS at 17:00

## 2020-12-15 RX ADMIN — CEFEPIME 2 G: 2 INJECTION, POWDER, FOR SOLUTION INTRAVENOUS at 18:21

## 2020-12-15 RX ADMIN — SODIUM CHLORIDE: 9 INJECTION, SOLUTION INTRAVENOUS at 03:21

## 2020-12-15 ASSESSMENT — ENCOUNTER SYMPTOMS
VOMITING: 0
COUGH: 1
ABDOMINAL PAIN: 1
SHORTNESS OF BREATH: 1

## 2020-12-15 ASSESSMENT — PAIN SCALES - GENERAL: PAINLEVEL_OUTOF10: 0

## 2020-12-15 NOTE — CARE COORDINATION
ED Chart review completed. Pt is in isolation precautions for covid-19 and is in the process of being admitted. Attempted calling pt's bedside phone and cell phone listed on the facesheet but there was no answer. Pt is a readmit and left AMA on 12/11/2020 from [facility].      CM will attempt assessment with pt when able to provide resources as on the last admission it was reported pt was homeless.

## 2020-12-15 NOTE — ED NOTES
AM assessment completed, see flowsheet. Patient resting in bed at this time. All needs met. Respirations easy and even at rest, SOB on exertion. O2 via NC, tolerating well. No c/o pain at this time. Bed in lowest position and locked, SR up x2. Call light and bedside table within reach.

## 2020-12-15 NOTE — ED NOTES
Two phlebotomists attempted to obtain blood draw and maxed out on sticks. ED staff attempted to obtain without success. Urine obtained and sent to lab.

## 2020-12-15 NOTE — PROGRESS NOTES
Entered room to administer narcan, patient sitting up wide awake eating dinner and watching TV. Will further monitor.

## 2020-12-15 NOTE — PROGRESS NOTES
RESPIRATORY THERAPY ASSESSMENT    Name:  Brooks   Seth Sutherland Record Number:  [id number]  Age: 48 y.o. Gender: male  : 1970  Today's Date:  12/15/2020  Room:      Assessment     Is the patient being admitted for a COPD or Asthma exacerbation? No   (If yes the patient will be seen every 4 hours for the first 24 hours and then reassessed)    Patient Admission Diagnosis      Allergies  No Known Allergies    Minimum Predicted Vital Capacity:               Actual Vital Capacity:                    Pulmonary History:COPD  Home Oxygen Therapy:  room air  Home Respiratory Therapy:Albuterol   Current Respiratory Therapy:  Albuterol 2 puff Q4 PRN          Respiratory Severity Index(RSI)   Patients with orders for inhalation medications, oxygen, or any therapeutic treatment modality will be placed on Respiratory Protocol. They will be assessed with the first treatment and at least every 72 hours thereafter. The following severity scale will be used to determine frequency of treatment intervention.     Smoking History: Pulmonary Disease or Smoking History, Less than 15 pack year = 1   Social History  Social History     Tobacco Use    Smoking status: Former Smoker     Packs/day: 2.00     Types: Cigarettes    Smokeless tobacco: Never Used    Tobacco comment: states 5 yrs ago   Substance Use Topics    Alcohol use: No    Drug use: Not Currently     Types: IV     Comment: states last use 5 wks ago       Recent Surgical History: None = 0  Past Surgical History  Past Surgical History:   Procedure Laterality Date    BRAIN SURGERY      s/p trauma    BRAIN SURGERY      FRACTURE SURGERY      LEG SURGERY         Level of Consciousness: Alert, Oriented, and Cooperative = 0    Level of Activity: Walking unassisted = 0    Respiratory Pattern: Regular Pattern; RR 8-20 = 0    Breath Sounds: Diminshed bilaterally and/or crackles = 2    Sputum   ,  ,    Cough: Strong, spontaneous, non-productive = 0 Vital Signs   BP 98/75   Pulse 72   Temp 99.7 °F (37.6 °C) (Temporal)   Resp 16   Ht 5' 1\" (1.549 m)   Wt 95 lb (43.1 kg)   SpO2 100%   BMI 17.95 kg/m²   SPO2 (COPD values may differ): 90-91% on room air or greater than 92% on FiO2 24- 28% = 1    Peak Flow (asthma only): not applicable = 0    RSI: 0-4 = See once and convert to home regimen or discontinue        Plan       Goals: medication delivery, mobilize retained secretions, volume expansion and improve oxygenation    Patient/caregiver was educated on the proper method of use for Respiratory Care Devices:  Yes      Level of patient/caregiver understanding able to:   ? Verbalize understanding   ? Demonstrate understanding       ? Teach back        ? Needs reinforcement       ? No available caregiver               ? Other:     Response to education:  Very Good     Is patient being placed on Home Treatment Regimen? Yes     Does the patient have everything they need prior to discharge? NA     Comments: pt assessed & chart reviewed    Plan of Care: maintain home regimen    Electronically signed by Maksim Escobar RCP on 12/15/2020 at 3:52 AM    Respiratory Protocol Guidelines     1. Assessment and treatment by Respiratory Therapy will be initiated for medication and therapeutic interventions upon initiation of aerosolized medication. 2. Physician will be contacted for respiratory rate (RR) greater than 35 breaths per minute. Therapy will be held for heart rate (HR) greater than 140 beats per minute, pending direction from physician. 3. Bronchodilators will be administered via Metered Dose Inhaler (MDI) with spacer when the following criteria are met:  a. Alert and cooperative     b. HR < 140 bpm  c. RR < 30 bpm                d. Can demonstrate a 23 second inspiratory hold  4. Bronchodilators will be administered via Hand Held Nebulizer [facility]) to patients when ANY of the following criteria are met  a.  Incognizant or uncooperative b. Patients treated with HHN at Home        c. Unable to demonstrate proper use of MDI with spacer     d. RR > 30 bpm   5. Bronchodilators will be delivered via Metered Dose Inhaler (MDI), HHN, Aerogen to intubated patients on mechanical ventilation. 6. Inhalation medication orders will be delivered and/or substituted as outlined below. Aerosolized Medications Ordering and Administration Guidelines:    1. All Medications will be ordered by a physician, and their frequency and/or modality will be adjusted as defined by the patients Respiratory Severity Index (RSI) score. 2. If the patient does not have documented COPD, consider discontinuing anticholinergics when RSI is less than 9.  3. If the bronchospasm worsens (increased RSI), then the bronchodilator frequency can be increased to a maximum of every 4 hours. If greater than every 4 hours is required, the physician will be contacted. 4. If the bronchospasm improves, the frequency of the bronchodilator can be decreased, based on the patient's RSI, but not less than home treatment regimen frequency. 5. Bronchodilator(s) will be discontinued if patient has a RSI less than 9 and has received no scheduled or as needed treatment for 72  Hrs. Patients Ordered on a Mucolytic Agent:    1. Must always be administered with a bronchodilator. 2. Discontinue if patient experiences worsened bronchospasm, or secretions have lessened to the point that the patient is able to clear them with a cough. Anti-inflammatory and Combination Medications:    1. If the patient lacks prior history of lung disease, is not using inhaled anti-inflammatory medication at home, and lacks wheezing by examination or by history for at least 24 hours, contact physician for possible discontinuation.

## 2020-12-15 NOTE — CONSULTS
Pharmacy Note  Vancomycin Consult    Iona Olson is a 48 y.o. male started on Vancomycin for gram positive coverage of HCAP in this COVID + patient; consult received from Dr. Florentino Lopez to manage therapy. Also receiving the following antibiotics: cefepime. Patient Active Problem List   Diagnosis    MRSA bacteremia    Pleural effusion, left    Leukocytosis    Illicit drug use    Hepatitis    Chest pain    Pulmonary embolism (HCC)    AMS (altered mental status)    Acute respiratory failure with hypoxia (HCC)    Drug overdose    Abnormal chest x-ray    Acute encephalopathy    Disorder of electrolytes    Non-traumatic rhabdomyolysis    Elevated procalcitonin    Methamphetamine abuse (HCC)    Toxic encephalopathy    Acute hypercapnic respiratory failure (HCC)    Heroin withdrawal (HCC)    Tachycardia    Hypoxia    Acute hypoxemic respiratory failure due to COVID-19 [facility])    Acute respiratory failure with hypoxia and hypercapnia ([facility])    Pneumonia due to COVID-19 virus    Thrombocytopenia ([facility])    Elevated LFTs    COVID-19 virus infection    Acute metabolic encephalopathy    Moderate protein-calorie malnutrition ([address]    HCAP (healthcare-associated pneumonia)       Allergies:  Patient has no known allergies. Temp max: 99.7    Recent Labs     12/14/20 2124   BUN 23*       Recent Labs     12/14/20 2124   CREATININE 0.6*       Recent Labs     12/14/20 2124   WBC 20.8*         Intake/Output Summary (Last 24 hours) at 12/15/2020 0748  Last data filed at 12/15/2020 0448  Gross per 24 hour   Intake    Output 500 ml   Net -500 ml       Culture Date      Source                       Results      Ht Readings from Last 1 Encounters:   12/14/20 5' 1\" (1.549 m)        Wt Readings from Last 1 Encounters:   12/14/20 95 lb (43.1 kg)         Body mass index is 17.95 kg/m². CrCl cannot be calculated (Unknown ideal weight.).     Goal Trough Level: 15-19 mcg/mL    Assessment/Plan: Will initiate Vancomycin with a one time loading dose of 1000 mg x1, followed by 750 mg IV every 12 hours. A vancomycin trough has been ordered for 12/16 @ 1530 prior to the 4th dose. Thank you for the consult. Will continue to follow.

## 2020-12-15 NOTE — ED PROVIDER NOTES
ALT 75 (*)     All other components within normal limits    Narrative:     Performed at:  [facility],  [place] [facility]   Phone [phone]   BRAIN NATRIURETIC PEPTIDE - Abnormal; Notable for the following components:    Pro-BNP 1,097 (*)     All other components within normal limits    Narrative:     Performed at:  [place] [address],  [place], [facility]   Phone [phone]   D-DIMER, QUANTITATIVE - Abnormal; Notable for the following components:    D-Dimer, Quant 288 (*)     All other components within normal limits    Narrative:     Performed at:  [place] [address],  [place], [facility]   Phone [phone]   CULTURE, BLOOD 1   CULTURE, BLOOD 2   LACTATE, SEPSIS    Narrative:     Performed at:  [facility],  [place], [facility]   Phone [phone]   TROPONIN    Narrative:     Performed at:  [place] [address],  [place], [facility]   Phone [phone]   PROTIME-INR    Narrative:     Performed at:  [place] [address],  [place], [facility] [employer]   Phone [phone]   APTT    Narrative:     Performed at:  [facility],  [place], [facility], [employer]   Phone [phone]   LACTATE, SEPSIS   URINE RT REFLEX TO CULTURE   URINE DRUG SCREEN   BASIC METABOLIC PANEL W/ REFLEX TO MG FOR LOW K   LACTIC ACID, PLASMA   LACTIC ACID, PLASMA   HEPATIC FUNCTION PANEL   CBC WITH AUTO DIFFERENTIAL   LACTIC ACID, PLASMA     CT CHEST PULMONARY EMBOLISM W CONTRAST   Final Result   Chronic nonocclusive segmental PE involving the pulmonary artery to the   posterior basal segment of the right lower lobe. No evidence of acute pulmonary embolism. COPD. A 0.7 cm pleural based nodule versus focal atelectasis in the right lower   lobe. Follow-up CT scan in 6 months may be obtained. I discussed the findings by phone with the ordering physician assistant,   Nata Garrido, at 12:37 a.m. on 12/15/2020      RECOMMENDATIONS:   CT scan of the chest in 6 months         XR CHEST PORTABLE   Final Result   Findings remain compatible with the patient's history of COVID-19 positivity. ED course: Patient is a 59-year-old male with recent diagnosis of Covid presenting with concerns for worsening shortness of breath. Was hypoxic prior to arrival.  Full HPI as detailed above. Patient seen in conjunction with the midlevel provider, please refer to their note for further details. Patient was treated with Decadron. Also had an elevated procalcitonin and was started on antibiotics here in the ED. He has hypoxic. Had an elevated D-dimer and CT showed chronic pulmonary embolism but no acute pulmonary embolism. Given his new oxygen requirement, Covid diagnosis, pneumonia, patient will be hospitalized for further work-up and treatment of his condition. Findings were discussed with the patient is comfortable in agreement with plan of care. All diagnostic, treatment, and disposition decisions were made by myself in conjunction with the advanced practice provider. For all further details of the patient's emergency department visit, please see the advanced practice provider's documentation. Comment: Please note this report has been produced using speech recognition software and may contain errors related to that system including errors in grammar, punctuation, and spelling, as well as words and phrases that may be inappropriate. If there are any questions or concerns please feel free to contact the dictating provider for clarification.        Edenilson Stone MD  12/15/20 5161

## 2020-12-15 NOTE — ED PROVIDER NOTES
 Stress: None   Relationships    Social connections     Talks on phone: None     Gets together: None     Attends Oriental orthodox service: None     Active member of club or organization: None     Attends meetings of clubs or organizations: None     Relationship status: None    Intimate partner violence     Fear of current or ex partner: None     Emotionally abused: None     Physically abused: None     Forced sexual activity: None   Other Topics Concern    None   Social History Narrative    ** Merged History Encounter **            SCREENINGS             PHYSICAL EXAM    (up to 7 for level 4, 8 or more for level 5)     ED Triage Vitals [12/14/20 2042]   BP Temp Temp Source Pulse Resp SpO2 Height Weight   99/61 99.7 °F (37.6 °C) Temporal 122 20 (!) 87 % 5' 1\" (1.549 m) 95 lb (43.1 kg)       Physical Exam  Vitals signs and nursing note reviewed. Constitutional:       Appearance: He is cachectic. He is ill-appearing. Interventions: Nasal cannula in place. HENT:      Head: Normocephalic and atraumatic. Mouth/Throat:      Mouth: Mucous membranes are moist.      Pharynx: Oropharynx is clear. No posterior oropharyngeal erythema. Eyes:      Conjunctiva/sclera: Conjunctivae normal.      Pupils: Pupils are equal, round, and reactive to light. Neck:      Musculoskeletal: Normal range of motion and neck supple. Cardiovascular:      Rate and Rhythm: Regular rhythm. Tachycardia present. Pulses: Normal pulses. Radial pulses are 2+ on the right side and 2+ on the left side. Posterior tibial pulses are 2+ on the right side and 2+ on the left side. Pulmonary:      Effort: Pulmonary effort is normal.      Breath sounds: No stridor. Wheezing and rhonchi present. No rales. Abdominal:      General: Bowel sounds are normal. There is no distension. Palpations: Abdomen is soft. Abdomen is not rigid. Tenderness: There is no abdominal tenderness. There is no guarding or rebound. Musculoskeletal: Normal range of motion. Right lower leg: Edema (1+ pitting) present. Left lower leg: Edema (1+ pitting) present. Skin:     General: Skin is warm and dry. Neurological:      Mental Status: He is alert and oriented to person, place, and time. GCS: GCS eye subscore is 4. GCS verbal subscore is 5. GCS motor subscore is 6. Cranial Nerves: No cranial nerve deficit. Sensory: No sensory deficit. Motor: No abnormal muscle tone. Coordination: Coordination normal.   Psychiatric:         Speech: Speech normal.         Behavior: Behavior normal.         Thought Content:  Thought content normal.         DIAGNOSTIC RESULTS   LABS:    Labs Reviewed   PROCALCITONIN - Abnormal; Notable for the following components:       Result Value    Procalcitonin 6.66 (*)     All other components within normal limits    Narrative:     Performed at:  [facility] [address],  [employer] [facility]   Phone [phone]   CBC WITH AUTO DIFFERENTIAL - Abnormal; Notable for the following components:    WBC 20.8 (*)     RBC 4.18 (*)     Hemoglobin 11.5 (*)     Hematocrit 35.7 (*)     RDW 16.2 (*)     Neutrophils Absolute 19.0 (*)     Lymphocytes Absolute 0.6 (*)     All other components within normal limits    Narrative:     Performed at:  [facility] [address],  [employer] [facility]   Phone [phone]   COMPREHENSIVE METABOLIC PANEL W/ REFLEX TO MG FOR LOW K - Abnormal; Notable for the following components:    Sodium 132 (*)     Chloride 95 (*)     Glucose 143 (*)     BUN 23 (*)     CREATININE 0.6 (*)     Total Protein 6.2 (*)     ALT 75 (*)     All other components within normal limits    Narrative:     Performed at:  [facility],  [employer] [facility]   Phone [phone]   BRAIN NATRIURETIC PEPTIDE - Abnormal; Notable for the following components: Pro-BNP 1,097 (*)     All other components within normal limits    Narrative:     Performed at:  [facility] [address],  [place] [email or web address]   Phone [phone]   D-DIMER, QUANTITATIVE - Abnormal; Notable for the following components:    D-Dimer, Quant 288 (*)     All other components within normal limits    Narrative:     Performed at:  [place] [address],  [place]   Phone [phone]   CULTURE, BLOOD 1   CULTURE, BLOOD 2   LACTATE, SEPSIS    Narrative:     Performed at:  [place] [address],  [place]   Phone [phone]   TROPONIN    Narrative:     Performed at:  [place] [address],  [place] [email or web address]   Phone [phone]   PROTIME-INR    Narrative:     Performed at:  [place] [address],  [place] [email or web address]   Phone [phone]   APTT    Narrative:     Performed at:  [place] [address],  [place]   Phone [phone]   LACTATE, SEPSIS   URINE RT REFLEX TO CULTURE   URINE DRUG SCREEN     Results for orders placed or performed during the hospital encounter of 12/14/20   Lactate, Sepsis   Result Value Ref Range    Lactic Acid, Sepsis 1.9 0.4 - 1.9 mmol/L   Procalcitonin   Result Value Ref Range    Procalcitonin 6.66 (H) 0.00 - 0.15 ng/mL   CBC Auto Differential   Result Value Ref Range    WBC 20.8 (H) 4.0 - 11.0 K/uL    RBC 4.18 (L) 4.20 - 5.90 M/uL    Hemoglobin 11.5 (L) 13.5 - 17.5 g/dL    Hematocrit 35.7 (L) 40.5 - 52.5 %    MCV 85.4 80.0 - 100.0 fL    MCH 27.6 26.0 - 34.0 pg    MCHC 32.3 31.0 - 36.0 g/dL    RDW 16.2 (H) 12.4 - 15.4 %    Platelets 591 947 - 594 K/uL    MPV 6.4 5.0 - 10.5 fL    Neutrophils % 91.5 %    Lymphocytes % 2.9 %    Monocytes % 5.2 %    Eosinophils % 0.2 %    Basophils % 0.2 % Neutrophils Absolute 19.0 (H) 1.7 - 7.7 K/uL    Lymphocytes Absolute 0.6 (L) 1.0 - 5.1 K/uL    Monocytes Absolute 1.1 0.0 - 1.3 K/uL    Eosinophils Absolute 0.0 0.0 - 0.6 K/uL    Basophils Absolute 0.0 0.0 - 0.2 K/uL   Comprehensive Metabolic Panel w/ Reflex to MG   Result Value Ref Range    Sodium 132 (L) 136 - 145 mmol/L    Potassium reflex Magnesium 3.9 3.5 - 5.1 mmol/L    Chloride 95 (L) 99 - 110 mmol/L    CO2 30 21 - 32 mmol/L    Anion Gap 7 3 - 16    Glucose 143 (H) 70 - 99 mg/dL    BUN 23 (H) 7 - 20 mg/dL    CREATININE 0.6 (L) 0.9 - 1.3 mg/dL    GFR Non-African American >60 >60    GFR African American >60 >60    Calcium 8.3 8.3 - 10.6 mg/dL    Total Protein 6.2 (L) 6.4 - 8.2 g/dL    Alb 3.8 3.4 - 5.0 g/dL    Albumin/Globulin Ratio 1.6 1.1 - 2.2    Total Bilirubin 1.0 0.0 - 1.0 mg/dL    Alkaline Phosphatase 90 40 - 129 U/L    ALT 75 (H) 10 - 40 U/L    AST 29 15 - 37 U/L    Globulin 2.4 g/dL   Troponin   Result Value Ref Range    Troponin <0.01 <0.01 ng/mL   Protime-INR   Result Value Ref Range    Protime 11.5 10.0 - 13.2 sec    INR 0.99 0.86 - 1.14   APTT   Result Value Ref Range    aPTT 24.3 24.2 - 36.2 sec   Brain Natriuretic Peptide   Result Value Ref Range    Pro-BNP 1,097 (H) 0 - 124 pg/mL   D-dimer, quantitative   Result Value Ref Range    D-Dimer, Quant 288 (H) 0 - 229 ng/mL DDU       All other labs were within normal range or not returned as of this dictation. EKG: All EKG's are interpreted by the Emergency Department Physician in the absence of a cardiologist.  Please see their note for interpretation of EKG.       RADIOLOGY:   Non-plain film images such as CT, Ultrasound and MRI are read by the radiologist. Plain radiographic images are visualized andpreliminarily interpreted by the  ED Provider with the below findings:        Interpretation perthe Radiologist below, if available at the time of this note:    CT CHEST PULMONARY EMBOLISM W CONTRAST   Final Result Chronic nonocclusive segmental PE involving the pulmonary artery to the   posterior basal segment of the right lower lobe. No evidence of acute pulmonary embolism. COPD. A 0.7 cm pleural based nodule versus focal atelectasis in the right lower   lobe. Follow-up CT scan in 6 months may be obtained. I discussed the findings by phone with the ordering physician assistant,   Elnoria Brunner, at 12:37 a.m. on 12/15/2020      RECOMMENDATIONS:   CT scan of the chest in 6 months         XR CHEST PORTABLE   Final Result   Findings remain compatible with the patient's history of COVID-19 positivity. Xr Chest Portable    Result Date: 12/8/2020  EXAMINATION: ONE XRAY VIEW OF THE CHEST 12/8/2020 7:24 am COMPARISON: 12/07/2020 HISTORY: ORDERING SYSTEM PROVIDED HISTORY: Mechanical ventilation TECHNOLOGIST PROVIDED HISTORY: Reason for exam:->Mechanical ventilation Reason for Exam: daily while on vent Acuity: Acute Type of Exam: Initial FINDINGS: Cardiac leads project over the chest.  Nasogastric tube extends to the left upper quadrant. Endotracheal tube extends to the mid trachea. Right upper extremity PICC line is demonstrated. Calcified pulmonary nodules, in keeping with granulomatous disease. No confluent airspace disease. No pneumothorax. Cardiac and mediastinal silhouettes are reflective of patient rotation. No acute airspace disease. PROCEDURES   Unless otherwise noted below, none     Procedures    CRITICAL CARE TIME   Critical care provided for this patient of which 35 min were spend on critical care and decision making. 0 min spent on procedures. There was imminent failure of an organ system which required critical intervention to prevent clinically significant progression of life threatening deterioration of the patient's condition to the point of disability or death.       CONSULTS:  IP CONSULT TO HOSPITALIST  IP CONSULT TO PHARMACY EMERGENCY DEPARTMENT COURSE and DIFFERENTIAL DIAGNOSIS/MDM:   Vitals:    Vitals:    12/14/20 2307 12/15/20 0035 12/15/20 0105 12/15/20 0135   BP: 92/75 99/74 90/66 98/75   Pulse: 91 97 79 72   Resp: 13 16 14 16   Temp:       TempSrc:       SpO2: 100% 100% 100% 100%   Weight:       Height:           Patient was given thefollowing medications:  Medications   azithromycin (ZITHROMAX) 500 mg in D5W 250ml addavial (has no administration in time range)   iopamidol (ISOVUE-370) 76 % injection 85 mL (has no administration in time range)   vancomycin 1000 mg IVPB in 250 mL D5W addavial (has no administration in time range)   cefepime (MAXIPIME) 2 g IVPB minibag (has no administration in time range)   0.9 % sodium chloride bolus (0 mLs Intravenous Stopped 12/15/20 0036)   dexamethasone (PF) (DECADRON) injection 10 mg (10 mg Intravenous Given 12/14/20 2137)   ipratropium-albuterol (DUONEB) nebulizer solution 1 ampule (1 ampule Inhalation Given 12/14/20 2137)   0.9 % sodium chloride bolus (1,000 mLs Intravenous New Bag 12/15/20 0035)   iopamidol (ISOVUE-370) 76 % injection 85 mL (85 mLs Intravenous Given 12/15/20 0007)   cefTRIAXone (ROCEPHIN) 1 g IVPB in 50 mL D5W minibag (1 g Intravenous New Bag 12/15/20 0035)        Patient has COVID-19 infection. Acute respiratory failure requiring oxygen. White count is elevated at 20,000. Procalcitonin 6.6. D-dimer minimally elevated history of PE.  CTPA showing chronic segmental pulmonary embolism involving pulmonary artery to posterior basal segment of right lower lobe. No acute pulmonary embolism. COPD. Pulmonary nodule versus atelectasis right lower lobe. He was given Decadron, IV antibiotics, IV fluids and breathing treatment. Plan for admission. 1:36 AM EST  On reevaluation patient resting with eyes closed. I awoke him and spoke with him discussed test results and plan he understands and agrees. No complaints at this time. He is stable.     2:00 AM EST Spoke with Dr. Madi Tuttle and discussed symptoms, exam, objective data and clinical course. Disposition and plan agreed upon. He will admit the patient and give/enter admitting orders. FINAL IMPRESSION      1. COVID-19    2. Acute respiratory failure with hypoxia (HCC)    3. Pneumonia due to organism    4. Septicemia ([facility]    5. Elevated d-dimer    6. Other chronic pulmonary embolism without acute cor pulmonale (HCC)    7. Pulmonary nodule          DISPOSITION/PLAN   DISPOSITION Admitted    PATIENT REFERREDTO:  No follow-up provider specified.     DISCHARGE MEDICATIONS:  New Prescriptions    No medications on file       DISCONTINUED MEDICATIONS:  Discontinued Medications    No medications on file              (Please note that portions ofthis note were completed with a voice recognition program.  Efforts were made to edit the dictations but occasionally words are mis-transcribed.)    Merissa Nevarez PA-C (electronically signed)            Camila Garza PA-C  12/15/20 [id number]

## 2020-12-15 NOTE — H&P
Hospital Medicine History & Physical      PCP: No primary care provider on file. Date of Admission: 12/14/2020    Date of Service: Pt seen/examined on 12/15/2020       Chief Complaint:    Chief Complaint   Patient presents with    Shortness of Breath     Pt was diagnosed with covid last week and states it is hard to breath       History Of Present Illness: The patient is a 48 y.o. male with h/o PE, hepatitis C, polysubstance abuse who presents to [facility] with c/o shortness of breath and chest pain. Diagnosed with COVID on 12/5. Admitted to [facility] 12/5-12/11. He was intubated at that time. He improved and was discharged home. States he feels better with oxygen. His dyspnea is worse with exertion. He has a cough. He denies fever, abdominal pain, nausea, vomiting, or diarrhea. He reports his last use of drugs was 3 months ago. His tox screen was positive for amphetamines on 12/5. We have not been able to get a urine sample yet. He is on 2 L O2. Labs with hyponatremia, elevated ALT, lactic acidosis, and leukocytosis. CT chest with chronic PE, COPD, and a 0.7 Cm pleural based nodule. Needs repeat CT in 6 months. Patient admitted for further management/care. 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  Authorizing Provider albuterol sulfate  (90 Base) MCG/ACT inhaler Inhale 2 puffs into the lungs every 4 hours as needed for Wheezing or Shortness of Breath 11/21/20 11/28/20  David Palma MD       Allergies:  Patient has no known allergies. Social History:     TOBACCO:   reports that he has quit smoking. His smoking use included cigarettes. He smoked 2.00 packs per day. He has never used smokeless tobacco.  ETOH:   reports no history of alcohol use. Family History:   Positive as follows:    History reviewed. No pertinent family history. REVIEW OF SYSTEMS:       Constitutional: Negative for fever   Respiratory: + dyspnea, cough   Cardiovascular: + chest pain   Gastrointestinal: Negative for vomiting, diarrhea   Genitourinary: Negative for dysuria  Musculoskeletal: Negative for arthralgias   Skin: Negative for rash   Neurological: Negative for syncope   Psychiatric/Behavorial: Negative for anxiety    PHYSICAL EXAM:    /72   Pulse 82   Temp 97.8 °F (36.6 °C)   Resp 16   Ht 5' 1\" (1.549 m)   Wt 95 lb (43.1 kg)   SpO2 95%   BMI 17.95 kg/m²     Gen: No distress. Alert. Eyes: PERRL. No sclera icterus. No conjunctival injection. ENT: No discharge. Pharynx clear. Neck: Trachea midline. Resp: No accessory muscle use. No crackles. No wheezes. No rhonchi. CV: Regular rate. Regular rhythm. No murmur. No rub. + RLE edema. GI: Non-tender. Non-distended. Normal bowel sounds. No hernia. Skin: Warm and dry. No nodule on exposed extremities. No rash on exposed extremities. M/S: No cyanosis. No joint deformity. No clubbing. Neuro: Awake. Grossly nonfocal  + tremulous  Psych: Oriented x 3.  Anxious, restless    CBC:   Recent Labs     12/14/20  2124 12/15/20  0915   WBC 20.8* 11.2*   HGB 11.5* 11.8*   HCT 35.7* 36.4*   MCV 85.4 85.9    211     BMP:   Recent Labs     12/14/20  2124 12/15/20  0915   * 132*   K 3.9 4.1   CL 95* 99   CO2 30 27   BUN 23* 16   CREATININE 0.6* 0.6*     LIVER PROFILE: Recent Labs     12/14/20  2124 12/15/20  0915   AST 29 31   ALT 75* 75*   BILITOT 1.0 0.6   ALKPHOS 90 88     PT/INR:   Recent Labs     12/14/20 2124   PROTIME 11.5   INR 0.99     APTT:   Recent Labs     12/14/20 2124   APTT 24.3       CARDIAC ENZYMES  Recent Labs     12/14/20 2124   TROPONINI <0.01       U/A:    Lab Results   Component Value Date    COLORU Yellow 12/05/2020    WBCUA 0-2 12/05/2020    RBCUA  12/05/2020    MUCUS 1+ 12/05/2020    BACTERIA 2+ 07/03/2020    CLARITYU Clear 12/05/2020    SPECGRAV 1.015 12/05/2020    LEUKOCYTESUR Negative 12/05/2020    BLOODU MODERATE 12/05/2020    GLUCOSEU Negative 12/05/2020       ABG    Lab Results   Component Value Date    WYF0NIH 24.6 12/08/2020    BEART 1.0 12/08/2020    P6CUCIQP 96.6 12/08/2020    PHART 7.451 12/08/2020    OFE2CEP 36.1 12/08/2020    PO2ART 82.7 12/08/2020    BXJ6ULM 25.7 12/08/2020       CULTURES  Blood: pending    EKG:  I have reviewed the EKG with the following interpretation:   N/A    RADIOLOGY  CT CHEST PULMONARY EMBOLISM W CONTRAST   Final Result   Chronic nonocclusive segmental PE involving the pulmonary artery to the   posterior basal segment of the right lower lobe. No evidence of acute pulmonary embolism. COPD. A 0.7 cm pleural based nodule versus focal atelectasis in the right lower   lobe. Follow-up CT scan in 6 months may be obtained. I discussed the findings by phone with the ordering physician assistant,   Camila Garza, at 12:37 a.m. on 12/15/2020      RECOMMENDATIONS:   CT scan of the chest in 6 months         XR CHEST PORTABLE   Final Result   Findings remain compatible with the patient's history of COVID-19 positivity. Active Problems:    HCAP (healthcare-associated pneumonia)  Resolved Problems:    * No resolved hospital problems.  *        ASSESSMENT/PLAN:  Acute hypoxemic respiratory failure  - secondary to Covid 19 infection, pneumonia (gram negative organism, risk for MRSA) - on 2 L O2. Wean as tolerated. - Continue Vanc, Cefepime D#1    Hyponatremia  - likely with fluid volume depletion.  - Give IVFs. Monitor labs. Lactic acidosis  - Trend Lactic    Leukocytosis  - likely related to above  - improved with antibiotics. Transaminitis  Hepatitis C- ALT elevated. - likely related to hep c and acute viral infection   - continue to monitor, improving     History of polysubstance abuse (opiates and meth)  - he denies any drug use to me. He does appear to possibly be withdrawing  - add COWS protocol  - check Tox screen  - he is refusing to give urine samply  - consider Ativan, DICK for methamphetamine use    H/o PE  - chronic on CT  - not on [facility]  - Check Venous doppler RLE as he has pain/swelling to this extremity. Pulmonary nodule  - need repeat CT chest in 6 months.      DVT Prophylaxis: Lovenox  Diet: DIET GENERAL;  Code Status: Full Code    Ebony Kan FNP-C  12/15/2020

## 2020-12-16 LAB
ANION GAP SERPL CALCULATED.3IONS-SCNC: 3 MMOL/L (ref 3–16)
BUN BLDV-MCNC: 19 MG/DL (ref 7–20)
CALCIUM SERPL-MCNC: 6.3 MG/DL (ref 8.3–10.6)
CHLORIDE BLD-SCNC: 111 MMOL/L (ref 99–110)
CO2: 27 MMOL/L (ref 21–32)
CREAT SERPL-MCNC: 0.6 MG/DL (ref 0.9–1.3)
GFR AFRICAN AMERICAN: >60
GFR NON-AFRICAN AMERICAN: >60
GLUCOSE BLD-MCNC: 146 MG/DL (ref 70–99)
HCT VFR BLD CALC: 25 % (ref 40.5–52.5)
HEMOGLOBIN: 8 G/DL (ref 13.5–17.5)
MCH RBC QN AUTO: 28 PG (ref 26–34)
MCHC RBC AUTO-ENTMCNC: 32 G/DL (ref 31–36)
MCV RBC AUTO: 87.5 FL (ref 80–100)
PDW BLD-RTO: 17.1 % (ref 12.4–15.4)
PLATELET # BLD: 185 K/UL (ref 135–450)
PMV BLD AUTO: 6.7 FL (ref 5–10.5)
POTASSIUM SERPL-SCNC: 3.7 MMOL/L (ref 3.5–5.1)
PROCALCITONIN: 7.75 NG/ML (ref 0–0.15)
RBC # BLD: 2.86 M/UL (ref 4.2–5.9)
SODIUM BLD-SCNC: 141 MMOL/L (ref 136–145)
VANCOMYCIN TROUGH: 9.1 UG/ML (ref 10–20)
WBC # BLD: 8.2 K/UL (ref 4–11)

## 2020-12-16 PROCEDURE — 80202 ASSAY OF VANCOMYCIN: CPT

## 2020-12-16 PROCEDURE — 6360000002 HC RX W HCPCS: Performed by: HOSPITALIST

## 2020-12-16 PROCEDURE — 2580000003 HC RX 258: Performed by: HOSPITALIST

## 2020-12-16 PROCEDURE — 2060000000 HC ICU INTERMEDIATE R&B

## 2020-12-16 PROCEDURE — 6370000000 HC RX 637 (ALT 250 FOR IP): Performed by: INTERNAL MEDICINE

## 2020-12-16 PROCEDURE — 99223 1ST HOSP IP/OBS HIGH 75: CPT | Performed by: INTERNAL MEDICINE

## 2020-12-16 PROCEDURE — 94761 N-INVAS EAR/PLS OXIMETRY MLT: CPT

## 2020-12-16 PROCEDURE — 84145 PROCALCITONIN (PCT): CPT

## 2020-12-16 PROCEDURE — 94640 AIRWAY INHALATION TREATMENT: CPT

## 2020-12-16 PROCEDURE — 2700000000 HC OXYGEN THERAPY PER DAY

## 2020-12-16 PROCEDURE — 80048 BASIC METABOLIC PNL TOTAL CA: CPT

## 2020-12-16 PROCEDURE — 99232 SBSQ HOSP IP/OBS MODERATE 35: CPT | Performed by: INTERNAL MEDICINE

## 2020-12-16 PROCEDURE — 36415 COLL VENOUS BLD VENIPUNCTURE: CPT

## 2020-12-16 PROCEDURE — 85027 COMPLETE CBC AUTOMATED: CPT

## 2020-12-16 PROCEDURE — 6360000002 HC RX W HCPCS: Performed by: NURSE PRACTITIONER

## 2020-12-16 RX ORDER — ALBUTEROL SULFATE 90 UG/1
2 AEROSOL, METERED RESPIRATORY (INHALATION)
Status: DISCONTINUED | OUTPATIENT
Start: 2020-12-16 | End: 2020-12-17

## 2020-12-16 RX ADMIN — CEFEPIME 2 G: 2 INJECTION, POWDER, FOR SOLUTION INTRAVENOUS at 19:07

## 2020-12-16 RX ADMIN — ENOXAPARIN SODIUM 30 MG: 100 INJECTION SUBCUTANEOUS at 20:26

## 2020-12-16 RX ADMIN — CEFEPIME 2 G: 2 INJECTION, POWDER, FOR SOLUTION INTRAVENOUS at 06:00

## 2020-12-16 RX ADMIN — SODIUM CHLORIDE: 9 INJECTION, SOLUTION INTRAVENOUS at 04:22

## 2020-12-16 RX ADMIN — NALOXONE HYDROCHLORIDE 0.4 MG: 0.4 INJECTION, SOLUTION INTRAMUSCULAR; INTRAVENOUS; SUBCUTANEOUS at 05:47

## 2020-12-16 RX ADMIN — Medication 2 PUFF: at 19:51

## 2020-12-16 RX ADMIN — DEXAMETHASONE 6 MG: 4 TABLET ORAL at 09:22

## 2020-12-16 RX ADMIN — Medication 10 ML: at 20:27

## 2020-12-16 RX ADMIN — Medication 2 PUFF: at 16:14

## 2020-12-16 RX ADMIN — VANCOMYCIN HYDROCHLORIDE 750 MG: 10 INJECTION, POWDER, LYOPHILIZED, FOR SOLUTION INTRAVENOUS at 04:29

## 2020-12-16 RX ADMIN — ENOXAPARIN SODIUM 30 MG: 100 INJECTION SUBCUTANEOUS at 09:22

## 2020-12-16 RX ADMIN — VANCOMYCIN HYDROCHLORIDE 750 MG: 750 INJECTION, POWDER, LYOPHILIZED, FOR SOLUTION INTRAVENOUS at 17:03

## 2020-12-16 NOTE — PROGRESS NOTES
Patient appears comfortable w/ resp easy and even. VSS. Oxygen decreased to 2L/min NC as 99% on 4L/min NC. Call light in patient's reach.

## 2020-12-16 NOTE — PROGRESS NOTES
Physician Progress Note      Jesus Cervantes  CSN #:                  [id number]  :                       1970  ADMIT DATE:       2020 8:51 PM  [address] [place] DATE:  RESPONDING  PROVIDER #:        Dong Wilson MD          QUERY TEXT:    Dear Attending Provider,  Pt admitted with gram negative pneumonia and Covid. Pt noted to have   leukocytosis, lactic acidosis, elevated procalcitonin, and acute respiratory   failure. If possible, please document in the progress notes and discharge   summary if you are evaluating and /or treating any of the following: The medical record reflects the following:  Risk Factors: recent hospitalization, gram neg pna, covid, copd  Clinical Indicators: wbc-20.8, lactic-2.2, procalcitonin-6.66, tachycardia,   acute respiratory failure, Transaminitis  Treatment: lactic, blood cx, vancomycin, Cefipime, 2000 cc NS bolus,   continuous IVF    Thank you for your assistance,  Katie Waters RN,BSN,CCDS,CRCR  Options provided:  -- Sepsis, present on admission  -- Sepsis, present on admission, now resolved  -- No Sepsis  -- Sepsis was ruled out  -- Other - I will add my own diagnosis  -- Disagree - Not applicable / Not valid  -- Disagree - Clinically unable to determine / Unknown  -- Refer to Clinical Documentation Reviewer    PROVIDER RESPONSE TEXT:    This patient has sepsis which was present on admission.     Query created by: Jayro Montiel on 2020 11:01 AM      Electronically signed by:  Dong Wilson MD 2020 3:45 PM

## 2020-12-16 NOTE — PROGRESS NOTES
Shift assessment complete, scheduled meds given. Pt given olvin, call light and bedside table in reach. Will continue to monitor.

## 2020-12-16 NOTE — PROGRESS NOTES
Patient alert and oriented, denying complaints at present other than when is breakfast and what are they having to eat. Oxygen at 6L/min NC. IVF infusing at 150ml/hr.

## 2020-12-16 NOTE — PROGRESS NOTES
Vancomycin Day: 2    Patient's labs, cultures, vitals, and vancomycin regimen reviewed. No changes today.   Trough due on 16th at [address] 12/16/202012:04 PM  .

## 2020-12-16 NOTE — PROGRESS NOTES
Pharmacy Vancomycin Consult     Vancomycin Day: 3  Current Dosinmg q12h    Temp max:  99.7    Recent Labs     12/15/20  0915 20  0630   BUN 16 19       Recent Labs     12/15/20  0915 20  0630   CREATININE 0.6* 0.6*       Recent Labs     12/15/20  0915 20  0630   WBC 11.2* 8.2         Intake/Output Summary (Last 24 hours) at 2020 1610  Last data filed at 2020 1323  Gross per 24 hour   Intake 3040 ml   Output 1300 ml   Net 1740 ml       Culture Date      Source                       Results  Blood;NGTD    Ht Readings from Last 1 Encounters:   12/15/20 5' (1.524 m)        Wt Readings from Last 1 Encounters:   20 102 lb 4.8 oz (46.4 kg)         Body mass index is 19.98 kg/m². CrCl cannot be calculated (Unknown ideal weight.).     Trough: 9.1mcg/ml    Assessment/Plan:  Will increase interval to q8h and check trough prior to 4th dose of new regimen  Kana Sanchez Pharm D 20204:12 PM  .

## 2020-12-16 NOTE — CONSULTS
Reason for referral and CC: SOB    HISTORY OF PRESENT ILLNESS: 25-year-old male with active drug abuse who was discharged after being intubated for COVID-19. He was on his first week ago but he had increased shortness of breath returns today. He is requiring 2 L of naproxen. He took drugs in his room and required Narcan this morning. Past Medical History:   Diagnosis Date    COVID-19 12/05/2020    H/O brain surgery     Hepatitis C antibody test positive 7/17/14    History of surgery     L leg surgery    Methamphetamine abuse ([facility] [address] [facility]     MRSA (methicillin resistant staph aureus) culture positive 7/12/14    blood cx    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    PE (pulmonary thromboembolism) ([facility] [address] [facility]     Pneumonia      Past Surgical History:   Procedure Laterality Date    BRAIN SURGERY  1986    s/p trauma    BRAIN SURGERY      FRACTURE SURGERY      LEG SURGERY       Family History  family history is not on file. Social History:  reports that he has quit smoking. His smoking use included cigarettes. He smoked 2.00 packs per day. He has never used smokeless tobacco.   reports no history of alcohol use. ALLERGIES:  Patient has No Known Allergies.   Continuous Infusions:    Scheduled Meds:   albuterol sulfate HFA  2 puff Inhalation Q4H WA    cefepime  2 g Intravenous Q12H    sodium chloride flush  10 mL Intravenous 2 times per day    enoxaparin  30 mg Subcutaneous BID    vancomycin  750 mg Intravenous Q12H    dexamethasone  6 mg Oral Daily     PRN Meds:  iopamidol, sodium chloride flush, promethazine **OR** ondansetron, polyethylene glycol, acetaminophen **OR** acetaminophen, traMADol **AND** cloNIDine, dicyclomine, gabapentin, hydrOXYzine, promethazine, traZODone, ibuprofen, naloxone    REVIEW OF SYSTEMS:  Constitutional: Negative for fever  HENT: Negative for sore throat  Eyes: Negative for redness   Respiratory: + for dyspnea, cough  Cardiovascular: Negative for chest pain Gastrointestinal: Negative for vomiting, diarrhea   Genitourinary: Negative for hematuria   Musculoskeletal: Negative for arthralgias   Skin: Negative for rash  Neurological: Negative for syncope  Hematological: Negative for adenopathy  Psychiatric/Behavorial: Negative for anxiety    PHYSICAL EXAM: /61   Pulse 74   Temp 97.4 °F (36.3 °C) (Oral)   Resp 16   Ht 5' (1.524 m)   Wt 102 lb 4.8 oz (46.4 kg)   SpO2 99%   BMI 19.98 kg/m²  on 4lpm  Constitutional:  No acute distress. Eyes: PERRL. Conjunctivae anicteric. ENT: Normal nose. Normal tongue. Neck:  Trachea is midline. No thyroid tenderness. Respiratory: No accessory muscle usage. decreased breath sounds. No wheezes. No rales. No Rhonchi. Cardiovascular: Normal S1S2. No digit clubbing. No digit cyanosis. No LE edema. Gastrointestinal: No mass palpated. No tenderness palpated. No umbilical hernia. Lymphatic: No cervical or supraclavicular adenopathy. Skin: No rash on the exposed extremities. No Nodules or induration on exposed extremities. Psychiatric: No anxiety or Agitation. Alert and Oriented to person, place and time.     CBC:   Recent Labs     12/14/20  2124 12/15/20  0915 12/16/20  0630   WBC 20.8* 11.2* 8.2   HGB 11.5* 11.8* 8.0*   HCT 35.7* 36.4* 25.0*   MCV 85.4 85.9 87.5    211 185     BMP:   Recent Labs     12/14/20  2124 12/15/20  0915 12/16/20  0630   * 132* 141   K 3.9 4.1 3.7   CL 95* 99 111*   CO2 30 27 27   BUN 23* 16 19   CREATININE 0.6* 0.6* 0.6*        Recent Labs     12/14/20  2124 12/15/20  0915   AST 29 31   ALT 75* 75*   BILIDIR  --  <0.2   BILITOT 1.0 0.6   ALKPHOS 90 88     Recent Labs     12/14/20 2124   PROTIME 11.5   INR 0.99     Recent Labs     12/15/20  1550   COLORU Yellow   PHUR 5.5  5.5   CLARITYU Clear   SPECGRAV >=1.030   LEUKOCYTESUR Negative   UROBILINOGEN 0.2   BILIRUBINUR Negative   BLOODU Negative   GLUCOSEU Negative No results for input(s): PHART, WUQ2QWA, PO2ART in the last 72 hours. Chest imaging was reviewed by me and showed   FINDINGS:   Pulmonary Arteries: Chronic nonocclusive segmental PE is noted involving   pulmonary artery to the posterior basal segment of the right lower lobe,   image number 118 axial series.  No other filling defect is seen in the   pulmonary arteries.       Mediastinum: No evidence of mediastinal lymphadenopathy.  Calcified   mediastinal lymph nodes are noted.  The heart and pericardium demonstrate no   acute abnormality.  There is no acute abnormality of the thoracic aorta.       Lungs/pleura: No pneumothorax.  No pleural effusion.  Emphysematous changes   in the bilateral lungs.  A 0.7 cm pleural based nodule versus focal   atelectasis in the right lower lobe, image number 95 axial series.  No   additional pulmonary nodule or mass.  Calcified granuloma in the right upper   lobe, image number 36       Upper Abdomen: Multiple calcified granulomas in the spleen.       Soft Tissues/Bones: Multilevel thoracic spondylosis.  Old compression   fracture of T8 vertebral body with moderate anterior wedging.  Also, minimal   compression deformity of the superior endplate of T3, most likely old.           Impression   Chronic nonocclusive segmental PE involving the pulmonary artery to the   posterior basal segment of the right lower lobe.       No evidence of acute pulmonary embolism.       COPD.       A 0.7 cm pleural based nodule versus focal atelectasis in the right lower   lobe.  Follow-up CT scan in 6 months may be obtained.        ASSESSMENT:  · COVID 19 pneumonia  · Pulmonary Emphysema with AE  · Unclear source of leukocytosis and elevated PCT without pneumonia on chest ct  · Hypoxia  · Active Drug abuse   · Pulmonary nodule -could follow up in 6 months with chest ct  · Chronic PE noted on CT    PLAN:  Supplemental oxygen to maintain SaO2 >92%; wean as tolerated   · Steroid taper for COPD · Bronchodilators with MDI  · COWS if needed  · Blood and urine cx  · On abx for unclear etiology of increased PCT    Thank you Augusto Liu, * for this consult

## 2020-12-16 NOTE — CARE COORDINATION
Case Management Assessment  Initial Evaluation      Patient Name: Liz Byrnes  YOB: 1970  Diagnosis: HCAP (healthcare-associated pneumonia) [J18.9]  Date / Time: 12/14/2020  8:51 PM    Admission status/Date:  12/14/2020  Chart Reviewed: Yes      Patient Interviewed: Yes   Family Interviewed:  No      Hospitalization in the last 30 days:  No      Health Care Decision Maker :  Titi Jones [phone]    Met with: patient  Doris Gonzalez conducted  (bedside/phone): phone    Current PCP: Needs PCP referral    Financial  Medicare/Medicare  Precert required for SNF : NA         3 night stay required - NA    ADLS  Support Systems/Care Needs:    Transportation: family    Meal Preparation: self- wants MOW    Housing  Living Arrangements: Lives alone in his camper at [address] [place] [address]  Steps: 2-3  Intent for return to present living arrangements: Yes  Identified Issues: pt reports that he does have electricity in his [address] with [address] : No      Passport/Waiver : No - referral for waiver services to [address] [place] [address] - pt does not meet level of care for WAIVER services (HHA/MOW/Transportation)   :                      Phone Number:    Passport/Waiver Services:        Durable Medical Equiptment   DME Provider: JOSE  Equipment: JOSE  Walker___Cane___RTS___ BSC___Shower Chair___Hospital Bed___W/C____Other________  02 at ____Liter(s)---wears(frequency)_______ HHN ___ CPAP___ BiPap___   N/A____      Home O2 Use :  No  +C19 and CM following for possible new home O2        Community Service Affiliation  Dialysis:  No    · Agency:  · Location:  · Dialysis Schedule:  · Phone:   · Fax:     Other Community Services: [facility] DISCHARGE PLAN: Explained Case Management role/services. Chart reviewed. Met with pt by phone due top C19 restrictions and explained the role of the CM. Plans to return to his camper in [facility]. Depends on family for transportation. CM following for HHC and possible home O2 R/T C19. Referral for WAIVER services called to [facility]-pt will likely not meet LOC for WAIVER services (HHA/MOW/transportation). Referral to [place] for PCP follow up.  +CM following

## 2020-12-16 NOTE — PROGRESS NOTES
Patient oriented X4.  talkiing about wanting wanting oxygen to go home. Also wanting help getting an apartment. Stated he bough a camper and that is where he lives now.  spo2 100% on 6L/min. Decreased flow to 4L , NSR on tele #[phone]. Call light in reach.

## 2020-12-16 NOTE — PROGRESS NOTES
Progress Note    Admit Date:  12/14/2020    Patient with recent COVID-19 infection, presenting back with increased shortness of breath,  admitted for HCAP. acute hypoxic respiratory failure. Episode of decreased responsiveness this morning , patient required Narcan, unclear if patient took some pills. Subjective:  Mr. Pa Gerard is awake, alert and oriented now. O2 requirement is up to 6 L this morning currently at 4 L.    e complains of persistent shortness of breath, he is wheezing. White count is improved    Objective:   BP (!) 98/53   Pulse 80   Temp 97.1 °F (36.2 °C) (Oral)   Resp 16   Ht 5' (1.524 m)   Wt 102 lb 4.8 oz (46.4 kg)   SpO2 100%   BMI 19.98 kg/m²       Intake/Output Summary (Last 24 hours) at 12/16/2020 1211  Last data filed at 12/16/2020 1117  Gross per 24 hour   Intake 2680 ml   Output 1025 ml   Net 1655 ml         Physical Exam:  Gen: No distress. Alert. Awake and well-oriented   looks ill  Eyes: PERRL. No sclera icterus. No conjunctival injection. ENT: No discharge. Pharynx clear. Neck: Trachea midline. Resp: No accessory muscle use. Bilateral diffuse wheezes and rhonchi present  CV: Regular rate. Regular rhythm. No murmur. No rub. + RLE edema. GI: Non-tender. Non-distended. Normal bowel sounds. No hernia. Skin: Warm and dry. No nodule on exposed extremities. No rash on exposed extremities. M/S: No cyanosis. No joint deformity. No clubbing. Neuro: Awake. Grossly nonfocal   Psych: Oriented x 3.          Medications:   Scheduled Meds:   albuterol sulfate HFA  2 puff Inhalation Q4H WA    cefepime  2 g Intravenous Q12H    sodium chloride flush  10 mL Intravenous 2 times per day    enoxaparin  30 mg Subcutaneous BID    vancomycin  750 mg Intravenous Q12H    dexamethasone  6 mg Oral Daily       Continuous Infusions:      Data:  CBC:   Recent Labs     12/14/20  2124 12/15/20  0915 12/16/20  0630   WBC 20.8* 11.2* 8.2   RBC 4.18* 4.24 2.86*   HGB 11.5* 11.8* 8.0* HCT 35.7* 36.4* 25.0*   MCV 85.4 85.9 87.5   RDW 16.2* 17.4* 17.1*    211 185     BMP:   Recent Labs     12/14/20  2124 12/15/20  0915 12/16/20  0630   * 132* 141   K 3.9 4.1 3.7   CL 95* 99 111*   CO2 30 27 27   BUN 23* 16 19   CREATININE 0.6* 0.6* 0.6*     BNP: No results for input(s): BNP in the last 72 hours. PT/INR:   Recent Labs     12/14/20 2124   PROTIME 11.5   INR 0.99     APTT:   Recent Labs     12/14/20 2124   APTT 24.3     CARDIAC ENZYMES:   Recent Labs     12/14/20 2124   TROPONINI <0.01     FASTING LIPID PANEL:  Lab Results   Component Value Date    TRIG 155 (H) 12/07/2020     LIVER PROFILE:   Recent Labs     12/14/20  2124 12/15/20  0915   AST 29 31   ALT 75* 75*   BILIDIR  --  <0.2   BILITOT 1.0 0.6   ALKPHOS 90 88           CULTURES  Blood: pending     EKG:  I have reviewed the EKG with the following interpretation:   N/A     RADIOLOGY  CT CHEST PULMONARY EMBOLISM W CONTRAST   Final Result   Chronic nonocclusive segmental PE involving the pulmonary artery to the   posterior basal segment of the right lower lobe. No evidence of acute pulmonary embolism. COPD. A 0.7 cm pleural based nodule versus focal atelectasis in the right lower   lobe. Follow-up CT scan in 6 months may be obtained. I discussed the findings by phone with the ordering physician assistant,   Nata Garrido, at 12:37 a.m. on 12/15/2020      RECOMMENDATIONS:   CT scan of the chest in 6 months         XR CHEST PORTABLE   Final Result   Findings remain compatible with the patient's history of COVID-19 positivity. Assessment:  Active Problems:    Elevated d-dimer    COVID-19    HCAP (healthcare-associated pneumonia)  Resolved Problems:    * No resolved hospital problems. *      Plan:    Acute hypoxemic respiratory failure  - secondary to Covid 19 infection, pneumonia (gram negative organism, risk for MRSA)   HCAP  - on 2 L O2. Wean as tolerated.   - Continue Vanc, Cefepime D#2 O2 requirement up to 6 L today ,patient was poorly responsive, required Narcan   - back down to 4 L now, he is more awake. Continue to monitor closely, add metered-dose inhalers. Pulmonology consulted    Hyponatremia  - likely with fluid volume depletion.  - Give IVFs. Monitor labs.      Lactic acidosis  - Trend Lactic     Leukocytosis  - likely related to above  - improved with antibiotics.    White count improved from 20K -> 11K -->  8.2K today    Transaminitis  Hepatitis C- ALT elevated. - likely related to hep c and acute viral infection   - continue to monitor, improving     History of polysubstance abuse (opiates and meth)  - he denies any drug use to me.   He does appear to possibly be withdrawing  - add COWS protocol  - check Tox screen  - he is refusing to give urine samply  - consider Ativan, DICK for methamphetamine use     H/o PE  - chronic on CT  - not on [facility]  - Check Venous doppler RLE as he has pain/swelling to this extremity.      Pulmonary nodule  - need repeat CT chest in 6 months.      DVT Prophylaxis: Lovenox  Diet: DIET GENERAL;  Code Status: Full Code     [place] MD Carlota 12/16/2020 12:11 PM

## 2020-12-16 NOTE — PROGRESS NOTES
Writer entered pts room, pt was not responding, moaning with eyes closed, Charge RN used narcan on pt and pt responded afterwards. Will continue to monitor.

## 2020-12-17 VITALS
HEART RATE: 113 BPM | DIASTOLIC BLOOD PRESSURE: 70 MMHG | OXYGEN SATURATION: 99 % | RESPIRATION RATE: 16 BRPM | TEMPERATURE: 97.1 F | HEIGHT: 60 IN | SYSTOLIC BLOOD PRESSURE: 120 MMHG | BODY MASS INDEX: 20.1 KG/M2 | WEIGHT: 102.4 LBS

## 2020-12-17 LAB
ANION GAP SERPL CALCULATED.3IONS-SCNC: 5 MMOL/L (ref 3–16)
BUN BLDV-MCNC: 18 MG/DL (ref 7–20)
CALCIUM SERPL-MCNC: 8.2 MG/DL (ref 8.3–10.6)
CHLORIDE BLD-SCNC: 101 MMOL/L (ref 99–110)
CO2: 30 MMOL/L (ref 21–32)
CREAT SERPL-MCNC: 0.6 MG/DL (ref 0.9–1.3)
GFR AFRICAN AMERICAN: >60
GFR NON-AFRICAN AMERICAN: >60
GLUCOSE BLD-MCNC: 124 MG/DL (ref 70–99)
HCT VFR BLD CALC: 34.1 % (ref 40.5–52.5)
HEMOGLOBIN: 11.2 G/DL (ref 13.5–17.5)
MCH RBC QN AUTO: 28.6 PG (ref 26–34)
MCHC RBC AUTO-ENTMCNC: 32.8 G/DL (ref 31–36)
MCV RBC AUTO: 87 FL (ref 80–100)
PDW BLD-RTO: 17.1 % (ref 12.4–15.4)
PLATELET # BLD: 201 K/UL (ref 135–450)
PMV BLD AUTO: 7 FL (ref 5–10.5)
POTASSIUM SERPL-SCNC: 3.6 MMOL/L (ref 3.5–5.1)
PROCALCITONIN: 6.37 NG/ML (ref 0–0.15)
RBC # BLD: 3.91 M/UL (ref 4.2–5.9)
SODIUM BLD-SCNC: 136 MMOL/L (ref 136–145)
VANCOMYCIN TROUGH: 17.1 UG/ML (ref 10–20)
WBC # BLD: 9 K/UL (ref 4–11)

## 2020-12-17 PROCEDURE — 99238 HOSP IP/OBS DSCHRG MGMT 30/<: CPT | Performed by: INTERNAL MEDICINE

## 2020-12-17 PROCEDURE — 6370000000 HC RX 637 (ALT 250 FOR IP): Performed by: INTERNAL MEDICINE

## 2020-12-17 PROCEDURE — 84145 PROCALCITONIN (PCT): CPT

## 2020-12-17 PROCEDURE — 85027 COMPLETE CBC AUTOMATED: CPT

## 2020-12-17 PROCEDURE — 36415 COLL VENOUS BLD VENIPUNCTURE: CPT

## 2020-12-17 PROCEDURE — 80048 BASIC METABOLIC PNL TOTAL CA: CPT

## 2020-12-17 PROCEDURE — 94640 AIRWAY INHALATION TREATMENT: CPT

## 2020-12-17 PROCEDURE — 6360000002 HC RX W HCPCS: Performed by: NURSE PRACTITIONER

## 2020-12-17 PROCEDURE — 80202 ASSAY OF VANCOMYCIN: CPT

## 2020-12-17 PROCEDURE — 94761 N-INVAS EAR/PLS OXIMETRY MLT: CPT

## 2020-12-17 PROCEDURE — 2580000003 HC RX 258: Performed by: HOSPITALIST

## 2020-12-17 PROCEDURE — 6360000002 HC RX W HCPCS: Performed by: HOSPITALIST

## 2020-12-17 PROCEDURE — 99233 SBSQ HOSP IP/OBS HIGH 50: CPT | Performed by: INTERNAL MEDICINE

## 2020-12-17 RX ORDER — ALBUTEROL SULFATE 90 UG/1
2 AEROSOL, METERED RESPIRATORY (INHALATION) 4 TIMES DAILY
Status: DISCONTINUED | OUTPATIENT
Start: 2020-12-17 | End: 2020-12-17 | Stop reason: HOSPADM

## 2020-12-17 RX ORDER — DEXAMETHASONE 4 MG/1
4 TABLET ORAL
Qty: 5 TABLET | Refills: 0 | Status: SHIPPED | OUTPATIENT
Start: 2020-12-17 | End: 2020-12-22

## 2020-12-17 RX ORDER — LEVOFLOXACIN 500 MG/1
500 TABLET, FILM COATED ORAL DAILY
Qty: 7 TABLET | Refills: 0 | Status: SHIPPED | OUTPATIENT
Start: 2020-12-17 | End: 2020-12-24

## 2020-12-17 RX ADMIN — Medication 2 PUFF: at 15:53

## 2020-12-17 RX ADMIN — DEXAMETHASONE 6 MG: 4 TABLET ORAL at 07:56

## 2020-12-17 RX ADMIN — Medication 10 ML: at 07:57

## 2020-12-17 RX ADMIN — CEFEPIME 2 G: 2 INJECTION, POWDER, FOR SOLUTION INTRAVENOUS at 05:47

## 2020-12-17 RX ADMIN — Medication 2 PUFF: at 11:44

## 2020-12-17 RX ADMIN — Medication 2 PUFF: at 00:14

## 2020-12-17 RX ADMIN — VANCOMYCIN HYDROCHLORIDE 750 MG: 750 INJECTION, POWDER, LYOPHILIZED, FOR SOLUTION INTRAVENOUS at 00:40

## 2020-12-17 RX ADMIN — ENOXAPARIN SODIUM 30 MG: 100 INJECTION SUBCUTANEOUS at 07:56

## 2020-12-17 RX ADMIN — Medication 2 PUFF: at 07:13

## 2020-12-17 RX ADMIN — VANCOMYCIN HYDROCHLORIDE 750 MG: 750 INJECTION, POWDER, LYOPHILIZED, FOR SOLUTION INTRAVENOUS at 11:16

## 2020-12-17 ASSESSMENT — PAIN SCALES - GENERAL
PAINLEVEL_OUTOF10: 0
PAINLEVEL_OUTOF10: 0

## 2020-12-17 NOTE — PROGRESS NOTES
RESPIRATORY THERAPY ASSESSMENT    Name:  Brooks   Seth Sutherland Record Number:  [id number]  Age: 48 y.o. Gender: male  : 1970  Today's Date:  2020  Room:  /0323-01    Assessment     Is the patient being admitted for a COPD or Asthma exacerbation? No   (If yes the patient will be seen every 4 hours for the first 24 hours and then reassessed)    Patient Admission Diagnosis      Allergies  No Known Allergies    Minimum Predicted Vital Capacity:     na          Actual Vital Capacity:      na              Pulmonary History: former smoker  Home Oxygen Therapy:  room air  Home Respiratory Therapy: Uses mothers Spiriva daily and her albuterol 8-9 x/day   Current Respiratory Therapy:  Albuterol mdi q4 w/a  Treatment Type: MDI  Medications: Albuterol    Respiratory Severity Index(RSI)   Patients with orders for inhalation medications, oxygen, or any therapeutic treatment modality will be placed on Respiratory Protocol. They will be assessed with the first treatment and at least every 72 hours thereafter. The following severity scale will be used to determine frequency of treatment intervention.     Smoking History: Pulmonary Disease or Smoking History, Greater than 15 pack year = 2    Social History  Social History     Tobacco Use    Smoking status: Former Smoker     Packs/day: 2.00     Types: Cigarettes    Smokeless tobacco: Never Used    Tobacco comment: states 5 yrs ago   Substance Use Topics    Alcohol use: No    Drug use: Not Currently     Types: IV     Comment: states last use 5 wks ago       Recent Surgical History: None = 0  Past Surgical History  Past Surgical History:   Procedure Laterality Date    BRAIN SURGERY      s/p trauma    BRAIN SURGERY      FRACTURE SURGERY      LEG SURGERY         Level of Consciousness: Alert, Oriented, and Cooperative = 0    Level of Activity: Mostly sedentary, minimal walking = 2 4. Bronchodilators will be administered via Hand Held Nebulizer [facility]) to patients when ANY of the following criteria are met  a. Incognizant or uncooperative          b. Patients treated with HHN at Home        c. Unable to demonstrate proper use of MDI with spacer     d. RR > 30 bpm   5. Bronchodilators will be delivered via Metered Dose Inhaler (MDI), HHN, Aerogen to intubated patients on mechanical ventilation. 6. Inhalation medication orders will be delivered and/or substituted as outlined below. Aerosolized Medications Ordering and Administration Guidelines:    1. All Medications will be ordered by a physician, and their frequency and/or modality will be adjusted as defined by the patients Respiratory Severity Index (RSI) score. 2. If the patient does not have documented COPD, consider discontinuing anticholinergics when RSI is less than 9.  3. If the bronchospasm worsens (increased RSI), then the bronchodilator frequency can be increased to a maximum of every 4 hours. If greater than every 4 hours is required, the physician will be contacted. 4. If the bronchospasm improves, the frequency of the bronchodilator can be decreased, based on the patient's RSI, but not less than home treatment regimen frequency. 5. Bronchodilator(s) will be discontinued if patient has a RSI less than 9 and has received no scheduled or as needed treatment for 72  Hrs. Patients Ordered on a Mucolytic Agent:    1. Must always be administered with a bronchodilator. 2. Discontinue if patient experiences worsened bronchospasm, or secretions have lessened to the point that the patient is able to clear them with a cough. Anti-inflammatory and Combination Medications:    1. If the patient lacks prior history of lung disease, is not using inhaled anti-inflammatory medication at home, and lacks wheezing by examination or by history for at least 24 hours, contact physician for possible discontinuation.

## 2020-12-17 NOTE — PROGRESS NOTES
Patient requested to be discharged today, despite reporting continued SOB. Hospitalist updated and pulmonology cleared patient for discharge today. Discharge instructions reviewed with patient. Prescriptions for Levaquin and Decadron provided to patient, along with resources for care clinic. Peripheral IV removed per protocol without complication. Telemetry monitor removed. Transport request made.

## 2020-12-17 NOTE — PROGRESS NOTES
SA complete, see flow sheet, scheduled meds given. Pt resting quietly in bed with call light and bedside table in reach. Will continue to monitor.

## 2020-12-17 NOTE — PLAN OF CARE
Problem: SAFETY  Goal: Free from accidental physical injury  12/16/2020 2144 by Aram Fuentes RN  Outcome: Ongoing  12/16/2020 1736 by Wilson Alejandre RN  Note: Free of falls this shift     Problem: DAILY CARE  Goal: Daily care needs are met  Outcome: Ongoing     Problem: PAIN  Goal: Patient's pain/discomfort is manageable  12/16/2020 2144 by Aram Fuentes RN  Outcome: Ongoing  12/16/2020 1736 by Wilson Alejandre RN  Note: Denying complaints of pain     Problem: SKIN INTEGRITY  Goal: Skin integrity is maintained or improved  12/16/2020 1736 by Wilson Alejandre RN  Note: Skin w/ scattered scabs/bruises

## 2020-12-17 NOTE — PROGRESS NOTES
Pulmonary Progress Note  CC: COPD    Subjective: Weaned to RA. Still has AGUIRRE. Wants to be d/c      EXAM: /73   Pulse 70   Temp 97.6 °F (36.4 °C) (Oral)   Resp 16   Ht 5' (1.524 m)   Wt 102 lb 6.4 oz (46.4 kg)   SpO2 98%   BMI 20.00 kg/m²  on ra  Constitutional:  No acute distress. Eyes: PERRL. Conjunctivae anicteric. ENT: Normal nose. Normal tongue. Neck:  Trachea is midline. No thyroid tenderness. Respiratory: No accessory muscle usage. decreased breath sounds. No wheezes. No rales. No Rhonchi. Cardiovascular: Normal S1S2. No digit clubbing. No digit cyanosis. No LE edema. Psychiatric: No anxiety or Agitation. Alert and Oriented to person, place and time.     Scheduled Meds:   albuterol sulfate HFA  2 puff Inhalation Q4H WA    vancomycin  750 mg Intravenous Q8H    cefepime  2 g Intravenous Q12H    sodium chloride flush  10 mL Intravenous 2 times per day    enoxaparin  30 mg Subcutaneous BID    dexamethasone  6 mg Oral Daily     Continuous Infusions:    PRN Meds:  iopamidol, sodium chloride flush, promethazine **OR** ondansetron, polyethylene glycol, acetaminophen **OR** acetaminophen, traMADol **AND** cloNIDine, dicyclomine, gabapentin, hydrOXYzine, promethazine, traZODone, ibuprofen, naloxone    Labs:  CBC:   Recent Labs     12/15/20  0915 12/16/20  0630 12/17/20  0530   WBC 11.2* 8.2 9.0   HGB 11.8* 8.0* 11.2*   HCT 36.4* 25.0* 34.1*   MCV 85.9 87.5 87.0    185 201     BMP:   Recent Labs     12/15/20  0915 12/16/20  0630 12/17/20  0530   * 141 136   K 4.1 3.7 3.6   CL 99 111* 101   CO2 27 27 30   BUN 16 19 18   CREATININE 0.6* 0.6* 0.6*       Cultures:  Blood cx nGTD    Films:    CTPA  FINDINGS:   Pulmonary Arteries: Chronic nonocclusive segmental PE is noted involving   pulmonary artery to the posterior basal segment of the right lower lobe,   image number 118 axial series.  No other filling defect is seen in the   pulmonary arteries.     Mediastinum: No evidence of mediastinal lymphadenopathy.  Calcified   mediastinal lymph nodes are noted.  The heart and pericardium demonstrate no   acute abnormality.  There is no acute abnormality of the thoracic aorta.       Lungs/pleura: No pneumothorax.  No pleural effusion.  Emphysematous changes   in the bilateral lungs.  A 0.7 cm pleural based nodule versus focal   atelectasis in the right lower lobe, image number 95 axial series.  No   additional pulmonary nodule or mass.  Calcified granuloma in the right upper   lobe, image number 36       Upper Abdomen: Multiple calcified granulomas in the spleen.       Soft Tissues/Bones: Multilevel thoracic spondylosis.  Old compression   fracture of T8 vertebral body with moderate anterior wedging.  Also, minimal   compression deformity of the superior endplate of T3, most likely old.           Impression   Chronic nonocclusive segmental PE involving the pulmonary artery to the   posterior basal segment of the right lower lobe.       No evidence of acute pulmonary embolism.       COPD.       A 0.7 cm pleural based nodule versus focal atelectasis in the right lower   lobe.  Follow-up CT scan in 6 months may be obtained.         ASSESSMENT:  · COVID 19 pneumonia  · Pulmonary Emphysema with AE  · Unclear source of leukocytosis and elevated PCT without pneumonia on chest ct  · Hypoxia  · Active Drug abuse   · Pulmonary nodule -could follow up in 6 months with chest ct  · Chronic PE noted on CT     PLAN:  · Supplemental oxygen to maintain SaO2 >92%; wean as tolerated   · Steroid taper for COPD   · Bronchodilators with MDI  · COWS if needed  · Blood and urine cx  · On abx for unclear etiology of increased PCT  · Could dc on Levaquin

## 2020-12-17 NOTE — PROGRESS NOTES
Pharmacy Vancomycin Consult     Vancomycin Day: 4  Current Dosing: Vancomycin 750 mg IVPB q8h    Temp max:  97.6    Recent Labs     12/16/20  0630 12/17/20  0530   BUN 19 18       Recent Labs     12/16/20  0630 12/17/20  0530   CREATININE 0.6* 0.6*       Recent Labs     12/16/20  0630 12/17/20  0530   WBC 8.2 9.0         Intake/Output Summary (Last 24 hours) at 12/17/2020 1726  Last data filed at 12/17/2020 1338  Gross per 24 hour   Intake 1080 ml   Output 3125 ml   Net -2045 ml       Ht Readings from Last 1 Encounters:   12/15/20 5' (1.524 m)        Wt Readings from Last 1 Encounters:   12/17/20 102 lb 6.4 oz (46.4 kg)         Body mass index is 20 kg/m². CrCl cannot be calculated (Unknown ideal weight.). Trough: 17.1    Assessment/Plan:  Continue vancomycin 750 mg IVPB q8h and monitor. Preeti Suazo, Pharm. D. 12/17/2020 5:27 PM

## 2020-12-18 ENCOUNTER — CARE COORDINATION (OUTPATIENT)
Dept: CASE MANAGEMENT | Age: 50
End: 2020-12-18

## 2020-12-18 ENCOUNTER — TELEPHONE (OUTPATIENT)
Dept: INTERNAL MEDICINE CLINIC | Age: 50
End: 2020-12-18

## 2020-12-18 LAB
AMPHETAMINES SCREEN BLOOD: NEGATIVE NG/ML
BARBITURATES SCREEN BLOOD: NEGATIVE NG/ML
BENZODIAZEPINES SCREEN BLOOD: POSITIVE NG/ML
BLOOD CULTURE, ROUTINE: NORMAL
BUPRENORPHINE: NEGATIVE NG/ML
CANNABINOID SCREEN BLOOD: NEGATIVE NG/ML
COCAINE SCREEN BLOOD: NEGATIVE NG/ML
CULTURE, BLOOD 2: NORMAL
Lab: NORMAL
METHADONE SCREEN BLOOD: NEGATIVE NG/ML
METHAMPHETAMINES SERUM/ PLASMA: NEGATIVE NG/ML
OPIATES SCREEN BLOOD: NEGATIVE NG/ML
OXYCODONE: NEGATIVE NG/ML
PHENCYCLIDINE SCREEN BLOOD: NEGATIVE NG/ML

## 2020-12-18 NOTE — CARE COORDINATION
DISCHARGE ORDER  Date/Time 2020 9:15 AM  Completed by: Selena Russell, Case Management    Patient Name: Kelsie Hernandez      : 1970  Admitting Diagnosis: HCAP (healthcare-associated pneumonia) [J18.9]      Admit order Date and Status:2020  (verify MD's last order for status of admission)      Noted discharge order. If applicable PT/OT recommendation at Discharge: n/a--left before they could see him  DME recommendation by PT/OT:n/a    Discharge Plan: Reviewed chart. Pt discharged after CM left for the day. Pt left before PT/OT could see pt. Pt was walking around the room per Zain Lozano RN on 2020, as she told this CM. Pt has a history of drugs and ETOH abuse. Pt is homeless. CM did speak with Janice Mitchell (saqib) prior to pt deciding he wanted to leave, as pt was adamant about leaving and stated to pulm that he did not want to go to a SNF, and just wanted to go. Pt was on 99% RA at discharge. No further needs needed from CM.

## 2020-12-18 NOTE — CARE COORDINATION
[facility] Transitions Initial Follow Up Call    Call within 2 business days of discharge: Yes    Patient: Gabe Clarke Patient : 1970   MRN: [id number]  Reason for Admission: Sepsis   Discharge Date: 20 RARS: Readmission Risk Score: 30      Last Discharge [facility]       Complaint Diagnosis Description Type Department Provider    20 Shortness of Breath COVID-19 . .. ED to Hosp-Admission (Discharged) (ADMITTED) Alisha Vazquez MD; Radha Rodrigues. .. Attempted to reach patient via phone for initial post hospital transition call. VM left stating purpose of call along with my contact information requesting a return call on mobile number, home number received message no vm setup at this time. Follow Up  No future appointments.     Nam Chan RN

## 2020-12-19 ENCOUNTER — CARE COORDINATION (OUTPATIENT)
Dept: CASE MANAGEMENT | Age: 50
End: 2020-12-19

## 2020-12-19 NOTE — CARE COORDINATION
[facility] Transitions Initial Follow Up Call    Call within 2 business days of discharge: Yes    Patient: Senait Heller Patient : 1970   MRN: <[id number]>  Reason for Admission:   Discharge Date: 20 RARS: Readmission Risk Score: 30      Last Discharge [address] [address]       Complaint Diagnosis Description Type Department Provider    20 Shortness of Breath COVID-19 . .. ED to Hosp-Admission (Discharged) (ADMITTED) Karon Giang MD; Zakia Mchugh. ..           2nd attempt at an initial 24 hour BPCI-A call, contact info left on , transitioned to central team.      Follow Up  No future appointments.     Walter Holbrook RN

## 2020-12-20 LAB
7-AMINOCLONAZEPAM: <5 NG/ML
ALPHA-HYDROXYALPRAZOLAM, S/P, QUANT: <5 NG/ML
ALPHA-HYDROXYMIDAZOLAM, S/P, QUANT: <20 NG/ML
ALPRAZOLAM: <5 NG/ML
CHLORDIAZEPOXIDE: <20 NG/ML
CLONAZEPAM: <5 NG/ML
DIAZEPAM LEVEL: 15 NG/ML
LORAZEPAM: <20 NG/ML
MIDAZOLAM: <20 NG/ML
NORDIAZEPAM: 116 NG/ML
OXAZEPAM: <20 NG/ML
TEMAZEPAM: <20 NG/ML

## 2020-12-22 NOTE — DISCHARGE SUMMARY
Name:  Manuel Briggs  Room:  /3492-04  MRN:    [id number]    Discharge Summary      This discharge summary is in conjunction with a complete physical exam done on the day of discharge. Discharging Physician: Dr. Quintana Pun: 12/14/2020  Discharge:  12/17/2020    HPI taken from admission H&P:    The patient is a 48 y.o. male with h/o PE, hepatitis C, polysubstance abuse who presents to [facility] with c/o shortness of breath and chest pain. Diagnosed with COVID on 12/5. Admitted to [facility] 12/5-12/11. He was intubated at that time. He improved and was discharged home. States he feels better with oxygen. His dyspnea is worse with exertion. He has a cough. He denies fever, abdominal pain, nausea, vomiting, or diarrhea. He reports his last use of drugs was 3 months ago. His tox screen was positive for amphetamines on 12/5. We have not been able to get a urine sample yet. He is on 2 L O2. Labs with hyponatremia, elevated ALT, lactic acidosis, and leukocytosis. CT chest with chronic PE, COPD, and a 0.7 Cm pleural based nodule. Needs repeat CT in 6 months. Patient admitted for further management/care. Diagnoses this Admission and Hospital Course   Acute hypoxemic respiratory failure--resolved  - secondary to Covid 19 infection, pneumonia (gram negative organism, risk for MRSA)   HCAP  - on 2 L O2. Wean as tolerated. - Continue Vanc, Cefepime D#2  - O2 requirement up to 6 L on 12/16,-.>patient was poorly responsive, required Narcan   - back down to 4 L-->now stable on RA  - monitored closely, added metered-dose inhalers. - Pulmonology consulted  - D/c home on Decadron and Levaquin    Hyponatremia  - likely with fluid volume depletion.  - Given IVFs.  Monitor labs.      Lactic acidosis  - Trended Lactic     Leukocytosis  - likely related to above  - improved with antibiotics.    White count improved from 20K -> 11K -->  8.2K today     Transaminitis  Hepatitis C- ALT elevated.   Recent Labs     12/14/20 2124   TROPONINI <0.01      FASTING LIPID PANEL:        Lab Results   Component Value Date     TRIG 155 (H) 12/07/2020      LIVER PROFILE:        Recent Labs     12/14/20  2124 12/15/20  0915   AST 29 31   ALT 75* 76*   BILIDIR  --  <0.2   BILITOT 1.0 0.6   ALKPHOS 90 88       CULTURES  Blood Cx: NGTD    RADIOLOGY  CT CHEST PULMONARY EMBOLISM W CONTRAST   Final Result   Chronic nonocclusive segmental PE involving the pulmonary artery to the   posterior basal segment of the right lower lobe. No evidence of acute pulmonary embolism. COPD. A 0.7 cm pleural based nodule versus focal atelectasis in the right lower   lobe. Follow-up CT scan in 6 months may be obtained. I discussed the findings by phone with the ordering physician assistant,   Kentrell Yepez, at 12:37 a.m. on 12/15/2020      RECOMMENDATIONS:   CT scan of the chest in 6 months         XR CHEST PORTABLE   Final Result   Findings remain compatible with the patient's history of COVID-19 positivity. Discharge Medications     Medication List      START taking these medications    dexamethasone 4 MG tablet  Commonly known as: Decadron  Take 1 tablet by mouth daily (with breakfast) for 5 days     levoFLOXacin 500 MG tablet  Commonly known as: Levaquin  Take 1 tablet by mouth daily for 7 days        CONTINUE taking these medications    albuterol sulfate  (90 Base) MCG/ACT inhaler  Inhale 2 puffs into the lungs every 4 hours as needed for Wheezing or Shortness of Breath           Where to Get Your Medications      You can get these medications from any pharmacy    Bring a paper prescription for each of these medications  · dexamethasone 4 MG tablet  · levoFLOXacin 500 MG tablet       Discharged in stable condition to home    Follow Up:   Follow up with PCP      Cheyenne Anglin MD  12/17/2020

## 2020-12-25 ENCOUNTER — APPOINTMENT (OUTPATIENT)
Dept: GENERAL RADIOLOGY | Age: 50
DRG: 871 | End: 2020-12-25
Payer: MEDICARE

## 2020-12-25 ENCOUNTER — APPOINTMENT (OUTPATIENT)
Dept: CT IMAGING | Age: 50
DRG: 871 | End: 2020-12-25
Payer: MEDICARE

## 2020-12-25 ENCOUNTER — HOSPITAL ENCOUNTER (INPATIENT)
Age: 50
LOS: 7 days | Discharge: LEFT BEFORE DISCHARGE | DRG: 871 | End: 2021-01-01
Attending: EMERGENCY MEDICINE | Admitting: INTERNAL MEDICINE
Payer: MEDICARE

## 2020-12-25 ENCOUNTER — TELEPHONE (OUTPATIENT)
Dept: OTHER | Facility: CLINIC | Age: 50
End: 2020-12-25

## 2020-12-25 DIAGNOSIS — R06.03 RESPIRATORY DISTRESS: Primary | ICD-10-CM

## 2020-12-25 DIAGNOSIS — F15.90 AMPHETAMINE USER: ICD-10-CM

## 2020-12-25 DIAGNOSIS — A41.9 SEPTICEMIA (HCC): ICD-10-CM

## 2020-12-25 DIAGNOSIS — J18.9 PNEUMONIA DUE TO ORGANISM: ICD-10-CM

## 2020-12-25 LAB
A/G RATIO: 1 (ref 1.1–2.2)
ALBUMIN SERPL-MCNC: 3.8 G/DL (ref 3.4–5)
ALP BLD-CCNC: 92 U/L (ref 40–129)
ALT SERPL-CCNC: 31 U/L (ref 10–40)
AMPHETAMINE SCREEN, URINE: POSITIVE
ANION GAP SERPL CALCULATED.3IONS-SCNC: 14 MMOL/L (ref 3–16)
AST SERPL-CCNC: 28 U/L (ref 15–37)
BARBITURATE SCREEN URINE: ABNORMAL
BASE EXCESS VENOUS: -2.2 MMOL/L (ref -3–3)
BASOPHILS ABSOLUTE: 0.1 K/UL (ref 0–0.2)
BASOPHILS RELATIVE PERCENT: 0.2 %
BENZODIAZEPINE SCREEN, URINE: ABNORMAL
BILIRUB SERPL-MCNC: 1 MG/DL (ref 0–1)
BILIRUBIN URINE: NEGATIVE
BLOOD, URINE: ABNORMAL
BUN BLDV-MCNC: 22 MG/DL (ref 7–20)
CALCIUM SERPL-MCNC: 8.9 MG/DL (ref 8.3–10.6)
CANNABINOID SCREEN URINE: ABNORMAL
CARBOXYHEMOGLOBIN: 2.3 % (ref 0–1.5)
CHLORIDE BLD-SCNC: 94 MMOL/L (ref 99–110)
CLARITY: CLEAR
CO2: 23 MMOL/L (ref 21–32)
COCAINE METABOLITE SCREEN URINE: ABNORMAL
COLOR: YELLOW
CREAT SERPL-MCNC: 0.7 MG/DL (ref 0.9–1.3)
EOSINOPHILS ABSOLUTE: 0 K/UL (ref 0–0.6)
EOSINOPHILS RELATIVE PERCENT: 0 %
EPITHELIAL CELLS, UA: ABNORMAL /HPF (ref 0–5)
GFR AFRICAN AMERICAN: >60
GFR NON-AFRICAN AMERICAN: >60
GLOBULIN: 3.7 G/DL
GLUCOSE BLD-MCNC: 186 MG/DL (ref 70–99)
GLUCOSE URINE: 500 MG/DL
HCO3 VENOUS: 23.4 MMOL/L (ref 23–29)
HCT VFR BLD CALC: 46.3 % (ref 40.5–52.5)
HEMOGLOBIN: 15.1 G/DL (ref 13.5–17.5)
KETONES, URINE: ABNORMAL MG/DL
LACTIC ACID: 2.6 MMOL/L (ref 0.4–2)
LACTIC ACID: 3.7 MMOL/L (ref 0.4–2)
LEUKOCYTE ESTERASE, URINE: NEGATIVE
LIPASE: 36 U/L (ref 13–60)
LYMPHOCYTES ABSOLUTE: 0.5 K/UL (ref 1–5.1)
LYMPHOCYTES RELATIVE PERCENT: 2 %
Lab: ABNORMAL
MAGNESIUM: 2 MG/DL (ref 1.8–2.4)
MCH RBC QN AUTO: 28 PG (ref 26–34)
MCHC RBC AUTO-ENTMCNC: 32.7 G/DL (ref 31–36)
MCV RBC AUTO: 85.7 FL (ref 80–100)
METHADONE SCREEN, URINE: ABNORMAL
METHEMOGLOBIN VENOUS: 0.5 %
MICROSCOPIC EXAMINATION: YES
MONOCYTES ABSOLUTE: 1.1 K/UL (ref 0–1.3)
MONOCYTES RELATIVE PERCENT: 4.9 %
MUCUS: ABNORMAL /LPF
NEUTROPHILS ABSOLUTE: 21.1 K/UL (ref 1.7–7.7)
NEUTROPHILS RELATIVE PERCENT: 92.9 %
NITRITE, URINE: NEGATIVE
O2 CONTENT, VEN: 16 VOL %
O2 SAT, VEN: 72 %
O2 THERAPY: ABNORMAL
OPIATE SCREEN URINE: ABNORMAL
OXYCODONE URINE: ABNORMAL
PCO2, VEN: 42.8 MMHG (ref 40–50)
PDW BLD-RTO: 17.6 % (ref 12.4–15.4)
PH UA: 6
PH UA: 6 (ref 5–8)
PH VENOUS: 7.36 (ref 7.35–7.45)
PHENCYCLIDINE SCREEN URINE: ABNORMAL
PLATELET # BLD: 210 K/UL (ref 135–450)
PMV BLD AUTO: 7.5 FL (ref 5–10.5)
PO2, VEN: 39.5 MMHG (ref 25–40)
POTASSIUM SERPL-SCNC: 3.8 MMOL/L (ref 3.5–5.1)
PRO-BNP: 226 PG/ML (ref 0–124)
PROPOXYPHENE SCREEN: ABNORMAL
PROTEIN UA: 30 MG/DL
RBC # BLD: 5.4 M/UL (ref 4.2–5.9)
RBC UA: ABNORMAL /HPF (ref 0–4)
SARS-COV-2, NAAT: NOT DETECTED
SODIUM BLD-SCNC: 131 MMOL/L (ref 136–145)
SPECIFIC GRAVITY UA: 1.02 (ref 1–1.03)
TCO2 CALC VENOUS: 25 MMOL/L
TOTAL PROTEIN: 7.5 G/DL (ref 6.4–8.2)
TROPONIN: <0.01 NG/ML
URINE TYPE: ABNORMAL
UROBILINOGEN, URINE: 0.2 E.U./DL
WBC # BLD: 22.7 K/UL (ref 4–11)
WBC UA: ABNORMAL /HPF (ref 0–5)

## 2020-12-25 PROCEDURE — 6370000000 HC RX 637 (ALT 250 FOR IP): Performed by: EMERGENCY MEDICINE

## 2020-12-25 PROCEDURE — 82803 BLOOD GASES ANY COMBINATION: CPT

## 2020-12-25 PROCEDURE — 81001 URINALYSIS AUTO W/SCOPE: CPT

## 2020-12-25 PROCEDURE — 74177 CT ABD & PELVIS W/CONTRAST: CPT

## 2020-12-25 PROCEDURE — 96365 THER/PROPH/DIAG IV INF INIT: CPT

## 2020-12-25 PROCEDURE — 1200000000 HC SEMI PRIVATE

## 2020-12-25 PROCEDURE — 83690 ASSAY OF LIPASE: CPT

## 2020-12-25 PROCEDURE — 99285 EMERGENCY DEPT VISIT HI MDM: CPT

## 2020-12-25 PROCEDURE — 83605 ASSAY OF LACTIC ACID: CPT

## 2020-12-25 PROCEDURE — 6360000004 HC RX CONTRAST MEDICATION: Performed by: EMERGENCY MEDICINE

## 2020-12-25 PROCEDURE — 83735 ASSAY OF MAGNESIUM: CPT

## 2020-12-25 PROCEDURE — 84145 PROCALCITONIN (PCT): CPT

## 2020-12-25 PROCEDURE — 6360000002 HC RX W HCPCS: Performed by: PHYSICIAN ASSISTANT

## 2020-12-25 PROCEDURE — 96368 THER/DIAG CONCURRENT INF: CPT

## 2020-12-25 PROCEDURE — 84484 ASSAY OF TROPONIN QUANT: CPT

## 2020-12-25 PROCEDURE — 80053 COMPREHEN METABOLIC PANEL: CPT

## 2020-12-25 PROCEDURE — U0002 COVID-19 LAB TEST NON-CDC: HCPCS

## 2020-12-25 PROCEDURE — 87150 DNA/RNA AMPLIFIED PROBE: CPT

## 2020-12-25 PROCEDURE — 2580000003 HC RX 258: Performed by: PHYSICIAN ASSISTANT

## 2020-12-25 PROCEDURE — 96366 THER/PROPH/DIAG IV INF ADDON: CPT

## 2020-12-25 PROCEDURE — 87186 SC STD MICRODIL/AGAR DIL: CPT

## 2020-12-25 PROCEDURE — 2700000000 HC OXYGEN THERAPY PER DAY

## 2020-12-25 PROCEDURE — 94660 CPAP INITIATION&MGMT: CPT

## 2020-12-25 PROCEDURE — 94761 N-INVAS EAR/PLS OXIMETRY MLT: CPT

## 2020-12-25 PROCEDURE — 96367 TX/PROPH/DG ADDL SEQ IV INF: CPT

## 2020-12-25 PROCEDURE — 85025 COMPLETE CBC W/AUTO DIFF WBC: CPT

## 2020-12-25 PROCEDURE — 80307 DRUG TEST PRSMV CHEM ANLYZR: CPT

## 2020-12-25 PROCEDURE — 71260 CT THORAX DX C+: CPT

## 2020-12-25 PROCEDURE — 71045 X-RAY EXAM CHEST 1 VIEW: CPT

## 2020-12-25 PROCEDURE — 87040 BLOOD CULTURE FOR BACTERIA: CPT

## 2020-12-25 PROCEDURE — 96375 TX/PRO/DX INJ NEW DRUG ADDON: CPT

## 2020-12-25 PROCEDURE — 94640 AIRWAY INHALATION TREATMENT: CPT

## 2020-12-25 PROCEDURE — 6370000000 HC RX 637 (ALT 250 FOR IP): Performed by: PHYSICIAN ASSISTANT

## 2020-12-25 PROCEDURE — 83880 ASSAY OF NATRIURETIC PEPTIDE: CPT

## 2020-12-25 RX ORDER — LIDOCAINE 4 G/G
1 PATCH TOPICAL ONCE
Status: COMPLETED | OUTPATIENT
Start: 2020-12-25 | End: 2020-12-26

## 2020-12-25 RX ORDER — LORAZEPAM 2 MG/ML
1 INJECTION INTRAMUSCULAR EVERY 4 HOURS PRN
Status: DISCONTINUED | OUTPATIENT
Start: 2020-12-25 | End: 2020-12-27

## 2020-12-25 RX ORDER — ACETAMINOPHEN 325 MG/1
650 TABLET ORAL ONCE
Status: COMPLETED | OUTPATIENT
Start: 2020-12-25 | End: 2020-12-25

## 2020-12-25 RX ORDER — MAGNESIUM SULFATE HEPTAHYDRATE 500 MG/ML
1 INJECTION, SOLUTION INTRAMUSCULAR; INTRAVENOUS ONCE
Status: DISCONTINUED | OUTPATIENT
Start: 2020-12-25 | End: 2020-12-25 | Stop reason: SDUPTHER

## 2020-12-25 RX ORDER — 0.9 % SODIUM CHLORIDE 0.9 %
30 INTRAVENOUS SOLUTION INTRAVENOUS ONCE
Status: COMPLETED | OUTPATIENT
Start: 2020-12-25 | End: 2020-12-25

## 2020-12-25 RX ORDER — TRAMADOL HYDROCHLORIDE 50 MG/1
50 TABLET ORAL PRN
Status: CANCELLED | OUTPATIENT
Start: 2020-12-25 | End: 2020-12-28

## 2020-12-25 RX ORDER — CLONIDINE HYDROCHLORIDE 0.1 MG/1
0.1 TABLET ORAL PRN
Status: CANCELLED | OUTPATIENT
Start: 2020-12-25

## 2020-12-25 RX ORDER — IPRATROPIUM BROMIDE AND ALBUTEROL SULFATE 2.5; .5 MG/3ML; MG/3ML
3 SOLUTION RESPIRATORY (INHALATION)
Status: DISCONTINUED | OUTPATIENT
Start: 2020-12-25 | End: 2020-12-25

## 2020-12-25 RX ORDER — MAGNESIUM SULFATE 1 G/100ML
1 INJECTION INTRAVENOUS ONCE
Status: COMPLETED | OUTPATIENT
Start: 2020-12-25 | End: 2020-12-25

## 2020-12-25 RX ORDER — LIDOCAINE 50 MG/G
1 PATCH TOPICAL DAILY
Status: DISCONTINUED | OUTPATIENT
Start: 2020-12-25 | End: 2020-12-25 | Stop reason: CLARIF

## 2020-12-25 RX ORDER — DEXAMETHASONE SODIUM PHOSPHATE 10 MG/ML
10 INJECTION INTRAMUSCULAR; INTRAVENOUS ONCE
Status: COMPLETED | OUTPATIENT
Start: 2020-12-25 | End: 2020-12-25

## 2020-12-25 RX ADMIN — ACETAMINOPHEN 650 MG: 325 TABLET ORAL at 20:40

## 2020-12-25 RX ADMIN — IOPAMIDOL 75 ML: 755 INJECTION, SOLUTION INTRAVENOUS at 21:20

## 2020-12-25 RX ADMIN — IPRATROPIUM BROMIDE AND ALBUTEROL SULFATE 3 AMPULE: .5; 3 SOLUTION RESPIRATORY (INHALATION) at 18:27

## 2020-12-25 RX ADMIN — CEFEPIME HYDROCHLORIDE 2 G: 2 INJECTION, POWDER, FOR SOLUTION INTRAVENOUS at 19:48

## 2020-12-25 RX ADMIN — MAGNESIUM SULFATE 1 G: 1 INJECTION INTRAVENOUS at 18:44

## 2020-12-25 RX ADMIN — DEXAMETHASONE SODIUM PHOSPHATE 10 MG: 10 INJECTION INTRAMUSCULAR; INTRAVENOUS at 18:34

## 2020-12-25 RX ADMIN — VANCOMYCIN HYDROCHLORIDE 1000 MG: 1 INJECTION, POWDER, LYOPHILIZED, FOR SOLUTION INTRAVENOUS at 20:39

## 2020-12-25 RX ADMIN — SODIUM CHLORIDE 1362 ML: 9 INJECTION, SOLUTION INTRAVENOUS at 19:49

## 2020-12-25 ASSESSMENT — PAIN DESCRIPTION - DESCRIPTORS: DESCRIPTORS: ACHING;DISCOMFORT;SHOOTING

## 2020-12-25 ASSESSMENT — PAIN DESCRIPTION - ONSET: ONSET: GRADUAL

## 2020-12-25 ASSESSMENT — PAIN DESCRIPTION - PROGRESSION: CLINICAL_PROGRESSION: NOT CHANGED

## 2020-12-25 NOTE — ED NOTES
RN at bedside attempting IV placement at this time. Reema NIELSEN at bedside.      Miesha Winter RN  12/25/20 [id number]

## 2020-12-25 NOTE — ED NOTES
Patient expresses wanting to remove CPAP mask at this time stating \"I don't want the mask, I want to eat\" Writer explained to patient that if he takes the mask off he could go hypoxic and have bad health outcomes. Jose Elias NIELSEN aware.      Severiano Balding, RN  12/25/20 1621

## 2020-12-25 NOTE — ED PROVIDER NOTES
[facility] Emergency Department    CHIEF COMPLAINT  Shortness of Breath (Pt was 80% O2 at home per EMS, pt placed on CPAP en route and duoneb treatment. Pt last used heroine 2 days ago.) and COPD      SHARED SERVICE VISIT  I have seen and evaluated this patient with my supervising physician, Dr. Belinda Dumont. HISTORY OF PRESENT ILLNESS  Petar Proctor is a 48 y.o. male who presents to the ED complaining of 2 to 3-day history of progressively worsening chest tightness and shortness of breath. History of COPD. Reports that he is a former smoker. Reports cough nonproductive. No hemoptysis. History of PEs. States that he is supposed to be on blood thinners although has not. Denies nasal congestion or sore throat. No loss of taste or smell. Has had some nausea. Denies vomiting. No diarrhea or constipation. Does have some  left leg discomfort. No neck or back pain. No numbness, tingling, weakness of extremities. Did test positive for Covid on 5 December. No other complaints, modifying factors or associated symptoms. Nursing notes reviewed. 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 No pertinent family history.   Social History     Socioeconomic History    Marital status: Single     Spouse name: Not on file    Number of children: Not on file    Years of education: Not on file    Highest education level: Not on file   Occupational History  gabapentin (NEURONTIN) capsule 600 mg  600 mg Oral TID Storm Bull MD   600 mg at 12/28/20 7513    LORazepam (ATIVAN) injection 2 mg  2 mg Intravenous Q4H PRN Storm Bull MD        methylPREDNISolone sodium (SOLU-MEDROL) injection 40 mg  40 mg Intravenous Q6H JOSIAS Raymond CNP   40 mg at 12/28/20 1045    sodium chloride flush 0.9 % injection 10 mL  10 mL Intravenous 2 times per day JOSIAS Raymond CNP   10 mL at 12/28/20 1045    sodium chloride flush 0.9 % injection 10 mL  10 mL Intravenous PRN JOSIAS Raymond CNP        enoxaparin (LOVENOX) injection 40 mg  40 mg Subcutaneous Daily JOSIAS Raymond CNP   40 mg at 12/28/20 0820    acetaminophen (TYLENOL) tablet 650 mg  650 mg Oral Q6H PRN JOSIAS Raymond CNP        Or    acetaminophen (TYLENOL) suppository 650 mg  650 mg Rectal Q6H PRN JOSIAS Raymond CNP        traZODone (DESYREL) tablet 50 mg  50 mg Oral Nightly PRN JOSIAS Raymond CNP        buprenorphine (SUBUTEX) SL tablet 2 mg  2 mg Sublingual PRN Josey Arnold MD   2 mg at 12/27/20 1738    And    cloNIDine (CATAPRES) tablet 0.1 mg  0.1 mg Oral PRN Josey Arnold MD   0.1 mg at 12/27/20 1701    hydrOXYzine (VISTARIL) capsule 50 mg  50 mg Oral Q8H PRN Josey Arnold MD   50 mg at 12/27/20 1701    dicyclomine (BENTYL) tablet 20 mg  20 mg Oral Q6H PRN Josey Arnold MD        dexmedetomidine [facility] 400 mcg in sodium chloride 0.9 % 100 mL infusion  0.2 mcg/kg/hr Intravenous Continuous Josey Arnold MD 4.2 mL/hr at 12/28/20 1117 0.4 mcg/kg/hr at 12/28/20 1117    ipratropium-albuterol (DUONEB) nebulizer solution 1 ampule  1 ampule Inhalation Q4H WA Josey Arnold MD   1 ampule at 12/28/20 5008    perflutren lipid microspheres (DEFINITY) injection 1.65 mg  1.5 mL Intravenous ONCE PRN Rodrigue Rodriguez, APRN - CNP         No Known Allergies    REVIEW OF SYSTEMS  10 systems reviewed, pertinent positives per HPI otherwise noted to be negative PHYSICAL EXAM  BP (!) 107/59   Pulse 118   Temp 97.5 °F (36.4 °C) (Temporal)   Resp 20   Ht 5' (1.524 m)   Wt 91 lb 7.9 oz (41.5 kg)   SpO2 100%   BMI 17.87 kg/m²   GENERAL APPEARANCE: Awake and alert. Cooperative. Moderate respiratory distress. HEAD: Normocephalic. Atraumatic. EYES: PERRL. EOM's grossly intact. ENT: Mucous membranes are moist.   NECK: Supple. No JVD. No tracheal tenderness or deviation. No crepitus. HEART: Tachycardic. No murmurs. No chest wall tenderness. LUNGS: Patient comes in with BiPAP, tripoding with tachypnea and retractions. Diminished air movement throughout. Occasional wheezing. No obvious rhonchi or rales. ABDOMEN: Soft. Non-distended. Non-tender. No guarding or rebound. no midline pulsatile mass. EXTREMITIES: No peripheral edema. Lower extremities suggestive of mild mottling. No unilateral calf pain, swelling or redness. Moves all extremities equally. All extremities neurovascularly intact. SKIN: Warm and dry. No acute rashes. NEUROLOGICAL: Alert and oriented. CN's 2-12 intact. No gross facial drooping. Strength 5/5, sensation intact. PSYCHIATRIC: Normal mood and affect.     RADIOLOGY  Ct Head Wo Contrast    Result Date: 12/5/2020 EXAMINATION: CT OF THE HEAD WITHOUT CONTRAST  12/5/2020 8:00 am TECHNIQUE: CT of the head was performed without the administration of intravenous contrast. Dose modulation, iterative reconstruction, and/or weight based adjustment of the mA/kV was utilized to reduce the radiation dose to as low as reasonably achievable. COMPARISON: 06/24/2015 HISTORY: ORDERING SYSTEM PROVIDED HISTORY: Altered mental status with acute encephalopathy TECHNOLOGIST PROVIDED HISTORY: Reason for exam:->Altered mental status with acute encephalopathy Has a \"code stroke\" or \"stroke alert\" been called? ->No Reason for Exam: Altered mental status with acute encephalopathy Acuity: Acute Type of Exam: Initial FINDINGS: BRAIN/VENTRICLES: There is some motion artifact. Within this limitation, there is no evidence of hemorrhage, edema, or mass effect. There is a small focus of encephalomalacia in the posterior left frontal lobe deep to a craniotomy defect. There are no abnormal extra-axial fluid collections ORBITS: The visualized portion of the orbits demonstrate no acute abnormality. SINUSES: The visualized paranasal sinuses and mastoid air cells demonstrate no acute abnormality. SOFT TISSUES/SKULL:  No acute abnormality of the skull. Old left frontoparietal craniotomy     No acute intracranial abnormality.      Xr Chest Portable    Result Date: 12/14/2020 EXAMINATION: ONE XRAY VIEW OF THE CHEST 12/14/2020 6:53 pm COMPARISON: 12/08/2020 HISTORY: ORDERING SYSTEM PROVIDED HISTORY: cough, sob, +covid TECHNOLOGIST PROVIDED HISTORY: Reason for exam:->cough, sob, +covid Reason for Exam: Pt c/o cough and SOB, pt is covid positive Acuity: Unknown Type of Exam: Unknown FINDINGS: Heart size and pulmonary vasculature are normal however there is interstitial prominence nearly diffusely somewhat sparing the right apex. There is hazy increased density in the mid and lower lung zones on the left. No consolidating infiltrate, pneumothorax or pleural effusion. Since the comparison exam, the endotracheal and NG tubes and the right PICC line have been removed. Surrounding osseous and soft tissue structures are unremarkable. Findings remain compatible with the patient's history of COVID-19 positivity. Xr Chest Portable    Result Date: 12/8/2020  EXAMINATION: ONE XRAY VIEW OF THE CHEST 12/8/2020 7:24 am COMPARISON: 12/07/2020 HISTORY: ORDERING SYSTEM PROVIDED HISTORY: Mechanical ventilation TECHNOLOGIST PROVIDED HISTORY: Reason for exam:->Mechanical ventilation Reason for Exam: daily while on vent Acuity: Acute Type of Exam: Initial FINDINGS: Cardiac leads project over the chest.  Nasogastric tube extends to the left upper quadrant. Endotracheal tube extends to the mid trachea. Right upper extremity PICC line is demonstrated. Calcified pulmonary nodules, in keeping with granulomatous disease. No confluent airspace disease. No pneumothorax. Cardiac and mediastinal silhouettes are reflective of patient rotation. No acute airspace disease.      Xr Chest Portable    Result Date: 12/7/2020 EXAMINATION: ONE XRAY VIEW OF THE CHEST 12/7/2020 3:50 am COMPARISON: Chest x-ray dated 12/06/2020 HISTORY: ORDERING SYSTEM PROVIDED HISTORY: Mechanical ventilation TECHNOLOGIST PROVIDED HISTORY: Reason for exam:->Mechanical ventilation Reason for Exam: resp distress Acuity: Acute Additional signs and symptoms: f/u ETT FINDINGS: LINES/TUBES/OTHER: Endotracheal tube, enteric tube and right arm PICC are grossly unchanged in position. HEART/MEDIASTINUM: The cardiomediastinal silhouette is unchanged. PLEURA/LUNGS: The lungs are again hyperinflated. Calcified granulomas are again noted bilaterally. There are no focal consolidations or pleural effusions. There is no appreciable pneumothorax. BONES/SOFT TISSUE: The visualized osseous structures are unchanged. Stable lines and tubes. No acute cardiopulmonary disease. Xr Chest Portable    Result Date: 12/6/2020  EXAMINATION: ONE XRAY VIEW OF THE CHEST 12/6/2020 4:37 am COMPARISON: Yesterday HISTORY: ORDERING SYSTEM PROVIDED HISTORY: Mechanical ventilation TECHNOLOGIST PROVIDED HISTORY: Reason for exam:->Mechanical ventilation Reason for Exam: resp distress Acuity: Acute Type of Exam: Subsequent/Follow-up Additional signs and symptoms: f/u ETT FINDINGS: The endotracheal tube terminates 2.5 cm cephalad to the lilo. Nasogastric tube has a normal course. Cardiac silhouette is normal.  Calcified granuloma project over the right paratracheal region. The lungs remain hyperinflated. Scattered calcified granuloma are in the lungs. No consolidation, pleural effusion or pneumothorax is seen. Stable position of supportive tubing. No acute cardiopulmonary disease.      Xr Chest Portable    Result Date: 12/5/2020 EXAMINATION: ONE XRAY VIEW OF THE CHEST 12/5/2020 1:50 pm COMPARISON: 12/05/2020 HISTORY: ORDERING SYSTEM PROVIDED HISTORY: intubation TECHNOLOGIST PROVIDED HISTORY: Reason for exam:->intubation Reason for Exam: intubation Acuity: Acute Type of Exam: Initial FINDINGS: The patient is rotated. Endotracheal tube terminates 5 cm cephalad to the lilo. Nasogastric tube has a normal course, distal side port at the level of the gastric fundus. The cardiac silhouette is normal.  No airspace consolidation, pleural effusion or pneumothorax is identified. Supportive tubing projects in normal position. No acute cardiopulmonary disease. Xr Chest Portable    Result Date: 12/5/2020  EXAMINATION: ONE XRAY VIEW OF THE CHEST 12/5/2020 6:48 am COMPARISON: Chest x-ray dated 11/21/2020 HISTORY: ORDERING SYSTEM PROVIDED HISTORY: SOB TECHNOLOGIST PROVIDED HISTORY: Reason for exam:->SOB Reason for Exam: SOB Acuity: Acute Type of Exam: Initial FINDINGS: HEART/MEDIASTINUM: The cardiomediastinal silhouette is within normal limits. PLEURA/LUNGS: There is hazy opacity at the right costophrenic angle which may reflect atelectasis versus airspace disease. There are no focal consolidations or pleural effusions. There is no appreciable pneumothorax. BONES/SOFT TISSUE: No acute abnormality. Hazy opacity at the right costophrenic angle which may reflect atelectasis versus airspace disease.      Ct Chest Pulmonary Embolism W Contrast    Result Date: 12/15/2020 EXAMINATION: CTA OF THE CHEST 12/15/2020 12:08 am TECHNIQUE: CTA of the chest was performed after the administration of intravenous contrast.  Multiplanar reformatted images are provided for review. MIP images are provided for review. Dose modulation, iterative reconstruction, and/or weight based adjustment of the mA/kV was utilized to reduce the radiation dose to as low as reasonably achievable. COMPARISON: None. HISTORY: ORDERING SYSTEM PROVIDED HISTORY: cp, sob, hypoxia, +covid r/o PE TECHNOLOGIST PROVIDED HISTORY: Reason for exam:->cp, sob, hypoxia, +covid r/o PE Reason for Exam: covid FINDINGS: Pulmonary Arteries: Chronic nonocclusive segmental PE is noted involving pulmonary artery to the posterior basal segment of the right lower lobe, image number 118 axial series. No other filling defect is seen in the pulmonary arteries. Mediastinum: No evidence of mediastinal lymphadenopathy. Calcified mediastinal lymph nodes are noted. The heart and pericardium demonstrate no acute abnormality. There is no acute abnormality of the thoracic aorta. Lungs/pleura: No pneumothorax. No pleural effusion. Emphysematous changes in the bilateral lungs. A 0.7 cm pleural based nodule versus focal atelectasis in the right lower lobe, image number 95 axial series. No additional pulmonary nodule or mass. Calcified granuloma in the right upper lobe, image number 36 Upper Abdomen: Multiple calcified granulomas in the spleen. Soft Tissues/Bones: Multilevel thoracic spondylosis. Old compression fracture of T8 vertebral body with moderate anterior wedging. Also, minimal compression deformity of the superior endplate of T3, most likely old. Patient received Decadron, DuoNeb's, and magnesium for chest tightness and shortness of breath, with good relief. Triage vitals showing tachycardia in 120s. Difficulty obtaining oxygen saturation secondary to patient being cold. Earlobe probe did  pulse ox of 100% while patient was on BiPAP. CBC showing no leukocytosis at 22.7 with left shift. No bands. Lactic acid elevated. Initiated on a 30 mL/kg IV fluid bolus. Blood cultures x2 pending. CMP suggestive of some mild dehydration. Empirically initiated on vancomycin and Rocephin. Patient was not tolerating BiPAP very well. VBG without acidosis. Taken off BiPAP and placed on high flow oxygen and maintaining oxygen saturations. Chest x-ray with no acute findings with questionable trace pleural effusion to the left. Procalcitonin tone and was 6.87. Lipase and magnesium normal.  BNP approximately 225. Troponin less than 0.01. Urinalysis with trace ketonuria. Positive for amphetamines on urine drug screen. Covid negative. CT chest and hospitalist consult pending. .  A discussion was had with Mr. Jarett Martinez regarding shortness of breath and cough, ED findings and recommendations for admission. Risk management discussed and shared decision making had with patient and/or surrogate. All questions were answered. Patient in agreement. CRITICAL CARE TIME  50 minutes of critical care time spent not including separately billable procedures. MDM  Results for orders placed or performed during the hospital encounter of 12/25/20   Culture, Blood 1    Specimen: Blood   Result Value Ref Range    Organism Staph aureus DNA Detected (A)     Blood Culture, Routine       mecA gene not detected  See additional report for complete BCID panel.       Organism Staphylococcus aureus (A)     Blood Culture, Routine POSITIVE for  Isolated two of two sets          Susceptibility    Staphylococcus aureus - BACTERIAL SUSCEPTIBILITY PANEL BY AJAY clindamycin <=0.25 Sensitive mcg/mL     erythromycin >=8 Resistant mcg/mL     oxacillin 0.5 Sensitive mcg/mL     tetracycline <=1 Sensitive mcg/mL     trimethoprim-sulfamethoxazole <=10 Sensitive mcg/mL   Culture, Blood 2    Specimen: Blood   Result Value Ref Range    Organism Staphylococcus aureus (A)     Culture, Blood 2       POSITIVE for  Isolated two of two sets  No further workup  Refer to culture #[id number] for sensitivity results     Culture, Blood, PCR ID Panel Results Report   Result Value Ref Range    Report SEE IMAGE    CBC auto differential   Result Value Ref Range    WBC 22.7 (H) 4.0 - 11.0 K/uL    RBC 5.40 4.20 - 5.90 M/uL    Hemoglobin 15.1 13.5 - 17.5 g/dL    Hematocrit 46.3 40.5 - 52.5 %    MCV 85.7 80.0 - 100.0 fL    MCH 28.0 26.0 - 34.0 pg    MCHC 32.7 31.0 - 36.0 g/dL    RDW 17.6 (H) 12.4 - 15.4 %    Platelets 018 914 - 047 K/uL    MPV 7.5 5.0 - 10.5 fL    Neutrophils % 92.9 %    Lymphocytes % 2.0 %    Monocytes % 4.9 %    Eosinophils % 0.0 %    Basophils % 0.2 %    Neutrophils Absolute 21.1 (H) 1.7 - 7.7 K/uL    Lymphocytes Absolute 0.5 (L) 1.0 - 5.1 K/uL    Monocytes Absolute 1.1 0.0 - 1.3 K/uL    Eosinophils Absolute 0.0 0.0 - 0.6 K/uL    Basophils Absolute 0.1 0.0 - 0.2 K/uL   Comprehensive metabolic panel   Result Value Ref Range    Sodium 131 (L) 136 - 145 mmol/L    Potassium 3.8 3.5 - 5.1 mmol/L    Chloride 94 (L) 99 - 110 mmol/L    CO2 23 21 - 32 mmol/L    Anion Gap 14 3 - 16    Glucose 186 (H) 70 - 99 mg/dL    BUN 22 (H) 7 - 20 mg/dL    CREATININE 0.7 (L) 0.9 - 1.3 mg/dL    GFR Non-African American >60 >60    GFR African American >60 >60    Calcium 8.9 8.3 - 10.6 mg/dL    Total Protein 7.5 6.4 - 8.2 g/dL    Alb 3.8 3.4 - 5.0 g/dL    Albumin/Globulin Ratio 1.0 (L) 1.1 - 2.2    Total Bilirubin 1.0 0.0 - 1.0 mg/dL    Alkaline Phosphatase 92 40 - 129 U/L    ALT 31 10 - 40 U/L    AST 28 15 - 37 U/L    Globulin 3.7 g/dL   Troponin   Result Value Ref Range    Troponin <0.01 <0.01 ng/mL Lactic acid, plasma   Result Value Ref Range    Lactic Acid 2.6 (H) 0.4 - 2.0 mmol/L   Blood gas, venous   Result Value Ref Range    pH, Mikey 7.355 7.350 - 7.450    pCO2, Mikey 42.8 40.0 - 50.0 mmHg    pO2, Mikey 39.5 25.0 - 40.0 mmHg    HCO3, Venous 23.4 23.0 - 29.0 mmol/L    Base Excess, Mikey -2.2 -3.0 - 3.0 mmol/L    O2 Sat, Mikey 72 Not Established %    Carboxyhemoglobin 2.3 (H) 0.0 - 1.5 %    MetHgb, Mikey 0.5 <1.5 %    TC02 (Calc), Mikey 25 Not Established mmol/L    O2 Content, Mikey 16 Not Established VOL %    O2 Therapy Unknown    Brain Natriuretic Peptide   Result Value Ref Range    Pro- (H) 0 - 124 pg/mL   Magnesium   Result Value Ref Range    Magnesium 2.00 1.80 - 2.40 mg/dL   Lipase   Result Value Ref Range    Lipase 36.0 13.0 - 60.0 U/L   COVID-19   Result Value Ref Range    SARS-CoV-2, NAAT Not Detected Not Detected   Urinalysis   Result Value Ref Range    Color, UA Yellow Straw/Yellow    Clarity, UA Clear Clear    Glucose, Ur 500 (A) Negative mg/dL    Bilirubin Urine Negative Negative    Ketones, Urine TRACE (A) Negative mg/dL    Specific Gravity, UA 1.020 1.005 - 1.030    Blood, Urine SMALL (A) Negative    pH, UA 6.0 5.0 - 8.0    Protein, UA 30 (A) Negative mg/dL    Urobilinogen, Urine 0.2 <2.0 E.U./dL    Nitrite, Urine Negative Negative    Leukocyte Esterase, Urine Negative Negative    Microscopic Examination YES     Urine Type NotGiven    Urine Drug Screen   Result Value Ref Range    Amphetamine Screen, Urine POSITIVE (A) Negative <1000ng/mL    Barbiturate Screen, Ur Neg Negative <200 ng/mL    Benzodiazepine Screen, Urine Neg Negative <200 ng/mL    Cannabinoid Scrn, Ur Neg Negative <50 ng/mL    Cocaine Metabolite Screen, Urine Neg Negative <300 ng/mL    Opiate Scrn, Ur Neg Negative <300 ng/mL    PCP Screen, Urine Neg Negative <25 ng/mL    Methadone Screen, Urine Neg Negative <300 ng/mL    Propoxyphene Scrn, Ur Neg Negative <300 ng/mL    Oxycodone Urine Neg Negative <100 ng/ml    pH, UA 6.0 Drug Screen Comment: see below    Lactic acid, plasma   Result Value Ref Range    Lactic Acid 3.7 (H) 0.4 - 2.0 mmol/L   Microscopic Urinalysis   Result Value Ref Range    Mucus, UA 1+ (A) None Seen /LPF    WBC, UA 3-5 0 - 5 /HPF    RBC, UA 3-4 0 - 4 /HPF    Epithelial Cells, UA 0-1 0 - 5 /HPF   Procalcitonin   Result Value Ref Range    Procalcitonin 6.87 (H) 0.00 - 0.15 ng/mL   Lactate, Sepsis   Result Value Ref Range    Lactic Acid, Sepsis 2.0 (H) 0.4 - 1.9 mmol/L   Lactate, Sepsis   Result Value Ref Range    Lactic Acid, Sepsis 1.4 0.4 - 1.9 mmol/L   Basic Metabolic Panel w/ Reflex to MG   Result Value Ref Range    Sodium 129 (L) 136 - 145 mmol/L    Potassium reflex Magnesium 3.6 3.5 - 5.1 mmol/L    Chloride 99 99 - 110 mmol/L    CO2 22 21 - 32 mmol/L    Anion Gap 8 3 - 16    Glucose 177 (H) 70 - 99 mg/dL    BUN 17 7 - 20 mg/dL    CREATININE <0.5 (L) 0.9 - 1.3 mg/dL    GFR Non-African American >60 >60    GFR African American >60 >60    Calcium 8.3 8.3 - 10.6 mg/dL   CBC auto differential   Result Value Ref Range    WBC 13.4 (H) 4.0 - 11.0 K/uL    RBC 4.37 4.20 - 5.90 M/uL    Hemoglobin 12.0 (L) 13.5 - 17.5 g/dL    Hematocrit 37.1 (L) 40.5 - 52.5 %    MCV 84.9 80.0 - 100.0 fL    MCH 27.4 26.0 - 34.0 pg    MCHC 32.2 31.0 - 36.0 g/dL    RDW 17.3 (H) 12.4 - 15.4 %    Platelets 484 813 - 415 K/uL    MPV 7.4 5.0 - 10.5 fL    Neutrophils % 93.6 %    Lymphocytes % 2.7 %    Monocytes % 3.6 %    Eosinophils % 0.0 %    Basophils % 0.1 %    Neutrophils Absolute 12.6 (H) 1.7 - 7.7 K/uL    Lymphocytes Absolute 0.4 (L) 1.0 - 5.1 K/uL    Monocytes Absolute 0.5 0.0 - 1.3 K/uL    Eosinophils Absolute 0.0 0.0 - 0.6 K/uL    Basophils Absolute 0.0 0.0 - 0.2 K/uL   Vancomycin, Trough   Result Value Ref Range    Vancomycin Tr 5.9 (L) 10.0 - 20.0 ug/mL   CBC   Result Value Ref Range    WBC 9.1 4.0 - 11.0 K/uL    RBC 3.97 (L) 4.20 - 5.90 M/uL    Hemoglobin 11.1 (L) 13.5 - 17.5 g/dL    Hematocrit 33.8 (L) 40.5 - 52.5 % MCV 85.1 80.0 - 100.0 fL    MCH 27.9 26.0 - 34.0 pg    MCHC 32.8 31.0 - 36.0 g/dL    RDW 17.4 (H) 12.4 - 15.4 %    Platelets 945 397 - 625 K/uL    MPV 7.4 5.0 - 10.5 fL   Basic Metabolic Panel w/ Reflex to MG   Result Value Ref Range    Sodium 138 136 - 145 mmol/L    Potassium reflex Magnesium 4.1 3.5 - 5.1 mmol/L    Chloride 102 99 - 110 mmol/L    CO2 33 (H) 21 - 32 mmol/L    Anion Gap 3 3 - 16    Glucose 176 (H) 70 - 99 mg/dL    BUN 21 (H) 7 - 20 mg/dL    CREATININE <0.5 (L) 0.9 - 1.3 mg/dL    GFR Non-African American >60 >60    GFR African American >60 >60    Calcium 8.9 8.3 - 10.6 mg/dL     I spoke with Dr. Monie Velarde who spoke with hospitalist. We thoroughly discussed the history, physical exam, laboratory and imaging studies, as well as, emergency department course. Based upon that discussion, we've decided to admit Cassandra Garcia to the hospital for further observation, evaluation and treatment. Final Impression  1. Respiratory distress    2. Pneumonia due to organism    3. Septicemia ([address]    4. Amphetamine user ([address]      Blood pressure (!) 107/59, pulse 118, temperature 97.5 °F (36.4 °C), temperature source Temporal, resp. rate 20, height 5' (1.524 m), weight 91 lb 7.9 oz (41.5 kg), SpO2 100 %. DISPOSITION  Patient was admitted to the hospital in stable condition.          [place], [place]  12/28/20 1330

## 2020-12-25 NOTE — PROGRESS NOTES
12/25/20 1825   NIV Type   $NIV $Daily Charge   NIV Started/Stopped On   Equipment Type v60   Mode Bilevel   Mask Type Full face mask   Mask Size Small   Settings/Measurements   IPAP 14 cmH20   CPAP/EPAP 8 cmH2O   Resp (!) 35   Vt Exhaled 650 mL   Minute Volume 25 Liters   Mask Leak (lpm) 23 lpm   Comfort Level Good   Using Accessory Muscles Yes   Breath Sounds   Right Upper Lobe Diminished   Right Middle Lobe Diminished   Right Lower Lobe Diminished   Left Upper Lobe Diminished   Left Lower Lobe Diminished   Alarm Settings   Alarms On Y   Press Low Alarm 6 cmH2O   High Pressure Alarm 30 cmH2O   Delay Alarm 20 sec(s)   Resp Rate Low Alarm 6   High Respiratory Rate 50 br/min

## 2020-12-25 NOTE — ED NOTES

## 2020-12-25 NOTE — ED NOTES
Bed: 01  Expected date:   Expected time:   Means of arrival:   Comments:  M 14 felicity franklin Mardene Lundborg, RN  12/25/20 [id number]

## 2020-12-26 LAB
ANION GAP SERPL CALCULATED.3IONS-SCNC: 8 MMOL/L (ref 3–16)
BASOPHILS ABSOLUTE: 0 K/UL (ref 0–0.2)
BASOPHILS RELATIVE PERCENT: 0.1 %
BUN BLDV-MCNC: 17 MG/DL (ref 7–20)
CALCIUM SERPL-MCNC: 8.3 MG/DL (ref 8.3–10.6)
CHLORIDE BLD-SCNC: 99 MMOL/L (ref 99–110)
CO2: 22 MMOL/L (ref 21–32)
CREAT SERPL-MCNC: <0.5 MG/DL (ref 0.9–1.3)
EOSINOPHILS ABSOLUTE: 0 K/UL (ref 0–0.6)
EOSINOPHILS RELATIVE PERCENT: 0 %
GFR AFRICAN AMERICAN: >60
GFR NON-AFRICAN AMERICAN: >60
GLUCOSE BLD-MCNC: 177 MG/DL (ref 70–99)
HCT VFR BLD CALC: 37.1 % (ref 40.5–52.5)
HEMOGLOBIN: 12 G/DL (ref 13.5–17.5)
LACTIC ACID, SEPSIS: 1.4 MMOL/L (ref 0.4–1.9)
LACTIC ACID, SEPSIS: 2 MMOL/L (ref 0.4–1.9)
LYMPHOCYTES ABSOLUTE: 0.4 K/UL (ref 1–5.1)
LYMPHOCYTES RELATIVE PERCENT: 2.7 %
MCH RBC QN AUTO: 27.4 PG (ref 26–34)
MCHC RBC AUTO-ENTMCNC: 32.2 G/DL (ref 31–36)
MCV RBC AUTO: 84.9 FL (ref 80–100)
MONOCYTES ABSOLUTE: 0.5 K/UL (ref 0–1.3)
MONOCYTES RELATIVE PERCENT: 3.6 %
NEUTROPHILS ABSOLUTE: 12.6 K/UL (ref 1.7–7.7)
NEUTROPHILS RELATIVE PERCENT: 93.6 %
PDW BLD-RTO: 17.3 % (ref 12.4–15.4)
PLATELET # BLD: 169 K/UL (ref 135–450)
PMV BLD AUTO: 7.4 FL (ref 5–10.5)
POTASSIUM REFLEX MAGNESIUM: 3.6 MMOL/L (ref 3.5–5.1)
PROCALCITONIN: 6.87 NG/ML (ref 0–0.15)
RBC # BLD: 4.37 M/UL (ref 4.2–5.9)
REPORT: NORMAL
SODIUM BLD-SCNC: 129 MMOL/L (ref 136–145)
WBC # BLD: 13.4 K/UL (ref 4–11)

## 2020-12-26 PROCEDURE — 94761 N-INVAS EAR/PLS OXIMETRY MLT: CPT

## 2020-12-26 PROCEDURE — 6370000000 HC RX 637 (ALT 250 FOR IP): Performed by: INTERNAL MEDICINE

## 2020-12-26 PROCEDURE — 80048 BASIC METABOLIC PNL TOTAL CA: CPT

## 2020-12-26 PROCEDURE — 2580000003 HC RX 258: Performed by: PHYSICIAN ASSISTANT

## 2020-12-26 PROCEDURE — 85025 COMPLETE CBC W/AUTO DIFF WBC: CPT

## 2020-12-26 PROCEDURE — 2580000003 HC RX 258: Performed by: NURSE PRACTITIONER

## 2020-12-26 PROCEDURE — 6360000002 HC RX W HCPCS: Performed by: INTERNAL MEDICINE

## 2020-12-26 PROCEDURE — 36415 COLL VENOUS BLD VENIPUNCTURE: CPT

## 2020-12-26 PROCEDURE — 2700000000 HC OXYGEN THERAPY PER DAY

## 2020-12-26 PROCEDURE — 94669 MECHANICAL CHEST WALL OSCILL: CPT

## 2020-12-26 PROCEDURE — 94640 AIRWAY INHALATION TREATMENT: CPT

## 2020-12-26 PROCEDURE — 2500000003 HC RX 250 WO HCPCS: Performed by: INTERNAL MEDICINE

## 2020-12-26 PROCEDURE — 83605 ASSAY OF LACTIC ACID: CPT

## 2020-12-26 PROCEDURE — 6360000002 HC RX W HCPCS: Performed by: NURSE PRACTITIONER

## 2020-12-26 PROCEDURE — 2000000000 HC ICU R&B

## 2020-12-26 PROCEDURE — 99291 CRITICAL CARE FIRST HOUR: CPT | Performed by: INTERNAL MEDICINE

## 2020-12-26 PROCEDURE — 6370000000 HC RX 637 (ALT 250 FOR IP): Performed by: HOSPITALIST

## 2020-12-26 PROCEDURE — 6360000002 HC RX W HCPCS: Performed by: PHYSICIAN ASSISTANT

## 2020-12-26 RX ORDER — BUPRENORPHINE 2 MG/1
2 TABLET SUBLINGUAL PRN
Status: DISPENSED | OUTPATIENT
Start: 2020-12-26 | End: 2020-12-29

## 2020-12-26 RX ORDER — DICYCLOMINE HCL 20 MG
20 TABLET ORAL EVERY 6 HOURS PRN
Status: DISCONTINUED | OUTPATIENT
Start: 2020-12-26 | End: 2021-01-01 | Stop reason: HOSPADM

## 2020-12-26 RX ORDER — HYDROXYZINE PAMOATE 25 MG/1
50 CAPSULE ORAL EVERY 8 HOURS PRN
Status: DISCONTINUED | OUTPATIENT
Start: 2020-12-26 | End: 2021-01-01 | Stop reason: HOSPADM

## 2020-12-26 RX ORDER — SODIUM CHLORIDE 0.9 % (FLUSH) 0.9 %
10 SYRINGE (ML) INJECTION EVERY 12 HOURS SCHEDULED
Status: DISCONTINUED | OUTPATIENT
Start: 2020-12-26 | End: 2021-01-01 | Stop reason: HOSPADM

## 2020-12-26 RX ORDER — ACETAMINOPHEN 650 MG/1
650 SUPPOSITORY RECTAL EVERY 6 HOURS PRN
Status: DISCONTINUED | OUTPATIENT
Start: 2020-12-26 | End: 2021-01-01 | Stop reason: HOSPADM

## 2020-12-26 RX ORDER — LORAZEPAM 2 MG/ML
0.5 INJECTION INTRAMUSCULAR ONCE
Status: COMPLETED | OUTPATIENT
Start: 2020-12-26 | End: 2020-12-26

## 2020-12-26 RX ORDER — CLONIDINE HYDROCHLORIDE 0.1 MG/1
0.1 TABLET ORAL PRN
Status: DISCONTINUED | OUTPATIENT
Start: 2020-12-26 | End: 2021-01-01 | Stop reason: HOSPADM

## 2020-12-26 RX ORDER — SODIUM CHLORIDE 9 MG/ML
INJECTION, SOLUTION INTRAVENOUS CONTINUOUS
Status: ACTIVE | OUTPATIENT
Start: 2020-12-26 | End: 2020-12-26

## 2020-12-26 RX ORDER — IPRATROPIUM BROMIDE AND ALBUTEROL SULFATE 2.5; .5 MG/3ML; MG/3ML
1 SOLUTION RESPIRATORY (INHALATION) EVERY 4 HOURS PRN
Status: DISCONTINUED | OUTPATIENT
Start: 2020-12-26 | End: 2020-12-26

## 2020-12-26 RX ORDER — METHYLPREDNISOLONE SODIUM SUCCINATE 40 MG/ML
40 INJECTION, POWDER, LYOPHILIZED, FOR SOLUTION INTRAMUSCULAR; INTRAVENOUS EVERY 6 HOURS
Status: DISCONTINUED | OUTPATIENT
Start: 2020-12-26 | End: 2020-12-28

## 2020-12-26 RX ORDER — IPRATROPIUM BROMIDE AND ALBUTEROL SULFATE 2.5; .5 MG/3ML; MG/3ML
1 SOLUTION RESPIRATORY (INHALATION)
Status: DISCONTINUED | OUTPATIENT
Start: 2020-12-27 | End: 2020-12-29

## 2020-12-26 RX ORDER — SODIUM CHLORIDE 0.9 % (FLUSH) 0.9 %
10 SYRINGE (ML) INJECTION PRN
Status: DISCONTINUED | OUTPATIENT
Start: 2020-12-26 | End: 2021-01-01 | Stop reason: HOSPADM

## 2020-12-26 RX ORDER — DEXMEDETOMIDINE HYDROCHLORIDE 4 UG/ML
0.2 INJECTION, SOLUTION INTRAVENOUS CONTINUOUS
Status: DISCONTINUED | OUTPATIENT
Start: 2020-12-26 | End: 2020-12-29

## 2020-12-26 RX ORDER — ACETAMINOPHEN 325 MG/1
650 TABLET ORAL EVERY 6 HOURS PRN
Status: DISCONTINUED | OUTPATIENT
Start: 2020-12-26 | End: 2021-01-01 | Stop reason: HOSPADM

## 2020-12-26 RX ORDER — TRAZODONE HYDROCHLORIDE 50 MG/1
50 TABLET ORAL NIGHTLY PRN
Status: DISCONTINUED | OUTPATIENT
Start: 2020-12-26 | End: 2021-01-01 | Stop reason: HOSPADM

## 2020-12-26 RX ADMIN — IPRATROPIUM BROMIDE AND ALBUTEROL SULFATE 1 AMPULE: .5; 3 SOLUTION RESPIRATORY (INHALATION) at 19:07

## 2020-12-26 RX ADMIN — BUPRENORPHINE 2 MG: 2 TABLET SUBLINGUAL at 14:12

## 2020-12-26 RX ADMIN — ENOXAPARIN SODIUM 40 MG: 40 INJECTION SUBCUTANEOUS at 09:39

## 2020-12-26 RX ADMIN — SODIUM CHLORIDE, PRESERVATIVE FREE 10 ML: 5 INJECTION INTRAVENOUS at 19:16

## 2020-12-26 RX ADMIN — CEFEPIME HYDROCHLORIDE 2 G: 2 INJECTION, POWDER, FOR SOLUTION INTRAVENOUS at 19:40

## 2020-12-26 RX ADMIN — CEFEPIME HYDROCHLORIDE 2 G: 2 INJECTION, POWDER, FOR SOLUTION INTRAVENOUS at 06:46

## 2020-12-26 RX ADMIN — CLONIDINE HYDROCHLORIDE 0.1 MG: 0.1 TABLET ORAL at 14:12

## 2020-12-26 RX ADMIN — METHYLPREDNISOLONE SODIUM SUCCINATE 40 MG: 40 INJECTION, POWDER, LYOPHILIZED, FOR SOLUTION INTRAMUSCULAR; INTRAVENOUS at 02:25

## 2020-12-26 RX ADMIN — LORAZEPAM 1 MG: 2 INJECTION, SOLUTION INTRAMUSCULAR; INTRAVENOUS at 19:41

## 2020-12-26 RX ADMIN — LORAZEPAM 0.5 MG: 2 INJECTION, SOLUTION INTRAMUSCULAR; INTRAVENOUS at 15:17

## 2020-12-26 RX ADMIN — SODIUM CHLORIDE: 9 INJECTION, SOLUTION INTRAVENOUS at 02:12

## 2020-12-26 RX ADMIN — VANCOMYCIN HYDROCHLORIDE 500 MG: 500 INJECTION, POWDER, LYOPHILIZED, FOR SOLUTION INTRAVENOUS at 09:39

## 2020-12-26 RX ADMIN — VANCOMYCIN HYDROCHLORIDE 500 MG: 500 INJECTION, POWDER, LYOPHILIZED, FOR SOLUTION INTRAVENOUS at 19:41

## 2020-12-26 RX ADMIN — METHYLPREDNISOLONE SODIUM SUCCINATE 40 MG: 40 INJECTION, POWDER, LYOPHILIZED, FOR SOLUTION INTRAMUSCULAR; INTRAVENOUS at 09:39

## 2020-12-26 RX ADMIN — METHYLPREDNISOLONE SODIUM SUCCINATE 40 MG: 40 INJECTION, POWDER, LYOPHILIZED, FOR SOLUTION INTRAMUSCULAR; INTRAVENOUS at 14:12

## 2020-12-26 RX ADMIN — METHYLPREDNISOLONE SODIUM SUCCINATE 40 MG: 40 INJECTION, POWDER, LYOPHILIZED, FOR SOLUTION INTRAMUSCULAR; INTRAVENOUS at 19:41

## 2020-12-26 RX ADMIN — Medication 0.2 MCG/KG/HR: at 16:47

## 2020-12-26 RX ADMIN — LORAZEPAM 1 MG: 2 INJECTION, SOLUTION INTRAMUSCULAR; INTRAVENOUS at 14:34

## 2020-12-26 RX ADMIN — SODIUM CHLORIDE: 9 INJECTION, SOLUTION INTRAVENOUS at 09:39

## 2020-12-26 ASSESSMENT — ENCOUNTER SYMPTOMS
STRIDOR: 0
COUGH: 1
EYE DISCHARGE: 0
SORE THROAT: 0
ABDOMINAL PAIN: 0
EYE ITCHING: 0
VOICE CHANGE: 0
EYE PAIN: 0
CHEST TIGHTNESS: 0
CHOKING: 0
DIARRHEA: 0
SHORTNESS OF BREATH: 1
CONSTIPATION: 0

## 2020-12-26 ASSESSMENT — PAIN SCALES - GENERAL
PAINLEVEL_OUTOF10: 0
PAINLEVEL_OUTOF10: 10

## 2020-12-26 NOTE — CONSULTS
Pharmacy to Dose Vancomycin    Dx: PNA  Goal trough = 15-20  Pt wt = 42.3 kg  Recent Labs     12/25/20 1814   CREATININE 0.7*     Recent Labs     12/25/20 1814   WBC 22.7*     Vanc 1000 mg x1 given in ED.   Start Vanc 500 mg IV q12h  Vancomycin trough: 12/27 0700  Ale William 12/26/2020 2:32 AM

## 2020-12-26 NOTE — PROGRESS NOTES
4 Eyes Skin Assessment     The patient is being assess for   admission    I agree that 2 RN's have performed a thorough Head to Toe Skin Assessment on the patient. ALL assessment sites listed below have been assessed.       Areas assessed by both nurses:   [x]   Head, Face, and Ears   [x]   Shoulders, Back, and Chest, Abdomen  [x]   Arms, Elbows, and Hands   [x]   Coccyx, Sacrum, and Ischium  [x]   Legs, Feet, and Heels  See chart        SHARE this note so that the co-signing nurse is able to place an eSignature**    Co-signer eSignature: {Esignature:[id number]}    Does the Patient have Skin Breakdown?  no          Candido Prevention initiated yes}   Wound Care Orders initiated:  no      WOC nurse consulted for Pressure Injury (Stage 3,4, Unstageable, DTI, NWPT, Complex wounds)and New or Established Ostomies: no      Primary Nurse eSignature: Electronically signed by Brenita Boeck, RN on 12/26/20 at 3:19 AM EST

## 2020-12-26 NOTE — ED NOTES
Report given to C5 RN, Darwin Cho at this time. Patient sent with personal belongings: pebbles valdivia shoes.      Nicanor Chen RN  12/26/20 1630

## 2020-12-26 NOTE — ED NOTES
Patient provided a ham sandwich, ice water, and apple sauce at this time. Cleared to eat by Estefani NIELSEN.      Brigette Hughes RN  12/25/20 1935

## 2020-12-26 NOTE — PROGRESS NOTES
Pt was more awake when repositioning him in  Bed.  Opened his eyes but still not talking  Falls back asleep

## 2020-12-26 NOTE — PROGRESS NOTES
Report given to C2 nurse. Pt transported off the floor in stable condition with two RN's at bedside.

## 2020-12-26 NOTE — H&P
Hospital Medicine History & Physical      PCP: No primary care provider on file. Date of Admission: 12/25/2020    Date of Service: Pt seen/examined on 12/25/2020 and Admitted to Inpatient with expected LOS greater than two midnights due to medical therapy.      Chief Complaint:  Shortness of breath    History Of Present Illness: 48 y.o. male, with PMH of IVDU, who presented to [facility] with shortness of breath. History obtained from the patient and review of EMR. She stated has been experiencing shortness of breath and chest tightness for the last 3 days. He stated he has been using \"someone elses\" inhaler has been ineffective. Upon arriving at the emergency room the patient was found to be 80% on room air and was placed on CPAP in route. He was also given a DuoNeb treatment. Shortly after arriving to the emergency room the patient was taken off of CPAP and placed on 8 L high flow nasal cannula. The patient stated he was diagnosed with COVID-19 on December 5, 2020. A rapid Covid was obtained in the emergency room that resulted as negative. Chest x-ray was also obtained that revealed mild blunting of the left costophrenic angle, possibly trace left pleural fluid. Moderate to severe emphysema. CT pulmonary embolism was obtained as well which revealed no acute pulmonary embolus, but did show pneumonia. The patient was started on vancomycin and cefepime. He was also given Decadron in the emergency room and will be started on Solu-Medrol. The patient also stated that he is an IV drug user. He stated he last used amphetamines 4 days ago and heroin 2 days ago. Patient stated he is also a drinker, but states he has positive he will not go through any withdrawals. The patient will be admitted for further evaluation. He denied any other associated symptoms as well as any aggravating and/or alleviating factors. At the time of this assessment, the patient was resting comfortably in bed. He currently denies any chest pain, back pain, abdominal pain, shortness of breath, numbness, tingling, N/V/C/D, fever and/or chills.      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 Authorizing Provider   albuterol sulfate  (90 Base) MCG/ACT inhaler Inhale 2 puffs into the lungs every 4 hours as needed for Wheezing or Shortness of Breath 11/21/20 11/28/20  Flor Rodriguez MD     Allergies:  Patient has no known allergies. Social History:      The patient currently lives at home    TOBACCO:   reports that he has quit smoking. His smoking use included cigarettes. He smoked 2.00 packs per day. He has never used smokeless tobacco.  ETOH:   reports no history of alcohol use. E-Cigarettes/Vaping Use     Questions Responses    E-Cigarette/Vaping Use     Start Date     Passive Exposure     Quit Date     Counseling Given     Comments         Family History:      History reviewed. No pertinent family history. REVIEW OF SYSTEMS:   Pertinent positives as noted in the HPI. All other systems reviewed and negative. PHYSICAL EXAM PERFORMED:    /89   Pulse 105   Temp 98.1 °F (36.7 °C) (Oral)   Resp 23   Ht 5' (1.524 m)   Wt 100 lb (45.4 kg)   SpO2 100%   BMI 19.53 kg/m²     General appearance:  Pleasant male in no apparent distress, appears stated age and cooperative. HEENT:  Pupils equal, round, and reactive to light. Extra ocular muscles intact. Conjunctivae/corneas clear. Neck: Supple, with full range of motion. No jugular venous distention. Trachea midline. Respiratory:  Normal respiratory effort.  Diminished breath sounds bilaterally throughout. 8 LHFNC Mild blunting of the left costophrenic angle, possibly trace left pleural   fluid. No other change from the prior study taking into account varying   radiographic technique. Moderate to severe emphysema. ASSESSMENT:    Active Hospital Problems    Diagnosis Date Noted    Sepsis ([facility] [address] [A41.9] 12/25/2020    Pneumonia [J18.9] 12/15/2020    Acute respiratory failure with hypoxia (HCC) [J96.01]     Methamphetamine abuse ([facility] [address] [F15.10]      PLAN:    Sepsis in patient with HAP, , lactic 2.6 and WBC 22.7 on admission  -tylenol given in ED  -30 ml/kg IVF given in ED  -cefepime and vanc given in ED and continued 12/25/2020  -MIVF  -tele monitoring  -repeat lactic    Acute respiratory failure with hypoxia in setting of HAP  -CXR revealed: mild blunting of left costophrenic angle, possibly trace left pleural fluid. Moderate to severe emphysema  -CT chest pulmonary embolism was obtained that revealed no evidence of acute pulmonary embolism. Airspace consolidation in the lingula compatible with pneumonia. -rapid COVID negative on admission  -positive COVID on 12/5/2020  -decadron given in ED  -solumedrol every 6 hours  -continuous pulse ox  -encourage coughing and deep breathing.  -antibiotics as indicated above    IVDU  -drug screen positive for amphetamines  -cessation discussed  -case management consult for drug program after discharge  -echo in am    DVT Prophylaxis: Lovenox    Diet: No diet orders on file     Code Status: Prior    PT/OT Eval Status: No indication for need at this time    Dispo - 2-3 days pending clinical improvement     JOSIAS Loving - CNP    Thank you No primary care provider on file. for the opportunity to be involved in this patient's care.  If you have any questions or concerns please feel free to contact me at [phone].  -----------------------------------Anticipated Dr. Barron chandra--------------------------------

## 2020-12-26 NOTE — TELEPHONE ENCOUNTER
Writer contacted  Dr. Liz Xiao to inform of 30 day readmission risk. Dr. Liz Xiao informed writer of readmission.

## 2020-12-26 NOTE — PROGRESS NOTES
Hospitalist Progress Note      PCP: No primary care provider on file. Date of Admission: 12/25/2020    Chief Complaint:  Shortness of breath    Hospital Course: 49 yo WM with ongoin IVDU, recent covid infection, recent admission at [address] p/w sob here and diagnosed with pneumonia. Subjective:  Restless, anxious, thinks sob better, on 10L oxygen       Medications:  Reviewed    Infusion Medications    sodium chloride 125 mL/hr at 12/26/20 0939     Scheduled Medications    methylPREDNISolone  40 mg Intravenous Q6H    sodium chloride flush  10 mL Intravenous 2 times per day    enoxaparin  40 mg Subcutaneous Daily    vancomycin  500 mg Intravenous Q12H    cefepime  2 g Intravenous Q12H     PRN Meds: sodium chloride flush, acetaminophen **OR** acetaminophen, traZODone, perflutren lipid microspheres, LORazepam      Intake/Output Summary (Last 24 hours) at 12/26/2020 1134  Last data filed at 12/26/2020 1019  Gross per 24 hour   Intake 2184.91 ml   Output 600 ml   Net 1584.91 ml       Physical Exam Performed:    BP (!) 97/58   Pulse 75   Temp 98.4 °F (36.9 °C) (Oral)   Resp 20   Ht 5' (1.524 m)   Wt 93 lb 4.1 oz (42.3 kg)   SpO2 100%   BMI 18.21 kg/m²     General appearance:  Pleasant male in no apparent distress, appears stated age and cooperative. HEENT:  Pupils equal, round, and reactive to light. Extra ocular muscles intact. Conjunctivae/corneas clear. Neck: Supple, with full range of motion. No jugular venous distention. Trachea midline. Respiratory:  inc'd respiratory effort. Diminished breath sounds bilaterally throughout. 10 LHFNC  Cardiovascular:  Regular rate and rhythm with normal S1/S2 without murmurs, rubs or gallops. Abdomen: Soft, non-tender, non-distended with normal bowel sounds. Musculoskeletal:  No clubbing, cyanosis or edema bilaterally. Full range of motion without deformity. Skin: Skin color, texture, turgor normal.  No significant rashes or lesions. Neurologic:  Neurovascularly intact. Cranial nerves: II-XII intact, grossly non-focal.  Psychiatric:  Alert and oriented, thought content appropriate, normal insight  Capillary Refill: Brisk,< 3 seconds   Peripheral Pulses: +2 palpable, equal bilaterally          Labs:   Recent Labs     12/25/20 1814 12/26/20  0453   WBC 22.7* 13.4*   HGB 15.1 12.0*   HCT 46.3 37.1*    169     Recent Labs     12/25/20  1814 12/26/20  0216   * 129*   K 3.8 3.6   CL 94* 99   CO2 23 22   BUN 22* 17   CREATININE 0.7* <0.5*   CALCIUM 8.9 8.3     Recent Labs     12/25/20  1814   AST 28   ALT 31   BILITOT 1.0   ALKPHOS 92     No results for input(s): INR in the last 72 hours. Recent Labs     12/25/20 1814   TROPONINI <0.01       Urinalysis:      Lab Results   Component Value Date    NITRU Negative 12/25/2020    WBCUA 3-5 12/25/2020    BACTERIA 2+ 07/03/2020    RBCUA 3-4 12/25/2020    BLOODU SMALL 12/25/2020    SPECGRAV 1.020 12/25/2020    GLUCOSEU 500 12/25/2020       Radiology:  CT CHEST PULMONARY EMBOLISM W CONTRAST   Final Result   1. No evidence of acute pulmonary embolism   2. Airspace consolidation in the lingula compatible with pneumonia         CT ABDOMEN PELVIS W IV CONTRAST Additional Contrast? None   Final Result   1. Dilated stomach that is gas and fluid filled. Some distention of the   proximal jejunum. No point of obstruction identified. 2. The appendix is not visualized   3. No gallstones   4. No obstructive uropathy         XR CHEST PORTABLE   Final Result   Mild blunting of the left costophrenic angle, possibly trace left pleural   fluid. No other change from the prior study taking into account varying   radiographic technique. Moderate to severe emphysema.                  Assessment/Plan:    Active Hospital Problems    Diagnosis    Sepsis ([facility] [address] [A41.9]    Pneumonia [J18.9]    Acute respiratory failure with hypoxia ([facility] [address] [J96.01]    Methamphetamine abuse ([facility] [address] [F15.10] Sepsis in patient with HAP, , lactic 2.6 and WBC 22.7 on admission  -tylenol given in ED  -30 ml/kg IVF given in ED  -cefepime and vanc given in ED and continued 12/25/2020  -MIVF  -tele monitoring  -repeat lactic     Acute respiratory failure with hypoxia in setting of HAP  -CXR revealed: mild blunting of left costophrenic angle, possibly trace left pleural fluid. Moderate to severe emphysema  -CT chest pulmonary embolism was obtained that revealed no evidence of acute pulmonary embolism. Airspace consolidation in the lingula compatible with pneumonia.   -rapid COVID negative on admission  -positive COVID on 12/5/2020  -decadron given in ED  -solumedrol every 6 hours  -continuous pulse ox  -encourage coughing and deep breathing.  -antibiotics as indicated above   -pulm asked to weigh in    IVDU  -drug screen positive for amphetamines  -cessation discussed  -case management consult for drug program after discharge  -echo in am   -started COWS protocol with subutex/clonidine on 12/26    DVT Prophylaxis: Lovenox     Diet: DIET GENERAL;  Code Status: Full Code    PT/OT Eval Status: not ordered    Dispo - pending improvement in symptoms    Koko Sánchez MD

## 2020-12-26 NOTE — ED NOTES
Patient repeatedly taking off nasal cannula and ear pulse probe. Writer keeps redirecting patient and reminding them to keep these on.       Debbora Siemens, RN  12/25/20 2886

## 2020-12-26 NOTE — PROGRESS NOTES
Pt admitted to 533 per stretcher from ER .  Pt sedate received ativan in Er , jerks occasionally at times using chest accessory muscles to breathe and through his mouth  Hob elevated

## 2020-12-26 NOTE — ED NOTES
Patient states they cannot use the restroom at this time for urine sample. Writer will continue to monitor.      Deirdre Tapia RN  12/25/20 2025       Deirdre Tapia RN  12/25/20 2025

## 2020-12-26 NOTE — PROGRESS NOTES
Pt very sedate since Ativan dose ,using some accessory muscles, sat 100% on 8 high flow. sodium 129 ,lactic 2  Sent to hospitalists

## 2020-12-27 ENCOUNTER — APPOINTMENT (OUTPATIENT)
Dept: GENERAL RADIOLOGY | Age: 50
DRG: 871 | End: 2020-12-27
Payer: MEDICARE

## 2020-12-27 LAB — VANCOMYCIN TROUGH: 5.9 UG/ML (ref 10–20)

## 2020-12-27 PROCEDURE — 94761 N-INVAS EAR/PLS OXIMETRY MLT: CPT

## 2020-12-27 PROCEDURE — 6360000002 HC RX W HCPCS: Performed by: INTERNAL MEDICINE

## 2020-12-27 PROCEDURE — 6360000002 HC RX W HCPCS: Performed by: PHYSICIAN ASSISTANT

## 2020-12-27 PROCEDURE — 6370000000 HC RX 637 (ALT 250 FOR IP): Performed by: INTERNAL MEDICINE

## 2020-12-27 PROCEDURE — 36556 INSERT NON-TUNNEL CV CATH: CPT

## 2020-12-27 PROCEDURE — 94640 AIRWAY INHALATION TREATMENT: CPT

## 2020-12-27 PROCEDURE — 99291 CRITICAL CARE FIRST HOUR: CPT | Performed by: INTERNAL MEDICINE

## 2020-12-27 PROCEDURE — 2500000003 HC RX 250 WO HCPCS: Performed by: INTERNAL MEDICINE

## 2020-12-27 PROCEDURE — 2000000000 HC ICU R&B

## 2020-12-27 PROCEDURE — 6360000002 HC RX W HCPCS: Performed by: NURSE PRACTITIONER

## 2020-12-27 PROCEDURE — 36415 COLL VENOUS BLD VENIPUNCTURE: CPT

## 2020-12-27 PROCEDURE — 2580000003 HC RX 258: Performed by: INTERNAL MEDICINE

## 2020-12-27 PROCEDURE — 05H533Z INSERTION OF INFUSION DEVICE INTO RIGHT SUBCLAVIAN VEIN, PERCUTANEOUS APPROACH: ICD-10-PCS | Performed by: INTERNAL MEDICINE

## 2020-12-27 PROCEDURE — 2580000003 HC RX 258: Performed by: NURSE PRACTITIONER

## 2020-12-27 PROCEDURE — 80202 ASSAY OF VANCOMYCIN: CPT

## 2020-12-27 PROCEDURE — 71045 X-RAY EXAM CHEST 1 VIEW: CPT

## 2020-12-27 PROCEDURE — 2700000000 HC OXYGEN THERAPY PER DAY

## 2020-12-27 PROCEDURE — 2580000003 HC RX 258: Performed by: PHYSICIAN ASSISTANT

## 2020-12-27 RX ORDER — GABAPENTIN 300 MG/1
300 CAPSULE ORAL 3 TIMES DAILY
Status: DISCONTINUED | OUTPATIENT
Start: 2020-12-27 | End: 2020-12-27

## 2020-12-27 RX ORDER — LORAZEPAM 2 MG/ML
2 INJECTION INTRAMUSCULAR EVERY 4 HOURS PRN
Status: DISCONTINUED | OUTPATIENT
Start: 2020-12-27 | End: 2021-01-01 | Stop reason: HOSPADM

## 2020-12-27 RX ORDER — METOPROLOL TARTRATE 5 MG/5ML
5 INJECTION INTRAVENOUS EVERY 6 HOURS PRN
Status: DISCONTINUED | OUTPATIENT
Start: 2020-12-27 | End: 2021-01-01 | Stop reason: HOSPADM

## 2020-12-27 RX ORDER — GABAPENTIN 300 MG/1
600 CAPSULE ORAL 3 TIMES DAILY
Status: DISCONTINUED | OUTPATIENT
Start: 2020-12-27 | End: 2020-12-28

## 2020-12-27 RX ORDER — OLANZAPINE 5 MG/1
5 TABLET, ORALLY DISINTEGRATING ORAL EVERY 8 HOURS PRN
Status: DISCONTINUED | OUTPATIENT
Start: 2020-12-27 | End: 2020-12-28

## 2020-12-27 RX ORDER — QUETIAPINE FUMARATE 100 MG/1
100 TABLET, FILM COATED ORAL 2 TIMES DAILY
Status: DISCONTINUED | OUTPATIENT
Start: 2020-12-27 | End: 2020-12-28

## 2020-12-27 RX ADMIN — IPRATROPIUM BROMIDE AND ALBUTEROL SULFATE 1 AMPULE: .5; 3 SOLUTION RESPIRATORY (INHALATION) at 20:41

## 2020-12-27 RX ADMIN — CLONIDINE HYDROCHLORIDE 0.1 MG: 0.1 TABLET ORAL at 17:01

## 2020-12-27 RX ADMIN — CEFEPIME HYDROCHLORIDE 2 G: 2 INJECTION, POWDER, FOR SOLUTION INTRAVENOUS at 21:56

## 2020-12-27 RX ADMIN — SODIUM CHLORIDE, PRESERVATIVE FREE 10 ML: 5 INJECTION INTRAVENOUS at 21:59

## 2020-12-27 RX ADMIN — HYDROXYZINE PAMOATE 50 MG: 25 CAPSULE ORAL at 17:01

## 2020-12-27 RX ADMIN — CEFEPIME HYDROCHLORIDE 2 G: 2 INJECTION, POWDER, FOR SOLUTION INTRAVENOUS at 08:22

## 2020-12-27 RX ADMIN — LORAZEPAM 1 MG: 2 INJECTION, SOLUTION INTRAMUSCULAR; INTRAVENOUS at 12:47

## 2020-12-27 RX ADMIN — LORAZEPAM 1 MG: 2 INJECTION, SOLUTION INTRAMUSCULAR; INTRAVENOUS at 17:01

## 2020-12-27 RX ADMIN — METOPROLOL TARTRATE 5 MG: 1 INJECTION, SOLUTION INTRAVENOUS at 13:02

## 2020-12-27 RX ADMIN — VANCOMYCIN HYDROCHLORIDE 750 MG: 750 INJECTION, POWDER, LYOPHILIZED, FOR SOLUTION INTRAVENOUS at 22:05

## 2020-12-27 RX ADMIN — QUETIAPINE FUMARATE 100 MG: 100 TABLET ORAL at 12:47

## 2020-12-27 RX ADMIN — METHYLPREDNISOLONE SODIUM SUCCINATE 40 MG: 40 INJECTION, POWDER, LYOPHILIZED, FOR SOLUTION INTRAMUSCULAR; INTRAVENOUS at 02:30

## 2020-12-27 RX ADMIN — IPRATROPIUM BROMIDE AND ALBUTEROL SULFATE 1 AMPULE: .5; 3 SOLUTION RESPIRATORY (INHALATION) at 11:58

## 2020-12-27 RX ADMIN — VANCOMYCIN HYDROCHLORIDE 500 MG: 500 INJECTION, POWDER, LYOPHILIZED, FOR SOLUTION INTRAVENOUS at 10:15

## 2020-12-27 RX ADMIN — IPRATROPIUM BROMIDE AND ALBUTEROL SULFATE 1 AMPULE: .5; 3 SOLUTION RESPIRATORY (INHALATION) at 16:31

## 2020-12-27 RX ADMIN — GABAPENTIN 600 MG: 300 CAPSULE ORAL at 21:51

## 2020-12-27 RX ADMIN — GABAPENTIN 300 MG: 300 CAPSULE ORAL at 12:47

## 2020-12-27 RX ADMIN — IPRATROPIUM BROMIDE AND ALBUTEROL SULFATE 1 AMPULE: .5; 3 SOLUTION RESPIRATORY (INHALATION) at 08:37

## 2020-12-27 RX ADMIN — BUPRENORPHINE 2 MG: 2 TABLET SUBLINGUAL at 17:38

## 2020-12-27 RX ADMIN — METHYLPREDNISOLONE SODIUM SUCCINATE 40 MG: 40 INJECTION, POWDER, LYOPHILIZED, FOR SOLUTION INTRAMUSCULAR; INTRAVENOUS at 14:40

## 2020-12-27 RX ADMIN — SODIUM CHLORIDE, PRESERVATIVE FREE 10 ML: 5 INJECTION INTRAVENOUS at 08:23

## 2020-12-27 RX ADMIN — QUETIAPINE FUMARATE 100 MG: 100 TABLET ORAL at 21:52

## 2020-12-27 RX ADMIN — ENOXAPARIN SODIUM 40 MG: 40 INJECTION SUBCUTANEOUS at 08:22

## 2020-12-27 RX ADMIN — METHYLPREDNISOLONE SODIUM SUCCINATE 40 MG: 40 INJECTION, POWDER, LYOPHILIZED, FOR SOLUTION INTRAMUSCULAR; INTRAVENOUS at 22:05

## 2020-12-27 RX ADMIN — METHYLPREDNISOLONE SODIUM SUCCINATE 40 MG: 40 INJECTION, POWDER, LYOPHILIZED, FOR SOLUTION INTRAMUSCULAR; INTRAVENOUS at 08:22

## 2020-12-27 ASSESSMENT — PAIN SCALES - GENERAL: PAINLEVEL_OUTOF10: 8

## 2020-12-27 ASSESSMENT — PAIN DESCRIPTION - PAIN TYPE: TYPE: ACUTE PAIN

## 2020-12-27 NOTE — PROGRESS NOTES
Cardiovascular:  Regular rate and rhythm with normal S1/S2 without murmurs, rubs or gallops. Abdomen: Soft, non-tender, non-distended with normal bowel sounds. Musculoskeletal:  No clubbing, cyanosis or edema bilaterally.  Full range of motion without deformity. Skin: Skin color, texture, turgor normal.  No significant rashes or lesions. Neurologic:  Neurovascularly intact. Cranial nerves: II-XII intact, grossly non-focal.  Psychiatric:  Alert and oriented, thought content appropriate, normal insight  Capillary Refill: Brisk,< 3 seconds   Peripheral Pulses: +2 palpable, equal bilaterally       Labs:   Recent Labs     12/25/20  1814 12/26/20  0453   WBC 22.7* 13.4*   HGB 15.1 12.0*   HCT 46.3 37.1*    169     Recent Labs     12/25/20  1814 12/26/20  0216   * 129*   K 3.8 3.6   CL 94* 99   CO2 23 22   BUN 22* 17   CREATININE 0.7* <0.5*   CALCIUM 8.9 8.3     Recent Labs     12/25/20  1814   AST 28   ALT 31   BILITOT 1.0   ALKPHOS 92     No results for input(s): INR in the last 72 hours. Recent Labs     12/25/20 1814   TROPONINI <0.01       Urinalysis:      Lab Results   Component Value Date    NITRU Negative 12/25/2020    WBCUA 3-5 12/25/2020    BACTERIA 2+ 07/03/2020    RBCUA 3-4 12/25/2020    BLOODU SMALL 12/25/2020    SPECGRAV 1.020 12/25/2020    GLUCOSEU 500 12/25/2020       Radiology:  CT CHEST PULMONARY EMBOLISM W CONTRAST   Final Result   1. No evidence of acute pulmonary embolism   2. Airspace consolidation in the lingula compatible with pneumonia         CT ABDOMEN PELVIS W IV CONTRAST Additional Contrast? None   Final Result   1. Dilated stomach that is gas and fluid filled. Some distention of the   proximal jejunum. No point of obstruction identified. 2. The appendix is not visualized   3. No gallstones   4.  No obstructive uropathy         XR CHEST PORTABLE   Final Result   Mild blunting of the left costophrenic angle, possibly trace left pleural fluid. No other change from the prior study taking into account varying   radiographic technique. Moderate to severe emphysema. Assessment/Plan:    Active Hospital Problems    Diagnosis    Sepsis ([facility] [address] [A41.9]    Pneumonia [J18.9]    Acute respiratory failure with hypoxia ([facility] [address] [J96.01]    Methamphetamine abuse ([facility] [address] [F15.10]        Sepsis in patient with HAP, , lactic 2.6 and WBC 22.7 on admission  -tylenol given in ED  -30 ml/kg IVF given in ED  -cefepime and vanc given in ED and continued 12/25/2020  -MIVF  -tele monitoring  -repeat lactic     Bacteremia- with staph in bcx x 2  -ID consulted for further thoughts/recs    Acute respiratory failure with hypoxia in setting of HAP  -CXR revealed: mild blunting of left costophrenic angle, possibly trace left pleural fluid. Moderate to severe emphysema  -CT chest pulmonary embolism was obtained that revealed no evidence of acute pulmonary embolism.  Airspace consolidation in the lingula compatible with pneumonia.   -rapid COVID negative on admission  -positive COVID on 12/5/2020  -decadron given in ED  -solumedrol every 6 hours  -continuous pulse ox  -encourage coughing and deep breathing.  -antibiotics as indicated above   -pulm asked to weigh in     IVDU  -drug screen positive for amphetamines  -cessation discussed  -case management consult for drug program after discharge  -echo in am   -started COWS protocol with subutex/clonidine on 12/26   -started precedex ggt due to w/d symptoms, transferred to ICU on 12/26    DVT Prophylaxis: Lovenox  Diet: DIET GENERAL;  Dietary Nutrition Supplements: Standard High Calorie Oral Supplement  Code Status: Full Code    PT/OT Eval Status: not ordered    Dispo - icu care, off precedex ggt, ID consulted    Bonita Houston MD

## 2020-12-27 NOTE — CONSULTS
 dexmedetomidine (PRECEDEX) IV bolus  1 mcg/kg Intravenous Once    [START ON 12/27/2020] ipratropium-albuterol  1 ampule Inhalation Q4H WA    cefepime  2 g Intravenous Q12H       Continuous Infusions:   dexmedetomidine 0.2 mcg/kg/hr (12/26/20 1647)       PRN Meds:   sodium chloride flush, acetaminophen **OR** acetaminophen, traZODone, buprenorphine **AND** cloNIDine, hydrOXYzine, dicyclomine, perflutren lipid microspheres, LORazepam   Inpatient consult to Pulmonology  Consult performed by: Pretty Dean MD  Consult ordered by: Elizabeth Lui MD    Inpatient consult to Pulmonology  Consult performed by: Pretty Dean MD  Consult ordered by: Elizabeth Lui MD        ALLERGIES:  Patient has No Known Allergies. REVIEW OF SYSTEMS:  Review of Systems   Constitutional: Negative for chills, fever and unexpected weight change. HENT: Negative for mouth sores, sore throat and voice change. Eyes: Negative for pain, discharge and itching. Respiratory: Positive for cough and shortness of breath. Negative for choking, chest tightness and stridor. Cardiovascular: Negative for chest pain, palpitations and leg swelling. Gastrointestinal: Negative for abdominal pain, constipation and diarrhea. Endocrine: Negative for cold intolerance, heat intolerance and polydipsia. Genitourinary: Negative for dysuria, frequency and hematuria. Musculoskeletal: Negative for gait problem, joint swelling and neck stiffness. Neurological: Negative for dizziness, numbness and headaches. Psychiatric/Behavioral: Negative for agitation, confusion and hallucinations. Objective:   PHYSICAL EXAM:  /76   Pulse 101   Temp 97.6 °F (36.4 °C) (Oral)   Resp 25   Ht 5' (1.524 m)   Wt 89 lb 4.6 oz (40.5 kg)   SpO2 100%   BMI 17.44 kg/m²    Physical Exam  Constitutional:       General: He is not in acute distress. Appearance: He is well-developed. He is not diaphoretic.    HENT: Head: Normocephalic and atraumatic. Mouth/Throat:      Pharynx: No oropharyngeal exudate. Eyes:      Pupils: Pupils are equal, round, and reactive to light. Neck:      Musculoskeletal: Neck supple. Vascular: No JVD. Cardiovascular:      Heart sounds: Normal heart sounds. No murmur. No friction rub. No gallop. Pulmonary:      Effort: Pulmonary effort is normal.      Breath sounds: Rales present. No wheezing. Abdominal:      General: Bowel sounds are normal. There is no distension. Palpations: Abdomen is soft. Tenderness: There is no abdominal tenderness. Musculoskeletal: Normal range of motion. Lymphadenopathy:      Cervical: No cervical adenopathy. Skin:     General: Skin is warm and dry. Findings: No rash. Neurological:      Mental Status: He is alert. Cranial Nerves: No cranial nerve deficit. Comments: CN 2-12 grossly intact            Data Reviewed:   LABS:  CBC:   Recent Labs     12/25/20  1814 12/26/20  0453   WBC 22.7* 13.4*   HGB 15.1 12.0*   HCT 46.3 37.1*   MCV 85.7 84.9    169     BMP:   Recent Labs     12/25/20  1814 12/26/20  0216   * 129*   K 3.8 3.6   CL 94* 99   CO2 23 22   BUN 22* 17   CREATININE 0.7* <0.5*     LIVER PROFILE:   Recent Labs     12/25/20  1814   AST 28   ALT 31   LIPASE 36.0   BILITOT 1.0   ALKPHOS 92     PT/INR: No results for input(s): PROTIME, INR in the last 72 hours. APTT:No results for input(s): APTT in the last 72 hours. UA:  Recent Labs     12/25/20  1822   COLORU Yellow   PHUR 6.0  6.0   WBCUA 3-5   RBCUA 3-4   MUCUS 1+*   CLARITYU Clear   SPECGRAV 1.020   LEUKOCYTESUR Negative   UROBILINOGEN 0.2   BILIRUBINUR Negative   BLOODU SMALL*   GLUCOSEU 500*     No results for input(s): PHART, CQP5TPL, PO2ART in the last 72 hours. Vent Information  SpO2: 100 %  Mask Type: Full face mask  Mask Size: Small    CXR personally reviewed, lingula pneumonia          Assessment:     1.  Acute resp failure, hypoxic 2. Lingula HCABP, staph  3. Sepsis with bacteremia  4. Acute encephalopathy, metabolic vs withdrawal   5. Recent acute covid infection   6. COPD/Asthma with acute exac  7. IVDU    Plan:      -Titrate FIO2  -Bronchodilators  -Precedex gtt, prn ativan  -Empiric vanc, may need ID consultation for source control  -Three weeks from his acute covid infection, no isolation needed  -ICU level of care     Due to life threatening encephalopathy and sepsis this patient is critically ill.  Total critical care time involved in his care was 35 mins     Tony Kay MD

## 2020-12-27 NOTE — PROGRESS NOTES
4. Bronchodilators will be administered via Hand Held Nebulizer [facility]) to patients when ANY of the following criteria are met  a. Incognizant or uncooperative          b. Patients treated with HHN at Home        c. Unable to demonstrate proper use of MDI with spacer     d. RR > 30 bpm   5. Bronchodilators will be delivered via Metered Dose Inhaler (MDI), HHN, Aerogen to intubated patients on mechanical ventilation. 6. Inhalation medication orders will be delivered and/or substituted as outlined below. Aerosolized Medications Ordering and Administration Guidelines:    1. All Medications will be ordered by a physician, and their frequency and/or modality will be adjusted as defined by the patients Respiratory Severity Index (RSI) score. 2. If the patient does not have documented COPD, consider discontinuing anticholinergics when RSI is less than 9.  3. If the bronchospasm worsens (increased RSI), then the bronchodilator frequency can be increased to a maximum of every 4 hours. If greater than every 4 hours is required, the physician will be contacted. 4. If the bronchospasm improves, the frequency of the bronchodilator can be decreased, based on the patient's RSI, but not less than home treatment regimen frequency. 5. Bronchodilator(s) will be discontinued if patient has a RSI less than 9 and has received no scheduled or as needed treatment for 72  Hrs. Patients Ordered on a Mucolytic Agent:    1. Must always be administered with a bronchodilator. 2. Discontinue if patient experiences worsened bronchospasm, or secretions have lessened to the point that the patient is able to clear them with a cough. Anti-inflammatory and Combination Medications:    1. If the patient lacks prior history of lung disease, is not using inhaled anti-inflammatory medication at home, and lacks wheezing by examination or by history for at least 24 hours, contact physician for possible discontinuation.

## 2020-12-28 PROBLEM — B95.61 BACTEREMIA DUE TO STAPHYLOCOCCUS AUREUS: Status: ACTIVE | Noted: 2020-12-28

## 2020-12-28 PROBLEM — R41.0 DELIRIUM: Status: ACTIVE | Noted: 2020-12-28

## 2020-12-28 PROBLEM — R91.8 PULMONARY INFILTRATE: Status: ACTIVE | Noted: 2020-12-28

## 2020-12-28 PROBLEM — R78.81 BACTEREMIA DUE TO STAPHYLOCOCCUS AUREUS: Status: ACTIVE | Noted: 2020-12-28

## 2020-12-28 LAB
ANION GAP SERPL CALCULATED.3IONS-SCNC: 3 MMOL/L (ref 3–16)
BLOOD CULTURE, ROUTINE: ABNORMAL
BLOOD CULTURE, ROUTINE: ABNORMAL
BUN BLDV-MCNC: 21 MG/DL (ref 7–20)
CALCIUM SERPL-MCNC: 8.9 MG/DL (ref 8.3–10.6)
CHLORIDE BLD-SCNC: 102 MMOL/L (ref 99–110)
CO2: 33 MMOL/L (ref 21–32)
CREAT SERPL-MCNC: <0.5 MG/DL (ref 0.9–1.3)
CULTURE, BLOOD 2: ABNORMAL
GFR AFRICAN AMERICAN: >60
GFR NON-AFRICAN AMERICAN: >60
GLUCOSE BLD-MCNC: 176 MG/DL (ref 70–99)
HCT VFR BLD CALC: 33.8 % (ref 40.5–52.5)
HEMOGLOBIN: 11.1 G/DL (ref 13.5–17.5)
MCH RBC QN AUTO: 27.9 PG (ref 26–34)
MCHC RBC AUTO-ENTMCNC: 32.8 G/DL (ref 31–36)
MCV RBC AUTO: 85.1 FL (ref 80–100)
ORGANISM: ABNORMAL
PDW BLD-RTO: 17.4 % (ref 12.4–15.4)
PLATELET # BLD: 181 K/UL (ref 135–450)
PMV BLD AUTO: 7.4 FL (ref 5–10.5)
POTASSIUM REFLEX MAGNESIUM: 4.1 MMOL/L (ref 3.5–5.1)
RBC # BLD: 3.97 M/UL (ref 4.2–5.9)
SODIUM BLD-SCNC: 138 MMOL/L (ref 136–145)
WBC # BLD: 9.1 K/UL (ref 4–11)

## 2020-12-28 PROCEDURE — 6370000000 HC RX 637 (ALT 250 FOR IP): Performed by: INTERNAL MEDICINE

## 2020-12-28 PROCEDURE — 85027 COMPLETE CBC AUTOMATED: CPT

## 2020-12-28 PROCEDURE — 99233 SBSQ HOSP IP/OBS HIGH 50: CPT | Performed by: INTERNAL MEDICINE

## 2020-12-28 PROCEDURE — 2580000003 HC RX 258: Performed by: NURSE PRACTITIONER

## 2020-12-28 PROCEDURE — 2580000003 HC RX 258: Performed by: INTERNAL MEDICINE

## 2020-12-28 PROCEDURE — 2000000000 HC ICU R&B

## 2020-12-28 PROCEDURE — 6360000002 HC RX W HCPCS: Performed by: INTERNAL MEDICINE

## 2020-12-28 PROCEDURE — 2700000000 HC OXYGEN THERAPY PER DAY

## 2020-12-28 PROCEDURE — 2500000003 HC RX 250 WO HCPCS: Performed by: INTERNAL MEDICINE

## 2020-12-28 PROCEDURE — 2580000003 HC RX 258: Performed by: PHYSICIAN ASSISTANT

## 2020-12-28 PROCEDURE — 94640 AIRWAY INHALATION TREATMENT: CPT

## 2020-12-28 PROCEDURE — 6360000002 HC RX W HCPCS: Performed by: NURSE PRACTITIONER

## 2020-12-28 PROCEDURE — 94761 N-INVAS EAR/PLS OXIMETRY MLT: CPT

## 2020-12-28 PROCEDURE — 6360000002 HC RX W HCPCS: Performed by: PHYSICIAN ASSISTANT

## 2020-12-28 PROCEDURE — 80048 BASIC METABOLIC PNL TOTAL CA: CPT

## 2020-12-28 RX ORDER — CHLORDIAZEPOXIDE HYDROCHLORIDE 5 MG/1
5 CAPSULE, GELATIN COATED ORAL 3 TIMES DAILY
Status: DISCONTINUED | OUTPATIENT
Start: 2020-12-28 | End: 2021-01-01 | Stop reason: HOSPADM

## 2020-12-28 RX ORDER — METHYLPREDNISOLONE SODIUM SUCCINATE 40 MG/ML
40 INJECTION, POWDER, LYOPHILIZED, FOR SOLUTION INTRAMUSCULAR; INTRAVENOUS EVERY 12 HOURS
Status: COMPLETED | OUTPATIENT
Start: 2020-12-29 | End: 2020-12-30

## 2020-12-28 RX ADMIN — CEFAZOLIN 2 G: 10 INJECTION, POWDER, FOR SOLUTION INTRAVENOUS at 19:35

## 2020-12-28 RX ADMIN — VANCOMYCIN HYDROCHLORIDE 750 MG: 750 INJECTION, POWDER, LYOPHILIZED, FOR SOLUTION INTRAVENOUS at 08:20

## 2020-12-28 RX ADMIN — GABAPENTIN 600 MG: 300 CAPSULE ORAL at 14:38

## 2020-12-28 RX ADMIN — METHYLPREDNISOLONE SODIUM SUCCINATE 40 MG: 40 INJECTION, POWDER, LYOPHILIZED, FOR SOLUTION INTRAMUSCULAR; INTRAVENOUS at 04:00

## 2020-12-28 RX ADMIN — LORAZEPAM 2 MG: 2 INJECTION, SOLUTION INTRAMUSCULAR; INTRAVENOUS at 17:08

## 2020-12-28 RX ADMIN — CHLORDIAZEPOXIDE HYDROCHLORIDE 5 MG: 5 CAPSULE ORAL at 23:59

## 2020-12-28 RX ADMIN — ENOXAPARIN SODIUM 40 MG: 40 INJECTION SUBCUTANEOUS at 08:20

## 2020-12-28 RX ADMIN — METHYLPREDNISOLONE SODIUM SUCCINATE 40 MG: 40 INJECTION, POWDER, LYOPHILIZED, FOR SOLUTION INTRAMUSCULAR; INTRAVENOUS at 10:45

## 2020-12-28 RX ADMIN — Medication 0.6 MCG/KG/HR: at 17:07

## 2020-12-28 RX ADMIN — Medication 0.5 MCG/KG/HR: at 05:35

## 2020-12-28 RX ADMIN — GABAPENTIN 600 MG: 300 CAPSULE ORAL at 08:21

## 2020-12-28 RX ADMIN — CEFEPIME HYDROCHLORIDE 2 G: 2 INJECTION, POWDER, FOR SOLUTION INTRAVENOUS at 08:18

## 2020-12-28 RX ADMIN — METHYLPREDNISOLONE SODIUM SUCCINATE 40 MG: 40 INJECTION, POWDER, LYOPHILIZED, FOR SOLUTION INTRAMUSCULAR; INTRAVENOUS at 14:38

## 2020-12-28 RX ADMIN — SODIUM CHLORIDE, PRESERVATIVE FREE 10 ML: 5 INJECTION INTRAVENOUS at 10:45

## 2020-12-28 RX ADMIN — IPRATROPIUM BROMIDE AND ALBUTEROL SULFATE 1 AMPULE: .5; 3 SOLUTION RESPIRATORY (INHALATION) at 16:38

## 2020-12-28 RX ADMIN — METHYLPREDNISOLONE SODIUM SUCCINATE 40 MG: 40 INJECTION, POWDER, LYOPHILIZED, FOR SOLUTION INTRAMUSCULAR; INTRAVENOUS at 19:35

## 2020-12-28 RX ADMIN — Medication 0.4 MCG/KG/HR: at 11:17

## 2020-12-28 RX ADMIN — MUPIROCIN: 20 OINTMENT TOPICAL at 22:07

## 2020-12-28 RX ADMIN — IPRATROPIUM BROMIDE AND ALBUTEROL SULFATE 1 AMPULE: .5; 3 SOLUTION RESPIRATORY (INHALATION) at 20:05

## 2020-12-28 RX ADMIN — Medication 0.5 MCG/KG/HR: at 16:55

## 2020-12-28 RX ADMIN — CEFAZOLIN 2 G: 10 INJECTION, POWDER, FOR SOLUTION INTRAVENOUS at 10:45

## 2020-12-28 RX ADMIN — MUPIROCIN: 20 OINTMENT TOPICAL at 10:44

## 2020-12-28 RX ADMIN — IPRATROPIUM BROMIDE AND ALBUTEROL SULFATE 1 AMPULE: .5; 3 SOLUTION RESPIRATORY (INHALATION) at 07:38

## 2020-12-28 RX ADMIN — QUETIAPINE FUMARATE 100 MG: 100 TABLET ORAL at 08:21

## 2020-12-28 ASSESSMENT — PAIN SCALES - GENERAL
PAINLEVEL_OUTOF10: 0

## 2020-12-28 ASSESSMENT — ENCOUNTER SYMPTOMS: TACHYPNEA: 1

## 2020-12-28 NOTE — PROGRESS NOTES
Precedex rate increased and PRN ativan administered for increased WOB, elevated HR, elevated BP. , RR 30, /89. Alert and oriented, awake eating popsicles.

## 2020-12-28 NOTE — PROCEDURES
Hospital Medicine  Procedure Note    Procedure: Central Vein Catheter     - 7Fr x 16cm triple lumen    Consent: Patient was counseled regarding the potential benefits and risks of the procedure. Written consent was provided. Site: Right subclavian vein   Central veins were surveyed by ultrasound to assess venous anatomy and patency. Site was chosen based on findings and patient-specific factors. Description:  Patient was placed in the trendelenberg position. The site was prepped and draped in sterile fashion. The skin was infiltrated with 1% lidocaine. The vein was cannulated using real-time ultrasound guidance. A wire was threaded through the needle into the vein. Correct wire placement was verified by ultrasound. The soft tissue tract was then dilated. The catheter was then passed over the wire. The wire was withdrawn intact. All catheter ports were drawn and flushed. The catheter was secured in place with sutures at a depth of 16 cm at the skin. The site was covered with a sterile dressing. The right anterior chest was then interrogated by ultrasound. Lung sliding was still present . A portable CXR was ordered to verify appropriate catheter tip placement. There was no immediately apparent complication.   Estimated Blood Loss: < 20mL

## 2020-12-28 NOTE — PROGRESS NOTES
Pulmonary & Critical Care Inpatient Progress Note   Tony Kay MD     REASON FOR TODAY'S VISIT:  Acute encephalopathy, critical care management    SUBJECTIVE:   Remains agitated and confused, on precedex gtt unable to be weaned off today  Tachycardic but not hypotensive     Scheduled Meds:   vancomycin  750 mg Intravenous Q12H    QUEtiapine  100 mg Oral BID    gabapentin  600 mg Oral TID    methylPREDNISolone  40 mg Intravenous Q6H    sodium chloride flush  10 mL Intravenous 2 times per day    enoxaparin  40 mg Subcutaneous Daily    dexmedetomidine (PRECEDEX) IV bolus  1 mcg/kg Intravenous Once    ipratropium-albuterol  1 ampule Inhalation Q4H WA    cefepime  2 g Intravenous Q12H       Continuous Infusions:   dexmedetomidine 0.4 mcg/kg/hr (12/27/20 2031)       PRN Meds:  OLANZapine zydis, metoprolol, LORazepam, sodium chloride flush, acetaminophen **OR** acetaminophen, traZODone, buprenorphine **AND** cloNIDine, hydrOXYzine, dicyclomine, perflutren lipid microspheres    ALLERGIES:  Patient has No Known Allergies. Objective:   PHYSICAL EXAM:  /77   Pulse 137   Temp 97.7 °F (36.5 °C) (Oral)   Resp 24   Ht 5' (1.524 m)   Wt 91 lb 7.9 oz (41.5 kg)   SpO2 96%   BMI 17.87 kg/m²    Physical Exam  Constitutional:       General: He is not in acute distress. Appearance: He is well-developed. He is not diaphoretic. HENT:      Head: Normocephalic and atraumatic. Mouth/Throat:      Pharynx: No oropharyngeal exudate. Eyes:      Pupils: Pupils are equal, round, and reactive to light. Neck:      Musculoskeletal: Neck supple. Vascular: No JVD. Cardiovascular:      Heart sounds: Normal heart sounds. No murmur. No friction rub. No gallop. Pulmonary:      Effort: Pulmonary effort is normal.      Breath sounds: No wheezing or rales. Abdominal:      General: Bowel sounds are normal. There is no distension. Palpations: Abdomen is soft. Due to life threatening sepsis, encephalopathy this patient is critically ill, total critical care time involved in his care was 35 mins     Whitley Weller MD

## 2020-12-28 NOTE — CARE COORDINATION
CASE MANAGEMENT INITIAL ASSESSMENT      Reviewed chart and completed assessment via telephone with: mother Regina Kocher  Explained Case Management role/services. Primary contact information: Hayes Parnell only reachable contact [phone]    Health Care Decision Maker :   Primary Decision Maker: Mary Baldwin - Parent - [phone]          Can this person be reached and be able to respond quickly, such as within a few minutes or hours? Yes    Admit date/status:12/25/20  Diagnosis: Sepsis   Is this a Readmission?:  Yes      Insurance:Medicare primary Medicaid secondary    Precert required for SNF: level of care needed       3 night stay required: No    Living arrangements, Adls, care needs, prior to admission:per mother patient lives in a camper. Camper does have electricity but no running water or food. States she believes he has heaters.       Transportation:private     Durable Medical Equipment at home:  None    Services in the home and/or outpatient, prior to [address] Notification (HEN): not initiated    Barriers to discharge: home living situation     Plan/comments: CM attempted to call into patient room for initial assessment without success. Attempted to call daughter Radha De La Garza at [phone] without success, number no longer in service. Call placed to mother Danica Garcia [phone] who is only other contact listed. Mother not aware of patient admission. Minimal knowledge to patient living situation. Attempted to clarify NOK however mom unclear on status. States that pt was  to Radha De La Garza mother but was unable to provide writer with her name or status of marriage. Only stated they were \" for a day and ran off. \" stated she was a \"drug addict. \"  [facility] went on to state that one of his sons moved a \"trailer on the patients property. [facility] stated there is no phone service there and she has no way to contact that son. States patient has 4 children: Vadim Rinconutant and Claudene Georgi. Yvonne Sanchez are twins. She does not have any working contacts for any of them. Kept repeating \"I do everything for him\"  Writer attempted to explain legal NOK to her without understanding. [facility] not understanding that patient actually admitted in hospital despite many attempts at explaining this. CM asked that nursing or MD attempt to update family.        ECOC on chart for MD signature      Sneha Burton RN

## 2020-12-28 NOTE — PROGRESS NOTES
Hospitalist Progress Note      PCP: No primary care provider on file. Date of Admission: 12/25/2020    Chief Complaint: SOB    Subjective: no new c/o. Medications:  Reviewed    Infusion Medications    dexmedetomidine 0.501 mcg/kg/hr (12/28/20 0535)     Scheduled Medications    mupirocin   Nasal BID    ceFAZolin  2 g Intravenous Q8H    QUEtiapine  100 mg Oral BID    gabapentin  600 mg Oral TID    methylPREDNISolone  40 mg Intravenous Q6H    sodium chloride flush  10 mL Intravenous 2 times per day    enoxaparin  40 mg Subcutaneous Daily    ipratropium-albuterol  1 ampule Inhalation Q4H WA     PRN Meds: OLANZapine zydis, metoprolol, LORazepam, sodium chloride flush, acetaminophen **OR** acetaminophen, traZODone, buprenorphine **AND** cloNIDine, hydrOXYzine, dicyclomine, perflutren lipid microspheres      Intake/Output Summary (Last 24 hours) at 12/28/2020 1055  Last data filed at 12/28/2020 1044  Gross per 24 hour   Intake 1305 ml   Output 1800 ml   Net -495 ml       Physical Exam Performed:    BP (!) 92/51   Pulse 96   Temp 97.9 °F (36.6 °C) (Oral)   Resp 18   Ht 5' (1.524 m)   Wt 91 lb 7.9 oz (41.5 kg)   SpO2 99%   BMI 17.87 kg/m²     General appearance: No apparent distress, appears stated age and cooperative. HEENT: Pupils equal, round, and reactive to light. Conjunctivae/corneas clear. Neck: Supple, with full range of motion. No jugular venous distention. Trachea midline. Respiratory:  Normal respiratory effort. Clear to auscultation, bilaterally without Rales/Wheezes/Rhonchi. Cardiovascular: Regular rate and rhythm with normal S1/S2 without murmurs, rubs or gallops. Abdomen: Soft, non-tender, non-distended with normal bowel sounds. Musculoskeletal: No clubbing, cyanosis or edema bilaterally. Full range of motion without deformity. Skin: Skin color, texture, turgor normal.  No rashes or lesions. Neurologic:  Neurovascularly intact without any focal sensory/motor deficits. Cranial nerves: II-XII intact, grossly non-focal.  Psychiatric: Alert and oriented, thought content appropriate, normal insight  Capillary Refill: Brisk,< 3 seconds   Peripheral Pulses: +2 palpable, equal bilaterally       Labs:   Recent Labs     12/25/20  1814 12/26/20  0453 12/28/20  0034   WBC 22.7* 13.4* 9.1   HGB 15.1 12.0* 11.1*   HCT 46.3 37.1* 33.8*    169 181     Recent Labs     12/25/20  1814 12/26/20  0216 12/28/20  0034   * 129* 138   K 3.8 3.6 4.1   CL 94* 99 102   CO2 23 22 33*   BUN 22* 17 21*   CREATININE 0.7* <0.5* <0.5*   CALCIUM 8.9 8.3 8.9     Recent Labs     12/25/20  1814   AST 28   ALT 31   BILITOT 1.0   ALKPHOS 92     No results for input(s): INR in the last 72 hours.   Recent Labs     12/25/20  1814   TROPONINI <0.01       Urinalysis:      Lab Results   Component Value Date    NITRU Negative 12/25/2020    WBCUA 3-5 12/25/2020    BACTERIA 2+ 07/03/2020    RBCUA 3-4 12/25/2020    BLOODU SMALL 12/25/2020    SPECGRAV 1.020 12/25/2020    GLUCOSEU 500 12/25/2020       Consults:    IP CONSULT TO HOSPITALIST  IP CONSULT TO CASE MANAGEMENT  IP CONSULT TO PHARMACY  IP CONSULT TO DIETITIAN  IP CONSULT TO PULMONOLOGY  IP CONSULT TO PULMONOLOGY  IP CONSULT TO INFECTIOUS DISEASES      Assessment/Plan:    Active Hospital Problems    Diagnosis    Sepsis ([facility] [address] [A41.9]    Pneumonia due to organism [J18.9]    Acute respiratory failure with hypoxia ([facility] [address] [J96.01]    Methamphetamine abuse ([facility] [address] [F15.10]       Sepsis in patient with HAP, , lactic 2.6 and WBC 22.7 on admission  -tylenol given in ED  -30 ml/kg IVF given in ED  -cefepime and vanc given in ED and continued 12/25/2020  -MIVF  -tele monitoring  -repeat lactic     Bacteremia- with staph in bcx x 2  -ID consulted for further thoughts/recs     Acute respiratory failure with hypoxia in setting of HAP -CXR revealed: mild blunting of left costophrenic angle, possibly trace left pleural fluid. Moderate to severe emphysema  -CT chest pulmonary embolism was obtained that revealed no evidence of acute pulmonary embolism.  Airspace consolidation in the lingula compatible with pneumonia. -rapid COVID negative on admission  -positive COVID on 12/5/2020  -decadron given in ED  -solumedrol every 6 hours  -continuous pulse ox  -encourage coughing and deep breathing.  -antibiotics as indicated above   -pulm asked to weigh in     IVDU  -drug screen positive for amphetamines  -cessation discussed  -case management consult for drug program after discharge  -echo in am   -started COWS protocol with subutex/clonidine on 12/26   -started precedex ggt due to w/d symptoms, transferred to ICU on 12/26       DVT Prophylaxis: [address]  Diet: DIET GENERAL;  Dietary Nutrition Supplements: Standard High Calorie Oral Supplement  Code Status: Full Code      PT/OT Eval Status: not yet ordered. Dispo - Remains in ICU.      Elliot Peacock MD

## 2020-12-29 PROBLEM — R73.9 HYPERGLYCEMIA: Status: ACTIVE | Noted: 2020-12-29

## 2020-12-29 LAB
ALBUMIN SERPL-MCNC: 3.3 G/DL (ref 3.4–5)
ANION GAP SERPL CALCULATED.3IONS-SCNC: 8 MMOL/L (ref 3–16)
BUN BLDV-MCNC: 20 MG/DL (ref 7–20)
CALCIUM SERPL-MCNC: 9.2 MG/DL (ref 8.3–10.6)
CHLORIDE BLD-SCNC: 103 MMOL/L (ref 99–110)
CO2: 32 MMOL/L (ref 21–32)
CREAT SERPL-MCNC: 0.7 MG/DL (ref 0.9–1.3)
GFR AFRICAN AMERICAN: >60
GFR NON-AFRICAN AMERICAN: >60
GLUCOSE BLD-MCNC: 352 MG/DL (ref 70–99)
HCT VFR BLD CALC: 37.5 % (ref 40.5–52.5)
HEMOGLOBIN: 11.8 G/DL (ref 13.5–17.5)
MAGNESIUM: 2.3 MG/DL (ref 1.8–2.4)
MCH RBC QN AUTO: 27.6 PG (ref 26–34)
MCHC RBC AUTO-ENTMCNC: 31.5 G/DL (ref 31–36)
MCV RBC AUTO: 87.6 FL (ref 80–100)
PDW BLD-RTO: 18.1 % (ref 12.4–15.4)
PHOSPHORUS: 2.2 MG/DL (ref 2.5–4.9)
PLATELET # BLD: 235 K/UL (ref 135–450)
PMV BLD AUTO: 7.4 FL (ref 5–10.5)
POTASSIUM SERPL-SCNC: 4 MMOL/L (ref 3.5–5.1)
RBC # BLD: 4.28 M/UL (ref 4.2–5.9)
SODIUM BLD-SCNC: 143 MMOL/L (ref 136–145)
WBC # BLD: 11 K/UL (ref 4–11)

## 2020-12-29 PROCEDURE — 2700000000 HC OXYGEN THERAPY PER DAY

## 2020-12-29 PROCEDURE — 94761 N-INVAS EAR/PLS OXIMETRY MLT: CPT

## 2020-12-29 PROCEDURE — 6370000000 HC RX 637 (ALT 250 FOR IP): Performed by: INTERNAL MEDICINE

## 2020-12-29 PROCEDURE — 2580000003 HC RX 258: Performed by: NURSE PRACTITIONER

## 2020-12-29 PROCEDURE — 2000000000 HC ICU R&B

## 2020-12-29 PROCEDURE — 80069 RENAL FUNCTION PANEL: CPT

## 2020-12-29 PROCEDURE — 6360000002 HC RX W HCPCS: Performed by: INTERNAL MEDICINE

## 2020-12-29 PROCEDURE — 6370000000 HC RX 637 (ALT 250 FOR IP): Performed by: NURSE PRACTITIONER

## 2020-12-29 PROCEDURE — 6360000002 HC RX W HCPCS: Performed by: NURSE PRACTITIONER

## 2020-12-29 PROCEDURE — 83735 ASSAY OF MAGNESIUM: CPT

## 2020-12-29 PROCEDURE — 94640 AIRWAY INHALATION TREATMENT: CPT

## 2020-12-29 PROCEDURE — 2500000003 HC RX 250 WO HCPCS: Performed by: INTERNAL MEDICINE

## 2020-12-29 PROCEDURE — 99233 SBSQ HOSP IP/OBS HIGH 50: CPT | Performed by: INTERNAL MEDICINE

## 2020-12-29 PROCEDURE — 85027 COMPLETE CBC AUTOMATED: CPT

## 2020-12-29 RX ORDER — NICOTINE POLACRILEX 4 MG
15 LOZENGE BUCCAL PRN
Status: DISCONTINUED | OUTPATIENT
Start: 2020-12-29 | End: 2021-01-01 | Stop reason: HOSPADM

## 2020-12-29 RX ORDER — DEXTROSE MONOHYDRATE 25 G/50ML
12.5 INJECTION, SOLUTION INTRAVENOUS PRN
Status: DISCONTINUED | OUTPATIENT
Start: 2020-12-29 | End: 2021-01-01 | Stop reason: HOSPADM

## 2020-12-29 RX ORDER — DEXTROSE MONOHYDRATE 50 MG/ML
100 INJECTION, SOLUTION INTRAVENOUS PRN
Status: DISCONTINUED | OUTPATIENT
Start: 2020-12-29 | End: 2021-01-01 | Stop reason: HOSPADM

## 2020-12-29 RX ORDER — IPRATROPIUM BROMIDE AND ALBUTEROL SULFATE 2.5; .5 MG/3ML; MG/3ML
1 SOLUTION RESPIRATORY (INHALATION) EVERY 4 HOURS PRN
Status: DISCONTINUED | OUTPATIENT
Start: 2020-12-29 | End: 2021-01-01

## 2020-12-29 RX ADMIN — METHYLPREDNISOLONE SODIUM SUCCINATE 40 MG: 40 INJECTION, POWDER, FOR SOLUTION INTRAMUSCULAR; INTRAVENOUS at 20:12

## 2020-12-29 RX ADMIN — IPRATROPIUM BROMIDE AND ALBUTEROL SULFATE 1 AMPULE: .5; 3 SOLUTION RESPIRATORY (INHALATION) at 11:37

## 2020-12-29 RX ADMIN — MUPIROCIN: 20 OINTMENT TOPICAL at 20:12

## 2020-12-29 RX ADMIN — HYDROXYZINE PAMOATE 50 MG: 25 CAPSULE ORAL at 18:27

## 2020-12-29 RX ADMIN — BUPRENORPHINE 2 MG: 2 TABLET SUBLINGUAL at 08:09

## 2020-12-29 RX ADMIN — TRAZODONE HYDROCHLORIDE 50 MG: 50 TABLET ORAL at 20:12

## 2020-12-29 RX ADMIN — SODIUM CHLORIDE, PRESERVATIVE FREE 10 ML: 5 INJECTION INTRAVENOUS at 20:13

## 2020-12-29 RX ADMIN — LORAZEPAM 2 MG: 2 INJECTION, SOLUTION INTRAMUSCULAR; INTRAVENOUS at 20:12

## 2020-12-29 RX ADMIN — CLONIDINE HYDROCHLORIDE 0.1 MG: 0.1 TABLET ORAL at 14:13

## 2020-12-29 RX ADMIN — CHLORDIAZEPOXIDE HYDROCHLORIDE 5 MG: 5 CAPSULE ORAL at 14:13

## 2020-12-29 RX ADMIN — METOPROLOL TARTRATE 5 MG: 1 INJECTION, SOLUTION INTRAVENOUS at 02:20

## 2020-12-29 RX ADMIN — CEFAZOLIN 2 G: 10 INJECTION, POWDER, FOR SOLUTION INTRAVENOUS at 03:20

## 2020-12-29 RX ADMIN — METOPROLOL TARTRATE 5 MG: 1 INJECTION, SOLUTION INTRAVENOUS at 14:13

## 2020-12-29 RX ADMIN — ENOXAPARIN SODIUM 40 MG: 40 INJECTION SUBCUTANEOUS at 08:08

## 2020-12-29 RX ADMIN — LORAZEPAM 2 MG: 2 INJECTION, SOLUTION INTRAMUSCULAR; INTRAVENOUS at 03:23

## 2020-12-29 RX ADMIN — BUPRENORPHINE 2 MG: 2 TABLET SUBLINGUAL at 10:16

## 2020-12-29 RX ADMIN — CHLORDIAZEPOXIDE HYDROCHLORIDE 5 MG: 5 CAPSULE ORAL at 08:09

## 2020-12-29 RX ADMIN — MUPIROCIN: 20 OINTMENT TOPICAL at 08:08

## 2020-12-29 RX ADMIN — Medication 1 MCG/KG/HR: at 03:20

## 2020-12-29 RX ADMIN — CLONIDINE HYDROCHLORIDE 0.1 MG: 0.1 TABLET ORAL at 16:14

## 2020-12-29 RX ADMIN — CLONIDINE HYDROCHLORIDE 0.1 MG: 0.1 TABLET ORAL at 12:13

## 2020-12-29 RX ADMIN — CEFAZOLIN 2 G: 10 INJECTION, POWDER, FOR SOLUTION INTRAVENOUS at 10:17

## 2020-12-29 RX ADMIN — CLONIDINE HYDROCHLORIDE 0.1 MG: 0.1 TABLET ORAL at 20:12

## 2020-12-29 RX ADMIN — BUPRENORPHINE 2 MG: 2 TABLET SUBLINGUAL at 12:13

## 2020-12-29 RX ADMIN — METOPROLOL TARTRATE 5 MG: 1 INJECTION, SOLUTION INTRAVENOUS at 08:09

## 2020-12-29 RX ADMIN — METHYLPREDNISOLONE SODIUM SUCCINATE 40 MG: 40 INJECTION, POWDER, FOR SOLUTION INTRAMUSCULAR; INTRAVENOUS at 08:08

## 2020-12-29 RX ADMIN — METOPROLOL TARTRATE 5 MG: 1 INJECTION, SOLUTION INTRAVENOUS at 22:10

## 2020-12-29 RX ADMIN — CEFAZOLIN 2 G: 10 INJECTION, POWDER, FOR SOLUTION INTRAVENOUS at 18:27

## 2020-12-29 RX ADMIN — SODIUM CHLORIDE, PRESERVATIVE FREE 10 ML: 5 INJECTION INTRAVENOUS at 08:10

## 2020-12-29 RX ADMIN — CHLORDIAZEPOXIDE HYDROCHLORIDE 5 MG: 5 CAPSULE ORAL at 20:12

## 2020-12-29 RX ADMIN — BUPRENORPHINE 2 MG: 2 TABLET SUBLINGUAL at 02:19

## 2020-12-29 RX ADMIN — CLONIDINE HYDROCHLORIDE 0.1 MG: 0.1 TABLET ORAL at 10:16

## 2020-12-29 RX ADMIN — CLONIDINE HYDROCHLORIDE 0.1 MG: 0.1 TABLET ORAL at 08:09

## 2020-12-29 ASSESSMENT — PAIN DESCRIPTION - PAIN TYPE
TYPE: ACUTE PAIN
TYPE: ACUTE PAIN

## 2020-12-29 ASSESSMENT — PAIN SCALES - GENERAL
PAINLEVEL_OUTOF10: 0

## 2020-12-29 ASSESSMENT — PAIN DESCRIPTION - LOCATION: LOCATION: CHEST

## 2020-12-29 NOTE — PROGRESS NOTES
INPATIENT PULMONARY CRITICAL CARE PROGRESS NOTE      Reason for visit    Acute encephalopathy    SUBJECTIVE: Patient when seen this morning continues to be on IV Precedex infusion to prevent delirium and agitation, patient was sleepy when seen, patient was afebrile and hemodynamically borderline, patient had sinus tachycardia on the monitor, patient was on 2 L of nasal cannula oxygen when seen, patient has had adequate urine output with cumulative fluid balance of +1.9 L, patient's blood sugars are slightly on the higher side, no other pertinent review of system could be obtained          Physical Exam:  Blood pressure 94/60, pulse 100, temperature 98.7 °F (37.1 °C), temperature source Temporal, resp. rate 13, height 5' (1.524 m), weight 91 lb 7.9 oz (41.5 kg), SpO2 100 %.'   Constitutional:  No acute distress. HENT:  Oropharynx is clear and moist. No thyromegaly. Eyes:  Conjunctivae are normal. Pupils equal, round, and reactive to light. No scleral icterus. Neck: . No tracheal deviation present. No obvious thyroid mass. Cardiovascular: Normal rate, regular rhythm, normal heart sounds. No right ventricular heave. No lower extremity edema. Pulmonary/Chest: No wheezes. Scattered rales. Chest wall is not dull to percussion. No accessory muscle usage or stridor. Abdominal: Soft. Bowel sounds present. No distension or hernia. No tenderness. Musculoskeletal: No cyanosis. No clubbing. No obvious joint deformity. Lymphadenopathy: No cervical or supraclavicular adenopathy. Skin: Skin is warm and dry. No rash or nodules on the exposed extremities.   Neurologic: sleeping when seen         Results:  CBC:   Recent Labs     12/26/20  0453 12/28/20  0034   WBC 13.4* 9.1   HGB 12.0* 11.1*   HCT 37.1* 33.8*   MCV 84.9 85.1    181     BMP:   Recent Labs     12/26/20  0216 12/28/20  0034   * 138   K 3.6 4.1   CL 99 102   CO2 22 33*   BUN 17 21*   CREATININE <0.5* <0.5*       Imaging: Hemoglobin Quant Latest Ref Range: 13.5 - 17.5 g/dL  12.0 (L)   11.1 (L)   Hematocrit Latest Ref Range: 40.5 - 52.5 %  37.1 (L)   33.8 (L)   MCV Latest Ref Range: 80.0 - 100.0 fL  84.9   85.1   MCH Latest Ref Range: 26.0 - 34.0 pg  27.4   27.9   MCHC Latest Ref Range: 31.0 - 36.0 g/dL  32.2   32.8   MPV Latest Ref Range: 5.0 - 10.5 fL  7.4   7.4   RDW Latest Ref Range: 12.4 - 15.4 %  17.3 (H)   17.4 (H)   Platelet Count Latest Ref Range: 135 - 450 K/uL  169   181   Neutrophils % Latest Units: %  93.6      Lymphocyte % Latest Units: %  2.7      Monocytes % Latest Units: %  3.6      Eosinophils % Latest Units: %  0.0          Staph aureus DNA Detected    Blood Culture, Routine 12/25/2020  6:30 PM [facility]   mecA gene not detected   See additional report for complete BCID panel. Organism Abnormal  12/25/2020  6:30 PM [address] [facility]   Staphylococcus aureus    Blood Culture, Routine 12/25/2020  6:30 PM [address] for   Isolated two of two sets    Testing Performed By    Lab - Abbreviation Name Director Address Valid Date Range   09-RB - [facility] [place] M.D. [address] [place] [address]. [facility] [address] 09/26/20 0000-Present   Narrative  Performed by: [address] [facility]  ORDER#: [id number]                          ORDERED BY: [employer]   SOURCE: Blood Antecubital-Rig              COLLECTED:  12/25/20 18:30   ANTIBIOTICS AT [facility]. :                      RECEIVED :  12/26/20 10:19   CALL  [place]  SAC5 telZulema Marlon [phone],   Microbiology results called to and read back by Hernandez Fenton RN,   12/26/2020 16:08, by Kathryn Thomas   If child <=2 yrs old please draw pediatric bottle. ~Blood Culture #1   Performed at:   [facility]   [address] [facility] [address]   Phone [phone]   Susceptibility    Staphylococcus aureus (2)    Antibiotic Interpretation AJAY Status clindamycin Sensitive <=0.25 mcg/mL     erythromycin Resistant >=8 mcg/mL     oxacillin Sensitive 0.5 mcg/mL     tetracycline Sensitive <=1 mcg/mL     trimethoprim-sulfamethoxazole Sensitive <=10 mcg/mL         Results for Chinedu Willis (MRN [id number]) as of 12/28/2020 21:04   Ref. Range 11/7/2020 18:43 12/5/2020 06:43 12/15/2020 15:50 12/15/2020 16:00 12/25/2020 18:22   Amphetamine Screen, Urine Latest Ref Range: Negative <1000ng/mL  Neg POSITIVE (A) Neg  POSITIVE (A)   Barbiturate Screen, Ur Latest Ref Range: Negative <200 ng/mL  Neg Neg Neg  Neg   Benzodiazepine Screen, Urine Latest Ref Range: Negative <200 ng/mL  Neg Neg POSITIVE (A)  Neg   Buprenorphine Latest Ref Range: Cutoff 1 ng/mL    Negative    Cannabinoid Scrn, Ur Latest Ref Range: Negative <50 ng/mL  Neg Neg Neg  Neg   Methadone Screen, Urine Latest Ref Range: Negative <300 ng/mL  Neg Neg Neg  Neg   Opiate Scrn, Ur Latest Ref Range: Negative <300 ng/mL  Neg Neg Neg  Neg   PCP Screen, Urine Latest Ref Range: Negative <25 ng/mL  Neg Neg Neg  Neg   Propoxyphene Scrn, Ur Latest Ref Range: Negative <300 ng/mL  Neg Neg Neg  Neg   Cocaine Metabolite Screen, Urine Latest Ref Range: Negative <300 ng/mL  Neg Neg Neg  Neg   7-aminoclonazepam Latest Units: ng/mL    <5    Oxycodone Urine Latest Ref Range: Negative <100 ng/ml  Neg Neg Neg  Neg       Assessment:  Principal Problem:    Pneumonia due to organism  Active Problems:    Acute respiratory failure with hypoxia (HCC)    Methamphetamine abuse (HCC)    Toxic encephalopathy    Sepsis ([facility] [address]    Pulmonary infiltrate    Bacteremia due to Staphylococcus aureus    Delirium  Resolved Problems:    * No resolved hospital problems.  *          Plan:   · Oxygen supplementation to keep saturation between 90 to 94%  · Please titrate down the oxygen as per the above parameters  · Pulmonary toilet  · Patient is on IV Precedex infusion patient is to be titrated down depending on patient's clinical status · P.o.  Librium started  · Patient may require COWS protocol  · Will discontinue Neurontin/Seroquel/Zyprexa for now and reassess  · Bronchodilators  · Patient's IV antibiotics were changed to Ancef  · Titration of antibiotics as per clinical status and cultures  · Patient's IV Solu-Medrol was changed to every 12 hours will reassess in the morning about changing to p.o. prednisone  · Neurochecks  · PUD prophylaxis    Case discussed with ICU team          Electronically signed by:  Brittney Miranda MD    12/28/2020    9:12 PM.

## 2020-12-29 NOTE — PLAN OF CARE
Problem: Falls - Risk of:  Goal: Will remain free from falls  Description: Will remain free from falls  12/29/2020 1241 by Wyatt Wooten RN  Outcome: Ongoing  12/29/2020 0645 by Ian Granger RN  Outcome: Ongoing  Goal: Absence of physical injury  Description: Absence of physical injury  12/29/2020 1241 by Wyatt Wooten RN  Outcome: Ongoing  12/29/2020 0645 by Ian Granger RN  Outcome: Ongoing     Problem: Skin Integrity:  Goal: Will show no infection signs and symptoms  Description: Will show no infection signs and symptoms  12/29/2020 1241 by Waytt Wooten RN  Outcome: Ongoing  12/29/2020 0645 by Ian Granger RN  Outcome: Ongoing  Goal: Absence of new skin breakdown  Description: Absence of new skin breakdown  12/29/2020 1241 by Wyatt Wooten RN  Outcome: Ongoing  12/29/2020 0645 by Ian Granger RN  Outcome: Ongoing     Problem: Pain:  Goal: Pain level will decrease  Description: Pain level will decrease  12/29/2020 1241 by Wyatt Wooten RN  Outcome: Ongoing  12/29/2020 0645 by Ian Granger RN  Outcome: Ongoing  Goal: Control of acute pain  Description: Control of acute pain  12/29/2020 1241 by Wyatt Wooten RN  Outcome: Ongoing  12/29/2020 0645 by Ian Granger RN  Outcome: Ongoing  Goal: Control of chronic pain  Description: Control of chronic pain  12/29/2020 1241 by Wyatt Wooten RN  Outcome: Ongoing  12/29/2020 0645 by Ian Granger RN  Outcome: Ongoing

## 2020-12-29 NOTE — PROGRESS NOTES
Clarified living environment with patient. Pt stated he lives in [facility] have electricity. He also has access to running water. The water tanks in his camper run out of water & he needs to refill them from a hose, but he DOES have access to the hose. Patient also voiced he would be open to meals on wheels & the possibility of nursing/aide services after discharge. He also inquired about qualifying for home oxygen at discharge & believes he would benefit greatly from O2 at home. Notified CM.

## 2020-12-29 NOTE — PROGRESS NOTES
Patient assessment complete and charted. HR 130s. PRN lopressor given. AOx4. Precedex infusing at 1 mcg/kg/hr. Bed locked and in lowest position. Non-skid socks in place. Call light within reach. Bed alarm on. Patient states no further needs at this time. Will continue to monitor.

## 2020-12-29 NOTE — PROGRESS NOTES
Hospitalist Progress Note      PCP: No primary care provider on file. Date of Admission: 12/25/2020    Chief Complaint: SOB    Subjective: no new c/o. Medications:  Reviewed    Infusion Medications    dexmedetomidine 1 mcg/kg/hr (12/29/20 0600)     Scheduled Medications    mupirocin   Nasal BID    ceFAZolin  2 g Intravenous Q8H    methylPREDNISolone  40 mg Intravenous Q12H    chlordiazePOXIDE  5 mg Oral TID    sodium chloride flush  10 mL Intravenous 2 times per day    enoxaparin  40 mg Subcutaneous Daily    ipratropium-albuterol  1 ampule Inhalation Q4H WA     PRN Meds: metoprolol, LORazepam, sodium chloride flush, acetaminophen **OR** acetaminophen, traZODone, buprenorphine **AND** cloNIDine, hydrOXYzine, dicyclomine, perflutren lipid microspheres      Intake/Output Summary (Last 24 hours) at 12/29/2020 0845  Last data filed at 12/29/2020 0600  Gross per 24 hour   Intake 2398.84 ml   Output 1345 ml   Net 1053.84 ml       Physical Exam Performed:    BP (!) 164/69   Pulse 134   Temp 98.1 °F (36.7 °C) (Axillary)   Resp 24   Ht 5' (1.524 m)   Wt 91 lb 7.9 oz (41.5 kg)   SpO2 96%   BMI 17.87 kg/m²     General appearance: No apparent distress, appears stated age and cooperative. HEENT: Pupils equal, round, and reactive to light. Conjunctivae/corneas clear. Neck: Supple, with full range of motion. No jugular venous distention. Trachea midline. Respiratory:  Normal respiratory effort. Clear to auscultation, bilaterally without Rales/Wheezes/Rhonchi. Cardiovascular: Regular rate and rhythm with normal S1/S2 without murmurs, rubs or gallops. Abdomen: Soft, non-tender, non-distended with normal bowel sounds. Musculoskeletal: No clubbing, cyanosis or edema bilaterally. Full range of motion without deformity. Skin: Skin color, texture, turgor normal.  No rashes or lesions. Neurologic:  Neurovascularly intact without any focal sensory/motor deficits. Cranial nerves: II-XII intact, grossly non-focal.  Psychiatric: Alert and oriented, thought content appropriate, normal insight  Capillary Refill: Brisk,< 3 seconds   Peripheral Pulses: +2 palpable, equal bilaterally       Labs:   Recent Labs     12/28/20  0034 12/29/20  0400   WBC 9.1 11.0   HGB 11.1* 11.8*   HCT 33.8* 37.5*    235     Recent Labs     12/28/20  0034 12/29/20  0400    143   K 4.1 4.0    103   CO2 33* 32   BUN 21* 20   CREATININE <0.5* 0.7*   CALCIUM 8.9 9.2   PHOS  --  2.2*     No results for input(s): AST, ALT, BILIDIR, BILITOT, ALKPHOS in the last 72 hours. No results for input(s): INR in the last 72 hours. No results for input(s): Jaimee Ripper in the last 72 hours.     Urinalysis:      Lab Results   Component Value Date    NITRU Negative 12/25/2020    WBCUA 3-5 12/25/2020    BACTERIA 2+ 07/03/2020    RBCUA 3-4 12/25/2020    BLOODU SMALL 12/25/2020    SPECGRAV 1.020 12/25/2020    GLUCOSEU 500 12/25/2020       Consults:    IP CONSULT TO HOSPITALIST  IP CONSULT TO CASE MANAGEMENT  IP CONSULT TO PHARMACY  IP CONSULT TO DIETITIAN  IP CONSULT TO PULMONOLOGY  IP CONSULT TO PULMONOLOGY  IP CONSULT TO INFECTIOUS DISEASES      Assessment/Plan:    Active Hospital Problems    Diagnosis    Pulmonary infiltrate [R91.8]    Bacteremia due to Staphylococcus aureus [R78.81, B95.61]    Delirium [R41.0]    Sepsis ([facility] [address] [A41.9]    Pneumonia due to organism [J18.9]    Toxic encephalopathy [G92]    Acute respiratory failure with hypoxia ([facility] [J96.01]    Methamphetamine abuse ([facility] [address] [F15.10]       Sepsis in patient with HAP, , lactic 2.6 and WBC 22.7 on admission  -tylenol given in ED  -30 ml/kg IVF given in ED  -cefepime and vanc given in ED and continued 12/25/2020  -MIV  -tele monitoring  -repeat lactic     Bacteremia- with staph in bcx x 2  -ID consulted for further thoughts/recs    Acute respiratory failure with hypoxia in setting of HAP  -CXR revealed: mild blunting of left costophrenic angle, possibly trace left pleural fluid. Moderate to severe emphysema  -CT chest pulmonary embolism was obtained that revealed no evidence of acute pulmonary embolism.  Airspace consolidation in the lingula compatible with pneumonia. -rapid COVID negative on admission  -positive COVID on 12/5/2020  -decadron given in ED  -solumedrol every 6 hours  -continuous pulse ox  -encourage coughing and deep breathing.  -antibiotics as indicated above   -pulm asked to weigh in     IVDU  -drug screen positive for amphetamines  -cessation discussed  -case management consult for drug program after discharge  -echo in am   -started COWS protocol with subutex/clonidine on 12/26   -started precedex ggt due to w/d symptoms, transferred to ICU on 12/26       DVT Prophylaxis: [address]  Diet: DIET GENERAL;  Dietary Nutrition Supplements: Standard High Calorie Oral Supplement  Code Status: Full Code      PT/OT Eval Status: not yet ordered. Dispo - Remains in ICU.      Giulia Prasad MD

## 2020-12-29 NOTE — PLAN OF CARE
Problem: Falls - Risk of:  Goal: Will remain free from falls  Description: Will remain free from falls  12/29/2020 0645 by Kristina Santana RN  Outcome: Ongoing  12/28/2020 1924 by Sharmila Ardon RN  Outcome: Ongoing  Goal: Absence of physical injury  Description: Absence of physical injury  12/29/2020 0645 by Kristina Santana RN  Outcome: Ongoing  12/28/2020 1924 by Sharmila Adron RN  Outcome: Ongoing     Problem: Skin Integrity:  Goal: Will show no infection signs and symptoms  Description: Will show no infection signs and symptoms  12/29/2020 0645 by Kristina Santana RN  Outcome: Ongoing  12/28/2020 1924 by Sharmila Ardon RN  Outcome: Ongoing  Goal: Absence of new skin breakdown  Description: Absence of new skin breakdown  12/29/2020 0645 by Kristina Santana RN  Outcome: Ongoing  12/28/2020 1924 by Sharmila Ardon RN  Outcome: Ongoing     Problem: Pain:  Goal: Pain level will decrease  Description: Pain level will decrease  12/29/2020 0645 by Kristina Santana RN  Outcome: Ongoing  12/28/2020 1924 by Sharmila Ardon RN  Outcome: Ongoing  Goal: Control of acute pain  Description: Control of acute pain  12/29/2020 0645 by Kristina Santana RN  Outcome: Ongoing  12/28/2020 1924 by Sharmila Ardon RN  Outcome: Ongoing  Goal: Control of chronic pain  Description: Control of chronic pain  12/29/2020 0645 by Kristina Santana RN  Outcome: Ongoing  12/28/2020 1924 by Sharmila Ardon RN  Outcome: Ongoing

## 2020-12-29 NOTE — PROGRESS NOTES
RESPIRATORY THERAPY ASSESSMENT    Name:  Brooks   Seth Sutherland Record Number:  [id number]  Age: 48 y.o. Gender: male  : 1970  Today's Date:  2020  Room:  0262/0262-01    Assessment     Is the patient being admitted for a COPD or Asthma exacerbation? No   (If yes the patient will be seen every 4 hours for the first 24 hours and then reassessed)    Patient Admission Diagnosis      Allergies  No Known Allergies    Minimum Predicted Vital Capacity:     NA          Actual Vital Capacity:      NA              Pulmonary History:COPD  Home Oxygen Therapy:  room air  Home Respiratory Therapy:Albuterol   Current Respiratory Therapy:  Duoneb txs Q4H w/a  Treatment Type: HHN  Medications: Albuterol/Ipratropium    Respiratory Severity Index(RSI)   Patients with orders for inhalation medications, oxygen, or any therapeutic treatment modality will be placed on Respiratory Protocol. They will be assessed with the first treatment and at least every 72 hours thereafter. The following severity scale will be used to determine frequency of treatment intervention.     Smoking History: Pulmonary Disease or Smoking History, Greater than 15 pack year = 2    Social History  Social History     Tobacco Use    Smoking status: Former Smoker     Packs/day: 2.00     Types: Cigarettes    Smokeless tobacco: Never Used    Tobacco comment: states 5 yrs ago   Substance Use Topics    Alcohol use: No    Drug use: Not Currently     Types: IV     Comment: last used heroine 2 days ago       Recent Surgical History: None = 0  Past Surgical History  Past Surgical History:   Procedure Laterality Date    BRAIN SURGERY      s/p trauma    BRAIN SURGERY      FRACTURE SURGERY      LEG SURGERY         Level of Consciousness: Alert, Oriented, and Cooperative = 0    Level of Activity: Mostly sedentary, minimal walking = 2    Respiratory Pattern: Regular Pattern; RR 8-20 = 0    Breath Sounds: Clear = 0    Sputum Sputum Color: Red, Creamy, Tenacity: Thick, Sputum How Obtained: Cough on request  Cough: Strong, spontaneous, non-productive = 0    Vital Signs   BP (!) 164/69   Pulse 134   Temp 98.1 °F (36.7 °C) (Axillary)   Resp 20   Ht 5' (1.524 m)   Wt 91 lb 7.9 oz (41.5 kg)   SpO2 96%   BMI 17.87 kg/m²   SPO2 (COPD values may differ): 88-89% on room air or greater than 92% on FiO2 28- 35% = 2    Peak Flow (asthma only): not applicable = 0    RSI: 5-6 = Q4hr PRN (every four hours as needed) for dyspnea        Plan       Goals: medication delivery and improve oxygenation    Patient/caregiver was educated on the proper method of use for Respiratory Care Devices:  Yes      Level of patient/caregiver understanding able to:   ? Verbalize understanding   ? Demonstrate understanding       ? Teach back        ? Needs reinforcement       ? No available caregiver               ? Other:     Response to education:  Good     Is patient being placed on Home Treatment Regimen? Yes     Does the patient have everything they need prior to discharge? NA     Comments: chart reviewed and pt assessed    Plan of Care: change txs to prn per RSI score and home regimen    Electronically signed by Jose Huerta RCP on 12/29/2020 at 9:30 AM    Respiratory Protocol Guidelines     1. Assessment and treatment by Respiratory Therapy will be initiated for medication and therapeutic interventions upon initiation of aerosolized medication. 2. Physician will be contacted for respiratory rate (RR) greater than 35 breaths per minute. Therapy will be held for heart rate (HR) greater than 140 beats per minute, pending direction from physician. 3. Bronchodilators will be administered via Metered Dose Inhaler (MDI) with spacer when the following criteria are met:  a.  Alert and cooperative     b. HR < 140 bpm  c. RR < 30 bpm                d. Can demonstrate a 23 second inspiratory hold 4. Bronchodilators will be administered via Hand Held Nebulizer [facility]) to patients when ANY of the following criteria are met  a. Incognizant or uncooperative          b. Patients treated with HHN at Home        c. Unable to demonstrate proper use of MDI with spacer     d. RR > 30 bpm   5. Bronchodilators will be delivered via Metered Dose Inhaler (MDI), HHN, Aerogen to intubated patients on mechanical ventilation. 6. Inhalation medication orders will be delivered and/or substituted as outlined below. Aerosolized Medications Ordering and Administration Guidelines:    1. All Medications will be ordered by a physician, and their frequency and/or modality will be adjusted as defined by the patients Respiratory Severity Index (RSI) score. 2. If the patient does not have documented COPD, consider discontinuing anticholinergics when RSI is less than 9.  3. If the bronchospasm worsens (increased RSI), then the bronchodilator frequency can be increased to a maximum of every 4 hours. If greater than every 4 hours is required, the physician will be contacted. 4. If the bronchospasm improves, the frequency of the bronchodilator can be decreased, based on the patient's RSI, but not less than home treatment regimen frequency. 5. Bronchodilator(s) will be discontinued if patient has a RSI less than 9 and has received no scheduled or as needed treatment for 72  Hrs. Patients Ordered on a Mucolytic Agent:    1. Must always be administered with a bronchodilator. 2. Discontinue if patient experiences worsened bronchospasm, or secretions have lessened to the point that the patient is able to clear them with a cough. Anti-inflammatory and Combination Medications:    1. If the patient lacks prior history of lung disease, is not using inhaled anti-inflammatory medication at home, and lacks wheezing by examination or by history for at least 24 hours, contact physician for possible discontinuation.

## 2020-12-30 LAB
ALBUMIN SERPL-MCNC: 3.1 G/DL (ref 3.4–5)
ANION GAP SERPL CALCULATED.3IONS-SCNC: 2 MMOL/L (ref 3–16)
BUN BLDV-MCNC: 24 MG/DL (ref 7–20)
CALCIUM SERPL-MCNC: 8.7 MG/DL (ref 8.3–10.6)
CHLORIDE BLD-SCNC: 95 MMOL/L (ref 99–110)
CO2: 37 MMOL/L (ref 21–32)
CREAT SERPL-MCNC: 0.6 MG/DL (ref 0.9–1.3)
EKG ATRIAL RATE: 103 BPM
EKG DIAGNOSIS: NORMAL
EKG P AXIS: 65 DEGREES
EKG P-R INTERVAL: 98 MS
EKG Q-T INTERVAL: 314 MS
EKG QRS DURATION: 82 MS
EKG QTC CALCULATION (BAZETT): 411 MS
EKG R AXIS: 84 DEGREES
EKG T AXIS: 61 DEGREES
EKG VENTRICULAR RATE: 103 BPM
GFR AFRICAN AMERICAN: >60
GFR NON-AFRICAN AMERICAN: >60
GLUCOSE BLD-MCNC: 133 MG/DL (ref 70–99)
GLUCOSE BLD-MCNC: 139 MG/DL (ref 70–99)
GLUCOSE BLD-MCNC: 142 MG/DL (ref 70–99)
GLUCOSE BLD-MCNC: 151 MG/DL (ref 70–99)
GLUCOSE BLD-MCNC: 185 MG/DL (ref 70–99)
GLUCOSE BLD-MCNC: 194 MG/DL (ref 70–99)
HCT VFR BLD CALC: 35.9 % (ref 40.5–52.5)
HEMOGLOBIN: 11.4 G/DL (ref 13.5–17.5)
MCH RBC QN AUTO: 27.8 PG (ref 26–34)
MCHC RBC AUTO-ENTMCNC: 31.8 G/DL (ref 31–36)
MCV RBC AUTO: 87.3 FL (ref 80–100)
PDW BLD-RTO: 17.1 % (ref 12.4–15.4)
PERFORMED ON: ABNORMAL
PHOSPHORUS: 1.9 MG/DL (ref 2.5–4.9)
PLATELET # BLD: 270 K/UL (ref 135–450)
PMV BLD AUTO: 7.6 FL (ref 5–10.5)
POTASSIUM SERPL-SCNC: 4.5 MMOL/L (ref 3.5–5.1)
RBC # BLD: 4.11 M/UL (ref 4.2–5.9)
SARS-COV-2, NAAT: NOT DETECTED
SODIUM BLD-SCNC: 134 MMOL/L (ref 136–145)
WBC # BLD: 11.9 K/UL (ref 4–11)

## 2020-12-30 PROCEDURE — 94761 N-INVAS EAR/PLS OXIMETRY MLT: CPT

## 2020-12-30 PROCEDURE — 93010 ELECTROCARDIOGRAM REPORT: CPT | Performed by: INTERNAL MEDICINE

## 2020-12-30 PROCEDURE — 99233 SBSQ HOSP IP/OBS HIGH 50: CPT | Performed by: INTERNAL MEDICINE

## 2020-12-30 PROCEDURE — 80069 RENAL FUNCTION PANEL: CPT

## 2020-12-30 PROCEDURE — 6360000002 HC RX W HCPCS: Performed by: NURSE PRACTITIONER

## 2020-12-30 PROCEDURE — 6370000000 HC RX 637 (ALT 250 FOR IP): Performed by: INTERNAL MEDICINE

## 2020-12-30 PROCEDURE — 2580000003 HC RX 258: Performed by: NURSE PRACTITIONER

## 2020-12-30 PROCEDURE — 93005 ELECTROCARDIOGRAM TRACING: CPT | Performed by: INTERNAL MEDICINE

## 2020-12-30 PROCEDURE — 99223 1ST HOSP IP/OBS HIGH 75: CPT | Performed by: INTERNAL MEDICINE

## 2020-12-30 PROCEDURE — 87040 BLOOD CULTURE FOR BACTERIA: CPT

## 2020-12-30 PROCEDURE — 6360000002 HC RX W HCPCS: Performed by: INTERNAL MEDICINE

## 2020-12-30 PROCEDURE — 94640 AIRWAY INHALATION TREATMENT: CPT

## 2020-12-30 PROCEDURE — 1200000000 HC SEMI PRIVATE

## 2020-12-30 PROCEDURE — U0002 COVID-19 LAB TEST NON-CDC: HCPCS

## 2020-12-30 PROCEDURE — 2700000000 HC OXYGEN THERAPY PER DAY

## 2020-12-30 PROCEDURE — 2500000003 HC RX 250 WO HCPCS: Performed by: INTERNAL MEDICINE

## 2020-12-30 PROCEDURE — 2580000003 HC RX 258: Performed by: INTERNAL MEDICINE

## 2020-12-30 PROCEDURE — 85027 COMPLETE CBC AUTOMATED: CPT

## 2020-12-30 PROCEDURE — 6370000000 HC RX 637 (ALT 250 FOR IP): Performed by: NURSE PRACTITIONER

## 2020-12-30 PROCEDURE — 99221 1ST HOSP IP/OBS SF/LOW 40: CPT | Performed by: INTERNAL MEDICINE

## 2020-12-30 RX ORDER — PREDNISONE 20 MG/1
40 TABLET ORAL DAILY
Status: DISCONTINUED | OUTPATIENT
Start: 2020-12-31 | End: 2021-01-01 | Stop reason: HOSPADM

## 2020-12-30 RX ADMIN — INSULIN LISPRO 1 UNITS: 100 INJECTION, SOLUTION INTRAVENOUS; SUBCUTANEOUS at 20:14

## 2020-12-30 RX ADMIN — INSULIN LISPRO 2 UNITS: 100 INJECTION, SOLUTION INTRAVENOUS; SUBCUTANEOUS at 08:24

## 2020-12-30 RX ADMIN — CEFAZOLIN 2 G: 10 INJECTION, POWDER, FOR SOLUTION INTRAVENOUS at 11:27

## 2020-12-30 RX ADMIN — METHYLPREDNISOLONE SODIUM SUCCINATE 40 MG: 40 INJECTION, POWDER, FOR SOLUTION INTRAMUSCULAR; INTRAVENOUS at 08:16

## 2020-12-30 RX ADMIN — ENOXAPARIN SODIUM 40 MG: 40 INJECTION SUBCUTANEOUS at 08:16

## 2020-12-30 RX ADMIN — CEFAZOLIN 2 G: 10 INJECTION, POWDER, FOR SOLUTION INTRAVENOUS at 18:59

## 2020-12-30 RX ADMIN — IPRATROPIUM BROMIDE AND ALBUTEROL SULFATE 1 AMPULE: .5; 3 SOLUTION RESPIRATORY (INHALATION) at 21:09

## 2020-12-30 RX ADMIN — MUPIROCIN: 20 OINTMENT TOPICAL at 08:17

## 2020-12-30 RX ADMIN — INSULIN LISPRO 2 UNITS: 100 INJECTION, SOLUTION INTRAVENOUS; SUBCUTANEOUS at 11:43

## 2020-12-30 RX ADMIN — METHYLPREDNISOLONE SODIUM SUCCINATE 40 MG: 40 INJECTION, POWDER, FOR SOLUTION INTRAMUSCULAR; INTRAVENOUS at 18:55

## 2020-12-30 RX ADMIN — CEFAZOLIN 2 G: 10 INJECTION, POWDER, FOR SOLUTION INTRAVENOUS at 02:21

## 2020-12-30 RX ADMIN — METOPROLOL TARTRATE 5 MG: 1 INJECTION, SOLUTION INTRAVENOUS at 08:16

## 2020-12-30 RX ADMIN — CHLORDIAZEPOXIDE HYDROCHLORIDE 5 MG: 5 CAPSULE ORAL at 20:14

## 2020-12-30 RX ADMIN — CLONIDINE HYDROCHLORIDE 0.1 MG: 0.1 TABLET ORAL at 11:27

## 2020-12-30 RX ADMIN — INSULIN LISPRO 1 UNITS: 100 INJECTION, SOLUTION INTRAVENOUS; SUBCUTANEOUS at 00:24

## 2020-12-30 RX ADMIN — TRAZODONE HYDROCHLORIDE 50 MG: 50 TABLET ORAL at 20:16

## 2020-12-30 RX ADMIN — CHLORDIAZEPOXIDE HYDROCHLORIDE 5 MG: 5 CAPSULE ORAL at 08:16

## 2020-12-30 RX ADMIN — CHLORDIAZEPOXIDE HYDROCHLORIDE 5 MG: 5 CAPSULE ORAL at 13:54

## 2020-12-30 RX ADMIN — SODIUM CHLORIDE, PRESERVATIVE FREE 10 ML: 5 INJECTION INTRAVENOUS at 08:17

## 2020-12-30 ASSESSMENT — PAIN SCALES - GENERAL
PAINLEVEL_OUTOF10: 0
PAINLEVEL_OUTOF10: 9

## 2020-12-30 NOTE — FLOWSHEET NOTE
12/30/20 0800   Vital Signs   Temp 97.9 °F (36.6 °C)   Temp Source Oral   Pulse 139   Heart Rate Source Monitor   Resp 24   /87   BP Location Left lower arm   Patient Position Sitting   Level of Consciousness Alert (0)   MEWS Score 5   Patient Currently in Pain Denies   BP Upper/Lower Lower   Pain Assessment   Pain Assessment 0-10   Pain Level 0   Oxygen Therapy   SpO2 96 %   O2 Device Nasal cannula   O2 Flow Rate (L/min) 2 L/min

## 2020-12-30 NOTE — PROGRESS NOTES
Comprehensive Nutrition Assessment    Type and Reason for Visit:  Reassess    Nutrition Recommendations/Plan:   Continue general diet + ONS with meals   Monitor nutrition adequacy, pertinent labs, bowel habits, wt changes, and clinical progress    Nutrition Assessment:  Pt is improving nutritionally AEB tolerating diet and appetite improving. Consuming most of his meals now. Malnutrition Assessment:  Malnutrition Status: At risk for malnutrition (Comment)    Context:  Acute Illness       Estimated Daily Nutrient Needs:  Energy (kcal):  6236-2190 kcal; Weight Used for Energy Requirements:  Ideal(48 kg)     Protein (g):  57-72 g; Weight Used for Protein Requirements:  Ideal(1.2-1.5 g/kg)        Fluid (ml/day):   ; Method Used for Fluid Requirements:  1 ml/kcal      Nutrition Related Findings:  BM 12/29.  PO intakes %      Wounds:  None       Current Nutrition Therapies:    Dietary Nutrition Supplements: Standard High Calorie Oral Supplement, Diabetic Oral Supplement  DIET GENERAL; Carb Control: 4 carb choices (60 gms)/meal    Anthropometric Measures:  · Height: 5' (152.4 cm)  · Current Body Weight: 90 lb (40.8 kg)   · Usual Body Weight: 102 lb (46.3 kg)(standing scale 12/14)     · Ideal Body Weight: 106 lbs; % Ideal Body Weight 87.7 %   · BMI: 17.6  · Adjusted Body Weight:  ; No Adjustment   · BMI Categories: Underweight (BMI less than 18.5)       Nutrition Diagnosis:   · Inadequate oral intake related to inadequate protein-energy intake as evidenced by weight loss greater than or equal to 5% in 1 month      Nutrition Interventions:   Food and/or Nutrient Delivery:  Continue Current Diet, Continue Oral Nutrition Supplement  Nutrition Education/Counseling:  No recommendation at this time   Coordination of Nutrition Care:  Continue to monitor while inpatient    Goals:  Pt will tolerate and consume greater than 50% of meals and ONS this admission       Nutrition Monitoring and Evaluation: Food/Nutrient Intake Outcomes:  Food and Nutrient Intake, Supplement Intake  Physical Signs/Symptoms Outcomes:  Chewing or Swallowing, Nutrition Focused Physical Findings, Weight     Discharge Planning:    Continue current diet, Continue Oral Nutrition Supplement     Electronically signed by Colby Farooq.  Flavio Astudillo RD, LD on 12/30/20 at 11:37 AM EST    Contact: [phone]

## 2020-12-30 NOTE — CONSULTS
Infectious Diseases Inpatient Consult Note    Reason for Consult:   S aureus bacteremia  Requesting Physician:   Dr Russell King  Primary Care Physician:  No primary care provider on file. History Obtained From:   Pt, EPIC    Admit Date: 12/25/2020  Hospital Day: 6    CHIEF COMPLAINT:       Chief Complaint   Patient presents with    Shortness of Breath     Pt was 80% O2 at home per EMS, pt placed on CPAP en route and duoneb treatment. Pt last used heroine 2 days ago.  COPD       HISTORY OF PRESENT ILLNESS:      47 yo man with hx COPD / asthma, PE, HCV, brain injury / surg, chronic pain (BIANCHI, L LE)  Dx COVID-19 - 12/5, admit / vent   Polysubstance abuse including iv heroine, methaphetamine, drug of choice heroine  Previous admission with resp sx / COPD exacerbation / pneumonia (last admission 12/14-17)    Presents with SOB worsening over 2 days, assoc chest tightiness. In ED 12/25 - WBC 22.7. Urine tox + amphetamines  COVID-19 neg  Admit ICU, started on steroids, vancomycin, cefepime  Changed to ancef 12/28    Today, afebrile, WBC 11.9.   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    PE (pulmonary thromboembolism) ([address]     Pneumonia        Past Surgical History:    Past Surgical History:   Procedure Laterality Date    BRAIN SURGERY  1986    s/p trauma    BRAIN SURGERY      FRACTURE SURGERY      LEG SURGERY         Current Medications:     [START ON 12/31/2020] predniSONE  40 mg Oral Daily    insulin lispro  0-12 Units Subcutaneous TID WC    insulin lispro  0-6 Units Subcutaneous Nightly    mupirocin   Nasal BID    ceFAZolin  2 g Intravenous Q8H  methylPREDNISolone  40 mg Intravenous Q12H    chlordiazePOXIDE  5 mg Oral TID    sodium chloride flush  10 mL Intravenous 2 times per day    enoxaparin  40 mg Subcutaneous Daily       Allergies:  Patient has no known allergies. Social History:    TOBACCO:    + cig - 2 ppd per EPIC  ETOH:    None   DRUGS:   Polysubstance use, hx IVDU  MARITAL STATUS:   single  OCCUPATION:       Patient lives     Family History:   No immunodeficiency    REVIEW OF SYSTEMS:    No fever / chills / sweats. No weight loss. No visual change, eye pain, eye discharge. No oral lesion, sore throat, dysphagia. Denies cough / sputum. Denies chest pain, palpitations. Denies n / v / abd pain. No diarrhea. Denies dysuria or change in urinary function. Denies joint swelling or pain. .  Denies skin changes, itching  Denies focal weakness, sensory change or other neurologic symptom    Denies new / worse depression, psychiatric symptoms    PHYSICAL EXAM:      Vitals:    /87   Pulse 139   Temp 97.9 °F (36.6 °C) (Oral)   Resp 24   Ht 5' (1.524 m)   Wt 90 lb (40.8 kg)   SpO2 96%   BMI 17.58 kg/m²     GENERAL: No apparent distress.   2L  HEENT: Membranes moist, no oral lesion, PERRL  NECK:  Supple, no lymphadenopathy  LUNGS: Clear b/l, no rales, no dullness  CARDIAC: RRR, no murmur appreciated  ABD:  + BS, soft / NT  EXT:  Extensive tattoos, RUE smaller than L, L LE scar; no wounds / no skin infection  NEURO: No focal neurologic findings  PSYCH: Orientation, sensorium, mood normal  LINES:  R SC line     DATA:    Lab Results   Component Value Date    WBC 11.9 (H) 12/30/2020    HGB 11.4 (L) 12/30/2020    HCT 35.9 (L) 12/30/2020    MCV 87.3 12/30/2020     12/30/2020     Lab Results   Component Value Date    CREATININE 0.6 (L) 12/30/2020    BUN 24 (H) 12/30/2020     (L) 12/30/2020    K 4.5 12/30/2020    CL 95 (L) 12/30/2020    CO2 37 (H) 12/30/2020       Hepatic Function Panel:   Lab Results   Component Value Date ALKPHOS 92 2020    ALT 31 2020    AST 28 2020    PROT 7.5 2020    BILITOT 1.0 2020    BILIDIR <0.2 12/15/2020    IBILI see below 12/15/2020    LABALBU 3.1 2020       Micro:   BC x 2 MSSA  Antibiotic Interpretation AJAY Status    clindamycin Sensitive <=0.25 mcg/mL     erythromycin Resistant >=8 mcg/mL     oxacillin Sensitive 0.5 mcg/mL     tetracycline Sensitive <=1 mcg/mL     trimethoprim-sulfamethoxazole Sensitive <=10 mcg/mL         Imagin/25 Head CT - no acute abnormality   CXR - no consolidation   Chest CT / CTA - no acute PE, lingula consolidation   Abd / pelvic CT - no acute changes    IMPRESSION:      Patient Active Problem List   Diagnosis    MRSA bacteremia    Pleural effusion, left    Leukocytosis    Illicit drug use    Hepatitis    Chest pain    Pulmonary embolism (HCC)    AMS (altered mental status)    Acute respiratory failure with hypoxia (HCC)    Drug overdose    Abnormal chest x-ray    Acute encephalopathy    Disorder of electrolytes    Non-traumatic rhabdomyolysis    Elevated procalcitonin    Methamphetamine abuse (HCC)    Toxic encephalopathy    Acute hypercapnic respiratory failure (HCC)    Heroin withdrawal (HCC)    Tachycardia    Hypoxia    Acute hypoxemic respiratory failure due to COVID-19 [facility])    Acute respiratory failure with hypoxia and hypercapnia (HCC)    Pneumonia due to COVID-19 virus    Thrombocytopenia (HCC)    Elevated LFTs    COVID-19    Acute metabolic encephalopathy    Moderate protein-calorie malnutrition (HCC)    Pneumonia due to organism    Sepsis ([facility] [address]    Pulmonary infiltrate    Bacteremia due to Staphylococcus aureus    Delirium    Hyperglycemia       Hx COPD / asthma, PE, HCV, brain injury / surg  Dx COVID-19 - , resp failure / on vent   Polysubstance abuse including iv heroine, methaphetamine Previous admission with resp sx / COPD exacerbation / pneumonia (last admission 12/14-17)    Admit 12/25 -  SOB / hypoxia  MSSA bacteremia secondary to IVDU (injects in R leg per pt)  Leukocytosis     RECOMMENDATIONS:    Cont ancef  Obtain f/u BC  Obtain Echo (TTE / DIVYA)    Will need prolonged course of iv antibiotic therapy (>4 weeks) in supervised setting   Drug abstinence discussed     COPD / DM / pain per Pul - Hospitalist    Discussed with pt, FAISAL Chester MD

## 2020-12-30 NOTE — PROGRESS NOTES
Patient transferred to room 370 from . Tolerated transport well. Oriented to unit, room, and call light system. Continue to monitor.   Aidan Miller RN

## 2020-12-30 NOTE — PROGRESS NOTES
Occupational Therapy  Attempted OT eval. RN recommended to wait as pt just transferred to Rm 370. OT will try later as schedule permits. Second attempt at 15:38: Pt had just received meal tray. Pt assisted with opening items. OT will eval pt 12/31.   Yvonne Merchant OT

## 2020-12-30 NOTE — PROGRESS NOTES
Hospitalist Progress Note      PCP: No primary care provider on file. Date of Admission: 2020    Chief Complaint: SOB    Subjective: no new c/o. Medications:  Reviewed    Infusion Medications    dextrose       Scheduled Medications    [START ON 2020] predniSONE  40 mg Oral Daily    insulin lispro  0-12 Units Subcutaneous TID WC    insulin lispro  0-6 Units Subcutaneous Nightly    mupirocin   Nasal BID    ceFAZolin  2 g Intravenous Q8H    methylPREDNISolone  40 mg Intravenous Q12H    chlordiazePOXIDE  5 mg Oral TID    sodium chloride flush  10 mL Intravenous 2 times per day    enoxaparin  40 mg Subcutaneous Daily     PRN Meds: ipratropium-albuterol, glucose, dextrose, glucagon (rDNA), dextrose, metoprolol, LORazepam, sodium chloride flush, acetaminophen **OR** acetaminophen, traZODone, [] buprenorphine **AND** cloNIDine, hydrOXYzine, dicyclomine, perflutren lipid microspheres      Intake/Output Summary (Last 24 hours) at 2020 1001  Last data filed at 2020 0945  Gross per 24 hour   Intake 1687.33 ml   Output 1150 ml   Net 537.33 ml       Physical Exam Performed:    /87   Pulse 139   Temp 97.9 °F (36.6 °C) (Oral)   Resp 24   Ht 5' (1.524 m)   Wt 90 lb (40.8 kg)   SpO2 96%   BMI 17.58 kg/m²     General appearance: No apparent distress, appears stated age and cooperative. HEENT: Pupils equal, round, and reactive to light. Conjunctivae/corneas clear. Neck: Supple, with full range of motion. No jugular venous distention. Trachea midline. Respiratory:  Normal respiratory effort. Clear to auscultation, bilaterally without Rales/Wheezes/Rhonchi. Cardiovascular: Regular rate and rhythm with normal S1/S2 without murmurs, rubs or gallops. Abdomen: Soft, non-tender, non-distended with normal bowel sounds. Musculoskeletal: No clubbing, cyanosis or edema bilaterally. Full range of motion without deformity. Skin: Skin color, texture, turgor normal.  No rashes or lesions. Neurologic:  Neurovascularly intact without any focal sensory/motor deficits. Cranial nerves: II-XII intact, grossly non-focal.  Psychiatric: Alert and oriented, thought content appropriate, normal insight  Capillary Refill: Brisk,< 3 seconds   Peripheral Pulses: +2 palpable, equal bilaterally       Labs:   Recent Labs     12/28/20  0034 12/29/20  0400 12/30/20  0430   WBC 9.1 11.0 11.9*   HGB 11.1* 11.8* 11.4*   HCT 33.8* 37.5* 35.9*    235 270     Recent Labs     12/28/20  0034 12/29/20  0400 12/30/20  0430    143 134*   K 4.1 4.0 4.5    103 95*   CO2 33* 32 37*   BUN 21* 20 24*   CREATININE <0.5* 0.7* 0.6*   CALCIUM 8.9 9.2 8.7   PHOS  --  2.2* 1.9*     No results for input(s): AST, ALT, BILIDIR, BILITOT, ALKPHOS in the last 72 hours. No results for input(s): INR in the last 72 hours. No results for input(s): Serena Gaster in the last 72 hours.     Urinalysis:      Lab Results   Component Value Date    NITRU Negative 12/25/2020    WBCUA 3-5 12/25/2020    BACTERIA 2+ 07/03/2020    RBCUA 3-4 12/25/2020    BLOODU SMALL 12/25/2020    SPECGRAV 1.020 12/25/2020    GLUCOSEU 500 12/25/2020       Consults:    IP CONSULT TO HOSPITALIST  IP CONSULT TO CASE MANAGEMENT  IP CONSULT TO PHARMACY  IP CONSULT TO DIETITIAN  IP CONSULT TO PULMONOLOGY  IP CONSULT TO PULMONOLOGY  IP CONSULT TO INFECTIOUS DISEASES      Assessment/Plan:    Active Hospital Problems    Diagnosis    Hyperglycemia [R73.9]    Pulmonary infiltrate [R91.8]    Bacteremia due to Staphylococcus aureus [R78.81, B95.61]    Delirium [R41.0]    Sepsis ([facility] [A41.9]    Pneumonia due to organism [J18.9]    Toxic encephalopathy [G92]    Acute respiratory failure with hypoxia ([facility] [J96.01]    Methamphetamine abuse ([facility] [F15.10]       Sepsis in patient with HAP, , lactic 2.6 and WBC 22.7 on admission  -tylenol given in ED  -30 ml/kg IVF given in ED -cefepime and vanc given in ED and continued 12/25/2020  -MIVF  -tele monitoring  -repeat lactic     Bacteremia- with staph in bcx x 2  -ID consulted for further thoughts/recs     Acute respiratory failure with hypoxia in setting of HAP  -CXR revealed: mild blunting of left costophrenic angle, possibly trace left pleural fluid. Moderate to severe emphysema  -CT chest pulmonary embolism was obtained that revealed no evidence of acute pulmonary embolism.  Airspace consolidation in the lingula compatible with pneumonia. -rapid COVID negative on admission  -positive COVID on 12/5/2020  -decadron given in ED  -solumedrol every 6 hours  -continuous pulse ox  -encourage coughing and deep breathing.  -antibiotics as indicated above   -pulm asked to weigh in     IVDU  -drug screen positive for amphetamines  -cessation discussed  -case management consult for drug program after discharge  -echo in am   -started COWS protocol with subutex/clonidine on 12/26   -started precedex ggt due to w/d symptoms, transferred to ICU on 12/26       DVT Prophylaxis: [address]  Diet: Dietary Nutrition Supplements: Standard High Calorie Oral Supplement, Diabetic Oral Supplement  DIET GENERAL; Carb Control: 4 carb choices (60 gms)/meal  Code Status: Full Code      PT/OT Eval Status: not yet ordered. Dispo - OK to transfer to floor.      Rosanna Bishop MD

## 2020-12-30 NOTE — PROGRESS NOTES
4 Eyes Skin Assessment     The patient is being assess for   Admission    I agree that 2 RN's have performed a thorough Head to Toe Skin Assessment on the patient. ALL assessment sites listed below have been assessed. Areas assessed by both nurses:   [x]   Head, Face, and Ears   [x]   Shoulders, Back, and Chest, Abdomen  [x]   Arms, Elbows, and Hands   [x]   Coccyx, Sacrum, and Ischium  [x]   Legs, Feet, and Heels          **SHARE this note so that the co-signing nurse is able to place an eSignature**    Co-signer eSignature: Electronically signed by Jordan Keita RN on 12/30/20 at 2:15 PM EST    Does the Patient have Skin Breakdown?   No          Candido Prevention initiated:  No   Wound Care Orders initiated:  No      [facility] nurse consulted for Pressure Injury (Stage 3,4, Unstageable, DTI, NWPT, Complex wounds)and New or Established Ostomies:  No      Primary Nurse eSignature: Electronically signed by Jordan Keita RN on 12/30/20 at 2:15 PM EST

## 2020-12-30 NOTE — CONSULTS
[address] [facility]   Cardiovascular Evaluation    PATIENT: Naty Valdez  DATE: 2020  MRN: [id number]  CSN: [id number]  : 1970    Primary Care Doctor/Referring provider: No primary care provider on file. , Howard Deleon MD     Reason for evaluation/Chief complaint:   Shortness of Breath (Pt was 80% O2 at home per EMS, pt placed on CPAP en route and duoneb treatment. Pt last used heroine 2 days ago.) and COPD      Subjective:    History of present illness on initial date of evaluation:   Naty Valdez is a 48 y.o. patient who presented to the hospital with complaints of SOB. He was subsequently was found to have sepsis and admitted to the ICU. The patient is not able to give detailed information about the events of hospitalization due to their underlying medical illness. The majority of the information is taken from the chart, medical staff, and available family. Cardiology has been consulted to assess the patient for possible infective endocarditis. Patient has a history of IV drug use. He has been on IV antibiotic therapy for the past several days while in the hospital here. Patient was seen by infectious disease for consultation. There is been a recommendation to consider transesophageal echocardiogram.  Patient is limited in his medical insight. Patient states that he fell down approximately 3 years ago and is ever since been struggling with shortness of breath. Patient denies any fever at this time. He does state that he has a chronic chest tightness and shortness of breath that is unchanged currently.       Patient Active Problem List   Diagnosis    MRSA bacteremia    Pleural effusion, left    Leukocytosis    Illicit drug use    Hepatitis    Chest pain    Pulmonary embolism (HCC)    AMS (altered mental status)    Acute respiratory failure with hypoxia (HCC)    Drug overdose    Abnormal chest x-ray    Acute encephalopathy  Disorder of electrolytes    Non-traumatic rhabdomyolysis    Elevated procalcitonin    Methamphetamine abuse (HCC)    Toxic encephalopathy    Acute hypercapnic respiratory failure (HCC)    Heroin withdrawal (HCC)    Tachycardia    Hypoxia    Acute hypoxemic respiratory failure due to COVID-19 [facility])    Acute respiratory failure with hypoxia and hypercapnia (HCC)    Pneumonia due to COVID-19 virus    Thrombocytopenia (HCC)    Elevated LFTs    COVID-19    Acute metabolic encephalopathy    Moderate protein-calorie malnutrition (HCC)    Pneumonia due to organism    Sepsis ([facility]    Pulmonary infiltrate    Bacteremia due to Staphylococcus aureus    Delirium    Hyperglycemia         Cardiac Testing: I have reviewed the findings below. EKG:  ECHO:   STRESS TEST:  CATH:  BYPASS:  VASCULAR:    Past Medical History:   has a past medical history of COVID-19, H/O brain surgery, Hepatitis C antibody test positive, History of surgery, Methamphetamine abuse ([facility] [address] MRSA (methicillin resistant staph aureus) culture positive, Paralysis of upper limb ([facility] PE (pulmonary thromboembolism) ([facility] [address] and Pneumonia. Surgical History:   has a past surgical history that includes brain surgery (1986); fracture surgery; brain surgery; and Leg Surgery. Social History:   reports that he has quit smoking. His smoking use included cigarettes. He smoked 2.00 packs per day. He has never used smokeless tobacco. He reports previous drug use. Drug: IV. He reports that he does not drink alcohol. Family History:  No evidence for sudden cardiac death or premature CAD    Medications:  Reviewed and are listed in nursing record. and/or listed below  Outpatient Medications:  Prior to Admission medications    Medication Sig Start Date End Date Taking?  Authorizing Provider albuterol sulfate  (90 Base) MCG/ACT inhaler Inhale 2 puffs into the lungs every 4 hours as needed for Wheezing or Shortness of Breath 11/21/20 11/28/20  Broadus Bumpers, MD       In-patient schedule medications:  Ilene Singleton ON 12/31/2020] predniSONE  40 mg Oral Daily    insulin lispro  0-12 Units Subcutaneous TID WC    insulin lispro  0-6 Units Subcutaneous Nightly    mupirocin   Nasal BID    ceFAZolin  2 g Intravenous Q8H    methylPREDNISolone  40 mg Intravenous Q12H    chlordiazePOXIDE  5 mg Oral TID    sodium chloride flush  10 mL Intravenous 2 times per day    enoxaparin  40 mg Subcutaneous Daily         Infusion Medications:   dextrose           Allergies:  Patient has no known allergies. Review of Systems:   14 point review of systems unable to be completed due to patient condition/cooperation. All available positives mentioned in history of present illness.          Physical Examination:    [unfilled]  BP (!) 141/84   Pulse 128   Temp 97.9 °F (36.6 °C) (Oral)   Resp 24   Ht 5' (1.524 m)   Wt 90 lb (40.8 kg)   SpO2 98%   BMI 17.58 kg/m²    Weight: 90 lb (40.8 kg)     Wt Readings from Last 3 Encounters:   12/30/20 90 lb (40.8 kg)   12/17/20 102 lb 6.4 oz (46.4 kg)   12/11/20 87 lb 1.3 oz (39.5 kg)       Intake/Output Summary (Last 24 hours) at 12/30/2020 1452  Last data filed at 12/30/2020 0945  Gross per 24 hour   Intake 720 ml   Output 1150 ml   Net -430 ml       General Appearance:  Alert x 2, cooperative, mild distress, appears older than stated age   Head:  Normocephalic, without obvious abnormality, atraumatic   Eyes:  PERRL, conjunctiva/corneas clear       Nose: Nares normal, no drainage or sinus tenderness   Throat: Lips, mucosa, and tongue normal   Neck: Supple, symmetrical, trachea midline, no adenopathy, thyroid: not enlarged, symmetric, no tenderness/mass/nodules, no carotid bruit or JVD       Lungs:   Reduced to auscultation bilaterally, respirations mildly labored Chest Wall:  No tenderness or deformity   Heart:  Regular rhythm and normal rate; S1, S2 are normal; no murmur noted; no rub or gallop   Abdomen:   Soft, non-tender, bowel sounds active all four quadrants,  no masses, no organomegaly           Extremities: Extremities normal, atraumatic, no cyanosis or edema   Pulses: 2+ and symmetric   Skin: Skin color, texture, turgor normal, no rashes or lesions   Pysch: Distant mood and affect   Neurologic: Normal gross motor and sensory exam.  Slow overall mental cognition. Labs  Recent Labs     12/29/20  0400 12/30/20  0430   WBC 11.0 11.9*   HGB 11.8* 11.4*   HCT 37.5* 35.9*   MCV 87.6 87.3    270     Recent Labs     12/29/20  0400 12/30/20  0430   CREATININE 0.7* 0.6*   BUN 20 24*    134*   K 4.0 4.5    95*   CO2 32 37*     No results for input(s): INR, PROTIME in the last 72 hours. No results for input(s): TROPONINI in the last 72 hours. Invalid input(s): PRO-BNP  No results for input(s): CHOL, HDL in the last 72 hours. Invalid input(s): LDL, TG      Imaging:  I have reviewed the below testing personally and my interpretation is below. EKG: None available during current index hospitalization. CXR:      Assessment:  48 y.o. patient with:  1. Bacteremia  2. Sepsis  3. History of IVDA    Plan:  1. Recommend that the patient undergo standard transthoracic echocardiogram.  2. We will keep patient n.p.o. for possible needs of transesophageal echocardiogram.  3. Repeat EKG would also be helpful. All questions and concerns were addressed to the patient/family. Alternatives to my treatment were discussed. The note was completed using EMR. Every effort was made to ensure accuracy; however, inadvertent computerized transcription errors may be present.     Gala Quevedo MD, [id number], [facility]  [phone] [place] office  [phone] [facility]  12/30/2020  2:52 PM

## 2020-12-30 NOTE — PROGRESS NOTES
INPATIENT PULMONARY CRITICAL CARE PROGRESS NOTE      Reason for visit    Acute encephalopathy    SUBJECTIVE: Patient when seen this morning continues to be on IV Precedex infusion to prevent delirium and agitation which was increased overnight and nursing,, patient was somewhat lethargic when seen, patient was afebrile and hemodynamically borderline, patient had sinus tachycardia on the monitor, patient was on 2 L of nasal cannula oxygen when seen, patient continues to have some audible wheezing, patient has had adequate urine output with cumulative fluid balance of + 3.3 L, patient is tolerating oral diet ;patient's blood sugars are significantly elevated; no other pertinent review of system could be obtained          Physical Exam:  Blood pressure 121/77, pulse 134, temperature 97 °F (36.1 °C), temperature source Axillary, resp. rate 26, height 5' (1.524 m), weight 91 lb 7.9 oz (41.5 kg), SpO2 98 %.'   Constitutional:  No acute distress. cachexia  HENT:  Oropharynx is clear and moist. No thyromegaly. Eyes:  Conjunctivae are normal. Pupils equal, round, and reactive to light. No scleral icterus. Neck: . No tracheal deviation present. No obvious thyroid mass. Cardiovascular: Normal rate, regular rhythm, normal heart sounds. No right ventricular heave. No lower extremity edema. Pulmonary/Chest: Bilateral wheezes. Scattered rales. Chest wall is not dull to percussion. No accessory muscle usage or stridor. Decreased breath sound density  Abdominal: Soft. Bowel sounds present. No distension or hernia. No tenderness. Musculoskeletal: No cyanosis. No clubbing. No obvious joint deformity. Lymphadenopathy: No cervical or supraclavicular adenopathy. Skin: Skin is warm and dry. No rash or nodules on the exposed extremities.   Neurologic: lethargic  when seen         Results:  CBC:   Recent Labs     12/28/20  0034 12/29/20  0400   WBC 9.1 11.0   HGB 11.1* 11.8*   HCT 33.8* 37.5*   MCV 85.1 87.6    235 BMP:   Recent Labs     12/28/20  0034 12/29/20  0400    143   K 4.1 4.0    103   CO2 33* 32   PHOS  --  2.2*   BUN 21* 20   CREATININE <0.5* 0.7*       Imaging:  I have reviewed radiology images personally. XR CHEST PORTABLE   Final Result   No pneumothorax status post right central venous line placement         CT CHEST PULMONARY EMBOLISM W CONTRAST   Final Result   1. No evidence of acute pulmonary embolism   2. Airspace consolidation in the lingula compatible with pneumonia         CT ABDOMEN PELVIS W IV CONTRAST Additional Contrast? None   Final Result   1. Dilated stomach that is gas and fluid filled. Some distention of the   proximal jejunum. No point of obstruction identified. 2. The appendix is not visualized   3. No gallstones   4. No obstructive uropathy         XR CHEST PORTABLE   Final Result   Mild blunting of the left costophrenic angle, possibly trace left pleural   fluid. No other change from the prior study taking into account varying   radiographic technique. Moderate to severe emphysema. Results for Chi Juarez (MRN [id number]) as of 12/29/2020 22:55   Ref.  Range 12/28/2020 00:34 12/29/2020 04:00   Sodium Latest Ref Range: 136 - 145 mmol/L 138 143   Potassium Latest Ref Range: 3.5 - 5.1 mmol/L 4.1 4.0   Chloride Latest Ref Range: 99 - 110 mmol/L 102 103   CO2 Latest Ref Range: 21 - 32 mmol/L 33 (H) 32   BUN Latest Ref Range: 7 - 20 mg/dL 21 (H) 20   Creatinine Latest Ref Range: 0.9 - 1.3 mg/dL <0.5 (L) 0.7 (L)   Anion Gap Latest Ref Range: 3 - 16  3 8   GFR Non- Latest Ref Range: >60  >60 >60   GFR  Latest Ref Range: >60  >60 >60   Magnesium Latest Ref Range: 1.80 - 2.40 mg/dL  2.30   Glucose Latest Ref Range: 70 - 99 mg/dL 176 (H) 352 (H)   Calcium Latest Ref Range: 8.3 - 10.6 mg/dL 8.9 9.2   Phosphorus Latest Ref Range: 2.5 - 4.9 mg/dL  2.2 (L)   Albumin Latest Ref Range: 3.4 - 5.0 g/dL  3.3 (L) WBC Latest Ref Range: 4.0 - 11.0 K/uL 9.1 11.0   RBC Latest Ref Range: 4.20 - 5.90 M/uL 3.97 (L) 4.28   Hemoglobin Quant Latest Ref Range: 13.5 - 17.5 g/dL 11.1 (L) 11.8 (L)   Hematocrit Latest Ref Range: 40.5 - 52.5 % 33.8 (L) 37.5 (L)   MCV Latest Ref Range: 80.0 - 100.0 fL 85.1 87.6   MCH Latest Ref Range: 26.0 - 34.0 pg 27.9 27.6   MCHC Latest Ref Range: 31.0 - 36.0 g/dL 32.8 31.5   MPV Latest Ref Range: 5.0 - 10.5 fL 7.4 7.4   RDW Latest Ref Range: 12.4 - 15.4 % 17.4 (H) 18.1 (H)   Platelet Count Latest Ref Range: 135 - 450 K/uL 181 235         Assessment:  Principal Problem:    Pneumonia due to organism  Active Problems:    Acute respiratory failure with hypoxia ([facility])    Methamphetamine abuse ([facility])    Toxic encephalopathy    Sepsis ([facility] [address]    Pulmonary infiltrate    Bacteremia due to Staphylococcus aureus    Delirium    Hyperglycemia  Resolved Problems:    * No resolved hospital problems. *          Plan:   · Oxygen supplementation to keep saturation between 90 to 94%  · Please titrate down the oxygen as per the above parameters  · Pulmonary toilet  · Patient is on IV Precedex infusion patient is to be titrated down depending on patient's clinical status-nursing was requested tapered DC  · P.o.  Librium started-may need to titrate if patient has increased agitation on Precedex  · Patient may require COWS protocol  · Will discontinue Neurontin/Seroquel/Zyprexa for now and reassess  · Bronchodilators  · Patient's IV antibiotics were changed to Ancef  · Titration of antibiotics as per clinical status and cultures  · Patient's IV Solu-Medrol was changed to every 12 hours which needs to be continued at this time as patient is continued to be wheezy  · Diet changed  · Blood glucose monitoring with sliding scale insulin for now  · Neurochecks  · PUD prophylaxis    Case discussed with ICU team          Electronically signed by:  Sheyla Mae MD    12/29/2020    10:59 PM.

## 2020-12-31 ENCOUNTER — ANESTHESIA (OUTPATIENT)
Dept: CARDIAC CATH/INVASIVE PROCEDURES | Age: 50
DRG: 871 | End: 2020-12-31
Payer: MEDICARE

## 2020-12-31 ENCOUNTER — ANESTHESIA (OUTPATIENT)
Dept: OPERATING ROOM | Age: 50
DRG: 871 | End: 2020-12-31
Payer: MEDICARE

## 2020-12-31 ENCOUNTER — ANESTHESIA EVENT (OUTPATIENT)
Dept: CARDIAC CATH/INVASIVE PROCEDURES | Age: 50
DRG: 871 | End: 2020-12-31
Payer: MEDICARE

## 2020-12-31 ENCOUNTER — ANESTHESIA EVENT (OUTPATIENT)
Dept: OPERATING ROOM | Age: 50
DRG: 871 | End: 2020-12-31
Payer: MEDICARE

## 2020-12-31 VITALS — SYSTOLIC BLOOD PRESSURE: 101 MMHG | DIASTOLIC BLOOD PRESSURE: 71 MMHG

## 2020-12-31 PROBLEM — I07.1 TRICUSPID REGURGITATION: Status: ACTIVE | Noted: 2020-12-31

## 2020-12-31 PROBLEM — Q21.12 PFO (PATENT FORAMEN OVALE): Status: ACTIVE | Noted: 2020-12-31

## 2020-12-31 LAB
ALBUMIN SERPL-MCNC: 3.1 G/DL (ref 3.4–5)
ANION GAP SERPL CALCULATED.3IONS-SCNC: 5 MMOL/L (ref 3–16)
BUN BLDV-MCNC: 27 MG/DL (ref 7–20)
CALCIUM SERPL-MCNC: 8.6 MG/DL (ref 8.3–10.6)
CHLORIDE BLD-SCNC: 96 MMOL/L (ref 99–110)
CO2: 35 MMOL/L (ref 21–32)
CREAT SERPL-MCNC: 0.6 MG/DL (ref 0.9–1.3)
GFR AFRICAN AMERICAN: >60
GFR NON-AFRICAN AMERICAN: >60
GLUCOSE BLD-MCNC: 122 MG/DL (ref 70–99)
GLUCOSE BLD-MCNC: 130 MG/DL (ref 70–99)
GLUCOSE BLD-MCNC: 171 MG/DL (ref 70–99)
GLUCOSE BLD-MCNC: 173 MG/DL (ref 70–99)
GLUCOSE BLD-MCNC: 93 MG/DL (ref 70–99)
HCT VFR BLD CALC: 35.8 % (ref 40.5–52.5)
HEMOGLOBIN: 11.3 G/DL (ref 13.5–17.5)
LV EF: 55 %
LV EF: 55 %
LVEF MODALITY: NORMAL
LVEF MODALITY: NORMAL
MCH RBC QN AUTO: 27.2 PG (ref 26–34)
MCHC RBC AUTO-ENTMCNC: 31.7 G/DL (ref 31–36)
MCV RBC AUTO: 85.9 FL (ref 80–100)
PDW BLD-RTO: 16.9 % (ref 12.4–15.4)
PERFORMED ON: ABNORMAL
PERFORMED ON: NORMAL
PHOSPHORUS: 3.5 MG/DL (ref 2.5–4.9)
PLATELET # BLD: 316 K/UL (ref 135–450)
PMV BLD AUTO: 7 FL (ref 5–10.5)
POTASSIUM SERPL-SCNC: 4.5 MMOL/L (ref 3.5–5.1)
RBC # BLD: 4.17 M/UL (ref 4.2–5.9)
SODIUM BLD-SCNC: 136 MMOL/L (ref 136–145)
WBC # BLD: 11.4 K/UL (ref 4–11)

## 2020-12-31 PROCEDURE — 97530 THERAPEUTIC ACTIVITIES: CPT

## 2020-12-31 PROCEDURE — 2580000003 HC RX 258: Performed by: INTERNAL MEDICINE

## 2020-12-31 PROCEDURE — 93312 ECHO TRANSESOPHAGEAL: CPT | Performed by: INTERNAL MEDICINE

## 2020-12-31 PROCEDURE — 97162 PT EVAL MOD COMPLEX 30 MIN: CPT

## 2020-12-31 PROCEDURE — 93325 DOPPLER ECHO COLOR FLOW MAPG: CPT

## 2020-12-31 PROCEDURE — 99232 SBSQ HOSP IP/OBS MODERATE 35: CPT | Performed by: INTERNAL MEDICINE

## 2020-12-31 PROCEDURE — 6370000000 HC RX 637 (ALT 250 FOR IP): Performed by: NURSE PRACTITIONER

## 2020-12-31 PROCEDURE — 93312 ECHO TRANSESOPHAGEAL: CPT

## 2020-12-31 PROCEDURE — 80069 RENAL FUNCTION PANEL: CPT

## 2020-12-31 PROCEDURE — 2580000003 HC RX 258: Performed by: NURSE PRACTITIONER

## 2020-12-31 PROCEDURE — 93325 DOPPLER ECHO COLOR FLOW MAPG: CPT | Performed by: INTERNAL MEDICINE

## 2020-12-31 PROCEDURE — 85027 COMPLETE CBC AUTOMATED: CPT

## 2020-12-31 PROCEDURE — 6370000000 HC RX 637 (ALT 250 FOR IP): Performed by: INTERNAL MEDICINE

## 2020-12-31 PROCEDURE — 6360000002 HC RX W HCPCS: Performed by: INTERNAL MEDICINE

## 2020-12-31 PROCEDURE — 6360000002 HC RX W HCPCS: Performed by: NURSE ANESTHETIST, CERTIFIED REGISTERED

## 2020-12-31 PROCEDURE — 94761 N-INVAS EAR/PLS OXIMETRY MLT: CPT

## 2020-12-31 PROCEDURE — 99233 SBSQ HOSP IP/OBS HIGH 50: CPT | Performed by: INTERNAL MEDICINE

## 2020-12-31 PROCEDURE — 97535 SELF CARE MNGMENT TRAINING: CPT

## 2020-12-31 PROCEDURE — 2700000000 HC OXYGEN THERAPY PER DAY

## 2020-12-31 PROCEDURE — 97116 GAIT TRAINING THERAPY: CPT

## 2020-12-31 PROCEDURE — 93320 DOPPLER ECHO COMPLETE: CPT

## 2020-12-31 PROCEDURE — 1200000000 HC SEMI PRIVATE

## 2020-12-31 PROCEDURE — 97166 OT EVAL MOD COMPLEX 45 MIN: CPT

## 2020-12-31 PROCEDURE — 93306 TTE W/DOPPLER COMPLETE: CPT

## 2020-12-31 RX ORDER — SODIUM CHLORIDE 0.9 % (FLUSH) 0.9 %
10 SYRINGE (ML) INJECTION EVERY 12 HOURS SCHEDULED
Status: CANCELLED | OUTPATIENT
Start: 2020-12-31

## 2020-12-31 RX ORDER — SODIUM CHLORIDE 0.9 % (FLUSH) 0.9 %
10 SYRINGE (ML) INJECTION PRN
Status: CANCELLED | OUTPATIENT
Start: 2020-12-31

## 2020-12-31 RX ORDER — PROPOFOL 10 MG/ML
INJECTION, EMULSION INTRAVENOUS PRN
Status: DISCONTINUED | OUTPATIENT
Start: 2020-12-31 | End: 2020-12-31 | Stop reason: SDUPTHER

## 2020-12-31 RX ADMIN — PROPOFOL 100 MG: 10 INJECTION, EMULSION INTRAVENOUS at 09:32

## 2020-12-31 RX ADMIN — INSULIN LISPRO 2 UNITS: 100 INJECTION, SOLUTION INTRAVENOUS; SUBCUTANEOUS at 17:30

## 2020-12-31 RX ADMIN — CHLORDIAZEPOXIDE HYDROCHLORIDE 5 MG: 5 CAPSULE ORAL at 20:56

## 2020-12-31 RX ADMIN — PROPOFOL 50 MG: 10 INJECTION, EMULSION INTRAVENOUS at 09:40

## 2020-12-31 RX ADMIN — CEFAZOLIN 2 G: 10 INJECTION, POWDER, FOR SOLUTION INTRAVENOUS at 03:42

## 2020-12-31 RX ADMIN — CHLORDIAZEPOXIDE HYDROCHLORIDE 5 MG: 5 CAPSULE ORAL at 15:14

## 2020-12-31 RX ADMIN — PREDNISONE 40 MG: 20 TABLET ORAL at 10:30

## 2020-12-31 RX ADMIN — TRAZODONE HYDROCHLORIDE 50 MG: 50 TABLET ORAL at 22:46

## 2020-12-31 RX ADMIN — CEFAZOLIN 2 G: 10 INJECTION, POWDER, FOR SOLUTION INTRAVENOUS at 11:18

## 2020-12-31 RX ADMIN — CHLORDIAZEPOXIDE HYDROCHLORIDE 5 MG: 5 CAPSULE ORAL at 10:30

## 2020-12-31 RX ADMIN — CEFAZOLIN 2 G: 10 INJECTION, POWDER, FOR SOLUTION INTRAVENOUS at 18:50

## 2020-12-31 RX ADMIN — SODIUM CHLORIDE, PRESERVATIVE FREE 10 ML: 5 INJECTION INTRAVENOUS at 10:30

## 2020-12-31 ASSESSMENT — PAIN DESCRIPTION - FREQUENCY: FREQUENCY: CONTINUOUS

## 2020-12-31 ASSESSMENT — PAIN DESCRIPTION - PROGRESSION
CLINICAL_PROGRESSION: NOT CHANGED

## 2020-12-31 ASSESSMENT — PAIN DESCRIPTION - PAIN TYPE
TYPE: ACUTE PAIN

## 2020-12-31 ASSESSMENT — PAIN SCALES - GENERAL
PAINLEVEL_OUTOF10: 9
PAINLEVEL_OUTOF10: 8
PAINLEVEL_OUTOF10: 8
PAINLEVEL_OUTOF10: 9
PAINLEVEL_OUTOF10: 8

## 2020-12-31 NOTE — PROGRESS NOTES
Physical Therapy    Facility/Department: 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 Imaging (-) for PE. Pt diagnosed with sepsis and acute respiratory failure requiring supplemental oxygen. Upon evaluation, pt presents with above impairments requiring CGA-SBA for mobility, able to ambulate up to 100ft with SBQC. Pt will benefit from skilled PT while admitted to maximize functional independene and safety. Anticipate pt will be safe to return home with prn assist, may benefit from Home PT to progress endurance. Will continue to follow. Treatment Diagnosis: Impaired mobility  Prognosis: Good  Decision Making: Medium Complexity  PT Education: Goals;PT Role;Plan of Care;Home Exercise Program;Transfer Training;General Safety;Gait Training;Disease Specific Education; Functional Mobility Training  Patient Education: Pt educated regarding importance of OOB mobility or lung function - verbalized understanding  REQUIRES PT FOLLOW UP: Yes  Activity Tolerance  Activity Tolerance: Patient limited by endurance; Patient Tolerated treatment well  Activity Tolerance: Pt with SOB on exertion, VSS throughout with SpO2 reading 96% on RA. Patient Diagnosis(es): The primary encounter diagnosis was Respiratory distress. Diagnoses of Pneumonia due to organism, Septicemia ([facility] [address] and Amphetamine user [facility]) were also pertinent to this visit. has a past medical history of COVID-19, H/O brain surgery, Hepatitis C antibody test positive, History of surgery, Methamphetamine abuse ([facility] [address] MRSA (methicillin resistant staph aureus) culture positive, Paralysis of upper limb ([facility] [address] PE [facility] throm[facility]embolism) [facility] and Pneumonia. has a past surgical history that includes brain surgery (1986); fracture surgery; brain surgery; and Leg Surgery. Restrictions  Restrictions/Precautions  Restrictions/Precautions: Up as Tolerated, Fall Risk  Required Braces or Orthoses?: No  Position Activity Restriction  Other position/activity restrictions: NPO, R PICC  Vision/Hearing  Vision: Impaired  Vision Exceptions: (Does not own glasses but \"I need them\")  Hearing: Within functional limits     Subjective  General  Chart Reviewed: Yes  Patient assessed for rehabilitation services?: Yes  Response To Previous Treatment: Not applicable  Family / Caregiver Present: No  Referring Practitioner: Pb Calvin MD  Referral Date : 12/30/20  Diagnosis: Sepsis  Follows Commands: Within Functional Limits  General Comment  Comments: RN clears pt for therapy. Subjective  Subjective: Pt lying in bed upon arrival, agreeable to therapy evaluation.   Pain Screening  Patient Currently in Pain: Yes  Pain Assessment  Pain Assessment: 0-10  Pain Level: 8  Pain Type: Acute pain  Vital Signs  Pulse: 80  BP: (!) 128/90  BP Location: Left upper arm  Patient Currently in Pain: Yes  Oxygen Therapy  SpO2: 96 %  Pulse Oximeter Device Mode: Intermittent  O2 Device: None (Room air)  Pre Treatment Pain Screening  Intervention List: Patient able to continue with treatment;Nurse/physician notified    Orientation  Orientation  Overall Orientation Status: Within Functional Limits Social/Functional History  Social/Functional History  Lives With: Alone  Type of Home: (camper but per patient mother is attempting to find him an apartment)  Home Layout: One level  Home Access: Stairs to enter with rails  Entrance Stairs - Number of Steps: 3  Entrance Stairs - Rails: Right  Bathroom Shower/Tub: Tub/Shower unit  Bathroom Toilet: Standard  Bathroom Accessibility: Accessible  Home Equipment: (first reports no DME, but then when PT asks states has a SBQC that he uses all the time)  Receives Help From: Family  ADL Assistance: [address] [place] [address] Avenue: Independent  Homemaking Responsibilities: Yes  Ambulation Assistance: Independent  Transfer Assistance: Independent  Active : Yes  Mode of Transportation: Car  Occupation: Unemployed  Additional Comments: Mother does the grocery shopping for the patient, per pt was attempting to get Meals on Wheels    Objective     Observation/Palpation  Posture: Fair    AROM RLE (degrees)  RLE AROM: WFL  AROM LLE (degrees)  LLE AROM : WFL  Strength RLE  Strength RLE: WFL  Comment: Grossly 3+-4/5  Strength LLE  Strength LLE: WFL  Comment: Grossly 3+-4/5  Strength RUE  Strength RUE: WFL     Sensation  Overall Sensation Status: Impaired(B hands/feet, reports new since admission.  Upon evaluation, able to feel light touch in all dermatomes BLEs)  Bed mobility  Supine to Sit: Stand by assistance(HOB elevated)  Sit to Supine: Unable to assess(Pt seated in chair at end of session)  Scooting: Stand by assistance(At EOB to achieve foot flat)  Transfers  Sit to Stand: Contact guard assistance  Stand to sit: Contact guard assistance  Ambulation  Ambulation?: Yes  WB Status: No restrictions  Ambulation 1  Surface: level tile  Device: Small Juan Hailey  Assistance: Contact guard assistance;Stand by assistance Quality of Gait: Pt demonstrates NBOS, intermittent lateral sway and mild path deviation, decreased step length and gait speed, mild unsteadiness but no overt LOB. Distance: 100ft  Stairs/Curb  Stairs?: No     Balance  Posture: Fair  Sitting - Static: Good  Sitting - Dynamic: Good;-  Standing - Static: Fair  Standing - Dynamic: Fair  Comments: Pt tolerated static standing at EOB with unilateral-0 UE support x 2 minutes  Exercises  Knee Long Arc Quad: x10 B  Ankle Pumps: x10 B     Plan   Plan  Times per week: 3-5x/wk  Times per day: Daily  Current Treatment Recommendations: Strengthening, Balance Training, Functional Mobility Training, Transfer Training, Gait Training, Stair training, Endurance Training, Home Exercise Program, Safety Education & Training, Patient/Caregiver Education & Training  Safety Devices  Type of devices: All fall risk precautions in place, Call light within reach, Chair alarm in place, Gait belt, Patient at risk for falls, Left in chair, Nurse notified  Restraints  Initially in place: No      AM-PAC Score  AM-PAC Inpatient Mobility Raw Score : 19 (12/31/20 0911)  AM-PAC Inpatient T-Scale Score : 45.44 (12/31/20 0911)  Mobility Inpatient CMS 0-100% Score: 41.77 (12/31/20 0911)  Mobility Inpatient CMS G-Code Modifier : CK (12/31/20 0911)          Goals  Short term goals  Time Frame for Short term goals: 1 week, 1/7/21 unless otherwise noted  Short term goal 1: Pt will perform bed mobility with mod I  Short term goal 2: Pt will perform sit<>stand transfers with mod I  Short term goal 3: Pt will ambulate >150ft with LRAD and mod I  Short term goal 4: Pt will ascend/descend 3 steps with hand rail and mod I  Short term goal 5: By 1/4, pt will tolerate x12-15 reps BLE exercise to assist with functional transfers. Patient Goals   Patient goals : \"To get stronger. \"       Therapy Time   Individual Concurrent Group Co-treatment   Time In 0820         Time Out 9055         Minutes 33 Timed Code Treatment Minutes: 23 Minutes(10 minute evaluation)       Isela Mensah PT     If pt is unable to be seen after this session, please let this note serve as discharge summary. Please see case management note for discharge disposition. Thank you.

## 2020-12-31 NOTE — PROGRESS NOTES
4 Eyes Skin Assessment     NAME:  Pema Roman  YOB: 1970  MEDICAL RECORD NUMBER:  [id number]    The patient is being assess for  Transfer to New Unit    I agree that 2 RN's have performed a thorough Head to Toe Skin Assessment on the patient. ALL assessment sites listed below have been assessed. Areas assessed by both nurses:    Head, Face, Ears, Shoulders, Back, Chest, Arms, Elbows, Hands, Sacrum. Buttock, Coccyx, Ischium and Legs. Feet and Heels        Does the Patient have a Wound? Yes wound(s) were present on assessment.  LDA wound assessment was Initiated and completed        Candido Prevention initiated:  NA   Wound Care Orders initiated:  NA    Pressure Injury (Stage 3,4, Unstageable, DTI, NWPT, and Complex wounds) if present place consult order under [de-identified] NA    New and Established Ostomies if present place consult order under : NA      Nurse 1 eSignature: Electronically signed by Sarah Bates RN on 12/31/20 at 2:55 AM EST    **SHARE this note so that the co-signing nurse is able to place an eSignature**    Nurse 2 eSignature: Electronically signed by Rakesh Ramos on 12/31/20 at 9:45 AM EST

## 2020-12-31 NOTE — PROGRESS NOTES
0430 12/31/20  0345    134* 136   K 4.0 4.5 4.5    95* 96*   CO2 32 37* 35*   PHOS 2.2* 1.9* 3.5   BUN 20 24* 27*   CREATININE 0.7* 0.6* 0.6*       Imaging:  I have reviewed radiology images personally. XR CHEST PORTABLE   Final Result   No pneumothorax status post right central venous line placement         CT CHEST PULMONARY EMBOLISM W CONTRAST   Final Result   1. No evidence of acute pulmonary embolism   2. Airspace consolidation in the lingula compatible with pneumonia         CT ABDOMEN PELVIS W IV CONTRAST Additional Contrast? None   Final Result   1. Dilated stomach that is gas and fluid filled. Some distention of the   proximal jejunum. No point of obstruction identified. 2. The appendix is not visualized   3. No gallstones   4. No obstructive uropathy         XR CHEST PORTABLE   Final Result   Mild blunting of the left costophrenic angle, possibly trace left pleural   fluid. No other change from the prior study taking into account varying   radiographic technique. Moderate to severe emphysema. Procedure performed: Transesophageal echocardiogram     Findings:  Normal left ventricular function with ejection fraction estimated at about 55%. The cardiac valves are without evidence for acute infective endocarditis. There is moderate to severe eccentric TR. There is evidence for an intracardiac shunt with a small ASD/PFO     There is no evidence for cardiac source of embolus.       Assessment:  Principal Problem:    Pneumonia due to organism  Active Problems:    Acute respiratory failure with hypoxia ([facility])    Methamphetamine abuse ([facility])    Toxic encephalopathy    Sepsis ([facility] [address]    Pulmonary infiltrate    Bacteremia due to Staphylococcus aureus    Delirium    Hyperglycemia    IV drug abuse (HCC)    Tricuspid regurgitation    PFO (patent foramen ovale)  Resolved Problems:    * No resolved hospital problems.  *          Plan: · Oxygen supplementation if required, to keep saturation between 90 to 94%  · Pulmonary toilet  · Status post Precedex infusion  · P.o.  Librium to continue for now  · Patient may require COWS protocol  · Bronchodilators  · IV Ancef to continue  · DIVYA results reviewed-management of tricuspid regurgitation and PFO as per cardiology  · Titration of antibiotics as per clinical status and cultures  · Oral prednisone in tapering doses  · Diet changed  · Blood glucose monitoring with sliding scale insulin for now  · Neurochecks  · PUD prophylaxis  · PT/OT evaluations    Electronically signed by:  Jolene Quiroz MD    12/31/2020    6:17 PM.

## 2020-12-31 NOTE — CARE COORDINATION
Pt on B3 at this time. Per conversation with primary nurse at 1100 huddle, pt is alert and oriented and able to perform adls here.  NN from [address] team.

## 2020-12-31 NOTE — PROGRESS NOTES
Neurologic:  Neurovascularly intact without any focal sensory/motor deficits. Cranial nerves: II-XII intact, grossly non-focal.  Psychiatric: Alert and oriented, thought content appropriate, normal insight  Capillary Refill: Brisk,< 3 seconds   Peripheral Pulses: +2 palpable, equal bilaterally       Labs:   Recent Labs     12/29/20  0400 12/30/20  0430 12/31/20  0345   WBC 11.0 11.9* 11.4*   HGB 11.8* 11.4* 11.3*   HCT 37.5* 35.9* 35.8*    270 316     Recent Labs     12/29/20  0400 12/30/20  0430 12/31/20  0345    134* 136   K 4.0 4.5 4.5    95* 96*   CO2 32 37* 35*   BUN 20 24* 27*   CREATININE 0.7* 0.6* 0.6*   CALCIUM 9.2 8.7 8.6   PHOS 2.2* 1.9* 3.5     No results for input(s): AST, ALT, BILIDIR, BILITOT, ALKPHOS in the last 72 hours. No results for input(s): INR in the last 72 hours. No results for input(s): [place] in the last 72 hours.     Urinalysis:      Lab Results   Component Value Date    NITRU Negative 12/25/2020    WBCUA 3-5 12/25/2020    BACTERIA 2+ 07/03/2020    RBCUA 3-4 12/25/2020    BLOODU SMALL 12/25/2020    SPECGRAV 1.020 12/25/2020    GLUCOSEU 500 12/25/2020       Consults:    IP CONSULT TO HOSPITALIST  IP CONSULT TO CASE MANAGEMENT  IP CONSULT TO PHARMACY  IP CONSULT TO DIETITIAN  IP CONSULT TO PULMONOLOGY  IP CONSULT TO PULMONOLOGY  IP CONSULT TO INFECTIOUS DISEASES  IP CONSULT TO CARDIOLOGY      Assessment/Plan:    Active Hospital Problems    Diagnosis    Hyperglycemia [R73.9]    Pulmonary infiltrate [R91.8]    Bacteremia due to Staphylococcus aureus [R78.81, B95.61]    Delirium [R41.0]    Sepsis ([facility] [A41.9]    Pneumonia due to organism [J18.9]    Toxic encephalopathy [G92]    Acute respiratory failure with hypoxia (HCC) [J96.01]    [facility] [F15.10]       Sepsis in patient with HAP, , lactic 2.6 and WBC 22.7 on admission  -tylenol given in ED  -30 ml/kg IVF given in ED  -cefepime and vanc given in ED and continued 12/25/2020 -MIVF  -tele monitoring  -repeat lactic     Bacteremia - with Staph in Blood Cx. Infectious Disease consulted and appreciated. Cardiology subsequently consulted s/p DIVYA w/out evidence of endocarditis.      Acute respiratory failure with hypoxia in setting of HAP  -CXR revealed: mild blunting of left costophrenic angle, possibly trace left pleural fluid. Moderate to severe emphysema  -CT chest pulmonary embolism was obtained that revealed no evidence of acute pulmonary embolism.  Airspace consolidation in the lingula compatible with pneumonia. -rapid COVID negative on admission  -positive COVID on 12/5/2020  -decadron given in ED  -solumedrol every 6 hours  -continuous pulse ox  -encourage coughing and deep breathing.  -antibiotics as indicated above   -pulm asked to weigh in     IVDU  -drug screen positive for amphetamines  -cessation discussed  -case management consult for drug program after discharge  -echo in am   -started COWS protocol with subutex/clonidine on 12/26   -started precedex ggt due to w/d symptoms, transferred to ICU on 12/26       DVT Prophylaxis: [address]  Diet: Diet NPO, After Midnight  Code Status: Full Code      PT/OT Eval Status: seen w/ recs for lukas w/ assist.     Dispo - Possibly to home Friday/Sat 1/2 Jan pending clinical course and subspecialty recs.       Dafne Darby MD

## 2020-12-31 NOTE — ANESTHESIA POSTPROCEDURE EVALUATION
Department of Anesthesiology  Postprocedure Note    Patient: Dinora Allen  MRN: [id number]  YOB: 1970  Date of evaluation: 12/31/2020  Time:  11:07 AM     Procedure Summary     Date: 12/31/20 Room / Location: [facility] Cardiac Cath Lab    Anesthesia Start: 0930 Anesthesia Stop: 5911    Procedure: TRANSESOPHAGEAL ECHO Diagnosis:     Scheduled Providers:  Responsible Provider: Annmarie Jose MD    Anesthesia Type: MAC ASA Status: 3          Anesthesia Type: No value filed. Farhat Phase I:      Farhat Phase II:      Last vitals: Reviewed and per EMR flowsheets. Anesthesia Post Evaluation    Patient location during evaluation: PACU  Patient participation: complete - patient participated  Level of consciousness: awake and alert  Airway patency: patent  Nausea & Vomiting: no nausea and no vomiting  Complications: no  Cardiovascular status: blood pressure returned to baseline  Respiratory status: acceptable  Hydration status: euvolemic  Comments: VSS on transfer to phase 2 recovery. No anesthetic complications.

## 2020-12-31 NOTE — PROGRESS NOTES
INPATIENT PULMONARY CRITICAL CARE PROGRESS NOTE      Reason for visit    Acute encephalopathy    SUBJECTIVE: Patient when seen this morning was off  IV Precedex infusion patient was not having any delirium or agitation, patient was much more alert and communicative and was not complaining of any significant cough or shortness of breath or chest pain,,, patient was afebrile and hemodynamically borderline, patient had sinus tachycardia on the monitor, patient was on 2 L of nasal cannula oxygen when seen with saturation of 98%,, , patient has had adequate urine output with cumulative fluid balance of + 3.3 L, patient is tolerating oral diet ;patient's blood sugars are still suboptimally controlled no other pertinent review of system of concern         Physical Exam:  Blood pressure (!) 128/90, pulse 111, temperature 97.4 °F (36.3 °C), temperature source Oral, resp. rate 18, height 5' (1.524 m), weight 90 lb (40.8 kg), SpO2 97 %.'   Constitutional:  No acute distress. cachexia  HENT:  Oropharynx is clear and moist. No thyromegaly. Eyes:  Conjunctivae are normal. Pupils equal, round, and reactive to light. No scleral icterus. Neck: . No tracheal deviation present. No obvious thyroid mass. Cardiovascular: Normal rate, regular rhythm, normal heart sounds. No right ventricular heave. No lower extremity edema. Pulmonary/Chest: Minimal bilateral wheezes. Scattered rales. Chest wall is not dull to percussion. No accessory muscle usage or stridor. Decreased breath sound density  Abdominal: Soft. Bowel sounds present. No distension or hernia. No tenderness. Musculoskeletal: No cyanosis. No clubbing. No obvious joint deformity. Lymphadenopathy: No cervical or supraclavicular adenopathy. Skin: Skin is warm and dry. No rash or nodules on the exposed extremities.   Neurologic: Alert and communicative        Results:  CBC:   Recent Labs     12/28/20  0034 12/29/20  0400 12/30/20  0430   WBC 9.1 11.0 11.9* HGB 11.1* 11.8* 11.4*   HCT 33.8* 37.5* 35.9*   MCV 85.1 87.6 87.3    235 270     BMP:   Recent Labs     12/28/20  0034 12/29/20  0400 12/30/20  0430    143 134*   K 4.1 4.0 4.5    103 95*   CO2 33* 32 37*   PHOS  --  2.2* 1.9*   BUN 21* 20 24*   CREATININE <0.5* 0.7* 0.6*       Imaging:  I have reviewed radiology images personally. XR CHEST PORTABLE   Final Result   No pneumothorax status post right central venous line placement         CT CHEST PULMONARY EMBOLISM W CONTRAST   Final Result   1. No evidence of acute pulmonary embolism   2. Airspace consolidation in the lingula compatible with pneumonia         CT ABDOMEN PELVIS W IV CONTRAST Additional Contrast? None   Final Result   1. Dilated stomach that is gas and fluid filled. Some distention of the   proximal jejunum. No point of obstruction identified. 2. The appendix is not visualized   3. No gallstones   4. No obstructive uropathy         XR CHEST PORTABLE   Final Result   Mild blunting of the left costophrenic angle, possibly trace left pleural   fluid. No other change from the prior study taking into account varying   radiographic technique. Moderate to severe emphysema. Results for Krish Vázquez (MRN [id number]) as of 12/30/2020 22:29   Ref.  Range 12/29/2020 04:00 12/30/2020 00:21 12/30/2020 04:30 12/30/2020 08:08 12/30/2020 11:37   Sodium Latest Ref Range: 136 - 145 mmol/L 143  134 (L)     Potassium Latest Ref Range: 3.5 - 5.1 mmol/L 4.0  4.5     Chloride Latest Ref Range: 99 - 110 mmol/L 103  95 (L)     CO2 Latest Ref Range: 21 - 32 mmol/L 32  37 (H)     BUN Latest Ref Range: 7 - 20 mg/dL 20  24 (H)     Creatinine Latest Ref Range: 0.9 - 1.3 mg/dL 0.7 (L)  0.6 (L)     Anion Gap Latest Ref Range: 3 - 16  8  2 (L)     GFR Non- Latest Ref Range: >60  >60  >60     GFR  Latest Ref Range: >60  >60  >60     Magnesium Latest Ref Range: 1.80 - 2.40 mg/dL 2.30 Glucose Latest Ref Range: 70 - 99 mg/dL 352 (H)  133 (H)     POC Glucose Latest Ref Range: 70 - 99 mg/dl  151 (H)  142 (H) 194 (H)   Calcium Latest Ref Range: 8.3 - 10.6 mg/dL 9.2  8.7     Phosphorus Latest Ref Range: 2.5 - 4.9 mg/dL 2.2 (L)  1.9 (L)         Results for April Burkitt (MRN [id number]) as of 12/30/2020 22:29   Ref. Range 12/26/2020 04:53 12/28/2020 00:34 12/29/2020 04:00 12/30/2020 04:30   WBC Latest Ref Range: 4.0 - 11.0 K/uL 13.4 (H) 9.1 11.0 11.9 (H)   RBC Latest Ref Range: 4.20 - 5.90 M/uL 4.37 3.97 (L) 4.28 4.11 (L)   Hemoglobin Quant Latest Ref Range: 13.5 - 17.5 g/dL 12.0 (L) 11.1 (L) 11.8 (L) 11.4 (L)   Hematocrit Latest Ref Range: 40.5 - 52.5 % 37.1 (L) 33.8 (L) 37.5 (L) 35.9 (L)   MCV Latest Ref Range: 80.0 - 100.0 fL 84.9 85.1 87.6 87.3   MCH Latest Ref Range: 26.0 - 34.0 pg 27.4 27.9 27.6 27.8   MCHC Latest Ref Range: 31.0 - 36.0 g/dL 32.2 32.8 31.5 31.8   MPV Latest Ref Range: 5.0 - 10.5 fL 7.4 7.4 7.4 7.6   RDW Latest Ref Range: 12.4 - 15.4 % 17.3 (H) 17.4 (H) 18.1 (H) 17.1 (H)   Platelet Count Latest Ref Range: 135 - 450 K/uL 169 181 235 270   Neutrophils % Latest Units: % 93.6        Staphylococcus aureus    Culture, Blood 2 12/25/2020  6:32 PM [facility]   POSITIVE for   Isolated two of two sets   No further workup   Refer to culture #[id number] for sensitivity results     Staph aureus DNA Detected    Blood Culture, Routine 12/25/2020  6:30 PM [facility]   mecA gene not detected   See additional report for complete BCID panel. Organism Abnormal  12/25/2020  6:30 PM 15 [facility]   Staphylococcus aureus    Blood Culture, Routine 12/25/2020  6:30 PM [id number] for   Isolated two of two sets    Testing Performed By    Lab - Abbreviation Name Director Address Valid Date Range   56-YG - [facility] Vianney Blanco M.D. [address] [place] [id number] 09/26/20 0000-Present Narrative  Performed by: [facility]  ORDER#: [id number]                          ORDERED BY: César Galo   SOURCE: Blood Antecubital-Rig              COLLECTED:  12/25/20 18:30   ANTIBIOTICS AT [facility]. :                      RECEIVED :  12/26/20 10:19   CALL  [place]illo  [facility] Gabriela Garcia [phone],   Microbiology results called to and read back by Mariaelena Thomas RN,   12/26/2020 16:08, by Sada Conrad   If child <=2 yrs old please draw pediatric bottle. ~Blood Culture #1   Performed at:   [address] [place] [address]   Phone [phone]   Susceptibility    Staphylococcus aureus (2)    Antibiotic Interpretation AJAY Status    clindamycin Sensitive <=0.25 mcg/mL     erythromycin Resistant >=8 mcg/mL     oxacillin Sensitive 0.5 mcg/mL     tetracycline Sensitive <=1 mcg/mL     trimethoprim-sulfamethoxazole Sensitive <=10 mcg/mL         Assessment:  Principal Problem:    Pneumonia due to organism  Active Problems:    Acute respiratory failure with hypoxia (HCC)    Methamphetamine abuse ([facility] [address]    Toxic encephalopathy    Sepsis ([facility]    Pulmonary infiltrate    Bacteremia due to Staphylococcus aureus    Delirium    Hyperglycemia  Resolved Problems:    * No resolved hospital problems. *          Plan:   · Oxygen supplementation to keep saturation between 90 to 94%  · Please titrate down the oxygen as per the above parameters  · Pulmonary toilet  · Patient's precedex has been tapered to d//c and patient was not agitated /showing agitation when seen in AM   · P.o.  Librium started-may need to titrate if patient has increased agitation on Precedex  · Patient may require COWS protocol  · Bronchodilators  · Patient's IV antibiotics were changed to Ancef  · DIVYA/TTE being contemplated as recommended by ID   · Titration of antibiotics as per clinical status and cultures  · Patient's IV Solu-Medrol  Being changed to PO prednisone in AM   · Diet changed

## 2020-12-31 NOTE — PROGRESS NOTES
[address] [facility]   Cardiovascular Evaluation    PATIENT: Naty Valdez  DATE: 2020  MRN: [id number]  CSN: [id number]  : 1970    Primary Care Doctor/Referring provider: No primary care provider on file. , Howard Deleon MD     Reason for evaluation/Chief complaint:   Shortness of Breath (Pt was 80% O2 at home per EMS, pt placed on CPAP en route and duoneb treatment. Pt last used heroine 2 days ago.) and COPD      Subjective: Feels much better this AM.    History of present illness on initial date of evaluation:   Naty Valdez is a 48 y.o. patient who presented to the hospital with complaints of SOB. He was subsequently was found to have sepsis and admitted to the ICU. The patient is not able to give detailed information about the events of hospitalization due to their underlying medical illness. The majority of the information is taken from the chart, medical staff, and available family. Cardiology has been consulted to assess the patient for possible infective endocarditis. Patient has a history of IV drug use. He has been on IV antibiotic therapy for the past several days while in the hospital here. Patient was seen by infectious disease for consultation. There is been a recommendation to consider transesophageal echocardiogram.  Patient is limited in his medical insight. Patient states that he fell down approximately 3 years ago and is ever since been struggling with shortness of breath. Patient denies any fever at this time. He does state that he has a chronic chest tightness and shortness of breath that is unchanged currently.       Patient Active Problem List   Diagnosis    MRSA bacteremia    Pleural effusion, left    Leukocytosis    Illicit drug use    Hepatitis    Chest pain    Pulmonary embolism (HCC)    AMS (altered mental status)    Acute respiratory failure with hypoxia (HCC)    Drug overdose    Abnormal chest x-ray    Acute encephalopathy  Disorder of electrolytes    Non-traumatic rhabdomyolysis    Elevated procalcitonin    Methamphetamine abuse (HCC)    Toxic encephalopathy    Acute hypercapnic respiratory failure (HCC)    Heroin withdrawal (HCC)    Tachycardia    Hypoxia    Acute hypoxemic respiratory failure due to COVID-19 [facility])    Acute respiratory failure with hypoxia and hypercapnia (HCC)    Pneumonia due to COVID-19 virus    Thrombocytopenia (HCC)    Elevated LFTs    COVID-19    Acute metabolic encephalopathy    Moderate protein-calorie malnutrition (HCC)    Pneumonia due to organism    Sepsis ([facility] [address]    Pulmonary infiltrate    Bacteremia due to Staphylococcus aureus    Delirium    Hyperglycemia         Cardiac Testing: I have reviewed the findings below. EKG:  ECHO:   STRESS TEST:  CATH:  BYPASS:  VASCULAR:    Past Medical History:   has a past medical history of COVID-19, H/O brain surgery, Hepatitis C antibody test positive, History of surgery, Methamphetamine abuse ([facility] [address] MRSA (methicillin resistant staph aureus) culture positive, Paralysis of upper limb ([facility] [address] PE (pulmonary thromboembolism) ([facility] [address] and Pneumonia. Surgical History:   has a past surgical history that includes brain surgery (1986); fracture surgery; brain surgery; and Leg Surgery. Social History:   reports that he has quit smoking. His smoking use included cigarettes. He smoked 2.00 packs per day. He has never used smokeless tobacco. He reports previous drug use. Drug: IV. He reports that he does not drink alcohol. Family History:  No evidence for sudden cardiac death or premature CAD    Medications:  Reviewed and are listed in nursing record. and/or listed below  Outpatient Medications:  Prior to Admission medications    Medication Sig Start Date End Date Taking?  Authorizing Provider albuterol sulfate  (90 Base) MCG/ACT inhaler Inhale 2 puffs into the lungs every 4 hours as needed for Wheezing or Shortness of Breath 11/21/20 11/28/20  Delores Morillo MD       In-patient schedule medications:   predniSONE  40 mg Oral Daily    insulin lispro  0-12 Units Subcutaneous TID WC    insulin lispro  0-6 Units Subcutaneous Nightly    mupirocin   Nasal BID    ceFAZolin  2 g Intravenous Q8H    chlordiazePOXIDE  5 mg Oral TID    sodium chloride flush  10 mL Intravenous 2 times per day    enoxaparin  40 mg Subcutaneous Daily         Infusion Medications:   dextrose           Allergies:  Patient has no known allergies. Review of Systems:   14 point review of systems unable to be completed due to patient condition/cooperation. All available positives mentioned in history of present illness.          Physical Examination:    [unfilled]  /83   Pulse 81   Temp 97.6 °F (36.4 °C) (Oral)   Resp 16   Ht 5' (1.524 m)   Wt 90 lb (40.8 kg)   SpO2 97%   BMI 17.58 kg/m²    Weight: 90 lb (40.8 kg)     Wt Readings from Last 3 Encounters:   12/30/20 90 lb (40.8 kg)   12/17/20 102 lb 6.4 oz (46.4 kg)   12/11/20 87 lb 1.3 oz (39.5 kg)       Intake/Output Summary (Last 24 hours) at 12/31/2020 0763  Last data filed at 12/31/2020 0730  Gross per 24 hour   Intake 600 ml   Output 1945 ml   Net -1345 ml       General Appearance:  Alert x 3, cooperative, no distress, appears older than stated age   Head:  Normocephalic, without obvious abnormality, atraumatic   Eyes:  PERRL, conjunctiva/corneas clear       Nose: Nares normal, no drainage or sinus tenderness   Throat: Lips, mucosa, and tongue normal   Neck: Supple, symmetrical, trachea midline, no adenopathy, thyroid: not enlarged, symmetric, no tenderness/mass/nodules, no carotid bruit or JVD       Lungs:   Reduced to auscultation bilaterally, respirations not labored   Chest Wall:  No tenderness or deformity Heart:  Regular rhythm and normal rate; S1, S2 are normal; no murmur noted; no rub or gallop   Abdomen:   Soft, non-tender, bowel sounds active all four quadrants,  no masses, no organomegaly           Extremities: Extremities normal, atraumatic, no cyanosis or edema   Pulses: 2+ and symmetric   Skin: Skin color, texture, turgor normal, no rashes or lesions   Pysch: Distant mood and affect   Neurologic: Normal gross motor and sensory exam.  Slow overall mental cognition. Labs  Recent Labs     12/30/20 0430 12/31/20  0345   WBC 11.9* 11.4*   HGB 11.4* 11.3*   HCT 35.9* 35.8*   MCV 87.3 85.9    316     Recent Labs     12/30/20 0430 12/31/20  0345   CREATININE 0.6* 0.6*   BUN 24* 27*   * 136   K 4.5 4.5   CL 95* 96*   CO2 37* 35*     No results for input(s): INR, PROTIME in the last 72 hours. No results for input(s): TROPONINI in the last 72 hours. Invalid input(s): PRO-BNP  No results for input(s): CHOL, HDL in the last 72 hours. Invalid input(s): LDL, TG      Imaging:  I have reviewed the below testing personally and my interpretation is below. EKG: None available during current index hospitalization. CXR:      Assessment:  48 y.o. patient with:  1. Bacteremia  2. Sepsis  3. History of IVDA    Plan:  1. Transthoracic echocardiogram from this AM does not shows infective endocarditis  2. We will keep patient n.p.o. for possible needs of transesophageal echocardiogram this AM   ~will need to verify availability of technician and anesthesia  3. Clinically appears much more stable this AM    ~likely from some level of diuresis completed since my evaluation yesterday    All questions and concerns were addressed to the patient/family. Alternatives to my treatment were discussed. The note was completed using EMR. Every effort was made to ensure accuracy; however, inadvertent computerized transcription errors may be present.     Nola Green MD, [address], [facility], [address] [facility] office

## 2020-12-31 NOTE — PROGRESS NOTES
Pt scored a 6 on COWS due to minimal sweating, tachycardia and reports of pain however pt was subdued when entering the room and falling asleep while RN was in the room. Pt had been given librium and Clonidine earlier. Clonidine being held at this time. Will continue to monitor.

## 2020-12-31 NOTE — PROGRESS NOTES
Occupational Therapy   Occupational Therapy Initial Assessment  Date: 2020   Patient Name: Kelsie Hernandez  MRN: [id number]     : 1970    Date of Service: 2020    Discharge Recommendations:  Home with assist PRN, Home with Home health OT, Home with nursing aide  OT Equipment Recommendations  Equipment Needed: No    Assessment   Performance deficits / Impairments: Decreased functional mobility ; Decreased safe awareness;Decreased balance;Decreased ADL status; Decreased endurance;Decreased strength;Decreased ROM; Decreased high-level IADLs;Decreased sensation;Decreased posture  Assessment: Pt is a 48year old previously independent male with PMH including Hep C, IVDU, PE, Brain sx and COVID () who presented  with SOB, chest tightness. Imaging (-) for PE. Pt diagnosed with sepsis and acute respiratory failure requiring supplemental oxygen. Upon evaluation, pt presents with above impairments needing Jerica with SBQC, prolonged rest breaks and cues for PLB. OT skilledservices are indicated to address above deficits and f/u with patient education. At this time rec assist PRN and HHOT upon DC home. Prognosis: Fair  Decision Making: Medium Complexity  OT Education: OT Role;Energy Conservation;Plan of Care;Precautions; Equipment;Transfer Training  REQUIRES OT FOLLOW UP: Yes  Activity Tolerance  Activity Tolerance: Patient Tolerated treatment well  Activity Tolerance: Vitals: Supine- BP: 128/90, HR 81, O2 97%  Safety Devices  Safety Devices in place: Yes  Type of devices: Left in chair;Call light within reach; Chair alarm in place;Nurse notified;Gait belt;Patient at risk for falls; All fall risk precautions in place  Restraints  Initially in place: No           Patient Diagnosis(es): The primary encounter diagnosis was Respiratory distress. Diagnoses of Pneumonia due to organism, Septicemia ([facility] [address] and Amphetamine user [facility]) were also pertinent to this visit. Home Equipment: (first reports no DME, but then when PT asks states has a SBQC that he uses all the time)  Receives Help From: Family  ADL Assistance: [address]: Independent  Homemaking Responsibilities: Yes  Ambulation Assistance: Independent  Transfer Assistance: Independent  Active : Yes  Mode of Transportation: Car  Occupation: Unemployed  Additional Comments:  Mother does the grocery shopping for the patient, per pt was attempting to get Meals on Wheels       Objective   Vision: Impaired  Vision Exceptions: (does not own glasses but \"I need them\")  Hearing: Within functional limits    Orientation  Overall Orientation Status: Within Normal Limits  Observation/Palpation  Posture: Fair  Balance  Sitting Balance: Supervision  Standing Balance: Minimal assistance(Jerica-CGA with SBQC)  Standing Balance  Time: x30 seconds, x2 minutes, x3 minutes, x45 seconds  Activity: standing, mobility, transfers, ADLs  Comment: no device vs SBQC  Functional Mobility  Functional - Mobility Device: Cane(SBQC)  Activity: Other  Assist Level: Minimal assistance  ADL  Feeding: Beverage management;Supervision;Setup  Grooming: Increased time to complete;Stand by assistance;Setup  LE Dressing: Increased time to complete;Contact guard assistance;Verbal cueing  Tone RUE  RUE Tone: Normotonic  Tone LUE  LUE Tone: Normotonic  Coordination  Movements Are Fluid And Coordinated: Yes     Bed mobility  Supine to Sit: Stand by assistance  Sit to Supine: Unable to assess  Scooting: Stand by assistance  Transfers  Stand Step Transfers: Contact guard assistance;Minimal assistance  Sit to stand: Contact guard assistance;Minimal assistance  Stand to sit: Contact guard assistance;Minimal assistance  Transfer Comments: no device sit <> stand, use of SBQC  Vision - Basic Assessment  Prior Vision: Other (add comment)(states needs glasses)  Visual History: No significant visual history Jaci Bowie, OTR/L

## 2020-12-31 NOTE — ANESTHESIA PRE PROCEDURE
 metoprolol (LOPRESSOR) injection 5 mg  5 mg Intravenous Q6H PRN Shannon Bean MD   5 mg at 12/30/20 0816    LORazepam (ATIVAN) injection 2 mg  2 mg Intravenous Q4H PRN Shannon Bean MD   2 mg at 12/29/20 2012    sodium chloride flush 0.9 % injection 10 mL  10 mL Intravenous 2 times per day [facility]   10 mL at 12/30/20 0817    sodium chloride flush 0.9 % injection 10 mL  10 mL Intravenous PRN [facility] - CNP        enoxaparin (LOVENOX) injection 40 mg  40 mg Subcutaneous Daily [facility]   Stopped at 12/31/20 6051    acetaminophen (TYLENOL) tablet 650 mg  650 mg Oral Q6H PRN [facility] - CNP        Or    acetaminophen (TYLENOL) suppository 650 mg  650 mg Rectal Q6H PRN [facility] - CNP        traZODone (DESYREL) tablet 50 mg  50 mg Oral Nightly PRN [facility]   50 mg at 12/30/20 2016    cloNIDine (CATAPRES) tablet 0.1 mg  0.1 mg Oral PRN Graciela Leos MD   0.1 mg at 12/30/20 1127    hydrOXYzine (VISTARIL) capsule 50 mg  50 mg Oral Q8H PRN Graciela Leos MD   50 mg at 12/29/20 1827    dicyclomine (BENTYL) tablet 20 mg  20 mg Oral Q6H PRN Graciela Leos MD        perflutren lipid microspheres (DEFINITY) injection 1.65 mg  1.5 mL Intravenous ONCE PRN [facility], APRN - CNP           Allergies:  No Known Allergies    Problem List:    Patient Active Problem List   Diagnosis Code    MRSA bacteremia R78.81, B95.62    Pleural effusion, left J90    Leukocytosis F60.372    Illicit drug use J76.65    Hepatitis K75.9    Chest pain R07.9    Pulmonary embolism (HCC) I26.99    AMS (altered mental status) R41.82    Acute respiratory failure with hypoxia (HCC) J96.01    Drug overdose T50.901A    Abnormal chest x-ray R93.89    Acute encephalopathy G93.40    Disorder of electrolytes E87.8    Non-traumatic rhabdomyolysis M62.82    Elevated procalcitonin R79.89    Methamphetamine abuse ([address] F15.10    Toxic encephalopathy G92  Acute hypercapnic respiratory failure (HCC) J96.02    Heroin withdrawal (HCC) F11.23    Tachycardia R00.0    Hypoxia R09.02    Acute hypoxemic respiratory failure due to COVID-19 (HCC) U07.1, J96.01    Acute respiratory failure with hypoxia and hypercapnia (HCC) J96.01, J96.02    Pneumonia due to COVID-19 virus U07.1, J12.89    Thrombocytopenia (HCC) D69.6    Elevated LFTs R79.89    COVID-19 U07.1    Acute metabolic encephalopathy E81.16    Moderate protein-calorie malnutrition (HCC) E44.0    Pneumonia due to organism J18.9    Sepsis (HCC) A41.9    Pulmonary infiltrate R91.8    Bacteremia due to Staphylococcus aureus R78.81, B95.61    Delirium R41.0    Hyperglycemia R73.9       Past Medical History:        Diagnosis Date    COVID-19 12/05/2020    H/O brain surgery     Hepatitis C antibody test positive 7/17/14    History of surgery     L leg surgery    Meth[facility] [address]     MRSA (methicillin resistant staph aureus) culture positive 7/12/14    blood cx    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    PE (pulmonary thromboembolism) (HCC)     Pneumonia        Past Surgical History:        Procedure Laterality Date    BRAIN SURGERY  1986    s/p trauma    BRAIN SURGERY      FRACTURE SURGERY      LEG SURGERY         Social History:    Social History     Tobacco Use    Smoking status: Former Smoker     Packs/day: 2.00     Types: Cigarettes    Smokeless tobacco: Never Used    Tobacco comment: states 5 yrs ago   Substance Use Topics    Alcohol use:  No                                Counseling given: Not Answered  Comment: states 5 yrs ago      Vital Signs (Current):   Vitals:    12/31/20 0000 12/31/20 0330 12/31/20 0730 12/31/20 0859   BP: 114/76 120/82 124/83 (!) 128/90   Pulse: 100 90 81 80   Resp: 18 18 16    Temp: 97.6 °F (36.4 °C) 97.2 °F (36.2 °C) 97.6 °F (36.4 °C)    TempSrc: Oral Oral Oral    SpO2: 96% 96% 97% 96%   Weight:       Height: BP Readings from Last 3 Encounters:   12/31/20 (!) 128/90   12/17/20 120/70   12/11/20 134/88       NPO Status:                                                                                 BMI:   Wt Readings from Last 3 Encounters:   12/30/20 90 lb (40.8 kg)   12/17/20 102 lb 6.4 oz (46.4 kg)   12/11/20 87 lb 1.3 oz (39.5 kg)     Body mass index is 17.58 kg/m².     CBC:   Lab Results   Component Value Date    WBC 11.4 12/31/2020    RBC 4.17 12/31/2020    HGB 11.3 12/31/2020    HCT 35.8 12/31/2020    MCV 85.9 12/31/2020    RDW 16.9 12/31/2020     12/31/2020       CMP:   Lab Results   Component Value Date     12/31/2020    K 4.5 12/31/2020    K 4.1 12/28/2020    CL 96 12/31/2020    CO2 35 12/31/2020    BUN 27 12/31/2020    CREATININE 0.6 12/31/2020    GFRAA >60 12/31/2020    AGRATIO 1.0 12/25/2020    LABGLOM >60 12/31/2020    GLUCOSE 173 12/31/2020    PROT 7.5 12/25/2020    CALCIUM 8.6 12/31/2020    BILITOT 1.0 12/25/2020    ALKPHOS 92 12/25/2020    AST 28 12/25/2020    ALT 31 12/25/2020       POC Tests:   Recent Labs     12/31/20  0855   POCGLU 93       Coags:   Lab Results   Component Value Date    PROTIME 11.5 12/14/2020    INR 0.99 12/14/2020    APTT 24.3 12/14/2020       HCG (If Applicable): No results found for: PREGTESTUR, PREGSERUM, HCG, HCGQUANT     ABGs:   Lab Results   Component Value Date    PHART 7.451 12/08/2020    PO2ART 82.7 12/08/2020    QSU9ERF 36.1 12/08/2020    QES8ORC 24.6 12/08/2020    BEART 1.0 12/08/2020    Z3QSYNHR 96.6 12/08/2020        Type & Screen (If Applicable):  No results found for: LABABO, LABRH    Drug/Infectious Status (If Applicable):  No results found for: HIV, HEPCAB    COVID-19 Screening (If Applicable):   Lab Results   Component Value Date    COVID19 Not Detected 12/30/2020    COVID19 Not Detected 07/04/2020         Anesthesia Evaluation   no history of anesthetic complications:   Airway: Mallampati: II TM distance: <3 FB   Neck ROM: full  Mouth opening: > = 3 FB Dental:    (+) upper dentures      Pulmonary:   (+) pneumonia:                            ROS comment: H/o tob   Cardiovascular:                      Neuro/Psych:   (+) neuromuscular disease:, psychiatric history:depression/anxiety              ROS comment: H/o brain surgery and right arm weakness GI/Hepatic/Renal:   (+) hepatitis:, liver disease:,          ROS comment: IVDA. Endo/Other:                     Abdominal:           Vascular:   + DVT, PE. Anesthesia Plan      MAC     ASA 3     (Risks, benefits and alternatives of MAC anesthesia discussed with pt. Questions answered. Willing to proceed.)  Induction: intravenous. Anesthetic plan and risks discussed with patient.                       Jamal Aceves MD   12/31/2020

## 2020-12-31 NOTE — ANESTHESIA PRE PROCEDURE
Department of Anesthesiology  Preprocedure Note       Name:  Abel Hoyt   Age:  48 y.o.  :  1970                                          MRN:  [id number]         Date:  2020      Surgeon: * No surgeons listed *    Procedure: * No procedures listed *    Medications prior to admission:   Prior to Admission medications    Medication Sig Start Date End Date Taking?  Authorizing Provider   albuterol sulfate  (90 Base) MCG/ACT inhaler Inhale 2 puffs into the lungs every 4 hours as needed for Wheezing or Shortness of Breath 20  Fatou Francisco MD       Current medications:    Current Facility-Administered Medications   Medication Dose Route Frequency Provider Last Rate Last Admin    predniSONE (DELTASONE) tablet 40 mg  40 mg Oral Daily Gust Collet, MD        perflutren lipid microspheres (DEFINITY) injection 1.65 mg  1.5 mL Intravenous ONCE PRN Farhana Castellanos MD        ipratropium-albuterol (DUONEB) nebulizer solution 1 ampule  1 ampule Inhalation Q4H PRN Shannon Bean MD   1 ampule at 20 2109    insulin lispro (HUMALOG) injection vial 0-12 Units  0-12 Units Subcutaneous TID WC Gust Collet, MD   2 Units at 20 1143    insulin lispro (HUMALOG) injection vial 0-6 Units  0-6 Units Subcutaneous Nightly Gust Collet, MD   1 Units at 20    glucose (GLUTOSE) 40 % oral gel 15 g  15 g Oral PRN Gust Collet, MD        dextrose 50 % IV solution  12.5 g Intravenous PRN Gust Collet, MD        glucagon (rDNA) injection 1 mg  1 mg Intramuscular PRN Gust Collet, MD        dextrose 5 % solution  100 mL/hr Intravenous PRN Gust Collet, MD        mupirocin (BACTROBAN) 2 % ointment   Nasal BID Beryle Chandler, MD   Given at 20 0817    ceFAZolin (ANCEF) 2 g in dextrose 5 % 100 mL IVPB  2 g Intravenous Q8H Gust Collet, MD   Stopped at 20 7632    chlordiazePOXIDE (LIBRIUM) capsule 5 mg  5 mg Oral TID Gust Collet, MD   5 mg at 20  Abnormal chest x-ray R93.89    Acute encephalopathy G93.40    Disorder of electrolytes E87.8    Non-traumatic rhabdomyolysis M62.82    Elevated procalcitonin R79.89    Methamphetamine abuse (HCC) F15.10    Toxic encephalopathy G92    Acute hypercapnic respiratory failure (HCC) J96.02    Heroin withdrawal (HCC) F11.23    Tachycardia R00.0    Hypoxia R09.02    Acute hypoxemic respiratory failure due to COVID-19 (HCC) U07.1, J96.01    Acute respiratory failure with hypoxia and hypercapnia (HCC) J96.01, J96.02    Pneumonia due to COVID-19 virus U07.1, J12.89    Thrombocytopenia (HCC) D69.6    Elevated LFTs R79.89    COVID-19 U07.1    Acute metabolic encephalopathy O57.10    Moderate protein-calorie malnutrition (HCC) E44.0    Pneumonia due to organism J18.9    Sepsis ([facility]) A41.9    Pulmonary infiltrate R91.8    Bacteremia due to Staphylococcus aureus R78.81, B95.61    Delirium R41.0    Hyperglycemia R73.9    IV drug abuse ([facility] [address] F19.10       Past Medical History:        Diagnosis Date    COVID-19 12/05/2020    H/O brain surgery     Hepatitis C antibody test positive 7/17/14    History of surgery     L leg surgery    Methamphetamine abuse ([facility] [address]     MRSA (methicillin resistant staph aureus) culture positive 7/12/14    blood cx    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    PE (pulmonary thromboembolism) ([facility])     Pneumonia        Past Surgical History:        Procedure Laterality Date    BRAIN SURGERY  1986    s/p trauma    BRAIN SURGERY      FRACTURE SURGERY      LEG SURGERY         Social History:    Social History     Tobacco Use    Smoking status: Former Smoker     Packs/day: 2.00     Types: Cigarettes    Smokeless tobacco: Never Used    Tobacco comment: states 5 yrs ago   Substance Use Topics    Alcohol use:  No                                Counseling given: Not Answered  Comment: states 5 yrs ago      Vital Signs (Current):   Vitals: 12/31/20 0000 12/31/20 0330 12/31/20 0730 12/31/20 0859   BP: 114/76 120/82 124/83 (!) 128/90   Pulse: 100 90 81 80   Resp: 18 18 16    Temp: 97.6 °F (36.4 °C) 97.2 °F (36.2 °C) 97.6 °F (36.4 °C)    TempSrc: Oral Oral Oral    SpO2: 96% 96% 97% 96%   Weight:       Height:                                                  BP Readings from Last 3 Encounters:   12/31/20 (!) 128/90   12/31/20 101/71   12/17/20 120/70       NPO Status:                                                                                 BMI:   Wt Readings from Last 3 Encounters:   12/30/20 90 lb (40.8 kg)   12/17/20 102 lb 6.4 oz (46.4 kg)   12/11/20 87 lb 1.3 oz (39.5 kg)     Body mass index is 17.58 kg/m².     CBC:   Lab Results   Component Value Date    WBC 11.4 12/31/2020    RBC 4.17 12/31/2020    HGB 11.3 12/31/2020    HCT 35.8 12/31/2020    MCV 85.9 12/31/2020    RDW 16.9 12/31/2020     12/31/2020       CMP:   Lab Results   Component Value Date     12/31/2020    K 4.5 12/31/2020    K 4.1 12/28/2020    CL 96 12/31/2020    CO2 35 12/31/2020    BUN 27 12/31/2020    CREATININE 0.6 12/31/2020    GFRAA >60 12/31/2020    AGRATIO 1.0 12/25/2020    LABGLOM >60 12/31/2020    GLUCOSE 173 12/31/2020    PROT 7.5 12/25/2020    CALCIUM 8.6 12/31/2020    BILITOT 1.0 12/25/2020    ALKPHOS 92 12/25/2020    AST 28 12/25/2020    ALT 31 12/25/2020       POC Tests:   Recent Labs     12/31/20  0855   POCGLU 93       Coags:   Lab Results   Component Value Date    PROTIME 11.5 12/14/2020    INR 0.99 12/14/2020    APTT 24.3 12/14/2020       HCG (If Applicable): No results found for: PREGTESTUR, PREGSERUM, HCG, HCGQUANT     ABGs:   Lab Results   Component Value Date    PHART 7.451 12/08/2020    PO2ART 82.7 12/08/2020    ZFK0PDF 36.1 12/08/2020    UBM3EDO 24.6 12/08/2020    BEART 1.0 12/08/2020    Q1FZODXS 96.6 12/08/2020        Type & Screen (If Applicable):  No results found for: LABABO, LABRH    Drug/Infectious Status (If Applicable): No results found for: HIV, HEPCAB    COVID-19 Screening (If Applicable):   Lab Results   Component Value Date    COVID19 Not Detected 12/30/2020    COVID19 Not Detected 07/04/2020         Anesthesia Evaluation   no history of anesthetic complications:   Airway: Mallampati: II  TM distance: >3 FB   Neck ROM: full  Mouth opening: > = 3 FB Dental:    (+) upper dentures      Pulmonary:   (+) pneumonia:                             Cardiovascular:                      Neuro/Psych:   (+) neuromuscular disease:, psychiatric history:            GI/Hepatic/Renal:   (+) hepatitis:, liver disease:,          ROS comment: IVDA. Endo/Other:                     Abdominal:           Vascular:                                        Anesthesia Plan      MAC     ASA 3     (Risks, benefits and alternatives of MAC anesthesia discussed with pt. Questions answered. Willing to proceed.)  Induction: intravenous. Anesthetic plan and risks discussed with patient.                       Andreas Zhang MD   12/31/2020

## 2020-12-31 NOTE — PLAN OF CARE
Problem: Falls - Risk of:  Goal: Absence of physical injury  Description: Absence of physical injury  Outcome: Ongoing  Note: Pt high fall risk. Instructed to use call light before getting out of bed and when in need of assistance. Call light within reach. Bed in low position. Bed alarm and nonskid footwear on. Will continue to monitor. Problem: Skin Integrity:  Goal: Absence of new skin breakdown  Description: Absence of new skin breakdown  Outcome: Ongoing  Note: Pt is at risk for skin breakdown. Pt will have skin assessments every shift, Q2 turns, heels elevated off of the bed, and friction and shear prevented when possible. Will continue to monitor for signs of skin breakdown and enforce prevention measures.        Problem: Pain:  Goal: Pain level will decrease  Description: Pain level will decrease  Outcome: Ongoing

## 2021-01-01 VITALS
RESPIRATION RATE: 16 BRPM | TEMPERATURE: 97.8 F | HEIGHT: 60 IN | BODY MASS INDEX: 17.67 KG/M2 | WEIGHT: 90 LBS | OXYGEN SATURATION: 95 % | DIASTOLIC BLOOD PRESSURE: 91 MMHG | SYSTOLIC BLOOD PRESSURE: 122 MMHG | HEART RATE: 96 BPM

## 2021-01-01 LAB
ALBUMIN SERPL-MCNC: 3.4 G/DL (ref 3.4–5)
ANION GAP SERPL CALCULATED.3IONS-SCNC: 4 MMOL/L (ref 3–16)
BUN BLDV-MCNC: 29 MG/DL (ref 7–20)
CALCIUM SERPL-MCNC: 8.6 MG/DL (ref 8.3–10.6)
CHLORIDE BLD-SCNC: 97 MMOL/L (ref 99–110)
CO2: 37 MMOL/L (ref 21–32)
CREAT SERPL-MCNC: 0.6 MG/DL (ref 0.9–1.3)
GFR AFRICAN AMERICAN: >60
GFR NON-AFRICAN AMERICAN: >60
GLUCOSE BLD-MCNC: 127 MG/DL (ref 70–99)
GLUCOSE BLD-MCNC: 84 MG/DL (ref 70–99)
GLUCOSE BLD-MCNC: 93 MG/DL (ref 70–99)
HCT VFR BLD CALC: 39.7 % (ref 40.5–52.5)
HEMOGLOBIN: 12.7 G/DL (ref 13.5–17.5)
MCH RBC QN AUTO: 27.7 PG (ref 26–34)
MCHC RBC AUTO-ENTMCNC: 32 G/DL (ref 31–36)
MCV RBC AUTO: 86.6 FL (ref 80–100)
PDW BLD-RTO: 17.2 % (ref 12.4–15.4)
PERFORMED ON: ABNORMAL
PERFORMED ON: NORMAL
PHOSPHORUS: 3.4 MG/DL (ref 2.5–4.9)
PLATELET # BLD: 337 K/UL (ref 135–450)
PMV BLD AUTO: 6.9 FL (ref 5–10.5)
POTASSIUM SERPL-SCNC: 4.1 MMOL/L (ref 3.5–5.1)
RBC # BLD: 4.58 M/UL (ref 4.2–5.9)
SODIUM BLD-SCNC: 138 MMOL/L (ref 136–145)
WBC # BLD: 13.2 K/UL (ref 4–11)

## 2021-01-01 PROCEDURE — 6360000002 HC RX W HCPCS: Performed by: NURSE PRACTITIONER

## 2021-01-01 PROCEDURE — 2580000003 HC RX 258: Performed by: NURSE PRACTITIONER

## 2021-01-01 PROCEDURE — 6370000000 HC RX 637 (ALT 250 FOR IP): Performed by: INTERNAL MEDICINE

## 2021-01-01 PROCEDURE — 6360000002 HC RX W HCPCS: Performed by: INTERNAL MEDICINE

## 2021-01-01 PROCEDURE — 99232 SBSQ HOSP IP/OBS MODERATE 35: CPT | Performed by: INTERNAL MEDICINE

## 2021-01-01 PROCEDURE — 2580000003 HC RX 258: Performed by: INTERNAL MEDICINE

## 2021-01-01 PROCEDURE — 80069 RENAL FUNCTION PANEL: CPT

## 2021-01-01 PROCEDURE — 85027 COMPLETE CBC AUTOMATED: CPT

## 2021-01-01 RX ORDER — IPRATROPIUM BROMIDE AND ALBUTEROL SULFATE 2.5; .5 MG/3ML; MG/3ML
1 SOLUTION RESPIRATORY (INHALATION) 4 TIMES DAILY
Status: DISCONTINUED | OUTPATIENT
Start: 2021-01-01 | End: 2021-01-01 | Stop reason: HOSPADM

## 2021-01-01 RX ADMIN — PREDNISONE 40 MG: 20 TABLET ORAL at 09:12

## 2021-01-01 RX ADMIN — SODIUM CHLORIDE, PRESERVATIVE FREE 10 ML: 5 INJECTION INTRAVENOUS at 09:13

## 2021-01-01 RX ADMIN — ENOXAPARIN SODIUM 40 MG: 40 INJECTION SUBCUTANEOUS at 09:12

## 2021-01-01 RX ADMIN — CHLORDIAZEPOXIDE HYDROCHLORIDE 5 MG: 5 CAPSULE ORAL at 09:12

## 2021-01-01 RX ADMIN — CEFAZOLIN 2 G: 10 INJECTION, POWDER, FOR SOLUTION INTRAVENOUS at 03:35

## 2021-01-01 RX ADMIN — CEFAZOLIN 2 G: 10 INJECTION, POWDER, FOR SOLUTION INTRAVENOUS at 12:24

## 2021-01-01 ASSESSMENT — PAIN SCALES - GENERAL: PAINLEVEL_OUTOF10: 9

## 2021-01-01 ASSESSMENT — PAIN DESCRIPTION - LOCATION: LOCATION: CHEST;ARM

## 2021-01-01 ASSESSMENT — PAIN DESCRIPTION - PROGRESSION: CLINICAL_PROGRESSION: NOT CHANGED

## 2021-01-01 NOTE — PROGRESS NOTES
Pt left AMA. Tried to encourage pt to wait for MD orders and pt refused. Perfect Serve sent to MD. Subclavian CVC removed. Pressure held for 3-4 minutes. Translucent dressing applied. Educated pt to leave dressing on for a few days. Pt verbalized understanding. Pt left with all belongings in hand. AMA paperwork signed and in the chart.

## 2021-01-01 NOTE — PROGRESS NOTES
INPATIENT PULMONARY CRITICAL CARE PROGRESS NOTE      Reason for visit: Acute hypoxemic respiratory failure, recent Covid 19 infection 12/5/20 with subsequent negative testing, lingular pneumonia. History of IV drug use and methamphetamine use. SUBJECTIVE: The patient complains of exertional dyspnea, does not have a productive cough. He denies chest pain. He remains afebrile and hemodynamically stable. His appetite is fair, he denies GI or  complaints. He lives with his mother, is disabled due to right arm weakness. Physical Exam:  Blood pressure (!) 122/91, pulse 96, temperature 97.8 °F (36.6 °C), temperature source Oral, resp. rate 16, height 5' (1.524 m), weight 90 lb (40.8 kg), SpO2 95 %.'   Constitutional: Very small built, underweight appearing. In no acute distress. HENT:  Oropharynx is clear and moist.  Missing teeth, no oral lesions. Mild bitemporal muscle wasting  Eyes:  Conjunctivae are normal. Pupils equal, round, and reactive to light. No scleral icterus. Neck: No JVD. No tracheal deviation present. No obvious thyroid mass. Cardiovascular: Borderline sinus tachycardia, normal heart sounds. No right ventricular heave. No lower extremity edema. Pulmonary/Chest: Diminished air entry bilaterally. No wheezes. No rales. No accessory muscle usage or stridor. Abdominal: Soft. Bowel sounds present. No distension or tenderness. Musculoskeletal: No cyanosis. No clubbing. No obvious joint deformity. Lymphadenopathy: No cervical or supraclavicular adenopathy. Skin: Skin is warm and dry. No rash or nodules on the exposed extremities. Psychiatric: Normal mood and affect. Behavior is normal.  No anxiety. Neurologic: Alert, awake and oriented. PERRL. Speech fluent.   Reportedly has right arm weakness, detailed exam not performed    Results:  CBC:   Recent Labs     12/30/20  0430 12/31/20  0345 01/01/21  0340   WBC 11.9* 11.4* 13.2*   HGB 11.4* 11.3* 12.7*   HCT 35.9* 35.8* 39.7* MCV 87.3 85.9 86.6    316 337     BMP:   Recent Labs     12/30/20  0430 12/31/20  0345 01/01/21  0340   * 136 138   K 4.5 4.5 4.1   CL 95* 96* 97*   CO2 37* 35* 37*   PHOS 1.9* 3.5 3.4   BUN 24* 27* 29*   CREATININE 0.6* 0.6* 0.6*       Imaging:  I have reviewed radiology images personally. XR CHEST PORTABLE   Final Result   No pneumothorax status post right central venous line placement         CT CHEST PULMONARY EMBOLISM W CONTRAST   Final Result   1. No evidence of acute pulmonary embolism   2. Airspace consolidation in the lingula compatible with pneumonia         CT ABDOMEN PELVIS W IV CONTRAST Additional Contrast? None   Final Result   1. Dilated stomach that is gas and fluid filled. Some distention of the   proximal jejunum. No point of obstruction identified. 2. The appendix is not visualized   3. No gallstones   4. No obstructive uropathy         XR CHEST PORTABLE   Final Result   Mild blunting of the left costophrenic angle, possibly trace left pleural   fluid. No other change from the prior study taking into account varying   radiographic technique. Moderate to severe emphysema.            Echocardiogram Transesophageal    Result Date: 12/31/2020 Transesophageal Echocardiography Report (DIVYA)  Demographics   Patient Name       Latasha Seymour   Date of Study      12/31/2020         Gender              Male   Patient Number     [id number]         Date of Birth       1970   Visit Number       [id number]          Age                 48 year(s)   Accession Number   [id number]         Room Number         9377   Corporate ID       [id number]           Sonographer         Janna Castellano   Ordering Physician Beni Tinoco MD, Tomas Balderas     Physician           Lachelle Oviedo MD, [facility] [place], [facility]  Procedure Type of Study   DIVYA procedure:ECHOCARDIOGRAM TRANSESOPHAGEAL. Procedure Date Date: 12/31/2020 Start: 09:27 AM Study Location: [address] [place] [address] - Echo Lab Technical Quality: Good visualization Additional Indications:Abn echo. Patient Status: Routine Height: 60 inches Weight: 100 pounds BSA: 1.39 m2 BMI: 19.53 kg/m2 BP: 101/71 mmHg DIVYA Performed By: the attending and the sonographer  Conclusions   Summary  Normal left ventricle size, wall thickness and systolic function with an  estimated ejection fraction of 55%. No regional wall motion abnormalities are seen. A bubble study was performed and showed evidence of right to left shunt  consistent with a patent foramen ovale or interatrial septum defects. Tricuspid valve is structurally normal.  No tricuspid valve vegetation or masses visualized. Moderate to severe eccentric tricuspid regurgitation. Signature   ------------------------------------------------------------------  Electronically signed by Lachelle Oviedo MD, Tomas Balderas  (Interpreting physician) on 12/31/2020 at 11:11 AM  ------------------------------------------------------------------   Findings   Left Ventricle  Normal left ventricle size, wall thickness and systolic function with an  estimated ejection fraction of 55%. No regional wall motion abnormalities are seen. Mitral Valve  The mitral valve appears normal in structure. Trivial mitral regurgitation. Left Atrium  A bubble study was performed and showed evidence of right to left shunt  consistent with a patent foramen ovale or interatrial septum defects. Aortic Valve  The aortic valve appears structurally normal.  No evidence of aortic valve regurgitation. Aorta  The aortic root is normal in size. Tricuspid Valve  Tricuspid valve is structurally normal.  No tricuspid valve vegetation or masses visualized. Moderate to severe eccentric tricuspid regurgitation. Pulmonic Valve  The pulmonic valve is structurally normal.  No evidence of pulmonic valve regurgitation. Pericardial Effusion  No pericardial effusion noted. Pleural Effusion  No pleural effusion. Miscellaneous  IVC not well visualized.       Echo Complete    Result Date: 12/31/2020 Transthoracic Echocardiography Report (TTE)  Demographics   Patient Name       [facility]   Date of Study      12/31/2020         Gender              Male   Patient Number     [id number]         Date of Birth       1970   Visit Number       [id number]          Age                 48 year(s)   Accession Number   [id number]         Room Number         1424   Corporate ID       [id number]           [address] [place] [address]   Ordering Physician Kell Aguirre MD, Brady Lopez     Physician           Kavon Rey MD, [facility] [place], [facility]  Procedure Type of Study   TTE procedure:ECHOCARDIOGRAM COMPLETE 2D W DOPPLER W COLOR. Procedure Date Date: 12/31/2020 Start: 06:53 AM Study Location: [address] [place] [address] Technical Quality: Adequate visualization Indications:Endocarditis. Patient Status: Routine Height: 60 inches Weight: 100 pounds BSA: 1.39 m2 BMI: 19.53 kg/m2 BP: 116/76 mmHg  Conclusions   Summary  Normal left ventricle systolic function with an estimated ejection fraction  of 55%. No regional wall motion abnormalities are seen. Normal left ventricular diastolic filling pressure. Moderate to severe eccentric tricuspid regurgitation. Systolic pulmonary artery pressure (SPAP) estimated at 55 mmHg (right atrial  pressure 8 mmHg), consistent with moderate pulmonary hypertension. Signature   ------------------------------------------------------------------  Electronically signed by Kavon Rey MD, Brady Lopez  (Interpreting physician) on 12/31/2020 at 10:19 AM  ------------------------------------------------------------------   Findings   Left Ventricle  Normal left ventricle size, wall thickness and systolic function with an  estimated ejection fraction of 55%. No regional wall motion abnormalities are seen. Normal left ventricular diastolic filling pressure. Mitral Valve  The mitral valve is normal in structure and function. Trace mitral regurgitation. Left Atrium  The left atrium is normal in size. Aortic Valve  The aortic valve is normal in structure and function. There is no evidence of aortic regurgitation. Aorta  The aortic root is normal in size. Right Ventricle  The right ventricle is normal in size and function. TAPSE is measured at 22 mm. S'' prime velocity is measured at 13.8 cm/s. Tricuspid Valve  The tricuspid valve is normal in structure. Moderate to severe eccentric tricuspid regurgitation. Systolic pulmonary artery pressure (SPAP) estimated at 55 mmHg (right atrial  pressure 8 mmHg), consistent with moderate pulmonary hypertension. Right Atrium  The right atrium is normal in size. Right atrial area is 11.5 cm2. Pulmonic Valve  The pulmonic valve is normal in structure and function. No evidence of pulmonic regurgitation. Pericardial Effusion  No pericardial effusion noted. Pleural Effusion  No pleural effusion. Miscellaneous  No obvious masses, thrombi or vegetations are noted. IVC is dilated (> 2.1 cm) and collapses > 50% with respiration consistent  with elevated right atrial pressure (8 mmHg).   M-Mode/2D Measurements (cm)   LV Diastolic Dimension: 0.03 cm LV Systolic Dimension: 8.59 cm  LV Septum Diastolic: 3.78 cm  LV PW Diastolic: 2.65 cm        AO Root Dimension: 2.3 cm                                  AV Cusp Separation: 1.5 cm                                  LA Dimension: 2.3 cm                                  LA Area: 13 cm2                                  LA volume/Index: 29 ml /21 ml/m2  Doppler Measurements   AV Peak Velocity: 118 cm/s    MV Peak E-Wave: 74 cm/s  AV Peak Gradient: 5.57 mmHg   MV Peak A-Wave: 71.1 cm/s  LVOT Peak Velocity: 78 cm/s   MV E/A Ratio: 1.04   TR Velocity:343 cm/s  TR Gradient:47.06 mmHg  Estimated RAP:8 mmHg  Estimated RVSP: 55 mmHg  E' Septal Velocity: 9.75 cm/s  E' Lateral Velocity: 11 cm/s   Aortic Valve   Peak Velocity: 118 cm/s  Peak Gradient: 5.57 mmHg   Cusp Separation: 1.5 cm  Aorta   Aortic Root: 2.3 cm      Ct Head Wo Contrast    Result Date: 12/5/2020  EXAMINATION: CT OF THE HEAD WITHOUT CONTRAST  12/5/2020 8:00 am TECHNIQUE: CT of the head was performed without the administration of intravenous contrast. Dose modulation, iterative reconstruction, and/or weight based adjustment of the mA/kV was utilized to reduce the radiation dose to as low as reasonably achievable. COMPARISON: 06/24/2015 HISTORY: ORDERING SYSTEM PROVIDED HISTORY: Altered mental status with acute encephalopathy TECHNOLOGIST PROVIDED HISTORY: Reason for exam:->Altered mental status with acute encephalopathy Has a \"code stroke\" or \"stroke alert\" been called? ->No Reason for Exam: Altered mental status with acute encephalopathy Acuity: Acute Type of Exam: Initial FINDINGS: BRAIN/VENTRICLES: There is some motion artifact. Within this limitation, there is no evidence of hemorrhage, edema, or mass effect. There is a small focus of encephalomalacia in the posterior left frontal lobe deep to a craniotomy defect. There are no abnormal extra-axial fluid collections ORBITS: The visualized portion of the orbits demonstrate no acute abnormality. SINUSES: The visualized paranasal sinuses and mastoid air cells demonstrate no acute abnormality. SOFT TISSUES/SKULL:  No acute abnormality of the skull. Old left frontoparietal craniotomy     No acute intracranial abnormality.      Ct Abdomen Pelvis W Iv Contrast Additional Contrast? None    Result Date: 12/25/2020 EXAMINATION: CT OF THE ABDOMEN AND PELVIS WITH CONTRAST 12/25/2020 9:12 pm TECHNIQUE: CT of the abdomen and pelvis was performed with the administration of intravenous contrast. Multiplanar reformatted images are provided for review. Dose modulation, iterative reconstruction, and/or weight based adjustment of the mA/kV was utilized to reduce the radiation dose to as low as reasonably achievable. COMPARISON: None. HISTORY: ORDERING SYSTEM PROVIDED HISTORY: abdominal pain TECHNOLOGIST PROVIDED HISTORY: Reason for exam:->abdominal pain Additional Contrast?->None Reason for Exam: abdominal pain Acuity: Acute Type of Exam: Initial FINDINGS: Lower Chest: Unremarkable. Organs: Liver appears unremarkable. Gallbladder mildly contracted but otherwise normal.  No focal splenic abnormality. The pancreas and kidneys appear normal. GI/Bowel: The stomach is dilated. Dilated bowel loops in the left upper quadrant. These appear to represent small bowel. Small bowel otherwise is nondistended. No focal bowel wall thickening identified. The appendix is not definitely visualized. Pelvis: No mass lesion. No abnormal fluid collection. No bladder or ureteral stone. Peritoneum/Retroperitoneum: Atherosclerotic changes. No adenopathy. No extraluminal gas. Bones/Soft Tissues: No soft tissue hernia. No acute fracture. No lytic or blastic lesion. 1. Dilated stomach that is gas and fluid filled. Some distention of the proximal jejunum. No point of obstruction identified. 2. The appendix is not visualized 3. No gallstones 4.  No obstructive uropathy     Xr Chest Portable    Result Date: 12/27/2020 EXAMINATION: ONE XRAY VIEW OF THE CHEST 12/27/2020 8:32 pm COMPARISON: CT and chest x-ray 12/25/2020 HISTORY: ORDERING SYSTEM PROVIDED HISTORY: Right subclavian CVC placement TECHNOLOGIST PROVIDED HISTORY: Reason for exam:->Right subclavian CVC placement Reason for Exam: cvc placement Acuity: Acute Type of Exam: Initial FINDINGS: Interval placement of right subclavian central venous line. No pneumothorax. Heart size normal.  Lungs grossly clear. Lingular infiltrate demonstrated on the CT scan is not well visualized radiographically. Opacity overlying the right upper chest is a superficial artifact     No pneumothorax status post right central venous line placement     Xr Chest Portable    Result Date: 12/25/2020  EXAMINATION: ONE XRAY VIEW OF THE CHEST 12/25/2020 6:52 pm COMPARISON: 12/08/2020 and 12/14/2020 HISTORY: ORDERING SYSTEM PROVIDED HISTORY: chest pain TECHNOLOGIST PROVIDED HISTORY: Reason for exam:->chest pain Reason for Exam: chest pain Acuity: Acute Type of Exam: Initial FINDINGS: Moderate to severe emphysema. The patient is rotated to the left on this portable examination accentuating the chronically increased interstitial lung markings particularly in the left lung. Trace left pleural fluid suspected with blunting of the costophrenic angle. There is no pneumothorax. Heart size is normal.  Unchanged bone exostosis projecting medially from the proximal shaft of the left humerus. Mild blunting of the left costophrenic angle, possibly trace left pleural fluid. No other change from the prior study taking into account varying radiographic technique. Moderate to severe emphysema.      Xr Chest Portable    Result Date: 12/14/2020 EXAMINATION: ONE XRAY VIEW OF THE CHEST 12/14/2020 6:53 pm COMPARISON: 12/08/2020 HISTORY: ORDERING SYSTEM PROVIDED HISTORY: cough, sob, +covid TECHNOLOGIST PROVIDED HISTORY: Reason for exam:->cough, sob, +covid Reason for Exam: Pt c/o cough and SOB, pt is covid positive Acuity: Unknown Type of Exam: Unknown FINDINGS: Heart size and pulmonary vasculature are normal however there is interstitial prominence nearly diffusely somewhat sparing the right apex. There is hazy increased density in the mid and lower lung zones on the left. No consolidating infiltrate, pneumothorax or pleural effusion. Since the comparison exam, the endotracheal and NG tubes and the right PICC line have been removed. Surrounding osseous and soft tissue structures are unremarkable. Findings remain compatible with the patient's history of COVID-19 positivity. Xr Chest Portable    Result Date: 12/8/2020  EXAMINATION: ONE XRAY VIEW OF THE CHEST 12/8/2020 7:24 am COMPARISON: 12/07/2020 HISTORY: ORDERING SYSTEM PROVIDED HISTORY: Mechanical ventilation TECHNOLOGIST PROVIDED HISTORY: Reason for exam:->Mechanical ventilation Reason for Exam: daily while on vent Acuity: Acute Type of Exam: Initial FINDINGS: Cardiac leads project over the chest.  Nasogastric tube extends to the left upper quadrant. Endotracheal tube extends to the mid trachea. Right upper extremity PICC line is demonstrated. Calcified pulmonary nodules, in keeping with granulomatous disease. No confluent airspace disease. No pneumothorax. Cardiac and mediastinal silhouettes are reflective of patient rotation. No acute airspace disease.      Xr Chest Portable    Result Date: 12/7/2020 EXAMINATION: ONE XRAY VIEW OF THE CHEST 12/7/2020 3:50 am COMPARISON: Chest x-ray dated 12/06/2020 HISTORY: ORDERING SYSTEM PROVIDED HISTORY: Mechanical ventilation TECHNOLOGIST PROVIDED HISTORY: Reason for exam:->Mechanical ventilation Reason for Exam: resp distress Acuity: Acute Additional signs and symptoms: f/u ETT FINDINGS: LINES/TUBES/OTHER: Endotracheal tube, enteric tube and right arm PICC are grossly unchanged in position. HEART/MEDIASTINUM: The cardiomediastinal silhouette is unchanged. PLEURA/LUNGS: The lungs are again hyperinflated. Calcified granulomas are again noted bilaterally. There are no focal consolidations or pleural effusions. There is no appreciable pneumothorax. BONES/SOFT TISSUE: The visualized osseous structures are unchanged. Stable lines and tubes. No acute cardiopulmonary disease. Xr Chest Portable    Result Date: 12/6/2020  EXAMINATION: ONE XRAY VIEW OF THE CHEST 12/6/2020 4:37 am COMPARISON: Yesterday HISTORY: ORDERING SYSTEM PROVIDED HISTORY: Mechanical ventilation TECHNOLOGIST PROVIDED HISTORY: Reason for exam:->Mechanical ventilation Reason for Exam: resp distress Acuity: Acute Type of Exam: Subsequent/Follow-up Additional signs and symptoms: f/u ETT FINDINGS: The endotracheal tube terminates 2.5 cm cephalad to the lilo. Nasogastric tube has a normal course. Cardiac silhouette is normal.  Calcified granuloma project over the right paratracheal region. The lungs remain hyperinflated. Scattered calcified granuloma are in the lungs. No consolidation, pleural effusion or pneumothorax is seen. Stable position of supportive tubing. No acute cardiopulmonary disease.      Xr Chest Portable    Result Date: 12/5/2020 EXAMINATION: ONE XRAY VIEW OF THE CHEST 12/5/2020 1:50 pm COMPARISON: 12/05/2020 HISTORY: ORDERING SYSTEM PROVIDED HISTORY: intubation TECHNOLOGIST PROVIDED HISTORY: Reason for exam:->intubation Reason for Exam: intubation Acuity: Acute Type of Exam: Initial FINDINGS: The patient is rotated. Endotracheal tube terminates 5 cm cephalad to the lilo. Nasogastric tube has a normal course, distal side port at the level of the gastric fundus. The cardiac silhouette is normal.  No airspace consolidation, pleural effusion or pneumothorax is identified. Supportive tubing projects in normal position. No acute cardiopulmonary disease. Xr Chest Portable    Result Date: 12/5/2020  EXAMINATION: ONE XRAY VIEW OF THE CHEST 12/5/2020 6:48 am COMPARISON: Chest x-ray dated 11/21/2020 HISTORY: ORDERING SYSTEM PROVIDED HISTORY: SOB TECHNOLOGIST PROVIDED HISTORY: Reason for exam:->SOB Reason for Exam: SOB Acuity: Acute Type of Exam: Initial FINDINGS: HEART/MEDIASTINUM: The cardiomediastinal silhouette is within normal limits. PLEURA/LUNGS: There is hazy opacity at the right costophrenic angle which may reflect atelectasis versus airspace disease. There are no focal consolidations or pleural effusions. There is no appreciable pneumothorax. BONES/SOFT TISSUE: No acute abnormality. Hazy opacity at the right costophrenic angle which may reflect atelectasis versus airspace disease.      Ct Chest Pulmonary Embolism W Contrast    Result Date: 12/25/2020 EXAMINATION: CTA OF THE CHEST 12/25/2020 8:56 pm TECHNIQUE: CTA of the chest was performed after the administration of intravenous contrast.  Multiplanar reformatted images are provided for review. MIP images are provided for review. Dose modulation, iterative reconstruction, and/or weight based adjustment of the mA/kV was utilized to reduce the radiation dose to as low as reasonably achievable. COMPARISON: 12/14/2020 HISTORY: ORDERING SYSTEM PROVIDED HISTORY: sob/cough TECHNOLOGIST PROVIDED HISTORY: Reason for exam:->sob/cough Reason for Exam: patient complains of sob and pain in left side of chest, cough Acuity: Acute Type of Exam: Initial Additional signs and symptoms: drug user Relevant Medical/Surgical History: copd FINDINGS: Pulmonary Arteries: Pulmonary arteries are adequately opacified. There is some distortion from motion artifact. Within this limitation, no acute emboli are demonstrated. The chronic nonocclusive right lower lobe PE demonstrated on the comparison exam is distorted by motion. Mediastinum: Thoracic aorta normal in caliber. No pericardial effusion. No mediastinal adenopathy Lungs/pleura: Pulmonary emphysema. New airspace consolidation in the lingula. Near complete resolution of lateral right basilar lung nodule seen previously. No pleural effusion Upper Abdomen: Limited images of the upper abdomen are unremarkable. Soft Tissues/Bones: No acute bony abnormality. Stable T3 and T8 wedge compression fracture     1. No evidence of acute pulmonary embolism 2.  Airspace consolidation in the lingula compatible with pneumonia     Ct Chest Pulmonary Embolism W Contrast    Result Date: 12/15/2020 Chronic nonocclusive segmental PE involving the pulmonary artery to the posterior basal segment of the right lower lobe. No evidence of acute pulmonary embolism. COPD. A 0.7 cm pleural based nodule versus focal atelectasis in the right lower lobe. Follow-up CT scan in 6 months may be obtained. I discussed the findings by phone with the ordering physician assistant, Michael Holstein, at 12:37 a.m. on 12/15/2020 RECOMMENDATIONS: CT scan of the chest in 6 months     Ir Picc Wo Sq Port/pump > 5 Years    Result Date: 12/7/2020  EXAMINATION: LIMITED ULTRASOUND OF THE ARM FOR PICC ACCESS, 12/6/2020 TECHNIQUE: The PICC team used ultrasound and VPS guidance to place a PICC line. HISTORY: ORDERING SYSTEM PROVIDED HISTORY: limited access TECHNOLOGIST PROVIDED HISTORY: Reason for exam:->limited access How many lumens are being requested?->2 What site is the preferred site?->Other (Comment) What side should this line be placed? ->Either Reason for Exam: limited access Acuity: Acute Type of Exam: Initial Relevant Medical/Surgical History: 37cm, 5fr, dual lumen, right brachial, non-tunneled power picc, US/VPS guidance FLUOROSCOPY DOSE AND TYPE OR TIME AND EXPOSURES: Air Kerma: 0 mGy FINDINGS: Ultrasound images demonstrate patency of the right brachial vein which was used for access for placement of a PICC by the PICC team, without a radiologist present. VPS guidance was used by the PICC team By report, a 37 cm dual lumen PICC line was placed. Right PICC line placement. Please refer to nursing note for VPS evaluation of line placement. Assessment and plan:  Acute respiratory failure, hypoxemia. Weaned off O2, room air SaO2 95%. However keeps asking for it due to shortness of breath as per nursing. Explained the rationale of oxygen to the patient  Pneumonia. Small patch of lingular pneumonia.   On cefazolin COPD/emphysema. CT chest with significant emphysema, is on oral steroid but was not placed him on scheduled bronchodilator. Will place him on DuoNeb 4 times a day scheduled  Acute encephalopathy. Presumed toxic. Resolved. Recent Covid-19 infection. No obvious residua  Staph aureus bacteremia, sepsis. Not septic, on cefazolin. Presumed endocarditis,  TR, PFO. Cardiology following  IVDU, methamphetamine abue. He claims his last IV drug use was a month ago DIVYA without vegetation  Malnutrition. Per nutrition  DVT prophyalxis. Lovenox. The patient has improved from the cardiopulmonary standpoint, we will sign off. Please call with questions and thank you for consultation. Discussed with the patient, nursing.       Electronically signed by:  Conrado Smith MD    1/1/2021    11:53 AM.

## 2021-01-01 NOTE — PROGRESS NOTES
Hospitalist Progress Note      PCP: No primary care provider on file. Date of Admission: 2020    Chief Complaint: SOB    Subjective: no new c/o. Medications:  Reviewed    Infusion Medications    dextrose       Scheduled Medications    predniSONE  40 mg Oral Daily    insulin lispro  0-12 Units Subcutaneous TID WC    insulin lispro  0-6 Units Subcutaneous Nightly    mupirocin   Nasal BID    ceFAZolin  2 g Intravenous Q8H    chlordiazePOXIDE  5 mg Oral TID    sodium chloride flush  10 mL Intravenous 2 times per day    enoxaparin  40 mg Subcutaneous Daily     PRN Meds: perflutren lipid microspheres, ipratropium-albuterol, glucose, dextrose, glucagon (rDNA), dextrose, metoprolol, LORazepam, sodium chloride flush, acetaminophen **OR** acetaminophen, traZODone, [] buprenorphine **AND** cloNIDine, hydrOXYzine, dicyclomine, perflutren lipid microspheres      Intake/Output Summary (Last 24 hours) at 2021 0842  Last data filed at 2021 0804  Gross per 24 hour   Intake 240 ml   Output 3760 ml   Net -3520 ml       Physical Exam Performed:    BP (!) 122/91   Pulse 96   Temp 97.8 °F (36.6 °C) (Oral)   Resp 16   Ht 5' (1.524 m)   Wt 90 lb (40.8 kg)   SpO2 95%   BMI 17.58 kg/m²     General appearance: No apparent distress, appears stated age and cooperative. HEENT: Pupils equal, round, and reactive to light. Conjunctivae/corneas clear. Neck: Supple, with full range of motion. No jugular venous distention. Trachea midline. Respiratory:  Normal respiratory effort. Clear to auscultation, bilaterally without Rales/Wheezes/Rhonchi. Cardiovascular: Regular rate and rhythm with normal S1/S2 without murmurs, rubs or gallops. Abdomen: Soft, non-tender, non-distended with normal bowel sounds. Musculoskeletal: No clubbing, cyanosis or edema bilaterally. Full range of motion without deformity. Skin: Skin color, texture, turgor normal.  No rashes or lesions. Neurologic:  Neurovascularly intact without any focal sensory/motor deficits. Cranial nerves: II-XII intact, grossly non-focal.  Psychiatric: Alert and oriented, thought content appropriate, normal insight  Capillary Refill: Brisk,< 3 seconds   Peripheral Pulses: +2 palpable, equal bilaterally       Labs:   Recent Labs     12/30/20 0430 12/31/20 0345 01/01/21  0340   WBC 11.9* 11.4* 13.2*   HGB 11.4* 11.3* 12.7*   HCT 35.9* 35.8* 39.7*    316 337     Recent Labs     12/30/20 0430 12/31/20 0345 01/01/21  0340   * 136 138   K 4.5 4.5 4.1   CL 95* 96* 97*   CO2 37* 35* 37*   BUN 24* 27* 29*   CREATININE 0.6* 0.6* 0.6*   CALCIUM 8.7 8.6 8.6   PHOS 1.9* 3.5 3.4     No results for input(s): AST, ALT, BILIDIR, BILITOT, ALKPHOS in the last 72 hours. No results for input(s): INR in the last 72 hours. No results for input(s): Coralyn Beard in the last 72 hours.     Urinalysis:      Lab Results   Component Value Date    NITRU Negative 12/25/2020    WBCUA 3-5 12/25/2020    BACTERIA 2+ 07/03/2020    RBCUA 3-4 12/25/2020    BLOODU SMALL 12/25/2020    SPECGRAV 1.020 12/25/2020    GLUCOSEU 500 12/25/2020       Consults:    IP CONSULT TO HOSPITALIST  IP CONSULT TO CASE MANAGEMENT  IP CONSULT TO PHARMACY  IP CONSULT TO DIETITIAN  IP CONSULT TO PULMONOLOGY  IP CONSULT TO PULMONOLOGY  IP CONSULT TO INFECTIOUS DISEASES  IP CONSULT TO CARDIOLOGY      Assessment/Plan:    Active Hospital Problems    Diagnosis    Tricuspid regurgitation [I07.1]    PFO (patent foramen ovale) [Q21.1]    IV drug abuse ([facility] [F19.10]    Hyperglycemia [R73.9]    Pulmonary infiltrate [R91.8]    Bacteremia due to Staphylococcus aureus [R78.81, B95.61]    Delirium [R41.0]    Sepsis ([address] [A41.9]    Pneumonia due to organism [J18.9]    Toxic encephalopathy [G92]    [facility] [J96.01]    Methamphetamine abuse ([facility] [F15.10] Sepsis in patient with HAP, , lactic 2.6 and WBC 22.7 on admission  -tylenol given in ED  -30 ml/kg IVF given in ED  -cefepime and vanc given in ED and continued 12/25/2020  -MIVF  -tele monitoring  -repeat lactic     Bacteremia - with Staph in Blood Cx. Infectious Disease consulted and appreciated. Cardiology subsequently consulted s/p DIVYA w/out evidence of endocarditis.      Acute respiratory failure with hypoxia in setting of HAP  -CXR revealed: mild blunting of left costophrenic angle, possibly trace left pleural fluid. Moderate to severe emphysema  -CT chest pulmonary embolism was obtained that revealed no evidence of acute pulmonary embolism.  Airspace consolidation in the lingula compatible with pneumonia. -rapid COVID negative on admission  -positive COVID on 12/5/2020  -decadron given in ED  -solumedrol every 6 hours  -continuous pulse ox  -encourage coughing and deep breathing.  -antibiotics as indicated above   -pulm asked to weigh in     IVDU  -drug screen positive for amphetamines  -cessation discussed  -case management consult for drug program after discharge  -echo in am   -started COWS protocol with subutex/clonidine on 12/26   -started precedex ggt due to w/d symptoms, transferred to ICU on 12/26       DVT Prophylaxis: [address]  Diet: DIET GENERAL; Carb Control: 4 carb choices (60 gms)/meal  Code Status: Full Code      PT/OT Eval Status: seen w/ recs for lukas w/ assist.     Dispo - Possibly to home Friday/Sat 1/2 Jan pending clinical course and subspecialty recs.       Ginette Stuart MD

## 2021-01-03 LAB
BLOOD CULTURE, ROUTINE: ABNORMAL
ORGANISM: ABNORMAL

## 2021-01-03 NOTE — DISCHARGE SUMMARY
Hospital Medicine Discharge Summary    Patient ID: Noris Hoffmann      Patient's PCP: No primary care provider on file. Admit Date: 12/25/2020     Discharge Date: 1/1/2021      Admitting Physician: Daysi Guzmán MD     Discharge Physician: Dillon Siu MD     Discharge Diagnoses: Active Hospital Problems    Diagnosis    Tricuspid regurgitation [I07.1]    PFO (patent foramen ovale) [Q21.1]    IV drug abuse ([address] [F19.10]    Hyperglycemia [R73.9]    Pulmonary infiltrate [R91.8]    Bacteremia due to Staphylococcus aureus [R78.81, B95.61]    Delirium [R41.0]    Sepsis ([address] [A41.9]    Pneumonia due to organism [J18.9]    Toxic encephalopathy [G92]    Acute respiratory failure with hypoxia ([address] [J96.01]    Methamphetamine abuse ([facility] [F15.10]       The patient was seen and examined on day of discharge and this discharge summary is in conjunction with any daily progress note from day of discharge. Hospital Course: signed out AMA. Following from last progress note. Sepsis in patient with HAP, , lactic 2.6 and WBC 22.7 on admission  -tylenol given in ED  -30 ml/kg IVF given in ED  -cefepime and vanc given in ED and continued 12/25/2020  -MIVF  -tele monitoring  -repeat lactic     Bacteremia - with Staph in Blood Cx. Infectious Disease consulted and appreciated. Cardiology subsequently consulted s/p DIVYA w/out evidence of endocarditis.      Acute respiratory failure with hypoxia in setting of HAP  -CXR revealed: mild blunting of left costophrenic angle, possibly trace left pleural fluid. Moderate to severe emphysema  -CT chest pulmonary embolism was obtained that revealed no evidence of acute pulmonary embolism.  Airspace consolidation in the lingula compatible with pneumonia. -rapid COVID negative on admission  -positive COVID on 12/5/2020  -decadron given in ED  -solumedrol every 6 hours  -continuous pulse ox  -encourage coughing and deep breathing. -antibiotics as indicated above   -pulm asked to weigh in     IVDU  -drug screen positive for amphetamines  -cessation discussed  -case management consult for drug program after discharge  -echo in am   -started COWS protocol with subutex/clonidine on 12/26   -started precedex ggt due to w/d symptoms, transferred to ICU on 12/26       Labs: For convenience and continuity at follow-up the following most recent labs are provided:      CBC:    Lab Results   Component Value Date    WBC 13.2 01/01/2021    HGB 12.7 01/01/2021    HCT 39.7 01/01/2021     01/01/2021       Renal:    Lab Results   Component Value Date     01/01/2021    K 4.1 01/01/2021    K 4.1 12/28/2020    CL 97 01/01/2021    CO2 37 01/01/2021    BUN 29 01/01/2021    CREATININE 0.6 01/01/2021    CALCIUM 8.6 01/01/2021    PHOS 3.4 01/01/2021         Significant Diagnostic Studies    Radiology:   XR CHEST PORTABLE   Final Result   No pneumothorax status post right central venous line placement         CT CHEST PULMONARY EMBOLISM W CONTRAST   Final Result   1. No evidence of acute pulmonary embolism   2. Airspace consolidation in the lingula compatible with pneumonia         CT ABDOMEN PELVIS W IV CONTRAST Additional Contrast? None   Final Result   1. Dilated stomach that is gas and fluid filled. Some distention of the   proximal jejunum. No point of obstruction identified. 2. The appendix is not visualized   3. No gallstones   4. No obstructive uropathy         XR CHEST PORTABLE   Final Result   Mild blunting of the left costophrenic angle, possibly trace left pleural   fluid. No other change from the prior study taking into account varying   radiographic technique. Moderate to severe emphysema.                 Consults:     IP CONSULT TO HOSPITALIST  IP CONSULT TO CASE MANAGEMENT  IP CONSULT TO PHARMACY  IP CONSULT TO DIETITIAN  IP CONSULT TO PULMONOLOGY  IP CONSULT TO PULMONOLOGY  IP CONSULT TO INFECTIOUS DISEASES IP CONSULT TO CARDIOLOGY    Disposition: Home     Condition at Discharge: Stable    Discharge Instructions/Follow-up:  w/ PCP 1-2 weeks and subspecialists as arranged. Code Status:  Full Code    Activity: activity as tolerated    Diet: regular diet      Discharge Medications:     Discharge Medication List as of 1/1/2021  3:26 PM           Details   albuterol sulfate  (90 Base) MCG/ACT inhaler Inhale 2 puffs into the lungs every 4 hours as needed for Wheezing or Shortness of Breath, Disp-1 Inhaler,R-0Print             Time Spent on discharge is more than 30 minutes in the examination, evaluation, counseling and review of medications and discharge plan.       Signed:    Cha Garrido MD   1/3/2021

## 2021-01-04 ENCOUNTER — CARE COORDINATION (OUTPATIENT)
Dept: CASE MANAGEMENT | Age: 51
End: 2021-01-04

## 2021-01-04 NOTE — CARE COORDINATION
[facility] Trans[facility] Initial Follow Up Call    Call within 2 business days of discharge: Yes    Patient: Bipin Kim Patient : 1970   MRN: <[id number]>  Reason for Admission: Respiratory Distress  Discharge Date: 21 RARS: Readmission Risk Score: 45      Last Discharge [facility]       Complaint Diagnosis Description Type Department Provider    20 Shortness of Breath; COPD Respiratory distress . .. ED to Hosp-Admission (Discharged) (Serena Turner) Steven Patricio MD; Gabriella Cano. .. First attempt to reach patient by phone post hospital discharge. HIPAA compliant message left on patient's voicemail; CTN contact information provided.       Stephany Ervin RN

## 2021-01-05 ENCOUNTER — CARE COORDINATION (OUTPATIENT)
Dept: CASE MANAGEMENT | Age: 51
End: 2021-01-05

## 2021-01-05 NOTE — CARE COORDINATION
[facility] Transitions Bundled Payments for Care Improvement (BPCI) Follow Up Call  Qualifying Diagnosis of Sepsis related to PNA.    1/5/2021  Patient Name:  Anais Main   YOB: 1970  Discharge Date:  1/1/21  RARS:  Readmission Risk Score: 45    PCP:  No primary care provider on file. Spoke with a child who answers the phone stating, \"He ain't here\". Care Transitions will continue to follow per [facility].  Katy Graff RN, CTN    No future appointments.

## 2021-01-12 ENCOUNTER — CARE COORDINATION (OUTPATIENT)
Dept: CASE MANAGEMENT | Age: 51
End: 2021-01-12

## 2021-01-12 NOTE — CARE COORDINATION
[facility] Transitions Follow Up Call    2021    Patient: Citlaly Demarco  Patient : 1970   MRN: [id number]  Reason for Admission: Respiratory distress  Discharge Date: 21 RARS: Readmission Risk Score: 45       Attempted to contact patient for BPCI-A call. Contact information left to  requesting call back at the earliest convenience. Alternate number \"Not reachable\" recording. Follow Up  No future appointments.     Maude Hughes RN

## 2021-01-26 ENCOUNTER — CARE COORDINATION (OUTPATIENT)
Dept: CASE MANAGEMENT | Age: 51
End: 2021-01-26

## 2021-01-26 NOTE — CARE COORDINATION
[facility] Follow Up Call    2021    Patient: Pema Doctor  Patient : 1970   MRN: [id number]  Reason for Admission:   Discharge Date: 21 RARS: Readmission Risk Score: 45    Follow Up: Attempted to contact patient for BPCI-A follow up. Unable to reach patient. Left message with contact information and request for call back. No future appointments.     Sacha Allen RN

## 2021-01-31 ENCOUNTER — APPOINTMENT (OUTPATIENT)
Dept: GENERAL RADIOLOGY | Age: 51
End: 2021-01-31
Payer: MEDICARE

## 2021-01-31 ENCOUNTER — HOSPITAL ENCOUNTER (EMERGENCY)
Age: 51
Discharge: ANOTHER HOSPITAL | End: 2021-02-01
Attending: EMERGENCY MEDICINE
Payer: MEDICARE

## 2021-01-31 ENCOUNTER — APPOINTMENT (OUTPATIENT)
Dept: CT IMAGING | Age: 51
End: 2021-01-31
Payer: MEDICARE

## 2021-01-31 DIAGNOSIS — R21 RASH: ICD-10-CM

## 2021-01-31 DIAGNOSIS — J43.9 PULMONARY EMPHYSEMA, UNSPECIFIED EMPHYSEMA TYPE (HCC): ICD-10-CM

## 2021-01-31 DIAGNOSIS — R91.8 ABNORMAL CT SCAN OF LUNG: ICD-10-CM

## 2021-01-31 DIAGNOSIS — J96.01 ACUTE RESPIRATORY FAILURE WITH HYPOXIA (HCC): Primary | ICD-10-CM

## 2021-01-31 DIAGNOSIS — F19.90 IV DRUG USER: ICD-10-CM

## 2021-01-31 LAB
A/G RATIO: 1 (ref 1.1–2.2)
ALBUMIN SERPL-MCNC: 3.5 G/DL (ref 3.4–5)
ALP BLD-CCNC: 92 U/L (ref 40–129)
ALT SERPL-CCNC: 35 U/L (ref 10–40)
ANION GAP SERPL CALCULATED.3IONS-SCNC: 9 MMOL/L (ref 3–16)
AST SERPL-CCNC: 56 U/L (ref 15–37)
BASOPHILS ABSOLUTE: 0 K/UL (ref 0–0.2)
BASOPHILS RELATIVE PERCENT: 0.7 %
BILIRUB SERPL-MCNC: 0.6 MG/DL (ref 0–1)
BUN BLDV-MCNC: 8 MG/DL (ref 7–20)
CALCIUM SERPL-MCNC: 8.7 MG/DL (ref 8.3–10.6)
CHLORIDE BLD-SCNC: 98 MMOL/L (ref 99–110)
CO2: 28 MMOL/L (ref 21–32)
CREAT SERPL-MCNC: 0.6 MG/DL (ref 0.9–1.3)
D DIMER: 793 NG/ML DDU (ref 0–229)
EOSINOPHILS ABSOLUTE: 0.1 K/UL (ref 0–0.6)
EOSINOPHILS RELATIVE PERCENT: 1.2 %
GFR AFRICAN AMERICAN: >60
GFR NON-AFRICAN AMERICAN: >60
GLOBULIN: 3.5 G/DL
GLUCOSE BLD-MCNC: 102 MG/DL (ref 70–99)
HCT VFR BLD CALC: 41.1 % (ref 40.5–52.5)
HEMOGLOBIN: 13 G/DL (ref 13.5–17.5)
INR BLD: 1.06 (ref 0.86–1.14)
LACTIC ACID, SEPSIS: 1 MMOL/L (ref 0.4–1.9)
LYMPHOCYTES ABSOLUTE: 1 K/UL (ref 1–5.1)
LYMPHOCYTES RELATIVE PERCENT: 23.6 %
MCH RBC QN AUTO: 28 PG (ref 26–34)
MCHC RBC AUTO-ENTMCNC: 31.7 G/DL (ref 31–36)
MCV RBC AUTO: 88.5 FL (ref 80–100)
MONOCYTES ABSOLUTE: 0.4 K/UL (ref 0–1.3)
MONOCYTES RELATIVE PERCENT: 9.1 %
NEUTROPHILS ABSOLUTE: 2.9 K/UL (ref 1.7–7.7)
NEUTROPHILS RELATIVE PERCENT: 65.4 %
PDW BLD-RTO: 16 % (ref 12.4–15.4)
PLATELET # BLD: 188 K/UL (ref 135–450)
PMV BLD AUTO: 7.2 FL (ref 5–10.5)
POTASSIUM REFLEX MAGNESIUM: 3.9 MMOL/L (ref 3.5–5.1)
PRO-BNP: 203 PG/ML (ref 0–124)
PROCALCITONIN: 0.08 NG/ML (ref 0–0.15)
PROTHROMBIN TIME: 12.3 SEC (ref 10–13.2)
RBC # BLD: 4.64 M/UL (ref 4.2–5.9)
SARS-COV-2, NAAT: NOT DETECTED
SEDIMENTATION RATE, ERYTHROCYTE: 12 MM/HR (ref 0–20)
SODIUM BLD-SCNC: 135 MMOL/L (ref 136–145)
TOTAL PROTEIN: 7 G/DL (ref 6.4–8.2)
TROPONIN: <0.01 NG/ML
WBC # BLD: 4.4 K/UL (ref 4–11)

## 2021-01-31 PROCEDURE — 85379 FIBRIN DEGRADATION QUANT: CPT

## 2021-01-31 PROCEDURE — 80053 COMPREHEN METABOLIC PANEL: CPT

## 2021-01-31 PROCEDURE — 85610 PROTHROMBIN TIME: CPT

## 2021-01-31 PROCEDURE — 96365 THER/PROPH/DIAG IV INF INIT: CPT

## 2021-01-31 PROCEDURE — U0002 COVID-19 LAB TEST NON-CDC: HCPCS

## 2021-01-31 PROCEDURE — 85025 COMPLETE CBC W/AUTO DIFF WBC: CPT

## 2021-01-31 PROCEDURE — 71045 X-RAY EXAM CHEST 1 VIEW: CPT

## 2021-01-31 PROCEDURE — 6360000002 HC RX W HCPCS: Performed by: NURSE PRACTITIONER

## 2021-01-31 PROCEDURE — 36415 COLL VENOUS BLD VENIPUNCTURE: CPT

## 2021-01-31 PROCEDURE — 84484 ASSAY OF TROPONIN QUANT: CPT

## 2021-01-31 PROCEDURE — 87040 BLOOD CULTURE FOR BACTERIA: CPT

## 2021-01-31 PROCEDURE — 6360000004 HC RX CONTRAST MEDICATION: Performed by: NURSE PRACTITIONER

## 2021-01-31 PROCEDURE — 85652 RBC SED RATE AUTOMATED: CPT

## 2021-01-31 PROCEDURE — 83605 ASSAY OF LACTIC ACID: CPT

## 2021-01-31 PROCEDURE — 86140 C-REACTIVE PROTEIN: CPT

## 2021-01-31 PROCEDURE — 83880 ASSAY OF NATRIURETIC PEPTIDE: CPT

## 2021-01-31 PROCEDURE — 71260 CT THORAX DX C+: CPT

## 2021-01-31 PROCEDURE — 96367 TX/PROPH/DG ADDL SEQ IV INF: CPT

## 2021-01-31 PROCEDURE — 2580000003 HC RX 258: Performed by: NURSE PRACTITIONER

## 2021-01-31 PROCEDURE — 84145 PROCALCITONIN (PCT): CPT

## 2021-01-31 PROCEDURE — 93005 ELECTROCARDIOGRAM TRACING: CPT | Performed by: NURSE PRACTITIONER

## 2021-01-31 PROCEDURE — 99284 EMERGENCY DEPT VISIT MOD MDM: CPT

## 2021-01-31 RX ADMIN — VANCOMYCIN HYDROCHLORIDE 1000 MG: 1 INJECTION, POWDER, LYOPHILIZED, FOR SOLUTION INTRAVENOUS at 22:14

## 2021-01-31 RX ADMIN — IOPAMIDOL 75 ML: 755 INJECTION, SOLUTION INTRAVENOUS at 22:00

## 2021-01-31 RX ADMIN — CEFEPIME 2000 MG: 2 INJECTION, POWDER, FOR SOLUTION INTRAVENOUS at 21:14

## 2021-01-31 ASSESSMENT — ENCOUNTER SYMPTOMS
COLOR CHANGE: 0
SORE THROAT: 0
ABDOMINAL PAIN: 0
RHINORRHEA: 0
SHORTNESS OF BREATH: 1
NAUSEA: 0
VOMITING: 0
DIARRHEA: 0

## 2021-02-01 ENCOUNTER — APPOINTMENT (OUTPATIENT)
Dept: INTERVENTIONAL RADIOLOGY/VASCULAR | Age: 51
DRG: 307 | End: 2021-02-01
Attending: INTERNAL MEDICINE
Payer: MEDICARE

## 2021-02-01 ENCOUNTER — HOSPITAL ENCOUNTER (INPATIENT)
Age: 51
LOS: 3 days | Discharge: LEFT BEFORE DISCHARGE | DRG: 307 | End: 2021-02-04
Attending: INTERNAL MEDICINE | Admitting: INTERNAL MEDICINE
Payer: MEDICARE

## 2021-02-01 VITALS
BODY MASS INDEX: 19.63 KG/M2 | SYSTOLIC BLOOD PRESSURE: 93 MMHG | RESPIRATION RATE: 12 BRPM | TEMPERATURE: 98.3 F | DIASTOLIC BLOOD PRESSURE: 76 MMHG | OXYGEN SATURATION: 100 % | WEIGHT: 100 LBS | HEIGHT: 60 IN | HEART RATE: 75 BPM

## 2021-02-01 LAB
AMORPHOUS: ABNORMAL /HPF
AMPHETAMINE SCREEN, URINE: ABNORMAL
ANION GAP SERPL CALCULATED.3IONS-SCNC: 8 MMOL/L (ref 3–16)
BACTERIA: ABNORMAL /HPF
BARBITURATE SCREEN URINE: ABNORMAL
BASOPHILS ABSOLUTE: 0 K/UL (ref 0–0.2)
BASOPHILS RELATIVE PERCENT: 0.5 %
BENZODIAZEPINE SCREEN, URINE: ABNORMAL
BILIRUBIN URINE: ABNORMAL
BLOOD, URINE: ABNORMAL
BUN BLDV-MCNC: 6 MG/DL (ref 7–20)
C-REACTIVE PROTEIN: 23.8 MG/L (ref 0–5.1)
CALCIUM SERPL-MCNC: 9 MG/DL (ref 8.3–10.6)
CANNABINOID SCREEN URINE: ABNORMAL
CHLORIDE BLD-SCNC: 101 MMOL/L (ref 99–110)
CLARITY: ABNORMAL
CO2: 30 MMOL/L (ref 21–32)
COCAINE METABOLITE SCREEN URINE: ABNORMAL
COLOR: ABNORMAL
CREAT SERPL-MCNC: 0.7 MG/DL (ref 0.9–1.3)
EKG ATRIAL RATE: 86 BPM
EKG DIAGNOSIS: NORMAL
EKG P AXIS: 68 DEGREES
EKG P-R INTERVAL: 120 MS
EKG Q-T INTERVAL: 372 MS
EKG QRS DURATION: 72 MS
EKG QTC CALCULATION (BAZETT): 445 MS
EKG R AXIS: 85 DEGREES
EKG T AXIS: 64 DEGREES
EKG VENTRICULAR RATE: 86 BPM
EOSINOPHILS ABSOLUTE: 0.1 K/UL (ref 0–0.6)
EOSINOPHILS RELATIVE PERCENT: 1.8 %
EPITHELIAL CELLS, UA: ABNORMAL /HPF (ref 0–5)
GFR AFRICAN AMERICAN: >60
GFR NON-AFRICAN AMERICAN: >60
GLUCOSE BLD-MCNC: 129 MG/DL (ref 70–99)
GLUCOSE URINE: NEGATIVE MG/DL
HCT VFR BLD CALC: 37.7 % (ref 40.5–52.5)
HEMOGLOBIN: 12.3 G/DL (ref 13.5–17.5)
KETONES, URINE: NEGATIVE MG/DL
LEUKOCYTE ESTERASE, URINE: NEGATIVE
LYMPHOCYTES ABSOLUTE: 1.3 K/UL (ref 1–5.1)
LYMPHOCYTES RELATIVE PERCENT: 29.4 %
Lab: ABNORMAL
MCH RBC QN AUTO: 27.4 PG (ref 26–34)
MCHC RBC AUTO-ENTMCNC: 32.6 G/DL (ref 31–36)
MCV RBC AUTO: 84 FL (ref 80–100)
METHADONE SCREEN, URINE: ABNORMAL
MICROSCOPIC EXAMINATION: YES
MONOCYTES ABSOLUTE: 0.4 K/UL (ref 0–1.3)
MONOCYTES RELATIVE PERCENT: 8.1 %
NEUTROPHILS ABSOLUTE: 2.7 K/UL (ref 1.7–7.7)
NEUTROPHILS RELATIVE PERCENT: 60.2 %
NITRITE, URINE: NEGATIVE
OPIATE SCREEN URINE: POSITIVE
OXYCODONE URINE: ABNORMAL
PDW BLD-RTO: 15.3 % (ref 12.4–15.4)
PH UA: 5
PH UA: 6.5 (ref 5–8)
PHENCYCLIDINE SCREEN URINE: ABNORMAL
PLATELET # BLD: 287 K/UL (ref 135–450)
PMV BLD AUTO: 6.9 FL (ref 5–10.5)
POTASSIUM REFLEX MAGNESIUM: 3.7 MMOL/L (ref 3.5–5.1)
PROPOXYPHENE SCREEN: ABNORMAL
PROTEIN UA: 100 MG/DL
RBC # BLD: 4.48 M/UL (ref 4.2–5.9)
RBC UA: ABNORMAL /HPF (ref 0–4)
SODIUM BLD-SCNC: 139 MMOL/L (ref 136–145)
SPECIFIC GRAVITY UA: >=1.03 (ref 1–1.03)
URINE TYPE: ABNORMAL
UROBILINOGEN, URINE: 1 E.U./DL
WBC # BLD: 4.5 K/UL (ref 4–11)
WBC UA: ABNORMAL /HPF (ref 0–5)

## 2021-02-01 PROCEDURE — 36415 COLL VENOUS BLD VENIPUNCTURE: CPT

## 2021-02-01 PROCEDURE — 02HV33Z INSERTION OF INFUSION DEVICE INTO SUPERIOR VENA CAVA, PERCUTANEOUS APPROACH: ICD-10-PCS | Performed by: INTERNAL MEDICINE

## 2021-02-01 PROCEDURE — 36556 INSERT NON-TUNNEL CV CATH: CPT

## 2021-02-01 PROCEDURE — 6370000000 HC RX 637 (ALT 250 FOR IP): Performed by: NURSE PRACTITIONER

## 2021-02-01 PROCEDURE — 6360000002 HC RX W HCPCS: Performed by: NURSE PRACTITIONER

## 2021-02-01 PROCEDURE — 80307 DRUG TEST PRSMV CHEM ANLYZR: CPT

## 2021-02-01 PROCEDURE — 2580000003 HC RX 258: Performed by: NURSE PRACTITIONER

## 2021-02-01 PROCEDURE — 94640 AIRWAY INHALATION TREATMENT: CPT

## 2021-02-01 PROCEDURE — 81001 URINALYSIS AUTO W/SCOPE: CPT

## 2021-02-01 PROCEDURE — 6370000000 HC RX 637 (ALT 250 FOR IP): Performed by: INTERNAL MEDICINE

## 2021-02-01 PROCEDURE — 93010 ELECTROCARDIOGRAM REPORT: CPT | Performed by: INTERNAL MEDICINE

## 2021-02-01 PROCEDURE — C1751 CATH, INF, PER/CENT/MIDLINE: HCPCS

## 2021-02-01 PROCEDURE — 1200000000 HC SEMI PRIVATE

## 2021-02-01 PROCEDURE — 85025 COMPLETE CBC W/AUTO DIFF WBC: CPT

## 2021-02-01 PROCEDURE — 77001 FLUOROGUIDE FOR VEIN DEVICE: CPT

## 2021-02-01 PROCEDURE — 80048 BASIC METABOLIC PNL TOTAL CA: CPT

## 2021-02-01 PROCEDURE — 76937 US GUIDE VASCULAR ACCESS: CPT

## 2021-02-01 PROCEDURE — 99222 1ST HOSP IP/OBS MODERATE 55: CPT | Performed by: INTERNAL MEDICINE

## 2021-02-01 RX ORDER — PROMETHAZINE HYDROCHLORIDE 25 MG/1
12.5 TABLET ORAL EVERY 6 HOURS PRN
Status: DISCONTINUED | OUTPATIENT
Start: 2021-02-01 | End: 2021-02-04 | Stop reason: HOSPADM

## 2021-02-01 RX ORDER — LORAZEPAM 1 MG/1
4 TABLET ORAL
Status: DISCONTINUED | OUTPATIENT
Start: 2021-02-01 | End: 2021-02-04 | Stop reason: HOSPADM

## 2021-02-01 RX ORDER — SODIUM CHLORIDE 0.9 % (FLUSH) 0.9 %
10 SYRINGE (ML) INJECTION PRN
Status: DISCONTINUED | OUTPATIENT
Start: 2021-02-01 | End: 2021-02-04 | Stop reason: SDUPTHER

## 2021-02-01 RX ORDER — LORAZEPAM 2 MG/ML
4 INJECTION INTRAMUSCULAR
Status: DISCONTINUED | OUTPATIENT
Start: 2021-02-01 | End: 2021-02-04 | Stop reason: HOSPADM

## 2021-02-01 RX ORDER — POLYETHYLENE GLYCOL 3350 17 G/17G
17 POWDER, FOR SOLUTION ORAL DAILY PRN
Status: DISCONTINUED | OUTPATIENT
Start: 2021-02-01 | End: 2021-02-04 | Stop reason: HOSPADM

## 2021-02-01 RX ORDER — LORAZEPAM 2 MG/ML
1 INJECTION INTRAMUSCULAR
Status: DISCONTINUED | OUTPATIENT
Start: 2021-02-01 | End: 2021-02-04 | Stop reason: HOSPADM

## 2021-02-01 RX ORDER — CHLORDIAZEPOXIDE HYDROCHLORIDE 5 MG/1
10 CAPSULE, GELATIN COATED ORAL 4 TIMES DAILY
Status: DISCONTINUED | OUTPATIENT
Start: 2021-02-01 | End: 2021-02-04

## 2021-02-01 RX ORDER — ONDANSETRON 2 MG/ML
4 INJECTION INTRAMUSCULAR; INTRAVENOUS EVERY 6 HOURS PRN
Status: DISCONTINUED | OUTPATIENT
Start: 2021-02-01 | End: 2021-02-04 | Stop reason: HOSPADM

## 2021-02-01 RX ORDER — LANOLIN ALCOHOL/MO/W.PET/CERES
3 CREAM (GRAM) TOPICAL NIGHTLY
Status: DISCONTINUED | OUTPATIENT
Start: 2021-02-01 | End: 2021-02-04 | Stop reason: HOSPADM

## 2021-02-01 RX ORDER — SODIUM CHLORIDE 0.9 % (FLUSH) 0.9 %
10 SYRINGE (ML) INJECTION EVERY 12 HOURS SCHEDULED
Status: DISCONTINUED | OUTPATIENT
Start: 2021-02-01 | End: 2021-02-01 | Stop reason: SDUPTHER

## 2021-02-01 RX ORDER — LORAZEPAM 2 MG/ML
3 INJECTION INTRAMUSCULAR
Status: DISCONTINUED | OUTPATIENT
Start: 2021-02-01 | End: 2021-02-04 | Stop reason: HOSPADM

## 2021-02-01 RX ORDER — ACETAMINOPHEN 325 MG/1
650 TABLET ORAL EVERY 6 HOURS PRN
Status: DISCONTINUED | OUTPATIENT
Start: 2021-02-01 | End: 2021-02-04 | Stop reason: HOSPADM

## 2021-02-01 RX ORDER — SODIUM CHLORIDE 0.9 % (FLUSH) 0.9 %
10 SYRINGE (ML) INJECTION PRN
Status: DISCONTINUED | OUTPATIENT
Start: 2021-02-01 | End: 2021-02-04 | Stop reason: HOSPADM

## 2021-02-01 RX ORDER — LORAZEPAM 1 MG/1
3 TABLET ORAL
Status: DISCONTINUED | OUTPATIENT
Start: 2021-02-01 | End: 2021-02-04 | Stop reason: HOSPADM

## 2021-02-01 RX ORDER — LORAZEPAM 2 MG/ML
2 INJECTION INTRAMUSCULAR
Status: DISCONTINUED | OUTPATIENT
Start: 2021-02-01 | End: 2021-02-04 | Stop reason: HOSPADM

## 2021-02-01 RX ORDER — SODIUM CHLORIDE 0.9 % (FLUSH) 0.9 %
10 SYRINGE (ML) INJECTION EVERY 12 HOURS SCHEDULED
Status: DISCONTINUED | OUTPATIENT
Start: 2021-02-01 | End: 2021-02-04 | Stop reason: HOSPADM

## 2021-02-01 RX ORDER — SODIUM CHLORIDE 0.9 % (FLUSH) 0.9 %
10 SYRINGE (ML) INJECTION EVERY 12 HOURS SCHEDULED
Status: DISCONTINUED | OUTPATIENT
Start: 2021-02-01 | End: 2021-02-04 | Stop reason: SDUPTHER

## 2021-02-01 RX ORDER — LORAZEPAM 1 MG/1
2 TABLET ORAL
Status: DISCONTINUED | OUTPATIENT
Start: 2021-02-01 | End: 2021-02-04 | Stop reason: HOSPADM

## 2021-02-01 RX ORDER — DEXTROSE MONOHYDRATE 50 MG/ML
INJECTION, SOLUTION INTRAVENOUS
Status: DISPENSED
Start: 2021-02-01 | End: 2021-02-02

## 2021-02-01 RX ORDER — ALBUTEROL SULFATE 90 UG/1
2 AEROSOL, METERED RESPIRATORY (INHALATION) EVERY 4 HOURS PRN
Status: DISCONTINUED | OUTPATIENT
Start: 2021-02-01 | End: 2021-02-03

## 2021-02-01 RX ORDER — BUPRENORPHINE 2 MG/1
2 TABLET SUBLINGUAL PRN
Status: DISCONTINUED | OUTPATIENT
Start: 2021-02-01 | End: 2021-02-04

## 2021-02-01 RX ORDER — CLONIDINE HYDROCHLORIDE 0.1 MG/1
0.1 TABLET ORAL PRN
Status: DISCONTINUED | OUTPATIENT
Start: 2021-02-01 | End: 2021-02-04 | Stop reason: HOSPADM

## 2021-02-01 RX ORDER — LIDOCAINE HYDROCHLORIDE 10 MG/ML
5 INJECTION, SOLUTION INFILTRATION; PERINEURAL ONCE
Status: DISCONTINUED | OUTPATIENT
Start: 2021-02-01 | End: 2021-02-04 | Stop reason: HOSPADM

## 2021-02-01 RX ORDER — LANOLIN ALCOHOL/MO/W.PET/CERES
100 CREAM (GRAM) TOPICAL DAILY
Status: DISCONTINUED | OUTPATIENT
Start: 2021-02-01 | End: 2021-02-04 | Stop reason: HOSPADM

## 2021-02-01 RX ORDER — M-VIT,TX,IRON,MINS/CALC/FOLIC 27MG-0.4MG
1 TABLET ORAL DAILY
Status: DISCONTINUED | OUTPATIENT
Start: 2021-02-01 | End: 2021-02-04 | Stop reason: HOSPADM

## 2021-02-01 RX ORDER — ACETAMINOPHEN 650 MG/1
650 SUPPOSITORY RECTAL EVERY 6 HOURS PRN
Status: DISCONTINUED | OUTPATIENT
Start: 2021-02-01 | End: 2021-02-04 | Stop reason: HOSPADM

## 2021-02-01 RX ORDER — LORAZEPAM 1 MG/1
1 TABLET ORAL
Status: DISCONTINUED | OUTPATIENT
Start: 2021-02-01 | End: 2021-02-04 | Stop reason: HOSPADM

## 2021-02-01 RX ORDER — ALBUTEROL SULFATE 90 UG/1
2 AEROSOL, METERED RESPIRATORY (INHALATION) 4 TIMES DAILY
Status: DISCONTINUED | OUTPATIENT
Start: 2021-02-01 | End: 2021-02-03

## 2021-02-01 RX ORDER — SODIUM CHLORIDE 9 MG/ML
INJECTION, SOLUTION INTRAVENOUS CONTINUOUS
Status: ACTIVE | OUTPATIENT
Start: 2021-02-01 | End: 2021-02-01

## 2021-02-01 RX ORDER — SODIUM CHLORIDE 0.9 % (FLUSH) 0.9 %
10 SYRINGE (ML) INJECTION PRN
Status: DISCONTINUED | OUTPATIENT
Start: 2021-02-01 | End: 2021-02-01 | Stop reason: SDUPTHER

## 2021-02-01 RX ADMIN — CHLORDIAZEPOXIDE HYDROCHLORIDE 10 MG: 5 CAPSULE ORAL at 09:54

## 2021-02-01 RX ADMIN — CHLORDIAZEPOXIDE HYDROCHLORIDE 10 MG: 5 CAPSULE ORAL at 13:09

## 2021-02-01 RX ADMIN — Medication 100 MG: at 09:54

## 2021-02-01 RX ADMIN — Medication 2 PUFF: at 16:11

## 2021-02-01 RX ADMIN — SODIUM CHLORIDE, PRESERVATIVE FREE 10 ML: 5 INJECTION INTRAVENOUS at 19:50

## 2021-02-01 RX ADMIN — Medication 2 PUFF: at 19:36

## 2021-02-01 RX ADMIN — ENOXAPARIN SODIUM 40 MG: 40 INJECTION SUBCUTANEOUS at 09:54

## 2021-02-01 RX ADMIN — Medication 2 PUFF: at 12:01

## 2021-02-01 RX ADMIN — VANCOMYCIN HYDROCHLORIDE 750 MG: 750 INJECTION, POWDER, LYOPHILIZED, FOR SOLUTION INTRAVENOUS at 17:48

## 2021-02-01 RX ADMIN — SODIUM CHLORIDE, PRESERVATIVE FREE 10 ML: 5 INJECTION INTRAVENOUS at 04:22

## 2021-02-01 RX ADMIN — Medication 1 TABLET: at 09:54

## 2021-02-01 RX ADMIN — SODIUM CHLORIDE: 9 INJECTION, SOLUTION INTRAVENOUS at 04:20

## 2021-02-01 RX ADMIN — CHLORDIAZEPOXIDE HYDROCHLORIDE 10 MG: 5 CAPSULE ORAL at 16:56

## 2021-02-01 RX ADMIN — CEFEPIME 2000 MG: 2 INJECTION, POWDER, FOR SOLUTION INTRAVENOUS at 17:09

## 2021-02-01 RX ADMIN — VANCOMYCIN HYDROCHLORIDE 750 MG: 750 INJECTION, POWDER, LYOPHILIZED, FOR SOLUTION INTRAVENOUS at 05:39

## 2021-02-01 RX ADMIN — CHLORDIAZEPOXIDE HYDROCHLORIDE 10 MG: 5 CAPSULE ORAL at 19:50

## 2021-02-01 RX ADMIN — Medication 3 MG: at 19:54

## 2021-02-01 RX ADMIN — SODIUM CHLORIDE, PRESERVATIVE FREE 10 ML: 5 INJECTION INTRAVENOUS at 19:51

## 2021-02-01 ASSESSMENT — ENCOUNTER SYMPTOMS
ABDOMINAL PAIN: 0
EYE DISCHARGE: 0
SORE THROAT: 0
CHOKING: 0
EYE PAIN: 0
CONSTIPATION: 0
CHEST TIGHTNESS: 0
STRIDOR: 0
SHORTNESS OF BREATH: 1
VOICE CHANGE: 0
DIARRHEA: 0
EYE ITCHING: 0

## 2021-02-01 ASSESSMENT — PAIN SCALES - GENERAL: PAINLEVEL_OUTOF10: 0

## 2021-02-01 NOTE — PROGRESS NOTES
Patient sitting up in bed watching TV. Student Nurse Echo assisted patient back to bed. Assessment as charted, please see flowsheet. Scheduled medications administered. Pt denies needs at this time, call light in reach, will monitor. Dr. Beverly Blue now at bedside speaking with patient.

## 2021-02-01 NOTE — PROGRESS NOTES
Patient resting quietly in bed watching TV, no acute distress noted. VS obtained. Pt denies further needs at this time. Call light in reach, will monitor.

## 2021-02-01 NOTE — CONSULTS
Pharmacy Note  Vancomycin Consult    Elizabeth Natarajan is a 48 y.o. male started on Vancomycin for Septic Emboli; consult received from ANNETTE Henriquez to manage therapy. Also receiving the following antibiotics: Cefepime. Allergies:  Patient has no known allergies. Tmax:     Recent Labs     01/31/21 2002   CREATININE 0.6*       Recent Labs     01/31/21 2002   WBC 4.4       CrCl cannot be calculated (Unknown ideal weight.). No intake or output data in the 24 hours ending 02/01/21 0414    Wt Readings from Last 1 Encounters:   02/01/21 90 lb 8 oz (41.1 kg)         Body mass index is 17.67 kg/m². Culture Date      Source                       Results      Loading dose (critically ill or in ICU, require dialysis or renal replacement therapy): Vancomycin 25 mg/kg IVPB x 1 (maximum 3000 mg). Maintenance dose: 15 mg/kg (maximum: 2000 mg/dose and 4500 mg/day) starting at the next dosing interval determined by renal function  Pulse dose: fluctuating renal function, IRMA, ESRD   Goal Vancomycin trough: 15-20 mcg/mL   Goal Vancomycin AUC: 400-600     Assessment/Plan:  Will initiate Vancomycin with a one time loading dose of 1000 mg x1, followed by 750 mg IV every 8 hours. Calculated . Vancomycin trough ordered for 2/1 at 2100. Timing of trough level will be determined based on culture results, renal function, and clinical response. Thank you for the consult. Markos Driver, PharmD  2/1/2021 4:17 AM    Vanc trough = 15.2 at 1537.  Continue with current dose  Jesu Talavera [facility]  2/2/2021 at 4:31 PM

## 2021-02-01 NOTE — PROGRESS NOTES
Pt sitting up in bed watching TV, no acute distress noted at this time. Pt setup for dinner tray, pt feeding self. Scheduled medication administered, please see MAR. Patient denies further needs. Call light remains in reach, will continue to monitor.

## 2021-02-01 NOTE — PROGRESS NOTES
Patient resting quietly in bed with eyes closed, easily aroused. Pt voided via urinal, UA specimen collected at this time. Pt denies further needs. Call light in reach, will monitor.

## 2021-02-01 NOTE — ED PROVIDER NOTES
[address] ED  EMERGENCY DEPARTMENT ENCOUNTER        Pt Name: Pema Roman  MRN: [id number]  Armstrongfcarroll 1970  Date of evaluation: 1/31/2021  Provider: JOSIAS Werner CNP  PCP: No primary care provider on file. ED Attending: No att. providers found    [facility]       Chief Complaint   Patient presents with    Shortness of Breath     Pt c/o increased SOB and leg and hand swelling x 4 days. Pt O2 90% on RA after changing into gown. Improved to 92% while sitting still. HISTORY OF PRESENT ILLNESS   (Location/Symptom, Timing/Onset, Context/Setting, Quality, Duration, Modifying Factors, Severity)  Note limiting factors. Pema Roman is a 48 y.o. male for sob. Onset was 4 days. Context includes pt reports he has had increased shortness of breath for the last 4 days. Patient also reports that he has had increased swelling to his hands and feet. He also reports that he is red dots to his lower legs as well. Patient does not use oxygen at home. He denies any chest pain. Alleviating factors include nothing. Aggravating factors include nothing. Pain is 10/10. Nothing has been used for pain today. Nursing Notes were all reviewed and agreed with or any disagreements were addressed  in the HPI. REVIEW OF SYSTEMS  (2-9 systems for level 4, 10 or more for level 5)     Review of Systems   Constitutional: Negative for fever. HENT: Negative for congestion, rhinorrhea and sore throat. Respiratory: Positive for shortness of breath. Cardiovascular: Positive for leg swelling. Negative for chest pain. Gastrointestinal: Negative for abdominal pain, diarrhea, nausea and vomiting. Genitourinary: Negative for decreased urine volume and difficulty urinating. Musculoskeletal: Negative for arthralgias and myalgias. Skin: Positive for rash. Negative for color change. Neurological: Negative for dizziness and light-headedness.    Psychiatric/Behavioral: Negative for agitation. All other systems reviewed and are negative. Positivesand Pertinent negatives as per HPI. Except as noted above in the ROS, all other systems were reviewed and negative. 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 FAMILY HISTORY     History reviewed. No pertinent family history.       SOCIAL HISTORY       Social History     Socioeconomic History    Marital status: Single     Spouse name: None    Number of children: None    Years of education: None    Highest education level: None   Occupational History    None   Social Needs    Financial resource strain: None    Food insecurity     Worry: None     Inability: None    Transportation needs     Medical: None     Non-medical: None   Tobacco Use    Smoking status: Former Smoker     Packs/day: 2.00     Types: Cigarettes    Smokeless tobacco: Never Used    Tobacco comment: states 5 yrs ago   Substance and Sexual Activity    Alcohol use: No    Drug use: Not Currently     Types: IV     Comment: 5-6 months ago, per pt    Sexual activity: Yes     Partners: Female   Lifestyle    Physical activity     Days per week: None DIFFERENTIAL - Abnormal; Notable for the following components:       Result Value    Hemoglobin 13.0 (*)     RDW 16.0 (*)     All other components within normal limits    Narrative:     Performed at:  [facility],  [place], [employer]   Phone [phone]   COMPREHENSIVE METABOLIC PANEL W/ REFLEX TO MG FOR LOW K - Abnormal; Notable for the following components:    Sodium 135 (*)     Chloride 98 (*)     Glucose 102 (*)     CREATININE 0.6 (*)     Albumin/Globulin Ratio 1.0 (*)     AST 56 (*)     All other components within normal limits    Narrative:     Performed at:  [place] [address],  [place]   Phone [phone]   BRAIN NATRIURETIC PEPTIDE - Abnormal; Notable for the following components:    Pro- (*)     All other components within normal limits    Narrative:     Performed at:  [place] [address],  [place]   Phone [phone]   D-DIMER, QUANTITATIVE - Abnormal; Notable for the following components:    D-Dimer, Quant 793 (*)     All other components within normal limits    Narrative:     Performed at:  [place] [address],  [place], [employer]   Phone [phone]   CULTURE, BLOOD 1   CULTURE, BLOOD 2   LACTATE, SEPSIS    Narrative:     Performed at:  [facility] [address],  [place], [employer]   Phone [phone]   PROCALCITONIN    Narrative:     Performed at:  [place] [address],  [place], [employer]   Phone [phone]   TROPONIN    Narrative:     Performed at:  [facility] [address],  [place], [employer]   Phone [phone]    Narrative:     Performed at:  [facility]) [facility],  [place], [employer]   Phone Final Result   No evidence of pulmonary embolism or acute pulmonary abnormality. Diffuse pulmonary emphysema. There is a 2.4 cm mass in the posterior right perihilar region in the right   lower lobe of the lung, highly suspicious for an underlying neoplasm. Further evaluation and workup is recommended. There are 2 speculated scar lesions in the inferior right middle lobe and   posterior right upper lobe. Cannot exclude the possibility of scar neoplasm   and further evaluation and workup is recommended. Mildly enlarged right hilar lymph nodes which could represent pathologic   lymph nodes. Chronic compression fractures of T6 and T8 vertebral bodies. XR CHEST PORTABLE   Final Result   1. Increased perihilar markings. Atelectasis, infectious or inflammatory   airway process, and interstitial edema are in the differential.   2. Irregular appearance of the proximal left humeral diaphysis. Correlate   with history. Nonemergent MRI would better evaluate. No results found. PROCEDURES   Unless otherwise noted below, none     Procedures    CRITICAL CARE TIME   N/A    CONSULTS:  IP CONSULT TO HOSPITALIST  IP CONSULT TO HOSPITALIST      EMERGENCY DEPARTMENT COURSE and DIFFERENTIAL DIAGNOSIS/MDM:   Vitals:    Vitals:    01/31/21 2218 01/31/21 2236 01/31/21 2307 01/31/21 2336   BP: 102/77 96/77 97/67 94/76   Pulse: 84 81 86 78   Resp: 11 14 14 14   Temp:       TempSrc:       SpO2: 99% 100% 100% 100%   Weight:       Height:           Patient was given the following medications:  Medications   vancomycin 1000 mg IVPB in 250 mL D5W addavial (1,000 mg Intravenous New Bag 1/31/21 2214)   cefepime (MAXIPIME) 2000 mg IVPB minibag (0 mg Intravenous Stopped 1/31/21 2144)   iopamidol (ISOVUE-370) 76 % injection 75 mL (75 mLs Intravenous Given 1/31/21 2200)       Patient was seen and evaluated by myself and Dr. Susanne Grubbs.   Patient here for shortness of breath that he has had for the last 4 days. Patient also complains of swelling to his hands and feet. He also reports that he has a new rash to his lower extremities. On exam he is awake and alert patient was initially hypoxic and was placed on oxygen. Sepsis protocol was initiated in the ED patient was provided with IV antibiotics. Lab values have been reviewed and interpreted. Patient had a elevated D-dimer and subsequently had a CT PE that was negative for any PEs however it was concerning for some abnormalities including right lower lobe lesions and hilar lymphadenopathy. Patient does have a history of IV drug use. His rash is concerning for Janeway lesions so based on this infectious disease might be beneficial for the patient. I did consult the hospitalist at [facility] who declined admission stating he probably needed transferred to [facility]. The [facility] hospitalist called to the [facility] hospitalist and the [facility] hospitalist has accepted the patient for transfer. Patient was informed that this treatment will be weeks of IV therapy and his previous admissions he left AMA. The patient agreed to stay in the hospital and was okay with being transferred. He was in agreement with an extended therapy to help improve his health. The patient tolerated their visit well. They were seen and evaluated by the attending physician, No att. providers found who agreed with the assessment and plan. The patient and / or the family were informed of the results of any tests, a time was given to answer questions, a plan was proposed and they agreed with plan. FINAL IMPRESSION      1. Acute respiratory failure with hypoxia (HCC)    2. Pulmonary emphysema, unspecified emphysema type ([address]    3. Abnormal CT scan of lung    4. IV drug user    5. Rash          DISPOSITION/PLAN   DISPOSITION Decision To Transfer 02/01/2021 12:18:05 AM      PATIENT REFERRED TO:  No follow-up provider specified.     DISCHARGE MEDICATIONS:  New Prescriptions No medications on file       DISCONTINUED MEDICATIONS:  Discontinued Medications    No medications on file              (Please note that portions of this note were completed with a voice recognition program.  Efforts were made to edit the dictations but occasionally words are mis-transcribed.)    JOSIAS Bucio CNP (electronically signed)       JOSIAS Bucio CNP  02/01/21 41 JOSIAS Wilburn CNP  02/01/21 0022

## 2021-02-01 NOTE — PROGRESS NOTES
Patient resting quietly in bed with eyes closed, easily aroused. Pt continues to deny needs. Call light remains in reach, will continue to monitor.

## 2021-02-01 NOTE — PROGRESS NOTES
RADIOLOGY:  Patient status post ultrasound/fluoroscopically guided placement of right internal jugular triple lumen central venous catheter. Patient tolerated the procedure well. Full report to follow.

## 2021-02-01 NOTE — ED PROVIDER NOTES
I independently performed a history and physical on Bhumika Trevizo. All diagnostic, treatment, and disposition decisions were made by myself in conjunction with the advanced practice provider. For further details of Akash Bridges emergency department encounter, please see the advance practice provider's documentation. In brief, this is a 48y.o. year old male, with   Chief Complaint   Patient presents with    Shortness of Breath     Pt c/o increased SOB and leg and hand swelling x 4 days. Pt O2 90% on RA after changing into gown. Improved to 92% while sitting still. Patient was discharged at the beginning of the month from [facility] after developing pneumonia and sepsis. He is an IV drug user however has not used since prior to his previous admission. The last couple of days he is noted increasing shortness of breath along with leg and hand swelling. He denies any fevers or chills. No chest pain. On examination I find a chronically ill-appearing male patient in no acute distress on nasal cannula oxygen. He is clear to auscultation bilaterally and regular rate rhythm. His abdomen is soft. Right hand has slight nonpitting edema. Bilateral lower extremities have slight nonpitting edema. Patient's skin is covered with cherry red blotches concerning for possible Janeway lesions. I do not see any evidence of splinter hemorrhages. I do not appreciate any murmur. Patient's work-up at this time unfortunately shows that he has probable cancer in his lungs. Patient will be transferred for admission to [facility] for pulmonary along with infectious diseases consultation. He is agreeable with this.      EKG Interpretation    Interpreted by emergency department physician    Rhythm: normal sinus   Rate: normal  Axis: normal  Ectopy: none  Conduction: Short WY  ST Segments: normal  T Waves: normal  Q Waves: none    Clinical Impression: Normal sinus rhythm, short WY interval    Mauricio Iraheta Facundo Coreas MD  02/01/21 0708

## 2021-02-01 NOTE — CARE COORDINATION
CASE MANAGEMENT INITIAL ASSESSMENT      Reviewed chart and completed assessment with:patient  Explained Case Management role/services. Primary contact information:andree [facility] Decision Maker :   Primary Decision Maker: Bernard Gomez - Parent - [phone]          Can this person be reached and be able to respond quickly, such as within a few minutes or hours? Yes      Admit date/status:2/1/21  Diagnosis:respiratory failure   Is this a Readmission?: n    [address] required for SNF: No       3 night stay required: No    Living arrangements, Adls, care needs, prior to admission:lives in his truck. homeless    Transportation:likely cab to his truck at [facility] at home:  Walker__Cane__RTS__ BSC__Shower Chair__  02__ HHN__ CPAP__  BiPap__  Hospital Bed__ W/C___ Other__________    Services in the home and/or outpatient, prior to [address] (if applicable)   · Name:  · Address:  · Dialysis Schedule:  · Phone:  · Fax:    PT/OT recs:none    Hospital Exemption Notification (HEN):needd for SNF    Barriers to discharge:none    Plan/comments:spoke with patient. Reported is homeless. Has been living in his car. Stated will need cab to get back to it at [place]. Discussed options of going to family members home. Has a mother and 4 kids. Stated \"they dont have any room for me\". .. further said he didn't want me calling them. Provided homeless resource packet. Discussed SA treatment options. Declined IP or OP options and declined resource packet. Will follow for needs.  Liang Rush RN      ECOC on chart for MD signature

## 2021-02-01 NOTE — PROGRESS NOTES
Patient provided urinal for UA specimen collection. Primary RN instructed patient to void in urinal for UA specimen collection with next void, pt verbalized understanding. Pt denies needs at this time. Call light remains in reach, will monitor.

## 2021-02-01 NOTE — H&P
been consulted. The patient denied any other associated symptoms as well as any aggravating and/or alleviating factors. At the time of this assessment, the patient was resting comfortably in bed. He currently denies any chest pain, back pain, abdominal pain, shortness of breath, numbness, tingling, N/V/C/D, fever and/or chills. 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 Authorizing Provider   albuterol sulfate  (90 Base) MCG/ACT inhaler Inhale 2 puffs into the lungs every 4 hours as needed for Wheezing or Shortness of Breath 11/21/20 1/31/21  Anali Keita MD     Allergies:  Patient has no known allergies. Social History:      The patient currently lives at home    TOBACCO:   reports that he quit smoking about 5 years ago. His smoking use included cigarettes. He has a 60.00 pack-year smoking history. His smokeless tobacco use includes snuff. ETOH:   reports current alcohol use of about 24.0 standard drinks of alcohol per week. E-Cigarettes/Vaping Use     Questions Responses    E-Cigarette/Vaping Use Never User    Start Date     Passive Exposure No    Quit Date     Counseling Given No    Comments         Family History:      Reviewed in detail.  Positive as follows:        Problem Relation Age of Onset    Other Mother         alzheimers     REVIEW OF SYSTEMS:   Pertinent positives as noted in the HPI. All other systems reviewed and negative. PHYSICAL EXAM PERFORMED:    /76   Pulse 79   Temp 97.7 °F (36.5 °C) (Oral)   Resp 14   Wt 90 lb 8 oz (41.1 kg)   SpO2 100%   BMI 17.67 kg/m²     General appearance:  Pleasant male in no apparent distress, appears stated age and cooperative. HEENT:  Normal cephalic, atraumatic without obvious deformity. Pupils equal, round, and reactive to light. Extra ocular muscles intact. Conjunctivae/corneas clear. Neck: Supple, with full range of motion. No jugular venous distention. Trachea midline. Respiratory:  Normal respiratory effort. Clear to auscultation, bilaterally without Rales/Wheezes/Rhonchi. 1 L NC  Cardiovascular:  Regular rate and rhythm with normal S1/S2 without murmurs, rubs or gallops. Abdomen: Soft, non-tender, non-distended with normal bowel sounds. Musculoskeletal:  No clubbing, cyanosis or edema bilaterally. Full range of motion without deformity. Skin: Skin color, texture, turgor normal. Petechiae rash lower legs. Neurologic:  Neurovascularly intact.  Cranial nerves: II-XII intact, grossly non-focal.  Psychiatric:  Alert and oriented, thought content appropriate, normal insight  Capillary Refill: Brisk,< 3 seconds   Peripheral Pulses: +2 palpable, equal bilaterally     Labs:     Recent Labs     01/31/21 2002   WBC 4.4   HGB 13.0*   HCT 41.1        Recent Labs     01/31/21 2002   *   K 3.9   CL 98*   CO2 28   BUN 8   CREATININE 0.6*   CALCIUM 8.7     Recent Labs     01/31/21 2002   AST 56*   ALT 35   BILITOT 0.6   ALKPHOS 92     Recent Labs     01/31/21  2216   INR 1.06     Recent Labs     01/31/21 2002   Cody Dilling <0.01     Urinalysis:      Lab Results   Component Value Date    NITRU Negative 12/25/2020    WBCUA 3-5 12/25/2020    BACTERIA 2+ 07/03/2020    RBCUA 3-4 12/25/2020    BLOODU SMALL 12/25/2020    SPECGRAV 1.020 12/25/2020    GLUCOSEU 500 12/25/2020     Radiology:     CXR: I have reviewed the CXR with the following interpretation: Atelectasis, infectious or inflammatory process, and interstitial edema are in the differential.    EKG:  I have reviewed the EKG with the following interpretation: The Ekg interpreted in the absence of a cardiologist show Sinus rhythm with short DC, rate 86. Otherwise normal ECG. No previous ECGs available     No orders to display     ASSESSMENT:    Active Hospital Problems    Diagnosis Date Noted    IV drug abuse ([facility] [address] [F19.10]     Acute respiratory failure with hypoxia ([facility] [address] [J96.01]      PLAN:    Acute respiratory failure with hypoxia likely d/t endocarditis/septic emboli 2/2 IV drug use. SPO2 89-90% on RA, now requiring 1 LNC. Does not wear supplemental oxygen at home. -CXR revealed: Increased perihilar markings. Atelectasis, infectious or inflammatory process, and interstitial edema are in the differential.  -CT chest pulmonary embolism revealed: No evidence of pulmonary embolism or acute pulmonary abnormality. Diffuse pulmonary emphysema. There is a 2.4 cm mass in the posterior right perihilar region in the right lower lobe of the lung, highly suspicious for an underlying neoplasm.  -rapid COVID negative on admission  -positive COVID on 12/5/2020  -blood cultures ordered in ED  -tele monitoring  -PRN in haler  -oxygen therapy protocol  -cefepime and vancomycin given in ED and continued 2/1/2021  -pulmonary consult - appreciate recommendations in advance  -ID consult -appreciate recommendations in advance    IVDU  -drug screen positive for opiates  -cessation discussed  -COWS protocol initiated  -echo on 12/31/2020: Normal left ventricle size, wall thickness and systolic function with an estimated ejection fraction of 55%. No regional wall motion abnormalities are seen. A bubble study was performed and showed evidence of right to left shunt consistent with a patent foramen ovale or interatrial septum defects.  Tricuspid valve is structurally normal. No tricuspid valve vegetation or masses visualized. Moderate to severe eccentric tricuspid regurgitation. Alcohol use  -CIWA protocol initiated  -librium 4 x daily  -thiamine and mvi daily    DVT Prophylaxis: Lovenox    Diet: No diet orders on file     Code Status: Prior    PT/OT Eval Status: No indication for need at this time    Dispo - 3-4 days pending clinical improvement     Britni Price, APRN - CNP    Thank you No primary care provider on file. for the opportunity to be involved in this patient's care.  If you have any questions or concerns please feel free to contact me at [phone].  ----------------------------------Dr. Federica Juarez-----------------------------------------

## 2021-02-01 NOTE — PROGRESS NOTES
47 yo M w/ c/o 4-5d increasing SOB/AGUIRRE, hand/leg swelling, rash on legs. Pt with hx of PE, HCV, IVDU who was in [facility] about a month ago for Staph bacteremia. He left AMA after ~6d IV ABX. He is requiring O2 to maintain sats and is not normally on O2. His sats were  Pt now with non-blanchable lesions on his legs and new/changing lung masses. Strong concern for septic emboli given his hx. I believe he needs to be evaluated and/or followed by ID. He will likely need mgmt for post acute ABX as he does not have a PCP. I d/w Dr. Huan Vásquez at [facility] who agreed pt would need ID c/s. He accepted for transfer to med/surg with tele if available and no tele is acceptable if none is available. I notified AASHISH Smith in our ED and the transfer center of the acceptance and to begin transfer arrangements. Transfer Dx: Acute resp failure with hypoxia. Please notify on-call provider on arrival to [facility] for orders.

## 2021-02-01 NOTE — ED NOTES
Called the access center at [facility] to have patient transferred to [facility]. As I am talking with them Dr. Sherly Avitia calls to inform us that he spoke with Dr. Marisa Burkitt at [facility] and the patient is accepted. I explained all of that to the access center so they are paging Dr. Marisa Burkitt to verify this acceptance.       Lizy Ascencio  81/83/85 0006

## 2021-02-01 NOTE — PROGRESS NOTES
RESPIRATORY THERAPY ASSESSMENT    Name:  Brooks   Seth Sutherland Record Number:  [id number]  Age: 48 y.o. Gender: male  : 1970  Today's Date:  2021  Room:  0337/0337-01    Assessment     Is the patient being admitted for a COPD or Asthma exacerbation? No   (If yes the patient will be seen every 4 hours for the first 24 hours and then reassessed)    Patient Admission Diagnosis      Allergies  No Known Allergies    Minimum Predicted Vital Capacity:     n/a          Actual Vital Capacity:      n/a              Pulmonary History:Asthma  Home Oxygen Therapy:  room air  Home Respiratory Therapy:Albuterol   Current Respiratory Therapy:  Albuterol inh Q4 PRN           Respiratory Severity Index(RSI)   Patients with orders for inhalation medications, oxygen, or any therapeutic treatment modality will be placed on Respiratory Protocol. They will be assessed with the first treatment and at least every 72 hours thereafter. The following severity scale will be used to determine frequency of treatment intervention. Smoking History: Pulmonary Disease or Smoking History, Greater than 15 pack year = 2    Social History  Social History     Tobacco Use    Smoking status: Former Smoker     Packs/day: 2.00     Years: 30.00     Pack years: 60.00     Types: Cigarettes     Quit date: 2016     Years since quittin.0    Smokeless tobacco: Current User     Types: Snuff    Tobacco comment: states 5 yrs ago   Substance Use Topics    Alcohol use:  Yes     Alcohol/week: 24.0 standard drinks     Types: 24 Cans of beer per week    Drug use: Not Currently     Types: IV     Comment: 5-6 months ago opiates, per pt       Recent Surgical History: None = 0  Past Surgical History  Past Surgical History:   Procedure Laterality Date    BRAIN SURGERY      s/p trauma    BRAIN SURGERY      FRACTURE SURGERY      LEG SURGERY         Level of Consciousness: Alert, Oriented, and Cooperative = 0    Level of Activity: Walking unassisted = 0    Respiratory Pattern: Regular Pattern; RR 8-20 = 0    Breath Sounds: Clear = 0    Sputum   ,  ,    Cough: Strong, spontaneous, non-productive = 0    Vital Signs   /76   Pulse 79   Temp 97.7 °F (36.5 °C) (Oral)   Resp 14   Ht 5' (1.524 m)   Wt 90 lb 8 oz (41.1 kg)   SpO2 100%   BMI 17.67 kg/m²   SPO2 (COPD values may differ): Greater than or equal to 92% on room air = 0    Peak Flow (asthma only): not applicable = 0    RSI: 0-4 = See once and convert to home regimen or discontinue        Plan       Goals: medication delivery, mobilize retained secretions, volume expansion and improve oxygenation    Patient/caregiver was educated on the proper method of use for Respiratory Care Devices:  Yes      Level of patient/caregiver understanding able to:   ? Verbalize understanding   ? Demonstrate understanding       ? Teach back        ? Needs reinforcement       ? No available caregiver               ? Other:     Response to education:  Mike Kohler     Is patient being placed on Home Treatment Regimen? Yes     Does the patient have everything they need prior to discharge? NA     Comments: pt seen in the ED and on admission, eval at bedside     Plan of Care: Albuterol inh Q4 PRN     Electronically signed by Yarely Carvalho RCP on 2/1/2021 at 4:46 AM    Respiratory Protocol Guidelines     1. Assessment and treatment by Respiratory Therapy will be initiated for medication and therapeutic interventions upon initiation of aerosolized medication. 2. Physician will be contacted for respiratory rate (RR) greater than 35 breaths per minute. Therapy will be held for heart rate (HR) greater than 140 beats per minute, pending direction from physician. 3. Bronchodilators will be administered via Metered Dose Inhaler (MDI) with spacer when the following criteria are met:  a. Alert and cooperative     b. HR < 140 bpm  c. RR < 30 bpm                d. Can demonstrate a 23 second inspiratory hold  4. Bronchodilators will be administered via Hand Held Nebulizer [facility]) to patients when ANY of the following criteria are met  a. Incognizant or uncooperative          b. Patients treated with HHN at Home        c. Unable to demonstrate proper use of MDI with spacer     d. RR > 30 bpm   5. Bronchodilators will be delivered via Metered Dose Inhaler (MDI), HHN, Aerogen to intubated patients on mechanical ventilation. 6. Inhalation medication orders will be delivered and/or substituted as outlined below. Aerosolized Medications Ordering and Administration Guidelines:    1. All Medications will be ordered by a physician, and their frequency and/or modality will be adjusted as defined by the patients Respiratory Severity Index (RSI) score. 2. If the patient does not have documented COPD, consider discontinuing anticholinergics when RSI is less than 9.  3. If the bronchospasm worsens (increased RSI), then the bronchodilator frequency can be increased to a maximum of every 4 hours. If greater than every 4 hours is required, the physician will be contacted. 4. If the bronchospasm improves, the frequency of the bronchodilator can be decreased, based on the patient's RSI, but not less than home treatment regimen frequency. 5. Bronchodilator(s) will be discontinued if patient has a RSI less than 9 and has received no scheduled or as needed treatment for 72  Hrs. Patients Ordered on a Mucolytic Agent:    1. Must always be administered with a bronchodilator. 2. Discontinue if patient experiences worsened bronchospasm, or secretions have lessened to the point that the patient is able to clear them with a cough. Anti-inflammatory and Combination Medications:    1. If the patient lacks prior history of lung disease, is not using inhaled anti-inflammatory medication at home, and lacks wheezing by examination or by history for at least 24 hours, contact physician for possible discontinuation.

## 2021-02-01 NOTE — PLAN OF CARE
4 Eyes Skin Assessment     The patient is being assess for  Admission    I agree that 2 RN's have performed a thorough Head to Toe Skin Assessment on the patient. ALL assessment sites listed below have been assessed. Areas assessed by both nurses:   [x]   Head, Face, and Ears   [x]   Shoulders, Back, and Chest  [x]   Arms, Elbows, and Hands   [x]   Coccyx, Sacrum, and IschIum  [x]   Legs, Feet, and Heels        Does the Patient have Skin Breakdown?   No         Candido Prevention initiated:  Yes   Wound Care Orders initiated:  NA      [facility] nurse consulted for Pressure Injury (Stage 3,4, Unstageable, DTI, NWPT, and Complex wounds), New and Established Ostomies:  NA      Nurse 1 eSignature: Electronically signed by Mandy Rosario RN on 2/1/21 at 3:44 AM EST    **SHARE this note so that the co-signing nurse is able to place an eSignature**    Nurse 2 eSignature: Electronically signed by Birgit Ramos RN on 2/1/21 at 6:09 AM EST

## 2021-02-01 NOTE — PROGRESS NOTES
Patient used call light to inform RN of need to use restroom. Primary RN at bedside to assist patient to restroom. Pt safely to restroom, requested privacy. Pt instructed to use call light in restroom when ready to get back to bed, pt verbalized understanding.

## 2021-02-01 NOTE — PROGRESS NOTES
Comprehensive Nutrition Assessment    Type and Reason for Visit:  Initial, Positive Nutrition Screen    Nutrition Recommendations/Plan:   1. Continue general diet  2. Add Strawberry Ensure BID  3. Encourage po intakes  4. Monitor po intakes, nutrition adequacy, weights, pertinent labs, BMs    Nutrition Assessment:  Positive nutrition screen for poor appetite. Pt admitted with c/o SOB, lower extremity swelling and red dots to BLE. Hx of IVDU and etoh abuse. Currently on a general diet with one intake recorded of % per EMR. Pt endorses good appetite now and PTA. Pt denied any weight changes. Weight appears to be around  lb generally per EMR. Pt homeless, unsure of food security. Pt received ONS on previous admission and would like to receive again. Will order ONS to optimize nutrition. Will continue general diet. Malnutrition Assessment:  Malnutrition Status: At risk for malnutrition (Comment)      Estimated Daily Nutrient Needs:  Energy (kcal):  0778-5157; Weight Used for Energy Requirements:  Current     Protein (g):  49-58; Weight Used for Protein Requirements:  Current        Fluid (ml/day):  3738-9407; Method Used for Fluid Requirements:  1 ml/kcal      Nutrition Related Findings:  Missing teeth      Wounds:  None       Current Nutrition Therapies:    DIET GENERAL;     Anthropometric Measures:  · Height: 5' (152.4 cm)  · Current Body Weight: 90 lb 8 oz (41.1 kg)   · Usual Body Weight: ( lb)     · Ideal Body Weight: 106 lbs  · BMI: 17.7  · BMI Categories: Underweight (BMI less than 18.5)       Nutrition Diagnosis:   · Predicted inadequate energy intake related to lack or limited access to food as evidenced by BMI      Nutrition Interventions:   Food and/or Nutrient Delivery:  Continue Current Diet, Start Oral Nutrition Supplement  Nutrition Education/Counseling:  No recommendation at this time   Coordination of Nutrition Care:  Continue to monitor while inpatient    Goals:  Pt will have po intakes 75% or greater this admission       Nutrition Monitoring and Evaluation:   Behavioral-Environmental Outcomes:  None Identified   Food/Nutrient Intake Outcomes:  Food and Nutrient Intake, Supplement Intake  Physical Signs/Symptoms Outcomes:  Weight     Discharge Planning:    Continue current diet, Continue Oral Nutrition Supplement, Assist with food insecurity     Electronically signed by Kirti Castañeda RD, LD on 2/1/21 at 11:37 AM EST    Contact: [phone]

## 2021-02-01 NOTE — PROGRESS NOTES
Transport notified Primary RN of transport of patient to IR via bed, Primary RN verbalized understanding. Pt to go to IR via bed for temporary CVC placement.

## 2021-02-01 NOTE — ED NOTES
Room#337 and report#[phone].  [facility] ETA [address]  92/52/06 0045

## 2021-02-01 NOTE — PROGRESS NOTES
Pt admitted to c3 at [facility]. 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   -Pt currently 100% on RA. IS provided given initial c/o SOB. -Financial counselor and SW consulted given homelessness and living in car currently.   -Neuro exam complete and negative other than baseline RUE weakness. Pt denies further needs. Call light within reach will continue to monitor.

## 2021-02-01 NOTE — CONSULTS
Pulmonary & Critical Care Consultation Note   True MD Carlos    REASONFOR CONSULTATION/CC: abnormal CT, COPD, shortness of breath    Consult at request of  Adalberto Hilton MD  PCP: No primary care provider on file. HISTORYOF PRESENT ILLNESS:    48y.o. year old male prior COVID infection, former smoker. Multiple hospital stays in the past 2 months. He presented with four days of worsening shortness of breath with min exertion on a daily basis, not relieved entirely with rest. Associated nonproductive cough, denies congestion, denies wheezing or chest tightness. He had a CT as part of his workup in the ED commenting on pulmonary nodules. Additional complaints including swelling and a red rash with similar onset time as the above respiratory symptoms. His recent admissions identified MSSA bacteremia and he had not completed treatment for this. Was admitted for further evaluation, we were consulted given his CT findings. 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 Social History     Tobacco Use    Smoking status: Former Smoker     Packs/day: 2.00     Years: 30.00     Pack years: 60.00     Types: Cigarettes     Quit date: 2016     Years since quittin.0    Smokeless tobacco: Current User     Types: Snuff    Tobacco comment: states 5 yrs ago   Substance Use Topics    Alcohol use:  Yes     Alcohol/week: 24.0 standard drinks     Types: 24 Objective:   PHYSICAL EXAM:  BP 98/69   Pulse 80   Temp 97.4 °F (36.3 °C) (Oral)   Resp 12   Ht 5' (1.524 m)   Wt 90 lb 8 oz (41.1 kg)   SpO2 98%   BMI 17.67 kg/m²    Physical Exam  Constitutional:       General: He is not in acute distress. Appearance: He is well-developed. He is not diaphoretic. HENT:      Head: Normocephalic and atraumatic. Mouth/Throat:      Pharynx: No oropharyngeal exudate. Eyes:      Pupils: Pupils are equal, round, and reactive to light. Neck:      Musculoskeletal: Neck supple. Vascular: No JVD. Cardiovascular:      Heart sounds: Normal heart sounds. No murmur. No friction rub. No gallop. Pulmonary:      Effort: Pulmonary effort is normal.      Breath sounds: No wheezing or rales. Abdominal:      General: Bowel sounds are normal. There is no distension. Palpations: Abdomen is soft. Tenderness: There is no abdominal tenderness. Musculoskeletal: Normal range of motion. Lymphadenopathy:      Cervical: No cervical adenopathy. Skin:     General: Skin is warm and dry. Findings: No rash. Neurological:      Mental Status: He is alert. Cranial Nerves: No cranial nerve deficit. Comments: CN 2-12 grossly intact            Data Reviewed:   LABS:  CBC:   Recent Labs     01/31/21 2002 02/01/21  0555   WBC 4.4 4.5   HGB 13.0* 12.3*   HCT 41.1 37.7*   MCV 88.5 84.0    287     BMP:   Recent Labs     01/31/21 2002 02/01/21  0556   * 139   K 3.9 3.7   CL 98* 101   CO2 28 30   BUN 8 6*   CREATININE 0.6* 0.7*     LIVER PROFILE:   Recent Labs     01/31/21 2002   AST 56*   ALT 35   BILITOT 0.6   ALKPHOS 92     PT/INR:   Recent Labs     01/31/21  2216   PROTIME 12.3   INR 1.06     APTT:No results for input(s): APTT in the last 72 hours. UA:  Recent Labs     02/01/21  0041   PHUR 5.0     No results for input(s): PHART, FXT1CAE, PO2ART in the last 72 hours.     Vent Information  SpO2: 98 %    CT chest personally reviewed, emphysema and scar like nodules bilaterally           Assessment:     1. Multiple pulm nodules, likely related to bacteremia/infectious  2. Shortness of breath, COPD/centrilobular emphysema  3. MSSA bacteremia  4. Former heavy smoker  5. IVDU    Plan:      -Favor infectious etiology to nodules (not technically masses as they are less than 3 cm in size). Await blood culture results and will consider further eval with possible biopsy/bronchoscopy if infection is ruled out  -Agree with empiric atb  -I discussed diagnosis of COPD with him which he has no prior knowledge of despite prior chart notes indicating this on past admissions.  Will start on bronchodilators which may relieve some of his dyspnea complaints  -Substance abuse counseling     Matthew Johnston MD

## 2021-02-01 NOTE — PROGRESS NOTES
Patient resting quietly in bed with eyes closed, easily aroused. No acute distress noted at this time. Pt denies further needs. Call light remains in reach, will monitor.

## 2021-02-01 NOTE — CONSULTS
Infectious Diseases   Consult Note      Reason for Consult:  Concern for infective endocarditis in PWID    Requesting Physician:  JOSIAS Crystal       Date of Admission: 2/1/2021  Subjective:   CHIEF COMPLAINT:  None given       HPI:    Mia Kanner is a 54yoM with history of IVDU (heroin, fentanyl, meth). Typically injects into the L leg. Admitted 12/5-12/11/20 with severe COVID, respiratory failure requiring MV, treated with steroids, CCP  Admitted 12/14-12/17/20 - hypoxemia, transient. [facility] Admitted 12/25-1/1/21 - worsening SOB. Lingular consolidation on CT chest.     He was bacteremic with MSSA. TTE with mod-severe TR but no vegetation seen. He left AMA. ED 1/31/21 - swelling of all extremities associated with worsening SOB; non-painful, non-pruritic LE rash. He denies pleuritic chest pain, SOB, cough, abd pain, back pain, focal joint pain. Afebrile on room air. BC and echo ordered and pending. 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 His smoking use included cigarettes. He has a 60.00 pack-year smoking history. His smokeless tobacco use includes snuff. ETOH:   reports current alcohol use of about 24.0 standard drinks of alcohol per week.        Family History:       Problem Relation Age of Onset    Other Mother alzheimers       Current Medications:    Current Facility-Administered Medications: albuterol sulfate  (90 Base) MCG/ACT inhaler 2 puff, 2 puff, Inhalation, Q4H PRN  sodium chloride flush 0.9 % injection 10 mL, 10 mL, Intravenous, 2 times per day  sodium chloride flush 0.9 % injection 10 mL, 10 mL, Intravenous, PRN  enoxaparin (LOVENOX) injection 40 mg, 40 mg, Subcutaneous, Daily  promethazine (PHENERGAN) tablet 12.5 mg, 12.5 mg, Oral, Q6H PRN **OR** ondansetron (ZOFRAN) injection 4 mg, 4 mg, Intravenous, Q6H PRN  polyethylene glycol (GLYCOLAX) packet 17 g, 17 g, Oral, Daily PRN  acetaminophen (TYLENOL) tablet 650 mg, 650 mg, Oral, Q6H PRN **OR** acetaminophen (TYLENOL) suppository 650 mg, 650 mg, Rectal, Q6H PRN  0.9 % sodium chloride infusion, , Intravenous, Continuous  vancomycin (VANCOCIN) 750 mg in dextrose 5 % 250 mL IVPB, 750 mg, Intravenous, Q8H  cefepime (MAXIPIME) 2000 mg IVPB minibag, 2,000 mg, Intravenous, Q12H  buprenorphine (SUBUTEX) SL tablet 2 mg, 2 mg, Sublingual, PRN **AND** cloNIDine (CATAPRES) tablet 0.1 mg, 0.1 mg, Oral, PRN  melatonin tablet 3 mg, 3 mg, Oral, Nightly  chlordiazePOXIDE (LIBRIUM) capsule 10 mg, 10 mg, Oral, 4x Daily  sodium chloride flush 0.9 % injection 10 mL, 10 mL, Intravenous, 2 times per day  sodium chloride flush 0.9 % injection 10 mL, 10 mL, Intravenous, PRN  thiamine tablet 100 mg, 100 mg, Oral, Daily  therapeutic multivitamin-minerals 1 tablet, 1 tablet, Oral, Daily  LORazepam (ATIVAN) tablet 1 mg, 1 mg, Oral, Q1H PRN **OR** LORazepam (ATIVAN) injection 1 mg, 1 mg, Intravenous, Q1H PRN **OR** LORazepam (ATIVAN) tablet 2 mg, 2 mg, Oral, Q1H PRN **OR** LORazepam (ATIVAN) injection 2 mg, 2 mg, Intravenous, Q1H PRN **OR** LORazepam (ATIVAN) tablet 3 mg, 3 mg, Oral, Q1H PRN **OR** LORazepam (ATIVAN) injection 3 mg, 3 mg, Intravenous, Q1H PRN **OR** LORazepam (ATIVAN) tablet 4 mg, 4 mg, Oral, Q1H PRN **OR** LORazepam (ATIVAN) injection 4 mg, 4 mg, Intravenous, Q1H distress, and appears stated age  Chronically ill appearing, malnourished. Flat affect. Cooperative with exam.   HEENT: NCAT, PERRL, EOMI. Sclera white, conjunctiva full. OP with moist mucosal membranes, no thrush, tongue protrudes midline  No conjunctival lesions   Dental carries R molar  NECK:  Supple, symmetrical, trachea midline, no adenopathy  LUNGS:  no increased work of breathing, CTA tina without W/R/R  CARDIOVASCULAR:  RRR with loud holosystolic murmur heard throughout the precordium   ABDOMEN:  normal bowel sounds, soft, flat, NT   MUSCULOSKELETAL: No obvious misalignment or effusion of the joints. No clubbing, cyanosis of the digits. No tenderness along the spinous processes   SKIN:  Non-blanching petechial/purpuric rash LE  Edema tina UE  +Track marks most prominent LLE   No peripheral stigmata of emboli   Scaling dermatitis soles of feet tina. Bounding pedal pulses   NEUROLOGIC: nonfocal exam  ACCESS:   None       DATA:    Old records have been reviewed    CBC:  Recent Labs     01/31/21 2002 02/01/21  0555   WBC 4.4 4.5   RBC 4.64 4.48   HGB 13.0* 12.3*   HCT 41.1 37.7*    287   MCV 88.5 84.0   MCH 28.0 27.4   MCHC 31.7 32.6   RDW 16.0* 15.3      BMP:  Recent Labs     01/31/21 2002 02/01/21  0556   * 139   K 3.9 3.7   CL 98* 101   CO2 28 30   BUN 8 6*   CREATININE 0.6* 0.7*   CALCIUM 8.7 9.0   GLUCOSE 102* 129*        Cultures:   12/5 COVID NAAT+   BC x2 neg    Flu ag neg    UA neg   12/14 BC x2 neg  12/25 BC x2 Staphylococcus aureus   Antibiotic Interpretation AJAY Status    clindamycin Sensitive <=0.25 mcg/mL     erythromycin Resistant >=8 mcg/mL     oxacillin Sensitive 0.5 mcg/mL     tetracycline Sensitive <=1 mcg/mL     trimethoprim-sulfamethoxazole Sensitive <=10 mcg/mL       12/25 COVID NAAT neg   12/30 BC x1 CoNS   COVID NAAT neg   1/31 BC x2 NGTD   COVID NAAT neg       Radiology Review:  All pertinent images / reports were reviewed as a part of this visit.    CT chest 12/15/20 Impression   Chronic nonocclusive segmental PE involving the pulmonary artery to the   posterior basal segment of the right lower lobe.       No evidence of acute pulmonary embolism.       COPD.       A 0.7 cm pleural based nodule versus focal atelectasis in the right lower   lobe.  Follow-up CT scan in 6 months may be obtained. CT chest 12/25/20     1. No evidence of acute pulmonary embolism   2. Airspace consolidation in the lingula compatible with pneumonia     CT chest 1/31/21  Impression   No evidence of pulmonary embolism or acute pulmonary abnormality.       Diffuse pulmonary emphysema.       There is a 2.4 cm mass in the posterior right perihilar region in the right   lower lobe of the lung, highly suspicious for an underlying neoplasm. Further evaluation and workup is recommended.       There are 2 speculated scar lesions in the inferior right middle lobe and   posterior right upper lobe.  Cannot exclude the possibility of scar neoplasm   and further evaluation and workup is recommended.       Mildly enlarged right hilar lymph nodes which could represent pathologic   lymph nodes.       Chronic compression fractures of T6 and T8 vertebral bodies. TTE 12/31/20  Summary   Normal left ventricle size, wall thickness and systolic function with an   estimated ejection fraction of 55%. No regional wall motion abnormalities are seen. A bubble study was performed and showed evidence of right to left shunt   consistent with a patent foramen ovale or interatrial septum defects. Tricuspid valve is structurally normal.   No tricuspid valve vegetation or masses visualized. Moderate to severe eccentric tricuspid regurgitation.     Assessment:     Patient Active Problem List   Diagnosis    MRSA bacteremia    Pleural effusion, left    Leukocytosis    Illicit drug use    Hepatitis    Chest pain    Pulmonary embolism (HCC)    AMS (altered mental status)    Acute respiratory failure with hypoxia (HCC)  Drug overdose    Abnormal chest x-ray    Acute encephalopathy    Disorder of electrolytes    Non-traumatic rhabdomyolysis    Elevated procalcitonin    Methamphetamine abuse (HCC)    Toxic encephalopathy    Acute hypercapnic respiratory failure (HCC)    Heroin withdrawal (HCC)    Tachycardia    Hypoxia    Acute hypoxemic respiratory failure due to COVID-19 [facility])    Acute respiratory failure with hypoxia and hypercapnia (HCC)    Pneumonia due to COVID-19 virus    Thrombocytopenia (HCC)    Elevated LFTs    COVID-19    Acute metabolic encephalopathy    Moderate protein-calorie malnutrition (HCC)    Pneumonia due to organism    Sepsis ([address]    Pulmonary infiltrate    Bacteremia due to Staphylococcus aureus    Delirium    Hyperglycemia    IV drug abuse (HCC)    Tricuspid regurgitation    PFO (patent foramen ovale)       Iveth Flores is a [place] who is evaluated for the following:    Polysubstance injection drug abuse    Admission 12/2020 with COVID, hypoxemic respiratory failure treated with steroids, CCP   Resolved  Appropriate period os isolation completed     Admitted again 12/25/20 with SOB, lingular infiltrate. Bacteremic with MSSA 12/25/20. Incomplete course of treatment completed as patient left AMA. At that time had TTE with mod-severe TR, PFO, no vegetation. Now admitted 1/31/21 with purpuric LE rash, swelling of extremities   He is without systemic signs of infection   Loud murmur  Primary concern in this context is SBE with MSSA being most likely agent / late manifestation of inadequately treated staphylococcal bacteremia.     2.4cm mass RLL  2 spiculated scar lesions RML RUL  Hilar LAD     HCV ab positive  Immunity to HAV, HBV with +serology 2014     NKDA       Recs:  Pending further data and confirmation this is persistent MSSA infection, continue broad abx as ordered with vanc and cefepime  BC, echo pending  At this time he lacks focal symptoms to suggest metastatic foci of infection - continue to monitor clinically   May need DIVYA  Agree with Dr. Sukumar Pineda the pulmonary nodules are more likely related to MSSA infection  Update HIV screen     Further recs to follow  D/w Dr. Karen Oreilly    Will follow        Elizabeth Hart M.D. Thank you for the opportunity to participate in the care of your patient.     Please do not hesitate to contact me:   [phone] office

## 2021-02-02 LAB
ANION GAP SERPL CALCULATED.3IONS-SCNC: 5 MMOL/L (ref 3–16)
BASOPHILS ABSOLUTE: 0 K/UL (ref 0–0.2)
BASOPHILS RELATIVE PERCENT: 0.2 %
BUN BLDV-MCNC: 12 MG/DL (ref 7–20)
CALCIUM SERPL-MCNC: 8.6 MG/DL (ref 8.3–10.6)
CHLORIDE BLD-SCNC: 101 MMOL/L (ref 99–110)
CO2: 32 MMOL/L (ref 21–32)
CREAT SERPL-MCNC: 0.6 MG/DL (ref 0.9–1.3)
EOSINOPHILS ABSOLUTE: 0 K/UL (ref 0–0.6)
EOSINOPHILS RELATIVE PERCENT: 0.5 %
GFR AFRICAN AMERICAN: >60
GFR NON-AFRICAN AMERICAN: >60
GLUCOSE BLD-MCNC: 114 MG/DL (ref 70–99)
HCT VFR BLD CALC: 35 % (ref 40.5–52.5)
HEMOGLOBIN: 11.6 G/DL (ref 13.5–17.5)
HIV AG/AB: NORMAL
HIV ANTIGEN: NORMAL
HIV-1 ANTIBODY: NORMAL
HIV-2 AB: NORMAL
LV EF: 58 %
LVEF MODALITY: NORMAL
LYMPHOCYTES ABSOLUTE: 0.8 K/UL (ref 1–5.1)
LYMPHOCYTES RELATIVE PERCENT: 12.9 %
MCH RBC QN AUTO: 27.6 PG (ref 26–34)
MCHC RBC AUTO-ENTMCNC: 33.1 G/DL (ref 31–36)
MCV RBC AUTO: 83.3 FL (ref 80–100)
MONOCYTES ABSOLUTE: 0.5 K/UL (ref 0–1.3)
MONOCYTES RELATIVE PERCENT: 8.5 %
NEUTROPHILS ABSOLUTE: 5 K/UL (ref 1.7–7.7)
NEUTROPHILS RELATIVE PERCENT: 77.9 %
PDW BLD-RTO: 15.7 % (ref 12.4–15.4)
PLATELET # BLD: 278 K/UL (ref 135–450)
PMV BLD AUTO: 6.8 FL (ref 5–10.5)
POTASSIUM REFLEX MAGNESIUM: 3.7 MMOL/L (ref 3.5–5.1)
RBC # BLD: 4.2 M/UL (ref 4.2–5.9)
SODIUM BLD-SCNC: 138 MMOL/L (ref 136–145)
VANCOMYCIN TROUGH: 15.2 UG/ML (ref 10–20)
WBC # BLD: 6.4 K/UL (ref 4–11)

## 2021-02-02 PROCEDURE — 86701 HIV-1ANTIBODY: CPT

## 2021-02-02 PROCEDURE — 87390 HIV-1 AG IA: CPT

## 2021-02-02 PROCEDURE — 80048 BASIC METABOLIC PNL TOTAL CA: CPT

## 2021-02-02 PROCEDURE — 99232 SBSQ HOSP IP/OBS MODERATE 35: CPT | Performed by: INTERNAL MEDICINE

## 2021-02-02 PROCEDURE — 94640 AIRWAY INHALATION TREATMENT: CPT

## 2021-02-02 PROCEDURE — 80202 ASSAY OF VANCOMYCIN: CPT

## 2021-02-02 PROCEDURE — 93306 TTE W/DOPPLER COMPLETE: CPT

## 2021-02-02 PROCEDURE — 6370000000 HC RX 637 (ALT 250 FOR IP): Performed by: NURSE PRACTITIONER

## 2021-02-02 PROCEDURE — 99233 SBSQ HOSP IP/OBS HIGH 50: CPT | Performed by: INTERNAL MEDICINE

## 2021-02-02 PROCEDURE — 86702 HIV-2 ANTIBODY: CPT

## 2021-02-02 PROCEDURE — 1200000000 HC SEMI PRIVATE

## 2021-02-02 PROCEDURE — 85025 COMPLETE CBC W/AUTO DIFF WBC: CPT

## 2021-02-02 PROCEDURE — 36592 COLLECT BLOOD FROM PICC: CPT

## 2021-02-02 PROCEDURE — 6360000002 HC RX W HCPCS: Performed by: NURSE PRACTITIONER

## 2021-02-02 PROCEDURE — 2580000003 HC RX 258: Performed by: NURSE PRACTITIONER

## 2021-02-02 RX ADMIN — CLONIDINE HYDROCHLORIDE 0.1 MG: 0.1 TABLET ORAL at 23:10

## 2021-02-02 RX ADMIN — CHLORDIAZEPOXIDE HYDROCHLORIDE 10 MG: 5 CAPSULE ORAL at 17:17

## 2021-02-02 RX ADMIN — BUPRENORPHINE HCL 2 MG: 2 TABLET SUBLINGUAL at 08:29

## 2021-02-02 RX ADMIN — Medication 2 PUFF: at 11:50

## 2021-02-02 RX ADMIN — ONDANSETRON 4 MG: 2 INJECTION INTRAMUSCULAR; INTRAVENOUS at 08:30

## 2021-02-02 RX ADMIN — Medication 2 PUFF: at 07:49

## 2021-02-02 RX ADMIN — VANCOMYCIN HYDROCHLORIDE 750 MG: 750 INJECTION, POWDER, LYOPHILIZED, FOR SOLUTION INTRAVENOUS at 17:17

## 2021-02-02 RX ADMIN — Medication 1 TABLET: at 08:29

## 2021-02-02 RX ADMIN — CEFEPIME 2000 MG: 2 INJECTION, POWDER, FOR SOLUTION INTRAVENOUS at 03:11

## 2021-02-02 RX ADMIN — SODIUM CHLORIDE, PRESERVATIVE FREE 10 ML: 5 INJECTION INTRAVENOUS at 20:43

## 2021-02-02 RX ADMIN — BUPRENORPHINE HCL 2 MG: 2 TABLET SUBLINGUAL at 23:20

## 2021-02-02 RX ADMIN — CLONIDINE HYDROCHLORIDE 0.1 MG: 0.1 TABLET ORAL at 08:29

## 2021-02-02 RX ADMIN — LORAZEPAM 2 MG: 2 INJECTION, SOLUTION INTRAMUSCULAR; INTRAVENOUS at 23:10

## 2021-02-02 RX ADMIN — BUPRENORPHINE HCL 2 MG: 2 TABLET SUBLINGUAL at 20:34

## 2021-02-02 RX ADMIN — CLONIDINE HYDROCHLORIDE 0.1 MG: 0.1 TABLET ORAL at 13:32

## 2021-02-02 RX ADMIN — CHLORDIAZEPOXIDE HYDROCHLORIDE 10 MG: 5 CAPSULE ORAL at 20:33

## 2021-02-02 RX ADMIN — CHLORDIAZEPOXIDE HYDROCHLORIDE 10 MG: 5 CAPSULE ORAL at 13:32

## 2021-02-02 RX ADMIN — BUPRENORPHINE HCL 2 MG: 2 TABLET SUBLINGUAL at 11:32

## 2021-02-02 RX ADMIN — CHLORDIAZEPOXIDE HYDROCHLORIDE 10 MG: 5 CAPSULE ORAL at 08:29

## 2021-02-02 RX ADMIN — VANCOMYCIN HYDROCHLORIDE 750 MG: 750 INJECTION, POWDER, LYOPHILIZED, FOR SOLUTION INTRAVENOUS at 08:19

## 2021-02-02 RX ADMIN — Medication 3 MG: at 20:33

## 2021-02-02 RX ADMIN — Medication 2 PUFF: at 16:01

## 2021-02-02 RX ADMIN — Medication 100 MG: at 08:29

## 2021-02-02 RX ADMIN — Medication 2 PUFF: at 19:55

## 2021-02-02 RX ADMIN — ACETAMINOPHEN 650 MG: 325 TABLET ORAL at 04:23

## 2021-02-02 RX ADMIN — Medication 2 PUFF: at 16:02

## 2021-02-02 RX ADMIN — CEFEPIME 2000 MG: 2 INJECTION, POWDER, FOR SOLUTION INTRAVENOUS at 15:35

## 2021-02-02 RX ADMIN — VANCOMYCIN HYDROCHLORIDE 750 MG: 750 INJECTION, POWDER, LYOPHILIZED, FOR SOLUTION INTRAVENOUS at 00:27

## 2021-02-02 RX ADMIN — LORAZEPAM 1 MG: 2 INJECTION, SOLUTION INTRAMUSCULAR; INTRAVENOUS at 20:35

## 2021-02-02 RX ADMIN — CLONIDINE HYDROCHLORIDE 0.1 MG: 0.1 TABLET ORAL at 11:32

## 2021-02-02 RX ADMIN — BUPRENORPHINE HCL 2 MG: 2 TABLET SUBLINGUAL at 13:33

## 2021-02-02 RX ADMIN — ENOXAPARIN SODIUM 40 MG: 40 INJECTION SUBCUTANEOUS at 08:21

## 2021-02-02 RX ADMIN — SODIUM CHLORIDE, PRESERVATIVE FREE 10 ML: 5 INJECTION INTRAVENOUS at 08:18

## 2021-02-02 RX ADMIN — CLONIDINE HYDROCHLORIDE 0.1 MG: 0.1 TABLET ORAL at 20:34

## 2021-02-02 RX ADMIN — Medication 2 PUFF: at 19:53

## 2021-02-02 RX ADMIN — ONDANSETRON 4 MG: 2 INJECTION INTRAMUSCULAR; INTRAVENOUS at 15:44

## 2021-02-02 ASSESSMENT — PAIN SCALES - GENERAL
PAINLEVEL_OUTOF10: 8
PAINLEVEL_OUTOF10: 0
PAINLEVEL_OUTOF10: 2
PAINLEVEL_OUTOF10: 0
PAINLEVEL_OUTOF10: 2

## 2021-02-02 NOTE — PROGRESS NOTES
Pt assessment completed and charted. VSS. Pt a/o but quiet. Medications given per MAR. IJ showing brisk blood return. Pt denies pain at this time. Bed in lowest position and wheels locked. Call light within reach. Bedside table within reach. Non-skid footwear in place. Pt denies any other needs at this time. Pt calls out appropriately. Will continue to monitor.

## 2021-02-02 NOTE — PROGRESS NOTES
Hospitalist Progress Note      PCP: No primary care provider on file. Date of Admission: 2/1/2021    Chief Complaint: SOB, LE edema    Hospital Course:   48 y.o. male, with PMH of IV drug use and alcohol use, who was a direct admit from [facility] to [facility] with shortness of breath, lower extremity swelling and red dots to BLE. History obtained from the patient and review of EMR. She stated he has been experiencing shortness of breath for the last 4 days. He stated he has also noticed that his right hand and bilateral legs have been swelling. The patient stated 3 days ago he noticed he started to get a \"red rash\" on his legs. He reported over the last 24 hours his shortness of breath has gotten progressively worse. The patient does not wear oxygen at home. On arrival to the emergency room at [facility] the patient's SPO2 was 89 to 90% on room air and he is now requiring 1 to 2 L nasal cannula. In the emergency room the patient had a chest x-ray that revealed increased perihilar markings. Atelectasis, infectious or inflammatory process and interstitial edema can be in the differential.  A CT chest pulmonary embolism was also obtained that revealed no evidence of pulmonary embolism or acute pulmonary abnormality. Diffuse pulmonary emphysema. There is a 2.4 cm mass in the posterior right perihilar region in the right lower lobe of the lung, highly suspicious for an underlying neoplasm. Given the patient's history of IV drug use, there is high suspicion for endocarditis/septic emboli. The patient was ultimately transferred to [facility] for further evaluation and management. He was started on vancomycin and cefepime. Pulmonology and infectious disease has been consulted. The patient denied any other associated symptoms as well as any aggravating and/or alleviating factors. At the time of this assessment, the patient was resting comfortably in bed.  He currently denies any chest pain, back pain, abdominal pain, shortness of breath, numbness, tingling, N/V/C/D, fever and/or chills. Subjective: having mild opioid withdrawal symptoms. CIWA scoring very low. Blood cultures NGTD. Echo pending. Medications:  Reviewed    Infusion Medications   Scheduled Medications    sodium chloride flush  10 mL Intravenous 2 times per day    enoxaparin  40 mg Subcutaneous Daily    vancomycin  750 mg Intravenous Q8H    cefepime  2,000 mg Intravenous Q12H    melatonin  3 mg Oral Nightly    chlordiazePOXIDE  10 mg Oral 4x Daily    sodium chloride flush  10 mL Intravenous 2 times per day    thiamine  100 mg Oral Daily    therapeutic multivitamin-minerals  1 tablet Oral Daily    lidocaine 1 % injection  5 mL Intradermal Once    albuterol sulfate HFA  2 puff Inhalation 4x daily    And    ipratropium  2 puff Inhalation 4x daily     PRN Meds: albuterol sulfate HFA, sodium chloride flush, promethazine **OR** ondansetron, polyethylene glycol, acetaminophen **OR** acetaminophen, buprenorphine **AND** cloNIDine, sodium chloride flush, LORazepam **OR** LORazepam **OR** LORazepam **OR** LORazepam **OR** LORazepam **OR** LORazepam **OR** LORazepam **OR** LORazepam      Intake/Output Summary (Last 24 hours) at 2/2/2021 1116  Last data filed at 2/2/2021 0818  Gross per 24 hour   Intake 390 ml   Output 1410 ml   Net -1020 ml       Physical Exam Performed:    /73   Pulse 106   Temp 97.8 °F (36.6 °C) (Oral)   Resp 16   Ht 5' (1.524 m)   Wt 90 lb 8 oz (41.1 kg)   SpO2 95%   BMI 17.67 kg/m²     General appearance:  Pleasant male in no apparent distress, appears stated age and cooperative. HEENT:  Normal cephalic, atraumatic without obvious deformity. Pupils equal, round, and reactive to light. Extra ocular muscles intact. Conjunctivae/corneas clear. Neck: Supple, with full range of motion. No jugular venous distention. Trachea midline. Respiratory:  Normal respiratory effort.  Clear to auscultation, bilaterally without Rales/Wheezes/Rhonchi. 1 L NC  Cardiovascular:  Regular rate and rhythm with normal S1/S2 without murmurs, rubs or gallops. Abdomen: Soft, non-tender, non-distended with normal bowel sounds. Musculoskeletal:  No clubbing, cyanosis or edema bilaterally. Full range of motion without deformity. Skin: Skin color, texture, turgor normal. Petechiae rash lower legs. Neurologic:  Neurovascularly intact. Cranial nerves: II-XII intact, grossly non-focal.  Psychiatric:  Alert and oriented, thought content appropriate, normal insight  Capillary Refill: Brisk,< 3 seconds   Peripheral Pulses: +2 palpable, equal bilaterally       Labs:   Recent Labs     01/31/21 2002 02/01/21  0555   WBC 4.4 4.5   HGB 13.0* 12.3*   HCT 41.1 37.7*    287     Recent Labs     01/31/21 2002 02/01/21  0556   * 139   K 3.9 3.7   CL 98* 101   CO2 28 30   BUN 8 6*   CREATININE 0.6* 0.7*   CALCIUM 8.7 9.0     Recent Labs     01/31/21 2002   AST 56*   ALT 35   BILITOT 0.6   ALKPHOS 92     Recent Labs     01/31/21  2216   INR 1.06     Recent Labs     01/31/21 2002   Penne Baumgarten <0.01       Urinalysis:      Lab Results   Component Value Date    NITRU Negative 02/01/2021    WBCUA 3-5 02/01/2021    BACTERIA 1+ 02/01/2021    RBCUA 11-20 02/01/2021    BLOODU LARGE 02/01/2021    SPECGRAV >=1.030 02/01/2021    GLUCOSEU Negative 02/01/2021       Radiology:  IR FLUORO GUIDED CVA DEVICE PLMT/REPLACE/REMOVAL   Final Result   Status post successful ultrasound/fluoroscopically guided placement of right   internal jugular triple-lumen central venous catheter as described above.                  Assessment/Plan:    Active Hospital Problems    Diagnosis    IV drug abuse ([facility] [address] [F19.10]    Acute respiratory failure with hypoxia (HCC) [J96.01]     Suspected endocarditis with septic emboli  - loud murmur, H/O IVDU, possible infectious emboli seen on CT chest  - no evidence of sepsis  - ID consulted  - echo ordered  - blood cultures NGTD  - continue vanc, cefepime  - pulm consulted     IVDU, opioid dependence  - counseling given  - COWS ordered     COPD  - no evidence of exacerbation  - nebs ordered     Alcohol dependence  - at risk for withdrawal  - started on CIWA, PRN ativan, scheduled librium  - counseling given    Moderate protein calorie malnutrition  - dietitian consult  - dietary supplements ordered    DVT Prophylaxis: lovenox  Diet: DIET GENERAL;  Dietary Nutrition Supplements: Standard High Calorie Oral Supplement  Code Status: Full Code    PT/OT Eval Status: not ordered    Dispo - at least 2-3 days    Hailey Rucker MD

## 2021-02-02 NOTE — PROGRESS NOTES
Pt's anxiety, irritability, and restlessness increased during shift. See COWs on MAR. Pt continues to complain of nausea. Medicated per MAR with Zofran. No emesis noted. Did not eat breakfast but did eat half of lunch. Call light within reach; will continue to monitor.

## 2021-02-02 NOTE — PROGRESS NOTES
Infectious Disease Follow up Notes    CC :  Concern for SBE      Antibiotics:   Cefepime 2g q12  vanc 750 q8     Admit Date:   2/1/2021  Hospital Day: 2    Subjective:   No fever today. On room air. He feels about the same. Less swelling in the hands and legs. No new concerns raised     Objective:     Patient Vitals for the past 8 hrs:   BP Temp Temp src Pulse Resp SpO2   02/02/21 1331 125/85   111     02/02/21 1153      95 %   02/02/21 1109 109/73   106     02/02/21 0813 118/75 97.8 °F (36.6 °C) Oral 96 16 95 %   02/02/21 0750      96 %       EXAM:  General:  Alert, cooperative, no acute distress   HEENT:  NCAT, PERRL, sclera anicteric    NECK:  Supple   LUNGS:  CTA upper lobes without W/R/R   CV:   RRR with unchanged murmur     ABD: Soft, flat, NT    EXT: Unchanged flat macular/purpuric lesions LE.   Resolved LE edema  Unchanged edema tina UE primarily in the hands          LINE: R IJ TLC in place         Scheduled Meds:   sodium chloride flush  10 mL Intravenous 2 times per day    enoxaparin  40 mg Subcutaneous Daily    vancomycin  750 mg Intravenous Q8H    cefepime  2,000 mg Intravenous Q12H    melatonin  3 mg Oral Nightly    chlordiazePOXIDE  10 mg Oral 4x Daily    sodium chloride flush  10 mL Intravenous 2 times per day    thiamine  100 mg Oral Daily    therapeutic multivitamin-minerals  1 tablet Oral Daily    lidocaine 1 % injection  5 mL Intradermal Once    albuterol sulfate HFA  2 puff Inhalation 4x daily    And    ipratropium  2 puff Inhalation 4x daily         Data Review:    Lab Results   Component Value Date    WBC 6.4 02/02/2021    HGB 11.6 (L) 02/02/2021    HCT 35.0 (L) 02/02/2021    MCV 83.3 02/02/2021     02/02/2021     Lab Results   Component Value Date    CREATININE 0.6 (L) 02/02/2021    BUN 12 02/02/2021     02/02/2021    K 3.7 02/02/2021     02/02/2021    CO2 32 further evaluation and workup is recommended.       Mildly enlarged right hilar lymph nodes which could represent pathologic   lymph nodes.       Chronic compression fractures of T6 and T8 vertebral bodies.      TTE 12/31/20  Summary   Normal left ventricle size, wall thickness and systolic function with an   estimated ejection fraction of 55%.   No regional wall motion abnormalities are seen.   A bubble study was performed and showed evidence of right to left shunt   consistent with a patent foramen ovale or interatrial septum defects.   Tricuspid valve is structurally normal.   No tricuspid valve vegetation or masses visualized.   Moderate to severe eccentric tricuspid regurgitation.     TTE 2/2/21  Summary   Technical difficult study due to heart off axis, and the patient is   difficult to stay still. No obvious vegetation, consider DIVYA if clinically indicated. -- Tachycardia is noted through the study, Left ventricular systolic   function is normal with a visually estimated ejection fraction of 55-60%. he   left ventricle is normal in size with normal wall thickness. Flattened in   diastole and systole (\"D-shaped septum\") consistent with right ventricular   volume and pressure overload. Normal left ventricular diastolic function. -- The right atrium is dilated. -- Mild mitral regurgitation. -- Severe tricuspid regurgitation. Systolic pulmonary artery pressure (SPAP) is elevated and estimated at 71   mmHg (right atrial pressure 8 mmHg) consistent with severe pulmonary   hypertension. No masses or vegetation is noted.     Assessment:     Patient Active Problem List    Diagnosis Date Noted    Tricuspid regurgitation 12/31/2020    PFO (patent foramen ovale) 12/31/2020    IV drug abuse ([address]     Hyperglycemia 12/29/2020    Pulmonary infiltrate 12/28/2020    Bacteremia due to Staphylococcus aureus 12/28/2020    Delirium 12/28/2020    Sepsis ([address] 12/25/2020    Pneumonia due to organism 12/15/2020  Moderate protein-calorie malnutrition ([facility] [address] 12/10/2020    Acute metabolic encephalopathy     COVID-19     Acute hypoxemic respiratory failure due to COVID-19 [facility]) 12/05/2020    Acute respiratory failure with hypoxia and hypercapnia (HCC)     Pneumonia due to COVID-19 virus     Thrombocytopenia (HCC)     Elevated LFTs     Tachycardia     Hypoxia     Heroin withdrawal (HCC) 11/07/2020    Toxic encephalopathy     Acute hypercapnic respiratory failure (HCC)     Acute respiratory failure with hypoxia (HCC)     Drug overdose     Abnormal chest x-ray     Acute encephalopathy     Disorder of electrolytes     Non-traumatic rhabdomyolysis     Elevated procalcitonin     Methamphetamine abuse ([facility] [address]     AMS (altered mental status) 07/03/2020    Chest pain 07/25/2014    Pulmonary embolism ([facility] [address]     Hepatitis 07/18/2014    MRSA bacteremia 07/13/2014    Pleural effusion, left 07/13/2014    Leukocytosis 31/96/1539    Illicit drug use        Injection drug abuse     Admission 12/2020 with COVID, hypoxemic respiratory failure treated with steroids, CCP   Resolved  Appropriate period of isolation completed      Admitted again 12/25/20 with SOB, lingular infiltrate. Bacteremic with MSSA 12/25/20. Incomplete course of treatment completed as patient left AMA. At that time had TTE with mod-severe TR, PFO, no vegetation.       Re-admitted 1/31/21 with new macular/purpuric LE rash, swelling of extremities without systemic signs of infection   Loud murmur  Primary concern in this context is SBE yet no fever, BC are negative to date  TTE no vegetation     2.4cm mass RLL  2 spiculated scar lesions RML RUL  Hilar LAD      HCV ab positive  Immunity to HAV, HBV with +serology 2014   HIV screen neg      NKDA     Plan:   Available data would suggest infective endocarditis is less likely. Presenting symptoms may be related to valvular heart disease.   Will ask Cardiology to see him for thoughts as well as consideration of DIVYA. Empiric abx for now     Still have lung nodules not fully explained.   Pulmonary following for consideration of bronch       Discussed with patient/family, all questions answered        Raylene Ormond, MD  Phone: [phone]   Fax : [phone]

## 2021-02-02 NOTE — PROGRESS NOTES
Pt a/o. Appeared to be agitated and restless this AM, complained of nausea. No emesis. COWs score of 8 and pt medicated per MAR CIWA score of 6, no CIWA medication needed per MAR. COWs score now of 3, no medication needed. Pt stated no longer nauseated after receiving Zofran. Loss of appetite. Complains of no pain. Denies any needs.  Call light within reach; will continue to  monitor

## 2021-02-02 NOTE — CARE COORDINATION
Chart reviewed. Spoke with Dr Edward Han. Following labs. Unclear if will need IVATBX at discharge. If so would need SNF placement due to IVDU. Would also need line switch to PICC. Patient is homeless, his truck is at [facility]. Following.  Liang Rush RN

## 2021-02-02 NOTE — PLAN OF CARE
Problem: Falls - Risk of:  Goal: Will remain free from falls  Description: Will remain free from falls  2/2/2021 1015 by Pressley Dakins, RN  Note: Pt remains safe. Uses call light appropriately to call out for assistance. Bed locked in lowest position; side rails 2/4; call light and bedside table within reach of pt.

## 2021-02-02 NOTE — PROGRESS NOTES
TEXT:    acute respiratory failure resolved prior to admission. Query created by: Reji Elizabeth on 2/2/2021 9:54 AM      QUERY TEXT:    Patient admitted with septic emboli and possible endocarditis 2/2 IV drug use. Notable for homelessness and food insecurity, with BMI 17.6. If possible,   please document in progress notes and discharge summary if you are evaluating   and /or treating any of the following: The medical record reflects the following:  Risk Factors: homeless, IV drug use, COPD    Clinical Indicators: per limited dietitian assessment during previous hospital   encounter: Usual Body Weight: 102 lb (46.3 kg)(standing scale 12/14), current   encounter weight per standing scale: 90 lbs (BMI 17.67). limited dietitian   assessment this encounter with no physical assessment conducted. notable for   \"at risk for malnutrition\" with \"food insecurity\" and homelessness, ONS   started. admission at [facility] 7/2020 w/ documented weights 125#/BMI 22.8    ID consultant exam notes: CONSTITUTIONAL:  Awake, alert, cooperative, no   apparent distress, and appears stated age  Chronically ill appearing, malnourished. Flat affect. Cooperative with exam.    Treatment: dietitian assessment, ONS, weights and I/Os per nursing, supportive   care, case management for d/c planning assistance    Thank you,  Da Deleon RN CDS  [phone]  Options provided:  -- Protein calorie malnutrition severe  -- Protein calorie malnutrition moderate  -- Protein calorie malnutrition mild  -- Other - I will add my own diagnosis  -- Disagree - Not applicable / Not valid  -- Disagree - Clinically unable to determine / Unknown  -- Refer to Clinical Documentation Reviewer    PROVIDER RESPONSE TEXT:    This patient has moderate protein calorie malnutrition. Query created by:  Reji Elizabeth on 2/2/2021 10:09 AM      Electronically signed by:  Alma Delia Roberts MD 2/2/2021 11:16 AM

## 2021-02-02 NOTE — PROGRESS NOTES
Pulmonary & Critical Care Inpatient Progress Note   Marvin Mueller MD     REASON FOR TODAY'S VISIT:  Pulmonary nodules    SUBJECTIVE:   Screaming out when seen   In more generalized discomfort  On subutex and CIWA protocol, received the former  No specific respiratory issues, on room air oxygen     Scheduled Meds:   sodium chloride flush  10 mL Intravenous 2 times per day    enoxaparin  40 mg Subcutaneous Daily    vancomycin  750 mg Intravenous Q8H    cefepime  2,000 mg Intravenous Q12H    melatonin  3 mg Oral Nightly    chlordiazePOXIDE  10 mg Oral 4x Daily    sodium chloride flush  10 mL Intravenous 2 times per day    thiamine  100 mg Oral Daily    therapeutic multivitamin-minerals  1 tablet Oral Daily    lidocaine 1 % injection  5 mL Intradermal Once    albuterol sulfate HFA  2 puff Inhalation 4x daily    And    ipratropium  2 puff Inhalation 4x daily       Continuous Infusions:      PRN Meds:  albuterol sulfate HFA, sodium chloride flush, promethazine **OR** ondansetron, polyethylene glycol, acetaminophen **OR** acetaminophen, buprenorphine **AND** cloNIDine, sodium chloride flush, LORazepam **OR** LORazepam **OR** LORazepam **OR** LORazepam **OR** LORazepam **OR** LORazepam **OR** LORazepam **OR** LORazepam    ALLERGIES:  Patient has No Known Allergies. Objective:   PHYSICAL EXAM:  /75   Pulse 96   Temp 97.8 °F (36.6 °C) (Oral)   Resp 16   Ht 5' (1.524 m)   Wt 90 lb 8 oz (41.1 kg)   SpO2 95%   BMI 17.67 kg/m²    Physical Exam  Constitutional:       General: He is not in acute distress. Appearance: He is well-developed. He is not diaphoretic. HENT:      Head: Normocephalic and atraumatic. Mouth/Throat:      Pharynx: No oropharyngeal exudate. Eyes:      Pupils: Pupils are equal, round, and reactive to light. Neck:      Musculoskeletal: Neck supple. Vascular: No JVD. Cardiovascular:      Heart sounds: Normal heart sounds. No murmur. No friction rub. No gallop. Pulmonary:      Effort: Pulmonary effort is normal.      Breath sounds: No wheezing or rales. Abdominal:      General: Bowel sounds are normal. There is no distension. Palpations: Abdomen is soft. Tenderness: There is no abdominal tenderness. Musculoskeletal: Normal range of motion. Lymphadenopathy:      Cervical: No cervical adenopathy. Skin:     General: Skin is warm and dry. Findings: No rash. Neurological:      Mental Status: He is alert. Cranial Nerves: No cranial nerve deficit. Comments: CN 2-12 grossly intact            Data Reviewed:   LABS:  CBC:  Recent Labs     01/31/21 2002 02/01/21  0555   WBC 4.4 4.5   HGB 13.0* 12.3*   HCT 41.1 37.7*   MCV 88.5 84.0    287     BMP:  Recent Labs     01/31/21 2002 02/01/21  0556   * 139   K 3.9 3.7   CL 98* 101   CO2 28 30   BUN 8 6*   CREATININE 0.6* 0.7*     LIVER PROFILE:   Recent Labs     01/31/21 2002   AST 56*   ALT 35   BILITOT 0.6   ALKPHOS 92     PT/INR:  Recent Labs     01/31/21  2216   PROTIME 12.3   INR 1.06     APTT: No results for input(s): APTT in the last 72 hours. UA:  Recent Labs     02/01/21  1830   COLORU DARK YELLOW*   PHUR 6.5   WBCUA 3-5   RBCUA 11-20*   BACTERIA 1+*   CLARITYU CLOUDY*   SPECGRAV >=1.030   LEUKOCYTESUR Negative   UROBILINOGEN 1.0   BILIRUBINUR SMALL*   BLOODU LARGE*   GLUCOSEU Negative   AMORPHOUS 4+     No results for input(s): PHART, EHR3IPD, PO2ART in the last 72 hours. Vent Information  SpO2: 95 %    CT chest personally reviewed, emphysema and scar like nodules bilaterally         Assessment:     1. Multiple pulm nodules, likely related to bacteremia/infectious  2. Shortness of breath, COPD/centrilobular emphysema  3. MSSA bacteremia  4. Former heavy smoker  5. IVDU    Plan:      -Blood cultures still negative, will await finalized report.  If they remain negative will consider bronchoscopy to evaluate nodules  -Atb therapy per ID  -Bronchodilators  -CIWA and COWS protocol for withdrawal   -Will order labs for this morning     Babs Salinas MD

## 2021-02-03 ENCOUNTER — APPOINTMENT (OUTPATIENT)
Dept: CARDIAC CATH/INVASIVE PROCEDURES | Age: 51
DRG: 307 | End: 2021-02-03
Attending: INTERNAL MEDICINE
Payer: MEDICARE

## 2021-02-03 ENCOUNTER — ANESTHESIA (OUTPATIENT)
Dept: CARDIAC CATH/INVASIVE PROCEDURES | Age: 51
DRG: 307 | End: 2021-02-03
Payer: MEDICARE

## 2021-02-03 ENCOUNTER — ANESTHESIA EVENT (OUTPATIENT)
Dept: CARDIAC CATH/INVASIVE PROCEDURES | Age: 51
DRG: 307 | End: 2021-02-03
Payer: MEDICARE

## 2021-02-03 VITALS — DIASTOLIC BLOOD PRESSURE: 61 MMHG | OXYGEN SATURATION: 98 % | SYSTOLIC BLOOD PRESSURE: 87 MMHG

## 2021-02-03 PROBLEM — Z86.16 PERSONAL HISTORY OF COVID-19: Status: ACTIVE | Noted: 2021-02-03

## 2021-02-03 PROBLEM — R91.1 LUNG NODULE: Status: ACTIVE | Noted: 2021-02-03

## 2021-02-03 PROBLEM — R59.0 HILAR ADENOPATHY: Status: ACTIVE | Noted: 2021-02-03

## 2021-02-03 PROBLEM — Z87.891 FORMER SMOKER: Status: ACTIVE | Noted: 2021-02-03

## 2021-02-03 PROBLEM — J43.2 CENTRILOBULAR EMPHYSEMA (HCC): Status: ACTIVE | Noted: 2021-02-03

## 2021-02-03 PROBLEM — I27.20 MODERATE TO SEVERE PULMONARY HYPERTENSION (HCC): Status: ACTIVE | Noted: 2021-02-03

## 2021-02-03 PROBLEM — Z78.9 ALCOHOL USE: Status: ACTIVE | Noted: 2021-02-03

## 2021-02-03 LAB
LV EF: 58 %
LVEF MODALITY: NORMAL

## 2021-02-03 PROCEDURE — 6360000002 HC RX W HCPCS: Performed by: INTERNAL MEDICINE

## 2021-02-03 PROCEDURE — 2580000003 HC RX 258: Performed by: NURSE PRACTITIONER

## 2021-02-03 PROCEDURE — 6360000002 HC RX W HCPCS: Performed by: NURSE ANESTHETIST, CERTIFIED REGISTERED

## 2021-02-03 PROCEDURE — 3700000000 HC ANESTHESIA ATTENDED CARE

## 2021-02-03 PROCEDURE — 2700000000 HC OXYGEN THERAPY PER DAY

## 2021-02-03 PROCEDURE — 6360000002 HC RX W HCPCS: Performed by: NURSE PRACTITIONER

## 2021-02-03 PROCEDURE — 3700000001 HC ADD 15 MINUTES (ANESTHESIA)

## 2021-02-03 PROCEDURE — 6370000000 HC RX 637 (ALT 250 FOR IP): Performed by: NURSE PRACTITIONER

## 2021-02-03 PROCEDURE — 99232 SBSQ HOSP IP/OBS MODERATE 35: CPT | Performed by: INTERNAL MEDICINE

## 2021-02-03 PROCEDURE — B24BZZ4 ULTRASONOGRAPHY OF HEART WITH AORTA, TRANSESOPHAGEAL: ICD-10-PCS | Performed by: INTERNAL MEDICINE

## 2021-02-03 PROCEDURE — 94761 N-INVAS EAR/PLS OXIMETRY MLT: CPT

## 2021-02-03 PROCEDURE — 93315 ECHO TRANSESOPHAGEAL: CPT

## 2021-02-03 PROCEDURE — 93320 DOPPLER ECHO COMPLETE: CPT

## 2021-02-03 PROCEDURE — 94640 AIRWAY INHALATION TREATMENT: CPT

## 2021-02-03 PROCEDURE — 2580000003 HC RX 258: Performed by: INTERNAL MEDICINE

## 2021-02-03 PROCEDURE — 1200000000 HC SEMI PRIVATE

## 2021-02-03 PROCEDURE — 2500000003 HC RX 250 WO HCPCS: Performed by: NURSE ANESTHETIST, CERTIFIED REGISTERED

## 2021-02-03 PROCEDURE — 93325 DOPPLER ECHO COLOR FLOW MAPG: CPT

## 2021-02-03 RX ORDER — MORPHINE SULFATE 4 MG/ML
1 INJECTION, SOLUTION INTRAMUSCULAR; INTRAVENOUS EVERY 5 MIN PRN
Status: DISCONTINUED | OUTPATIENT
Start: 2021-02-03 | End: 2021-02-04

## 2021-02-03 RX ORDER — LABETALOL HYDROCHLORIDE 5 MG/ML
5 INJECTION, SOLUTION INTRAVENOUS EVERY 10 MIN PRN
Status: DISCONTINUED | OUTPATIENT
Start: 2021-02-03 | End: 2021-02-04

## 2021-02-03 RX ORDER — SODIUM CHLORIDE 0.9 % (FLUSH) 0.9 %
10 SYRINGE (ML) INJECTION EVERY 12 HOURS SCHEDULED
Status: DISCONTINUED | OUTPATIENT
Start: 2021-02-03 | End: 2021-02-04 | Stop reason: SDUPTHER

## 2021-02-03 RX ORDER — SODIUM CHLORIDE 0.9 % (FLUSH) 0.9 %
10 SYRINGE (ML) INJECTION PRN
Status: DISCONTINUED | OUTPATIENT
Start: 2021-02-03 | End: 2021-02-04 | Stop reason: SDUPTHER

## 2021-02-03 RX ORDER — MORPHINE SULFATE 4 MG/ML
2 INJECTION, SOLUTION INTRAMUSCULAR; INTRAVENOUS EVERY 5 MIN PRN
Status: DISCONTINUED | OUTPATIENT
Start: 2021-02-03 | End: 2021-02-04

## 2021-02-03 RX ORDER — ALBUTEROL SULFATE 2.5 MG/3ML
2.5 SOLUTION RESPIRATORY (INHALATION)
Status: DISCONTINUED | OUTPATIENT
Start: 2021-02-03 | End: 2021-02-04 | Stop reason: HOSPADM

## 2021-02-03 RX ORDER — FUROSEMIDE 10 MG/ML
40 INJECTION INTRAMUSCULAR; INTRAVENOUS ONCE
Status: COMPLETED | OUTPATIENT
Start: 2021-02-03 | End: 2021-02-03

## 2021-02-03 RX ORDER — LIDOCAINE HYDROCHLORIDE 20 MG/ML
INJECTION, SOLUTION INFILTRATION; PERINEURAL PRN
Status: DISCONTINUED | OUTPATIENT
Start: 2021-02-03 | End: 2021-02-03 | Stop reason: SDUPTHER

## 2021-02-03 RX ORDER — ONDANSETRON 2 MG/ML
4 INJECTION INTRAMUSCULAR; INTRAVENOUS
Status: ACTIVE | OUTPATIENT
Start: 2021-02-03 | End: 2021-02-03

## 2021-02-03 RX ORDER — PROMETHAZINE HYDROCHLORIDE 25 MG/ML
6.25 INJECTION, SOLUTION INTRAMUSCULAR; INTRAVENOUS
Status: ACTIVE | OUTPATIENT
Start: 2021-02-03 | End: 2021-02-03

## 2021-02-03 RX ORDER — OXYCODONE HYDROCHLORIDE AND ACETAMINOPHEN 5; 325 MG/1; MG/1
2 TABLET ORAL PRN
Status: ACTIVE | OUTPATIENT
Start: 2021-02-03 | End: 2021-02-03

## 2021-02-03 RX ORDER — OXYCODONE HYDROCHLORIDE AND ACETAMINOPHEN 5; 325 MG/1; MG/1
1 TABLET ORAL PRN
Status: ACTIVE | OUTPATIENT
Start: 2021-02-03 | End: 2021-02-03

## 2021-02-03 RX ORDER — ALBUTEROL SULFATE 2.5 MG/3ML
2.5 SOLUTION RESPIRATORY (INHALATION) EVERY 4 HOURS PRN
Status: DISCONTINUED | OUTPATIENT
Start: 2021-02-03 | End: 2021-02-04 | Stop reason: HOSPADM

## 2021-02-03 RX ORDER — HYDRALAZINE HYDROCHLORIDE 20 MG/ML
5 INJECTION INTRAMUSCULAR; INTRAVENOUS EVERY 10 MIN PRN
Status: DISCONTINUED | OUTPATIENT
Start: 2021-02-03 | End: 2021-02-04

## 2021-02-03 RX ORDER — DIPHENHYDRAMINE HYDROCHLORIDE 50 MG/ML
12.5 INJECTION INTRAMUSCULAR; INTRAVENOUS
Status: ACTIVE | OUTPATIENT
Start: 2021-02-03 | End: 2021-02-03

## 2021-02-03 RX ORDER — MEPERIDINE HYDROCHLORIDE 50 MG/ML
12.5 INJECTION INTRAMUSCULAR; INTRAVENOUS; SUBCUTANEOUS EVERY 5 MIN PRN
Status: DISCONTINUED | OUTPATIENT
Start: 2021-02-03 | End: 2021-02-04

## 2021-02-03 RX ORDER — PROPOFOL 10 MG/ML
INJECTION, EMULSION INTRAVENOUS PRN
Status: DISCONTINUED | OUTPATIENT
Start: 2021-02-03 | End: 2021-02-03 | Stop reason: SDUPTHER

## 2021-02-03 RX ADMIN — ALBUTEROL SULFATE 2.5 MG: 2.5 SOLUTION RESPIRATORY (INHALATION) at 19:15

## 2021-02-03 RX ADMIN — CEFEPIME 2000 MG: 2 INJECTION, POWDER, FOR SOLUTION INTRAVENOUS at 04:27

## 2021-02-03 RX ADMIN — Medication 2 PUFF: at 08:17

## 2021-02-03 RX ADMIN — LORAZEPAM 2 MG: 2 INJECTION, SOLUTION INTRAMUSCULAR; INTRAVENOUS at 06:53

## 2021-02-03 RX ADMIN — LORAZEPAM 4 MG: 2 INJECTION, SOLUTION INTRAMUSCULAR; INTRAVENOUS at 03:09

## 2021-02-03 RX ADMIN — VANCOMYCIN HYDROCHLORIDE 750 MG: 750 INJECTION, POWDER, LYOPHILIZED, FOR SOLUTION INTRAVENOUS at 15:30

## 2021-02-03 RX ADMIN — IPRATROPIUM BROMIDE 0.5 MG: 0.5 SOLUTION RESPIRATORY (INHALATION) at 19:15

## 2021-02-03 RX ADMIN — Medication 2 PUFF: at 12:12

## 2021-02-03 RX ADMIN — PROPOFOL 20 MG: 10 INJECTION, EMULSION INTRAVENOUS at 13:38

## 2021-02-03 RX ADMIN — LIDOCAINE HYDROCHLORIDE 40 MG: 20 INJECTION, SOLUTION INFILTRATION; PERINEURAL at 13:27

## 2021-02-03 RX ADMIN — CLONIDINE HYDROCHLORIDE 0.1 MG: 0.1 TABLET ORAL at 04:56

## 2021-02-03 RX ADMIN — LORAZEPAM 4 MG: 2 INJECTION, SOLUTION INTRAMUSCULAR; INTRAVENOUS at 00:21

## 2021-02-03 RX ADMIN — CLONIDINE HYDROCHLORIDE 0.1 MG: 0.1 TABLET ORAL at 23:03

## 2021-02-03 RX ADMIN — VANCOMYCIN HYDROCHLORIDE 750 MG: 750 INJECTION, POWDER, LYOPHILIZED, FOR SOLUTION INTRAVENOUS at 00:37

## 2021-02-03 RX ADMIN — Medication 2 PUFF: at 15:37

## 2021-02-03 RX ADMIN — CHLORDIAZEPOXIDE HYDROCHLORIDE 10 MG: 5 CAPSULE ORAL at 09:30

## 2021-02-03 RX ADMIN — CHLORDIAZEPOXIDE HYDROCHLORIDE 10 MG: 5 CAPSULE ORAL at 20:25

## 2021-02-03 RX ADMIN — Medication 1 TABLET: at 09:30

## 2021-02-03 RX ADMIN — Medication 100 MG: at 09:30

## 2021-02-03 RX ADMIN — Medication 2 PUFF: at 15:38

## 2021-02-03 RX ADMIN — Medication 3 MG: at 20:25

## 2021-02-03 RX ADMIN — SODIUM CHLORIDE, PRESERVATIVE FREE 10 ML: 5 INJECTION INTRAVENOUS at 09:30

## 2021-02-03 RX ADMIN — LORAZEPAM 3 MG: 2 INJECTION, SOLUTION INTRAMUSCULAR; INTRAVENOUS at 05:32

## 2021-02-03 RX ADMIN — CEFEPIME 2000 MG: 2 INJECTION, POWDER, FOR SOLUTION INTRAVENOUS at 15:30

## 2021-02-03 RX ADMIN — BUPRENORPHINE HCL 2 MG: 2 TABLET SUBLINGUAL at 00:35

## 2021-02-03 RX ADMIN — PROPOFOL 20 MG: 10 INJECTION, EMULSION INTRAVENOUS at 13:40

## 2021-02-03 RX ADMIN — CLONIDINE HYDROCHLORIDE 0.1 MG: 0.1 TABLET ORAL at 00:35

## 2021-02-03 RX ADMIN — CHLORDIAZEPOXIDE HYDROCHLORIDE 10 MG: 5 CAPSULE ORAL at 16:22

## 2021-02-03 RX ADMIN — PROPOFOL 20 MG: 10 INJECTION, EMULSION INTRAVENOUS at 13:31

## 2021-02-03 RX ADMIN — PROPOFOL 50 MG: 10 INJECTION, EMULSION INTRAVENOUS at 13:27

## 2021-02-03 RX ADMIN — FUROSEMIDE 40 MG: 10 INJECTION, SOLUTION INTRAMUSCULAR; INTRAVENOUS at 15:37

## 2021-02-03 RX ADMIN — PROPOFOL 20 MG: 10 INJECTION, EMULSION INTRAVENOUS at 13:35

## 2021-02-03 RX ADMIN — LORAZEPAM 4 MG: 2 INJECTION, SOLUTION INTRAMUSCULAR; INTRAVENOUS at 01:21

## 2021-02-03 RX ADMIN — BUPRENORPHINE HCL 2 MG: 2 TABLET SUBLINGUAL at 23:03

## 2021-02-03 RX ADMIN — LORAZEPAM 3 MG: 2 INJECTION, SOLUTION INTRAMUSCULAR; INTRAVENOUS at 04:27

## 2021-02-03 ASSESSMENT — ENCOUNTER SYMPTOMS: SHORTNESS OF BREATH: 1

## 2021-02-03 ASSESSMENT — PAIN SCALES - GENERAL: PAINLEVEL_OUTOF10: 0

## 2021-02-03 NOTE — PROGRESS NOTES
Ativan given. 1 mg wasted with Anshul Benton.     Electronically signed by Bethany Galindo RN on 2/3/2021 at 8:17 AM

## 2021-02-03 NOTE — PROGRESS NOTES
Pt agitated upon wake up. CIWA 16. However, pt needed to urinate and once completed, pt promptly fell asleep. Will hold PRN ativan at this time and administer scheduled librium at this time. Will continue to monitor.

## 2021-02-03 NOTE — PROGRESS NOTES
Pt moved to room 333 with an avasys camera due to increased restlessness, irritability and chills. See MAR.     Electronically signed by Flavia Pritchett RN on 2/3/2021 at 3:27 AM

## 2021-02-03 NOTE — PROGRESS NOTES
Pt oriented to person, place, time and situation. Drowsy but answers questions. States understanding of planned DIVYA. Pt states he does not want any family members contacted. Will continue to monitor.

## 2021-02-03 NOTE — ANESTHESIA PRE PROCEDURE
Department of Anesthesiology  Preprocedure Note       Name:  Efrem Moscoso   Age:  48 y.o.  :  1970                                          MRN:  [id number]         Date:  2/3/2021      Surgeon: * Surgery not found *    Procedure:     Medications prior to admission:   Prior to Admission medications    Medication Sig Start Date End Date Taking?  Authorizing Provider   albuterol sulfate  (90 Base) MCG/ACT inhaler Inhale 2 puffs into the lungs every 4 hours as needed for Wheezing or Shortness of Breath 20  David Palma MD       Current medications:    Current Facility-Administered Medications   Medication Dose Route Frequency Provider Last Rate Last Admin    vancomycin (VANCOCIN) 750 mg in dextrose 5 % 250 mL IVPB  750 mg Intravenous Q12H Jayshree Genao MD        sodium chloride flush 0.9 % injection 10 mL  10 mL Intravenous 2 times per day Janis Pryor MD        sodium chloride flush 0.9 % injection 10 mL  10 mL Intravenous PRN Janis Pryor MD        perflutren lipid microspheres (DEFINITY) injection 1.65 mg  1.5 mL Intravenous ONCE PRN Jayshree Genao MD        albuterol sulfate  (90 Base) MCG/ACT inhaler 2 puff  2 puff Inhalation Q4H PRN Lisabeth Balsam, APRN - CNP        sodium chloride flush 0.9 % injection 10 mL  10 mL Intravenous 2 times per day Lisabeth Balsam, APRN - CNP   10 mL at 21 2043    sodium chloride flush 0.9 % injection 10 mL  10 mL Intravenous PRN Lisabeth Balsam, APRN - CNP   10 mL at 21 0422    enoxaparin (LOVENOX) injection 40 mg  40 mg Subcutaneous Daily Lisabeth Balsam, APRN - CNP   Stopped at 21 0930    promethazine (PHENERGAN) tablet 12.5 mg  12.5 mg Oral Q6H PRN Lisabeth Balsam, APRN - CNP        Or    ondansetron (ZOFRAN) injection 4 mg  4 mg Intravenous Q6H PRN Lisabeth Balsam, APRN - CNP   4 mg at 21 1544    polyethylene glycol (GLYCOLAX) packet 17 g  17 g Oral Daily PRN Lisabeth Balsam, APRN - CNP        acetaminophen (TYLENOL) tablet 650 mg  650 mg Oral Q6H PRN [facility], APRN - CNP   650 mg at 02/02/21 0423    Or    acetaminophen (TYLENOL) suppository 650 mg  650 mg Rectal Q6H PRN Francena Area, APRN - CNP        cefepime (MAXIPIME) 2000 mg IVPB minibag  2,000 mg Intravenous Q12H Francena Area, APRN - CNP   Stopped at 02/03/21 0536    buprenorphine (SUBUTEX) SL tablet 2 mg  2 mg Sublingual PRN [facility], APRN - CNP   2 mg at 02/03/21 7743    And    cloNIDine (CATAPRES) tablet 0.1 mg  0.1 mg Oral PRN Francena Area, APRN - CNP   0.1 mg at 02/03/21 0456    melatonin tablet 3 mg  3 mg Oral Nightly Francena Area, APRN - CNP   3 mg at 02/02/21 2033    chlordiazePOXIDE (LIBRIUM) capsule 10 mg  10 mg Oral 4x Daily [facility], APRN - CNP   10 mg at 02/03/21 0930    sodium chloride flush 0.9 % injection 10 mL  10 mL Intravenous 2 times per day [facility], APRN - CNP   10 mL at 02/03/21 0930    sodium chloride flush 0.9 % injection 10 mL  10 mL Intravenous PRN Francena Area, APRN - CNP        thiamine tablet 100 mg  100 mg Oral Daily Lisa [facility], APRN - CNP   100 mg at 02/03/21 0930    therapeutic multivitamin-minerals 1 tablet  1 tablet Oral Daily [facility], APRN - CNP   1 tablet at 02/03/21 0930    LORazepam (ATIVAN) tablet 1 mg  1 mg Oral Q1H PRN [facility], APRN - CNP        Or    LORazepam (ATIVAN) injection 1 mg  1 mg Intravenous Q1H PRN Francena Area, APRN - CNP   1 mg at 02/02/21 2035    Or    LORazepam (ATIVAN) tablet 2 mg  2 mg Oral Q1H PRN [facility], APRN - CNP        Or    LORazepam (ATIVAN) injection 2 mg  2 mg Intravenous Q1H PRN Francena Area, APRN - CNP   2 mg at 02/03/21 1988    Or    LORazepam (ATIVAN) tablet 3 mg  3 mg Oral Q1H PRN [facility], APRN - CNP        Or    LORazepam (ATIVAN) injection 3 mg  3 mg Intravenous Q1H PRN Francena Area, APRN - CNP   3 mg at 02/03/21 0532    Or    LORazepam (ATIVAN) tablet 4 mg  4 mg Oral Q1H PRN Francena Area, APRN - CNP        Or    LORazepam (ATIVAN) injection 4 mg  4 mg Intravenous Q1H PRN JOSIAS Allen CNP   4 mg at 02/03/21 0309    lidocaine 1 % injection 5 mL  5 mL Intradermal Once JOSIAS Allen CNP        albuterol sulfate  (90 Base) MCG/ACT inhaler 2 puff  2 puff Inhalation 4x daily Sailaja Grimaldo MD   2 puff at 02/03/21 1212    And    ipratropium (ATROVENT HFA) 17 MCG/ACT inhaler 2 puff  2 puff Inhalation 4x daily Sailaja Grimaldo MD   2 puff at 02/03/21 1212       Allergies:  No Known Allergies    Problem List:    Patient Active Problem List   Diagnosis Code    MRSA bacteremia R78.81, B95.62    Pleural effusion, left J90    Leukocytosis J16.274    Illicit drug use O18.61    Hepatitis K75.9    Chest pain R07.9    Pulmonary embolism (HCC) I26.99    AMS (altered mental status) R41.82    Acute respiratory failure with hypoxia ([facility]) J96.01    Drug overdose T50.901A    Abnormal chest x-ray R93.89    Acute encephalopathy G93.40    Disorder of electrolytes E87.8    Non-traumatic rhabdomyolysis M62.82    Elevated procalcitonin R79.89    Methamphetamine abuse ([facility]) F15.10    Toxic encephalopathy G92    Acute hypercapnic respiratory failure (HCC) J96.02    Heroin withdrawal ([facility] F11.23    Tachycardia R00.0    Hypoxia R09.02    [facility] hypox[facility] U07.1, J96.01    Acute respiratory failure with hypoxia and hypercapnia (HCC) J96.01, J96.02    Pneumonia due to COVID-19 virus U07.1, J12.82    Thrombocytopenia ([facility]) D69.6    Elevated LFTs R79.89    COVID-19 U07.1    Acute metabolic encephalopathy I43.14    Moderate protein-calorie malnutrition (HCC) E44.0    Pneumonia due to organism J18.9    Sepsis ([facility]) A41.9    Pulmonary infiltrate R91.8    Bacteremia due to Staphylococcus aureus R78.81, B95.61    Delirium R41.0    Hyperglycemia R73.9    IV drug abuse ([facility]) F19.10    Tricuspid regurgitation I07.1    PFO (patent foramen ovale) Q21.1    Lung nodule R91.1    Centrilobular emphysema ([facility] J43.2    Hilar adenopathy R59.0    Personal history of covid-19 Z86.16    Moderate to severe pulmonary hypertension ([facility] I27.20    Former smoker Z87.891    Alcohol use Z72.89       Past Medical History:        Diagnosis Date    Centrilobular emphysema ([facility] 2/3/2021    COVID-19 2020    H/O brain surgery     Hepatitis C antibody test positive 14    History of surgery     L leg surgery    Methamphetamine abuse ([facility]     MRSA (methicillin resistant staph aureus) culture positive 14    blood cx    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    PE (pulmonary thromboembolism) ([facility]     Pneumonia        Past Surgical History:        Procedure Laterality Date    BRAIN SURGERY      s/p trauma    BRAIN SURGERY      FRACTURE SURGERY      LEG SURGERY         Social History:    Social History     Tobacco Use    Smoking status: Former Smoker     Packs/day: 2.00     Years: 30.00     Pack years: 60.00     Types: Cigarettes     Quit date: 2016     Years since quittin.0    Smokeless tobacco: Current User     Types: Snuff    Tobacco comment: states 5 yrs ago   Substance Use Topics    Alcohol use:  Yes     Alcohol/week: 24.0 standard drinks     Types: 24 Cans of beer per week                                Ready to quit: Not Answered  Counseling given: Not Answered  Comment: states 5 yrs ago      Vital Signs (Current):   Vitals:    21 0526 21 0640 21 0819 21 0845   BP: 118/83 104/75  116/62   Pulse: 91 92  95   Resp:    22   Temp:       TempSrc:       SpO2:  99% 96% 95%   Weight:       Height:                                                  BP Readings from Last 3 Encounters:   21 116/62   21 93/76   21 (!) 122/91       NPO Status:                                                                                 BMI:   Wt Readings from Last 3 Encounters:   21 90 lb 8 oz (41.1 kg)   21 100 lb (45.4 kg)   20 90 lb (40.8 kg)     Body mass index is 17.67 kg/m². CBC:   Lab Results   Component Value Date    WBC 6.4 02/02/2021    RBC 4.20 02/02/2021    HGB 11.6 02/02/2021    HCT 35.0 02/02/2021    MCV 83.3 02/02/2021    RDW 15.7 02/02/2021     02/02/2021       CMP:   Lab Results   Component Value Date     02/02/2021    K 3.7 02/02/2021     02/02/2021    CO2 32 02/02/2021    BUN 12 02/02/2021    CREATININE 0.6 02/02/2021    GFRAA >60 02/02/2021    AGRATIO 1.0 01/31/2021    LABGLOM >60 02/02/2021    GLUCOSE 114 02/02/2021    PROT 7.0 01/31/2021    CALCIUM 8.6 02/02/2021    BILITOT 0.6 01/31/2021    ALKPHOS 92 01/31/2021    AST 56 01/31/2021    ALT 35 01/31/2021       POC Tests: No results for input(s): POCGLU, POCNA, POCK, POCCL, POCBUN, POCHEMO, POCHCT in the last 72 hours.     Coags:   Lab Results   Component Value Date    PROTIME 12.3 01/31/2021    INR 1.06 01/31/2021    APTT 24.3 12/14/2020       HCG (If Applicable): No results found for: PREGTESTUR, PREGSERUM, HCG, HCGQUANT     ABGs:   Lab Results   Component Value Date    PHART 7.451 12/08/2020    PO2ART 82.7 12/08/2020    DJY9PEH 36.1 12/08/2020    HZW7ACK 24.6 12/08/2020    BEART 1.0 12/08/2020    L9BHDLTM 96.6 12/08/2020        Type & Screen (If Applicable):  No results found for: LABABO, LABRH    Drug/Infectious Status (If Applicable):  No results found for: HIV, HEPCAB    COVID-19 Screening (If Applicable):   Lab Results   Component Value Date    COVID19 Not Detected 01/31/2021    COVID19 Not Detected 07/04/2020         Anesthesia Evaluation   no history of anesthetic complications:   Airway: Mallampati: II  TM distance: >3 FB   Neck ROM: limited  Mouth opening: > = 3 FB Dental:    (+) upper dentures      Pulmonary:   (+) pneumonia:  COPD:  shortness of breath:                             Cardiovascular:    (+) valvular problems/murmurs (TR):, pulmonary hypertension:,                   Neuro/Psych:   (+) neuromuscular disease:, psychiatric history:depression/anxiety             GI/Hepatic/Renal:   (+) hepatitis: C, liver disease:,          ROS comment: IVDA. Endo/Other: Negative Endo/Other ROS                    Abdominal:           Vascular:   + PE. Anesthesia Plan      general     ASA 4     (Risks, benefits and alternatives of GA discussed with pt. Questions answered. Willing to proceed.)  Induction: intravenous. Anesthetic plan and risks discussed with patient.                       Bonita Horta MD   2/3/2021

## 2021-02-03 NOTE — PROGRESS NOTES
Pt in withdraw from Alcohol and Heroin. Pt appears anxious and reports chills. See MAR for medication admin. A&O x4. VSS. Assessment is as charted. CALL LIGHT & BEDSIDE TABLE WITHIN REACH, WHEELS LOCKED, BED IN LOWEST POSITION, BED ALARM ON, PT INSTRUCTED TO CALL OUT FOR ASSISTANCE & EXPRESSED UNDERSTANDING, CALLS OUT APPROPRIATELY.     Electronically signed by Zhao Romo RN on 2/3/2021 at 3:25 AM

## 2021-02-03 NOTE — PROGRESS NOTES
Brief Pre-Op Note/Sedation Assessment      Mckenzie Tapia  1970  Cath Pool Rm/NONE      [id number]  1:22 PM    Planned Procedure: DIVYA Procedure    Post Procedure Plan: Return to same level of care    Consent: I have discussed with the patient and/or the patient representative the indication, alternatives, and the possible risks and/or complications of the planned procedure and the anesthesia methods. The patient and/or patient representative appear to understand and agree to proceed.     Chief Complaint: endocarditis      Indications for Cath Procedure:  As above        Vital Signs:  /62   Pulse 95   Temp 98.2 °F (36.8 °C) (Oral)   Resp 22   Ht 5' (1.524 m)   Wt 90 lb 8 oz (41.1 kg)   SpO2 95%   BMI 17.67 kg/m²          Allergies:  No Known Allergies    Past Medical History:  Past Medical History:   Diagnosis Date    Centrilobular emphysema ([facility] [address] 2/3/2021    COVID-19 12/05/2020    H/O brain surgery     Hepatitis C antibody test positive 7/17/14    History of surgery     L leg surgery    Methamphetamine abuse ([facility] [address]     MRSA (methicillin resistant staph aureus) culture positive 7/12/14    blood cx    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    PE (pulmonary thromboembolism) ([facility] [address]     Pneumonia          Surgical History:  Past Surgical History:   Procedure Laterality Date    BRAIN SURGERY  1986    s/p trauma    BRAIN SURGERY      FRACTURE SURGERY      LEG SURGERY           Medications:  Current Facility-Administered Medications   Medication Dose Route Frequency Provider Last Rate Last Admin    vancomycin (VANCOCIN) 750 mg in dextrose 5 % 250 mL IVPB  750 mg Intravenous Q12H Everett Brady MD        sodium chloride flush 0.9 % injection 10 mL  10 mL Intravenous 2 times per day Roberto Feliciano MD        sodium chloride flush 0.9 % injection 10 mL  10 mL Intravenous PRN Roberto Feliciano MD        meperidine (DEMEROL) injection 12.5 mg  12.5 mg Intravenous Q5 Min PRN Vlad Hale Toney Arriaga MD        HYDROmorphone (DILAUDID) injection 0.25 mg  0.25 mg Intravenous Q5 Min PRN Roscoe Mendez MD        HYDROmorphone (DILAUDID) injection 0.5 mg  0.5 mg Intravenous Q5 Min PRN Roscoe Mendez MD        morphine (PF) injection 1 mg  1 mg Intravenous Q5 Min PRN Roscoe Mendez MD        morphine (PF) injection 2 mg  2 mg Intravenous Q5 Min PRN Roscoe Mendez MD        oxyCODONE-acetaminophen (PERCOCET) 5-325 MG per tablet 1 tablet  1 tablet Oral PRN Roscoe Mendez MD        Or    oxyCODONE-acetaminophen (PERCOCET) 5-325 MG per tablet 2 tablet  2 tablet Oral PRN Roscoe Mendez MD        ondansetron [facility]) injection 4 mg  4 mg Intravenous Once PRN Roscoe Mendez MD        promethazine [facility]) injection 6.25 mg  6.25 mg Intravenous Once PRN Roscoe Mendez MD        diphenhydrAMINE (BENADRYL) injection 12.5 mg  12.5 mg Intravenous Once PRN Roscoe Mendez MD        labetalol (NORMODYNE;TRANDATE) injection 5 mg  5 mg Intravenous Q10 Min PRN Roscoe Mendez MD        hydrALAZINE (APRESOLINE) injection 5 mg  5 mg Intravenous Q10 Min PRN Roscoe Mendez MD        furosemide (LASIX) injection 40 mg  40 mg Intravenous Once Citlali Tamez MD        perflutren lipid microspheres (DEFINITY) injection 1.65 mg  1.5 mL Intravenous ONCE PRN Vera Hudson MD        albuterol sulfate  (90 Base) MCG/ACT inhaler 2 puff  2 puff Inhalation Q4H PRN JOSIAS Russell CNP        sodium chloride flush 0.9 % injection 10 mL  10 mL Intravenous 2 times per day JOSIAS Russell CNP   10 mL at 02/02/21 2043    sodium chloride flush 0.9 % injection 10 mL  10 mL Intravenous PRN JOSIAS Russell CNP   10 mL at 02/01/21 0422    enoxaparin (LOVENOX) injection 40 mg  40 mg Subcutaneous Daily JOSIAS Russell CNP   Stopped at 02/03/21 0930    promethazine (PHENERGAN) tablet 12.5 mg  12.5 mg Oral Q6H PRN Davied Irons, APRN - CNP        Or    ondansetron (ZOFRAN) injection 4 mg  4 mg Intravenous Q6H PRN Davied Irons, APRN - CNP   4 mg at 02/02/21 1544    polyethylene glycol (GLYCOLAX) packet 17 g  17 g Oral Daily PRN Davied Irons, APRN - CNP        acetaminophen (TYLENOL) tablet 650 mg  650 mg Oral Q6H PRN Davied Irons, APRN - CNP   650 mg at 02/02/21 0423    Or    acetaminophen (TYLENOL) suppository 650 mg  650 mg Rectal Q6H PRN Davied Irons, APRN - CNP        cefepime (MAXIPIME) 2000 mg IVPB minibag  2,000 mg Intravenous Q12H Davied Irons, APRN - CNP   Stopped at 02/03/21 0536    buprenorphine (SUBUTEX) SL tablet 2 mg  2 mg Sublingual PRN Davied Irons, APRN - CNP   2 mg at 02/03/21 6191    And    cloNIDine (CATAPRES) tablet 0.1 mg  0.1 mg Oral PRN Davied Irons, APRN - CNP   0.1 mg at 02/03/21 0456    melatonin tablet 3 mg  3 mg Oral Nightly Davied Irons, APRN - CNP   3 mg at 02/02/21 2033    chlordiazePOXIDE (LIBRIUM) capsule 10 mg  10 mg Oral 4x Daily Davied Irons, APRN - CNP   10 mg at 02/03/21 0930    sodium chloride flush 0.9 % injection 10 mL  10 mL Intravenous 2 times per day Davied Irons, APRN - CNP   10 mL at 02/03/21 0930    sodium chloride flush 0.9 % injection 10 mL  10 mL Intravenous PRN Davied Irons, APRN - CNP        thiamine tablet 100 mg  100 mg Oral Daily Stephanie Kaiserb, APRN - CNP   100 mg at 02/03/21 0930    therapeutic multivitamin-minerals 1 tablet  1 tablet Oral Daily Davied Irons, APRN - CNP   1 tablet at 02/03/21 0930    LORazepam (ATIVAN) tablet 1 mg  1 mg Oral Q1H PRN Davied Irons, APRN - CNP        Or    LORazepam (ATIVAN) injection 1 mg  1 mg Intravenous Q1H PRN Davied Irons, APRN - CNP   1 mg at 02/02/21 2035    Or    LORazepam (ATIVAN) tablet 2 mg  2 mg Oral Q1H PRN Davied Irons, APRN - CNP        Or    LORazepam (ATIVAN) injection 2 mg  2 mg Intravenous Q1H PRN Davied Irons, APRN - CNP   2 mg at 02/03/21 7093    Or    LORazepam (ATIVAN) tablet 3 mg  3 mg Oral Q1H PRN Croydon Parkers, APRN - CNP        Or    LORazepam (ATIVAN) injection 3 mg  3 mg Intravenous Q1H PRN Shannan Parkers, APRN - CNP   3 mg at 02/03/21 0532    Or    LORazepam (ATIVAN) tablet 4 mg  4 mg Oral Q1H PRN Shannan Parkers, APRN - CNP        Or    LORazepam (ATIVAN) injection 4 mg  4 mg Intravenous Q1H PRN Shannan Parkers, APRN - CNP   4 mg at 02/03/21 0309    lidocaine 1 % injection 5 mL  5 mL Intradermal Once Shannan Parkers, APRN - CNP        albuterol sulfate  (90 Base) MCG/ACT inhaler 2 puff  2 puff Inhalation 4x daily Dominic Brown MD   2 puff at 02/03/21 1212    And    ipratropium (ATROVENT HFA) 17 MCG/ACT inhaler 2 puff  2 puff Inhalation 4x daily Dominic Brown MD   2 puff at 02/03/21 1212           Pre-Sedation:    Pre-Sedation Documentation and Exam:  I have assessed the patient and agree with the H&P present on the chart. Prior History of Anesthesia Complications:   none    Modified Mallampati:  III (soft palate, base of uvula visible)    ASA Classification:  Class 3 - A patient with severe systemic disease that limits activity but is not incapacitating      Farhat Scale: Activity:  2 - Able to move 4 extremities voluntarily on command  Respiration:  2 - Able to breathe deeply and cough freely  Circulation:  2 - BP+/- 20mmHg of normal  Consciousness:  2 - Fully awake  Oxygen Saturation (color):  2 - Able to maintain oxygen saturation >92% on room air    Sedation/Anesthesia Plan:  Guard the patient's safety and welfare. Minimize physical discomfort and pain. Minimize negative psychological responses to treatment by providing sedation and analgesia and maximize the potential amnesia. Patient to meet pre-procedure discharge plan. Medication Planned:    As per anesthesiology service.       Electronically signed by Jose D Silver MD on 2/3/2021 at 1:22 PM

## 2021-02-03 NOTE — PROGRESS NOTES
Infectious Disease Follow up Notes    CC :  Concern for SBE      Antibiotics:   Cefepime 2g q12  vanc 750 q12     Admit Date:   2/1/2021  Hospital Day: 3    Subjective:   He remains afebrile. Very agitated overnight receivingfew doses of ativan. Lethargic this morning. NPO for planned DIVYA. Objective:     Patient Vitals for the past 8 hrs:   BP Pulse Resp SpO2   02/03/21 0845 116/62 95 22 95 %   02/03/21 0819    96 %   02/03/21 0640 104/75 92  99 %       EXAM:  General:  lethargic, no acute distress   HEENT:  NCAT, PERRL, sclera anicteric    NECK:  Supple   LUNGS:  CTA upper lobes without W/R/R   CV:   RRR with unchanged murmur     ABD: Soft, flat, NT    EXT: Unchanged flat macular lesions LE.   Resolved LE edema  Decreased edema tina UE primarily in the hands          LINE: R IJ TLC in place         Scheduled Meds:   vancomycin  750 mg Intravenous Q12H    sodium chloride flush  10 mL Intravenous 2 times per day    furosemide  40 mg Intravenous Once    sodium chloride flush  10 mL Intravenous 2 times per day    enoxaparin  40 mg Subcutaneous Daily    cefepime  2,000 mg Intravenous Q12H    melatonin  3 mg Oral Nightly    chlordiazePOXIDE  10 mg Oral 4x Daily    sodium chloride flush  10 mL Intravenous 2 times per day    thiamine  100 mg Oral Daily    therapeutic multivitamin-minerals  1 tablet Oral Daily    lidocaine 1 % injection  5 mL Intradermal Once    albuterol sulfate HFA  2 puff Inhalation 4x daily    And    ipratropium  2 puff Inhalation 4x daily         Data Review:    Lab Results   Component Value Date    WBC 6.4 02/02/2021    HGB 11.6 (L) 02/02/2021    HCT 35.0 (L) 02/02/2021    MCV 83.3 02/02/2021     02/02/2021     Lab Results   Component Value Date    CREATININE 0.6 (L) 02/02/2021    BUN 12 02/02/2021     02/02/2021    K 3.7 02/02/2021     02/02/2021    CO2 32 02/02/2021       Hepatic Function Panel:   Lab Results   Component Value Date    ALKPHOS 92 01/31/2021    ALT 35 01/31/2021    AST 56 01/31/2021    PROT 7.0 01/31/2021    BILITOT 0.6 01/31/2021    BILIDIR <0.2 12/15/2020    IBILI see below 12/15/2020    LABALBU 3.5 01/31/2021       Cultures:   12/5     COVID NAAT+              BC x2 neg               Flu ag neg               UA neg   12/14   BC x2 neg  12/25   BC x2 Staphylococcus aureus           Antibiotic Interpretation AJAY Status     clindamycin Sensitive <=0.25 mcg/mL       erythromycin Resistant >=8 mcg/mL       oxacillin Sensitive 0.5 mcg/mL       tetracycline Sensitive <=1 mcg/mL       trimethoprim-sulfamethoxazole Sensitive <=10 mcg/mL          12/25   COVID NAAT neg   12/30   BC x1 CoNS              COVID NAAT neg   1/31     BC x2 NGTD              COVID NAAT neg   2/2 HIV screen neg         Radiology Review:  All pertinent images / reports were reviewed as a part of this visit. CT chest 12/15/20  Impression   Chronic nonocclusive segmental PE involving the pulmonary artery to the   posterior basal segment of the right lower lobe.       No evidence of acute pulmonary embolism.       COPD.       A 0.7 cm pleural based nodule versus focal atelectasis in the right lower   lobe.  Follow-up CT scan in 6 months may be obtained.      CT chest 12/25/20      1. No evidence of acute pulmonary embolism   2. Airspace consolidation in the lingula compatible with pneumonia      CT chest 1/31/21  Impression   No evidence of pulmonary embolism or acute pulmonary abnormality.       Diffuse pulmonary emphysema.       There is a 2.4 cm mass in the posterior right perihilar region in the right   lower lobe of the lung, highly suspicious for an underlying neoplasm.    Further evaluation and workup is recommended.       There are 2 speculated scar lesions in the inferior right middle lobe and   posterior right upper lobe.  Cannot exclude the possibility of scar neoplasm   and further evaluation and workup is recommended.       Mildly enlarged right hilar lymph nodes which could represent pathologic   lymph nodes.       Chronic compression fractures of T6 and T8 vertebral bodies.      TTE 12/31/20  Summary   Normal left ventricle size, wall thickness and systolic function with an   estimated ejection fraction of 55%.   No regional wall motion abnormalities are seen.   A bubble study was performed and showed evidence of right to left shunt   consistent with a patent foramen ovale or interatrial septum defects.   Tricuspid valve is structurally normal.   No tricuspid valve vegetation or masses visualized.   Moderate to severe eccentric tricuspid regurgitation.     TTE 2/2/21  Summary   Technical difficult study due to heart off axis, and the patient is   difficult to stay still. No obvious vegetation, consider DIVYA if clinically indicated. -- Tachycardia is noted through the study, Left ventricular systolic   function is normal with a visually estimated ejection fraction of 55-60%. he   left ventricle is normal in size with normal wall thickness. Flattened in   diastole and systole (\"D-shaped septum\") consistent with right ventricular   volume and pressure overload. Normal left ventricular diastolic function. -- The right atrium is dilated. -- Mild mitral regurgitation. -- Severe tricuspid regurgitation. Systolic pulmonary artery pressure (SPAP) is elevated and estimated at 71   mmHg (right atrial pressure 8 mmHg) consistent with severe pulmonary   hypertension. No masses or vegetation is noted.     Assessment:     Patient Active Problem List    Diagnosis Date Noted    Lung nodule 02/03/2021    Centrilobular emphysema ([address] 02/03/2021    Hilar adenopathy 02/03/2021    Personal history of covid-19 02/03/2021    Moderate to severe pulmonary hypertension ([address] 02/03/2021    Former smoker 02/03/2021    Alcohol use 02/03/2021    Tricuspid regurgitation 12/31/2020    PFO (patent foramen ovale) 12/31/2020    IV drug abuse ([facility] [address]     Hyperglycemia 12/29/2020    Pulmonary infiltrate 12/28/2020    Bacteremia due to Staphylococcus aureus 12/28/2020    Delirium 12/28/2020    Sepsis ([facility] [address] 12/25/2020    Pneumonia due to organism 12/15/2020    Moderate protein-calorie malnutrition ([facility] [address] 12/10/2020    Acute metabolic encephalopathy     COVID-19     Acute hypoxemic respiratory failure due to COVID-19 [facility]) 12/05/2020    Acute respiratory failure with hypoxia and hypercapnia (HCC)     Pneumonia due to COVID-19 virus     Thrombocytopenia (HCC)     Elevated LFTs     Tachycardia     Hypoxia     Heroin withdrawal (HCC) 11/07/2020    Toxic encephalopathy     Acute hypercapnic respiratory failure (HCC)     Acute respiratory failure with hypoxia (HCC)     Drug overdose     Abnormal chest x-ray     Acute encephalopathy     Disorder of electrolytes     Non-traumatic rhabdomyolysis     Elevated procalcitonin     Methamphetamine abuse ([facility] [address]     AMS (altered mental status) 07/03/2020    Chest pain 07/25/2014    Pulmonary embolism ([facility] [address]     Hepatitis 07/18/2014    MRSA bacteremia 07/13/2014    Pleural effusion, left 07/13/2014    Leukocytosis 54/21/5985    Illicit drug use        Injection drug abuse     Admission 12/2020 with COVID, hypoxemic respiratory failure treated with steroids, CCP   Resolved  Appropriate period of isolation completed      Admitted again 12/25/20 with SOB, lingular infiltrate. Bacteremic with MSSA 12/25/20. Incomplete course of treatment completed as patient left AMA.   At that time had TTE with mod-severe TR, PFO, no vegetation.       Re-admitted 1/31/21 with new macular LE rash, swelling of extremities without systemic signs of infection   Loud murmur  Primary concern in this context is SBE yet no fever, BC are negative to date  TTE no vegetation  -empiric abx for now  -DIVYA      2.4cm mass RLL  2 spiculated scar lesions RML RUL  Hilar LAD   -Pulmonology following    HCV ab positive  Immunity to HAV, HBV with +serology 2014   HIV screen neg      NKDA       Discussed with RN, Dinah Zaman MD  Phone: [phone]   Fax : [phone]

## 2021-02-03 NOTE — PROGRESS NOTES
Hospitalist Progress Note      PCP: No primary care provider on file. Date of Admission: 2/1/2021    Chief Complaint: SOB, LE edema    Hospital Course:   48 y.o. male, with PMH of IV drug use and alcohol use, who was a direct admit from [facility] to [facility] with shortness of breath, lower extremity swelling and red dots to BLE. History obtained from the patient and review of EMR. She stated he has been experiencing shortness of breath for the last 4 days. He stated he has also noticed that his right hand and bilateral legs have been swelling. The patient stated 3 days ago he noticed he started to get a \"red rash\" on his legs. He reported over the last 24 hours his shortness of breath has gotten progressively worse. The patient does not wear oxygen at home. On arrival to the emergency room at [facility] the patient's SPO2 was 89 to 90% on room air and he is now requiring 1 to 2 L nasal cannula. In the emergency room the patient had a chest x-ray that revealed increased perihilar markings. Atelectasis, infectious or inflammatory process and interstitial edema can be in the differential.  A CT chest pulmonary embolism was also obtained that revealed no evidence of pulmonary embolism or acute pulmonary abnormality. Diffuse pulmonary emphysema. There is a 2.4 cm mass in the posterior right perihilar region in the right lower lobe of the lung, highly suspicious for an underlying neoplasm. Given the patient's history of IV drug use, there is high suspicion for endocarditis/septic emboli. The patient was ultimately transferred to [facility] for further evaluation and management. He was started on vancomycin and cefepime. Pulmonology and infectious disease has been consulted. The patient denied any other associated symptoms as well as any aggravating and/or alleviating factors. At the time of this assessment, the patient was resting comfortably in bed.  He currently denies any chest pain, back pain, abdominal pain, shortness of breath, numbness, tingling, N/V/C/D, fever and/or chills. Subjective: having mild opioid withdrawal symptoms. CIWA scoring very low. Blood cultures NGTD. Echo pending. Medications:  Reviewed    Infusion Medications   Scheduled Medications    vancomycin  750 mg Intravenous Q12H    sodium chloride flush  10 mL Intravenous 2 times per day    enoxaparin  40 mg Subcutaneous Daily    cefepime  2,000 mg Intravenous Q12H    melatonin  3 mg Oral Nightly    chlordiazePOXIDE  10 mg Oral 4x Daily    sodium chloride flush  10 mL Intravenous 2 times per day    thiamine  100 mg Oral Daily    therapeutic multivitamin-minerals  1 tablet Oral Daily    lidocaine 1 % injection  5 mL Intradermal Once    albuterol sulfate HFA  2 puff Inhalation 4x daily    And    ipratropium  2 puff Inhalation 4x daily     PRN Meds: perflutren lipid microspheres, albuterol sulfate HFA, sodium chloride flush, promethazine **OR** ondansetron, polyethylene glycol, acetaminophen **OR** acetaminophen, buprenorphine **AND** cloNIDine, sodium chloride flush, LORazepam **OR** LORazepam **OR** LORazepam **OR** LORazepam **OR** LORazepam **OR** LORazepam **OR** LORazepam **OR** LORazepam      Intake/Output Summary (Last 24 hours) at 2/3/2021 1228  Last data filed at 2/3/2021 1132  Gross per 24 hour   Intake 490 ml   Output 2225 ml   Net -1735 ml       Physical Exam Performed:    /62   Pulse 95   Temp 98.2 °F (36.8 °C) (Oral)   Resp 22   Ht 5' (1.524 m)   Wt 90 lb 8 oz (41.1 kg)   SpO2 95%   BMI 17.67 kg/m²     General appearance:  Pleasant male in no apparent distress, appears stated age and cooperative. HEENT:  Normal cephalic, atraumatic without obvious deformity. Pupils equal, round, and reactive to light. Extra ocular muscles intact. Conjunctivae/corneas clear. Neck: Supple, with full range of motion. No jugular venous distention. Trachea midline.   Respiratory:  Normal respiratory effort. Clear to auscultation, bilaterally without Rales/Wheezes/Rhonchi. 1 L NC  Cardiovascular:  Regular rate and rhythm with normal S1/S2 without murmurs, rubs or gallops. Abdomen: Soft, non-tender, non-distended with normal bowel sounds. Musculoskeletal:  No clubbing, cyanosis or edema bilaterally. Full range of motion without deformity. Skin: Skin color, texture, turgor normal. Petechiae rash lower legs. Neurologic:  Neurovascularly intact. Cranial nerves: II-XII intact, grossly non-focal.  Psychiatric:  Alert and oriented, thought content appropriate, normal insight  Capillary Refill: Brisk,< 3 seconds   Peripheral Pulses: +2 palpable, equal bilaterally       Labs:   Recent Labs     01/31/21 2002 02/01/21  0555 02/02/21  1105   WBC 4.4 4.5 6.4   HGB 13.0* 12.3* 11.6*   HCT 41.1 37.7* 35.0*    287 278     Recent Labs     01/31/21 2002 02/01/21  0556 02/02/21  1105   * 139 138   K 3.9 3.7 3.7   CL 98* 101 101   CO2 28 30 32   BUN 8 6* 12   CREATININE 0.6* 0.7* 0.6*   CALCIUM 8.7 9.0 8.6     Recent Labs     01/31/21 2002   AST 56*   ALT 35   BILITOT 0.6   ALKPHOS 92     Recent Labs     01/31/21  2216   INR 1.06     Recent Labs     01/31/21 2002   Cyndra Chill <0.01       Urinalysis:      Lab Results   Component Value Date    NITRU Negative 02/01/2021    WBCUA 3-5 02/01/2021    BACTERIA 1+ 02/01/2021    RBCUA 11-20 02/01/2021    BLOODU LARGE 02/01/2021    SPECGRAV >=1.030 02/01/2021    GLUCOSEU Negative 02/01/2021       Radiology:  IR FLUORO GUIDED CVA DEVICE PLMT/REPLACE/REMOVAL   Final Result   Status post successful ultrasound/fluoroscopically guided placement of right   internal jugular triple-lumen central venous catheter as described above.                  Assessment/Plan:    Active Hospital Problems    Diagnosis    IV drug abuse ([facility] [F19.10]    Acute respiratory failure with hypoxia ([facility] [address] [facility] [J96.01]     Suspected endocarditis with septic emboli  - loud murmur, H/O IVDU, possible infectious emboli seen on CT chest  - no evidence of sepsis  - ID consulted  - echo with severe tricuspid regurgitation poor poor visualization.  DIVYA ordered for today  - blood cultures NGTD  - continue vanc, cefepime  - pulm consulted  - if cultures remain NGTD, symptoms may be more consistent with prior episode of endocarditis  - will discuss with pulm possible bronchoscopy following DIVYA to work up mass seen on CT chest     IVDU, opioid dependence  - counseling given  - COWS ordered     COPD  - no evidence of exacerbation  - nebs ordered     Alcohol dependence  - at risk for withdrawal  - started on CIWA, PRN ativan, scheduled librium  - counseling given    Moderate protein calorie malnutrition  - dietitian consult  - dietary supplements ordered    DVT Prophylaxis: lovenox  Diet: DIET GENERAL;  Dietary Nutrition Supplements: Standard High Calorie Oral Supplement  Code Status: Full Code    PT/OT Eval Status: not ordered    Dispo - 1-2 days    Gala Hernandez MD

## 2021-02-03 NOTE — PROGRESS NOTES
INPATIENT PULMONARY CRITICAL CARE PROGRESS NOTE      Reason for visit    abnormal CT, COPD, shortness of breath    SUBJECTIVE: Patient when evaluated was sleeping in the bed having mild tachypnea without any increasing work of breathing, patient has increased chest congestion, patient was on 2 L of nasal cannula oxygen earlier but when seen was on room air oxygen, patient was afebrile and hemodynamically maintained, patient has had sinus rhythm on the monitor, patient has had good urine output overnight with cumulative fluid balance of -2.5 L, patient is supposed to have DIVYA today as per nursing, patient's p.o. intake is limited, patient's glycemic control was acceptable, no other pertinent review of system could be obtained         Physical Exam:  Blood pressure 116/62, pulse 95, temperature 98.2 °F (36.8 °C), temperature source Oral, resp. rate 22, height 5' (1.524 m), weight 90 lb 8 oz (41.1 kg), SpO2 95 %.'     Constitutional:  No acute distress. Mild tachypnea  HENT:  Oropharynx is clear and moist. No thyromegaly. slight facial flushing   Eyes:  Conjunctivae are normal. Pupils equal, round, and reactive to light. No scleral icterus. Neck: . No tracheal deviation present. No obvious thyroid mass. Cardiovascular: Normal rate, regular rhythm, LLSB murmur   No right ventricular heave. No lower extremity edema. decreased BSI   Pulmonary/Chest: No wheezes. B/L  rales. Some chest congestion Chest wall is not dull to percussion. No accessory muscle usage or stridor. Abdominal: Soft. Bowel sounds present. No distension or hernia. No tenderness. Musculoskeletal: No cyanosis. No clubbing. No obvious joint deformity. Lymphadenopathy: No cervical or supraclavicular adenopathy. Skin: Skin is warm and dry. No rash or nodules on the exposed extremities. multiple tattoos  Neurologic: sleeping when seen         Results:  CBC:   Recent Labs     01/31/21  2002 02/01/21  0555 02/02/21  1105   WBC 4.4 4.5 6.4   HGB 13.0* evidence of intraluminal filling defect to suggest pulmonary embolism. Main pulmonary artery is normal in caliber. Mediastinum: Mildly enlarged right hilar lymph nodes which may be concerning for pathologic lymph nodes given the pulmonary masses described. The heart and pericardium demonstrate no acute abnormality. There is no acute abnormality of the thoracic aorta. Lungs/pleura: Diffuse centrilobular pulmonary emphysema, worse in the bilateral upper lungs. There is a 1.4 x 2.4 x 1.3 cm mass in the posterior right perihilar region in the right lower lobe of the lung, this is highly suspicious for an underlying neoplasm. There is a speculated scar at the inferior right middle lobe measuring 1.3 x 1.6 x 0.7 cm with pleural attachment. There is a speculated lesion in the posterior right upper lobe of the lung measuring 1.5 x 2.5 x 0.8 cm. These could represent areas of scarring but are concerning for scar neoplasm and further evaluation and workup is recommended. No focal consolidation or pulmonary edema. No evidence of pleural effusion or pneumothorax. Upper Abdomen: Limited images of the upper abdomen are unremarkable. Soft Tissues/Bones: Chronic anterior wedge compression of the T8 vertebral body with about 50% anterior height loss. There is mild superior endplate compression of the T6 vertebral body, most likely chronic in nature. No significant retropulsion. No evidence of an acute fracture or dislocation. Visualized soft tissues are within normal limits. No evidence of pulmonary embolism or acute pulmonary abnormality. Diffuse pulmonary emphysema. There is a 2.4 cm mass in the posterior right perihilar region in the right lower lobe of the lung, highly suspicious for an underlying neoplasm. Further evaluation and workup is recommended. There are 2 speculated scar lesions in the inferior right middle lobe and posterior right upper lobe.   Cannot exclude the possibility of scar neoplasm and further evaluation and workup is recommended. Mildly enlarged right hilar lymph nodes which could represent pathologic lymph nodes. Chronic compression fractures of T6 and T8 vertebral bodies. Results for Mellissa Hercules (MRN [id number]) as of 2/3/2021 12:33   Ref. Range 1/31/2021 20:02 1/31/2021 20:51 2/1/2021 05:56 2/2/2021 11:05   Sodium Latest Ref Range: 136 - 145 mmol/L 135 (L)  139 138   Potassium Latest Ref Range: 3.5 - 5.1 mmol/L 3.9  3.7 3.7   Chloride Latest Ref Range: 99 - 110 mmol/L 98 (L)  101 101   CO2 Latest Ref Range: 21 - 32 mmol/L 28  30 32   BUN Latest Ref Range: 7 - 20 mg/dL 8  6 (L) 12   Creatinine Latest Ref Range: 0.9 - 1.3 mg/dL 0.6 (L)  0.7 (L) 0.6 (L)   Anion Gap Latest Ref Range: 3 - 16  9  8 5   GFR Non- Latest Ref Range: >60  >60  >60 >60   GFR  Latest Ref Range: >60  >60  >60 >60   Lactic Acid, Sepsis Latest Ref Range: 0.4 - 1.9 mmol/L  1.0     Glucose Latest Ref Range: 70 - 99 mg/dL 102 (H)  129 (H) 114 (H)   Calcium Latest Ref Range: 8.3 - 10.6 mg/dL 8.7  9.0 8.6   Total Protein Latest Ref Range: 6.4 - 8.2 g/dL 7.0      Procalcitonin Latest Ref Range: 0.00 - 0.15 ng/mL 0.08        Results for Mellissa Hercules (MRN [id number]) as of 2/3/2021 12:33   Ref.  Range 1/31/2021 20:02 1/31/2021 21:16 2/1/2021 05:55 2/2/2021 11:05   WBC Latest Ref Range: 4.0 - 11.0 K/uL 4.4  4.5 6.4   RBC Latest Ref Range: 4.20 - 5.90 M/uL 4.64  4.48 4.20   Hemoglobin Quant Latest Ref Range: 13.5 - 17.5 g/dL 13.0 (L)  12.3 (L) 11.6 (L)   Hematocrit Latest Ref Range: 40.5 - 52.5 % 41.1  37.7 (L) 35.0 (L)   MCV Latest Ref Range: 80.0 - 100.0 fL 88.5  84.0 83.3   MCH Latest Ref Range: 26.0 - 34.0 pg 28.0  27.4 27.6   MCHC Latest Ref Range: 31.0 - 36.0 g/dL 31.7  32.6 33.1   MPV Latest Ref Range: 5.0 - 10.5 fL 7.2  6.9 6.8   RDW Latest Ref Range: 12.4 - 15.4 % 16.0 (H)  15.3 15.7 (H)   Platelet Count Latest Ref Range: 135 - 450 K/uL 188  287 278   Neutrophils % Latest Units: % 65.4  60.2 77.9   Lymphocyte % Latest Units: % 23.6  29.4 12.9     Results for Ciarra Grimes (MRN [id number]) as of 2/3/2021 12:33   Ref. Range 1/31/2021 20:51 1/31/2021 20:56 2/2/2021 04:30   CULTURE, BLOOD 2 Unknown Rpt     Culture, Blood 2 Unknown No Growth to date. Any change in status will be called. HIV-1 Antibody Latest Ref Range: Non-reactive    Non-Reactive   HIV Ag/Ab Latest Ref Range: Non-reactive    Non-Reactive   HIV ANTIGEN Latest Ref Range: Non-reactive    Non-Reactive   HIV-2 Ab Latest Ref Range: Non-reactive    Non-Reactive   COVID-19 Unknown  Rpt    SARS-CoV-2, NAAT Latest Ref Range: Not Detected   Not Detected      ONE XRAY VIEW OF THE CHEST       1/31/2021 8:19 pm       COMPARISON:   Chest x-ray 12/27/2020.       HISTORY:   ORDERING SYSTEM PROVIDED HISTORY: SOB   TECHNOLOGIST PROVIDED HISTORY:   Reason for exam:->SOB   Reason for Exam: SOB   Acuity: Acute   Type of Exam: Initial   Additional signs and symptoms: pt c/o sob       FINDINGS:   Cardiac silhouette is upper normal limits in size. Mediastinal contours are   otherwise suboptimally evaluated due to rotated projection. Lungs demonstrate   no focal consolidation or pleural effusion.  Right-sided granuloma and   calcified lymph nodes in the mediastinum and radha again visualized.  No   pneumothorax appreciated on this projection. Irregular appearance of the   proximal left humeral diaphysis noted.  Increased perihilar markings. Atelectasis, infectious or inflammatory airway process, and interstitial   edema are in the differential.           Impression   1.  Increased perihilar markings.  Atelectasis, infectious or inflammatory   airway process, and interstitial edema are in the differential.   2. Irregular appearance of the proximal left humeral diaphysis.  Correlate   with history.  Nonemergent MRI would better evaluate.      CT chest -  No evidence of pulmonary embolism or acute pulmonary abnormality.       Diffuse pulmonary emphysema.     There is a 2.4 cm mass in the posterior right perihilar region in the right   lower lobe of the lung, highly suspicious for an underlying neoplasm. Further evaluation and workup is recommended.       There are 2 speculated scar lesions in the inferior right middle lobe and   posterior right upper lobe.  Cannot exclude the possibility of scar neoplasm   and further evaluation and workup is recommended.       Mildly enlarged right hilar lymph nodes which could represent pathologic   lymph nodes.       Chronic compression fractures of T6 and T8 vertebral bodies. No Growth to date.  Any change in status will be called. Testing Performed By    Lab - Abbreviation Name Director Address Valid Date Range   19- - [facility] David Graves M.D. [address] [facility] [address] 09/26/20 0000-Present   Narrative  Performed by: [facility]  ORDER#: [id number]                          ORDERED BY: Zoila Campa   SOURCE: Blood         Summary   Technical difficult study due to heart off axis, and the patient is   difficult to stay still. No obvious vegetation, consider DIVYA if clinically indicated. -- Tachycardia is noted through the study, Left ventricular systolic   function is normal with a visually estimated ejection fraction of 55-60%. he   left ventricle is normal in size with normal wall thickness. Flattened in   diastole and systole (\"D-shaped septum\") consistent with right ventricular   volume and pressure overload. Normal left ventricular diastolic function. -- The right atrium is dilated. -- Mild mitral regurgitation. -- Severe tricuspid regurgitation. Systolic pulmonary artery pressure (SPAP) is elevated and estimated at 71   mmHg (right atrial pressure 8 mmHg) consistent with severe pulmonary   hypertension. No masses or vegetation is noted.     Assessment:  Active Problems:    Acute respiratory failure with hypoxia ([address]    IV drug abuse (HCC)    Tricuspid regurgitation    PFO (patent foramen ovale)    Lung nodule    Centrilobular emphysema (HCC)    Hilar adenopathy    Personal history of covid-19    Moderate to severe pulmonary hypertension ([address]  Resolved Problems:    * No resolved hospital problems. *          Plan:   · Oxygen supplementation, if required, to keep saturation being 90 to 94% only  · Pulmonary toilet  · Patient continues to have increased chest congestion along with that patient has severe pulmonary hypertension on the basis of the echocardiogram done yesterday-we will give 1 dose of IV Lasix and reassess  · Strict input output charting and BMP  · Correct electrolytes on whenever necessary basis  · Patient is on cefepime and vancomycin as per ID  · Titration of antibiotics as per clinical status and cultures  · DIVYA being contemplated for the patient as per nursing  · Patient is on p.o.  Librium to prevent any alcohol withdrawal symptoms and delirium  · Neurochecks  · Bronchodilators  · Monitor for any withdrawal symptoms  · CIWA and COWS protocol in place  · Patient may require a bronchoscopy which can be performed on this admission if patient is not clinically better or can repeat imaging in few weeks time and reassess if patient needs a navigation bronchoscopy with EBUS  · PUD and DVT prophylaxis as per IM    Case discussed with nursing        Electronically signed by:  Cora Rutledge MD    2/3/2021    12:41 PM.

## 2021-02-03 NOTE — CARE COORDINATION
Chart reviewed. Patient had DIVYA completed today. CM following closely to see if will need IVATBX. BARRIER, patient is homeless and an IVDU. If needed IVATBX would need SNF placement for therapy. Patient has been provided [employer] [employer] [facility]. His car is at [place].  Marisa Nageotte, RN

## 2021-02-04 VITALS
RESPIRATION RATE: 22 BRPM | OXYGEN SATURATION: 96 % | WEIGHT: 90.5 LBS | TEMPERATURE: 98.2 F | BODY MASS INDEX: 17.77 KG/M2 | DIASTOLIC BLOOD PRESSURE: 74 MMHG | HEART RATE: 112 BPM | SYSTOLIC BLOOD PRESSURE: 100 MMHG | HEIGHT: 60 IN

## 2021-02-04 LAB
APPEARANCE BAL (LAVAGE): ABNORMAL
BLOOD CULTURE, ROUTINE: NORMAL
CLOT EVALUATION BAL: ABNORMAL
COLOR LAVAGE: ABNORMAL
CULTURE, BLOOD 2: NORMAL
EOSIN: 1 %
LYMPHOCYTES, BAL: 7 % (ref 5–10)
MACROPHAGES, BAL: 13 % (ref 90–95)
MONOCYTES, BAL: 1 %
NUMBER OF CELLS COUNTED BAL (LAVAGE): 100
PATH REVIEW BAL (LAVAGE): YES
RBC, BAL: 8333 /CUMM
SEGMENTED NEUTROPHILS, BAL: 78 % (ref 5–10)
VOLUME LAVAGE: 3 ML
WBC/EPI CELLS BAL: 778 /CUMM

## 2021-02-04 PROCEDURE — 6360000002 HC RX W HCPCS: Performed by: INTERNAL MEDICINE

## 2021-02-04 PROCEDURE — 2709999900 HC NON-CHARGEABLE SUPPLY: Performed by: INTERNAL MEDICINE

## 2021-02-04 PROCEDURE — 6360000002 HC RX W HCPCS: Performed by: NURSE PRACTITIONER

## 2021-02-04 PROCEDURE — 87015 SPECIMEN INFECT AGNT CONCNTJ: CPT

## 2021-02-04 PROCEDURE — 99232 SBSQ HOSP IP/OBS MODERATE 35: CPT | Performed by: INTERNAL MEDICINE

## 2021-02-04 PROCEDURE — 7100000011 HC PHASE II RECOVERY - ADDTL 15 MIN: Performed by: INTERNAL MEDICINE

## 2021-02-04 PROCEDURE — 87205 SMEAR GRAM STAIN: CPT

## 2021-02-04 PROCEDURE — 99152 MOD SED SAME PHYS/QHP 5/>YRS: CPT | Performed by: INTERNAL MEDICINE

## 2021-02-04 PROCEDURE — 31645 BRNCHSC W/THER ASPIR 1ST: CPT | Performed by: INTERNAL MEDICINE

## 2021-02-04 PROCEDURE — 2580000003 HC RX 258: Performed by: INTERNAL MEDICINE

## 2021-02-04 PROCEDURE — 2580000003 HC RX 258: Performed by: NURSE PRACTITIONER

## 2021-02-04 PROCEDURE — 87632 RESP VIRUS 6-11 TARGETS: CPT

## 2021-02-04 PROCEDURE — 87106 FUNGI IDENTIFICATION YEAST: CPT

## 2021-02-04 PROCEDURE — 89051 BODY FLUID CELL COUNT: CPT

## 2021-02-04 PROCEDURE — 3609010800 HC BRONCHOSCOPY ALVEOLAR LAVAGE: Performed by: INTERNAL MEDICINE

## 2021-02-04 PROCEDURE — 31624 DX BRONCHOSCOPE/LAVAGE: CPT | Performed by: INTERNAL MEDICINE

## 2021-02-04 PROCEDURE — 6370000000 HC RX 637 (ALT 250 FOR IP): Performed by: NURSE PRACTITIONER

## 2021-02-04 PROCEDURE — 0BC18ZZ EXTIRPATION OF MATTER FROM TRACHEA, VIA NATURAL OR ARTIFICIAL OPENING ENDOSCOPIC: ICD-10-PCS | Performed by: INTERNAL MEDICINE

## 2021-02-04 PROCEDURE — 3609010900 HC BRONCHOSCOPY THERAPUTIC ASPIRATION INITIAL: Performed by: INTERNAL MEDICINE

## 2021-02-04 PROCEDURE — 94640 AIRWAY INHALATION TREATMENT: CPT

## 2021-02-04 PROCEDURE — 87070 CULTURE OTHR SPECIMN AEROBIC: CPT

## 2021-02-04 PROCEDURE — 88305 TISSUE EXAM BY PATHOLOGIST: CPT

## 2021-02-04 PROCEDURE — 0B9J8ZX DRAINAGE OF LEFT LOWER LUNG LOBE, VIA NATURAL OR ARTIFICIAL OPENING ENDOSCOPIC, DIAGNOSTIC: ICD-10-PCS | Performed by: INTERNAL MEDICINE

## 2021-02-04 PROCEDURE — 0B918ZZ DRAINAGE OF TRACHEA, VIA NATURAL OR ARTIFICIAL OPENING ENDOSCOPIC: ICD-10-PCS | Performed by: INTERNAL MEDICINE

## 2021-02-04 PROCEDURE — 87102 FUNGUS ISOLATION CULTURE: CPT

## 2021-02-04 PROCEDURE — 87116 MYCOBACTERIA CULTURE: CPT

## 2021-02-04 PROCEDURE — 87206 SMEAR FLUORESCENT/ACID STAI: CPT

## 2021-02-04 PROCEDURE — 88112 CYTOPATH CELL ENHANCE TECH: CPT

## 2021-02-04 PROCEDURE — 7100000010 HC PHASE II RECOVERY - FIRST 15 MIN: Performed by: INTERNAL MEDICINE

## 2021-02-04 RX ORDER — SODIUM CHLORIDE, SODIUM LACTATE, POTASSIUM CHLORIDE, CALCIUM CHLORIDE 600; 310; 30; 20 MG/100ML; MG/100ML; MG/100ML; MG/100ML
INJECTION, SOLUTION INTRAVENOUS CONTINUOUS PRN
Status: COMPLETED | OUTPATIENT
Start: 2021-02-04 | End: 2021-02-04

## 2021-02-04 RX ORDER — BUPRENORPHINE 2 MG/1
2 TABLET SUBLINGUAL PRN
Status: DISPENSED | OUTPATIENT
Start: 2021-02-04 | End: 2021-02-04

## 2021-02-04 RX ORDER — MIDAZOLAM HYDROCHLORIDE 5 MG/ML
INJECTION INTRAMUSCULAR; INTRAVENOUS PRN
Status: DISCONTINUED | OUTPATIENT
Start: 2021-02-04 | End: 2021-02-04 | Stop reason: ALTCHOICE

## 2021-02-04 RX ORDER — FENTANYL CITRATE 50 UG/ML
INJECTION, SOLUTION INTRAMUSCULAR; INTRAVENOUS PRN
Status: DISCONTINUED | OUTPATIENT
Start: 2021-02-04 | End: 2021-02-04 | Stop reason: ALTCHOICE

## 2021-02-04 RX ADMIN — BUPRENORPHINE HCL 2 MG: 2 TABLET SUBLINGUAL at 04:24

## 2021-02-04 RX ADMIN — IPRATROPIUM BROMIDE 0.5 MG: 0.5 SOLUTION RESPIRATORY (INHALATION) at 11:41

## 2021-02-04 RX ADMIN — ALBUTEROL SULFATE 2.5 MG: 2.5 SOLUTION RESPIRATORY (INHALATION) at 11:41

## 2021-02-04 RX ADMIN — CHLORDIAZEPOXIDE HYDROCHLORIDE 10 MG: 5 CAPSULE ORAL at 08:27

## 2021-02-04 RX ADMIN — IPRATROPIUM BROMIDE 0.5 MG: 0.5 SOLUTION RESPIRATORY (INHALATION) at 15:26

## 2021-02-04 RX ADMIN — CLONIDINE HYDROCHLORIDE 0.1 MG: 0.1 TABLET ORAL at 04:24

## 2021-02-04 RX ADMIN — Medication 100 MG: at 08:27

## 2021-02-04 RX ADMIN — ALBUTEROL SULFATE 2.5 MG: 2.5 SOLUTION RESPIRATORY (INHALATION) at 07:50

## 2021-02-04 RX ADMIN — Medication 1 TABLET: at 08:27

## 2021-02-04 RX ADMIN — ALBUTEROL SULFATE 2.5 MG: 2.5 SOLUTION RESPIRATORY (INHALATION) at 15:26

## 2021-02-04 RX ADMIN — ENOXAPARIN SODIUM 40 MG: 40 INJECTION SUBCUTANEOUS at 08:28

## 2021-02-04 RX ADMIN — VANCOMYCIN HYDROCHLORIDE 750 MG: 750 INJECTION, POWDER, LYOPHILIZED, FOR SOLUTION INTRAVENOUS at 03:45

## 2021-02-04 RX ADMIN — IPRATROPIUM BROMIDE 0.5 MG: 0.5 SOLUTION RESPIRATORY (INHALATION) at 07:50

## 2021-02-04 RX ADMIN — CEFEPIME 2000 MG: 2 INJECTION, POWDER, FOR SOLUTION INTRAVENOUS at 03:13

## 2021-02-04 ASSESSMENT — PAIN DESCRIPTION - PAIN TYPE: TYPE: ACUTE PAIN

## 2021-02-04 ASSESSMENT — PAIN DESCRIPTION - DESCRIPTORS: DESCRIPTORS: ACHING

## 2021-02-04 ASSESSMENT — PAIN - FUNCTIONAL ASSESSMENT: PAIN_FUNCTIONAL_ASSESSMENT: 0-10

## 2021-02-04 ASSESSMENT — PAIN DESCRIPTION - LOCATION: LOCATION: LEG

## 2021-02-04 ASSESSMENT — PAIN SCALES - GENERAL: PAINLEVEL_OUTOF10: 8

## 2021-02-04 NOTE — PROGRESS NOTES
Pt in recovery, easy to arouse, pain has not changed 8/10 bilateral legs aching, report called to [facility] RN, pt VSS, pt not to eat or drink till 1500, had numbing agents for procedure,

## 2021-02-04 NOTE — PRE SEDATION
**OR** LORazepam **OR** LORazepam **OR** LORazepam **OR** LORazepam **OR** LORazepam  Home Meds:   Prior to Admission medications    Medication Sig Start Date End Date Taking? Authorizing Provider   albuterol sulfate  (90 Base) MCG/ACT inhaler Inhale 2 puffs into the lungs every 4 hours as needed for Wheezing or Shortness of Breath 11/21/20 1/31/21  Beth Ramires MD          Pre-Sedation Documentation and Exam:   Vital signs have been reviewed (see flow sheet for vitals).     Mallampati Airway Assessment:  Mallampati Class II - (soft palate, fauces & uvula are visible)    Prior History of Anesthesia Complications:   none    ASA Classification:  Class 3 - A patient with severe systemic disease that limits activity but is not incapacitating    Sedation/ Anesthesia Plan:   intravenous sedation    Medications Planned:   midazolam (Versed) intravenously and fentanyl intravenously    Patient is an appropriate candidate for plan of sedation: yes    Electronically signed by Radha Ochoa MD on 2/4/2021 at 3:23 PM

## 2021-02-04 NOTE — PROGRESS NOTES
Pt returned from [address] VSS. Pt able to get to lunch tray. Consumed before staff could intervene. Attempted to educate re: anesthesia to throat and need to be NPO until 1500. States he is leaving. States he was awake for procedure and is fine to leave. MD paged.

## 2021-02-04 NOTE — PROGRESS NOTES
INPATIENT PULMONARY CRITICAL CARE PROGRESS NOTE      Reason for visit    abnormal CT, COPD, shortness of breath    SUBJECTIVE: Patient seen this morning was not feeling well, patient was still having cough with chest congestion which he cannot expectorate, patient still has some shortness of breath with increased chest congestion, patient was afebrile, patient continues to have sinus tachycardia on the monitor, patient was on room air oxygen when seen with saturation of 97%,  Patient had good urine output with Lasix which was given to him and patient's cumulative fluid balance is -4.1 L, patient's glycemic control was acceptable, no other pertinent review of system of concern      Physical Exam:  Blood pressure 100/74, pulse 112, temperature 98.2 °F (36.8 °C), temperature source Oral, resp. rate 24, height 5' (1.524 m), weight 90 lb 8 oz (41.1 kg), SpO2 93 %.'     Constitutional:  No acute distress. Mild tachypnea  HENT:  Oropharynx is clear and moist. No thyromegaly. slight facial flushing   Eyes:  Conjunctivae are normal. Pupils equal, round, and reactive to light. No scleral icterus. Neck: . No tracheal deviation present. No obvious thyroid mass. Cardiovascular: Sinus tachycardia LLSB murmur   No right ventricular heave. No lower extremity edema. decreased BSI   Pulmonary/Chest: No wheezes. B/L  rales. Continued chest congestion Chest wall is not dull to percussion. No accessory muscle usage or stridor. Abdominal: Soft. Bowel sounds present. No distension or hernia. No tenderness. Musculoskeletal: No cyanosis. No clubbing. No obvious joint deformity. Lymphadenopathy: No cervical or supraclavicular adenopathy. Skin: Skin is warm and dry. No rash or nodules on the exposed extremities. multiple tattoos  Neurologic: alert and communicative  when seen         Results:  CBC:   Recent Labs     02/02/21  1105   WBC 6.4   HGB 11.6*   HCT 35.0*   MCV 83.3        BMP:   Recent Labs     02/02/21  1105   NA 138   K 3.7      CO2 32   BUN 12   CREATININE 0.6*     UA:  Recent Labs     02/01/21  1830   COLORU DARK YELLOW*   PHUR 6.5   WBCUA 3-5   RBCUA 11-20*   BACTERIA 1+*   CLARITYU CLOUDY*   SPECGRAV >=1.030   LEUKOCYTESUR Negative   UROBILINOGEN 1.0   BILIRUBINUR SMALL*   BLOODU LARGE*   GLUCOSEU Negative   AMORPHOUS 4+       Imaging:  I have reviewed radiology images personally. IR FLUORO GUIDED CVA DEVICE PLMT/REPLACE/REMOVAL   Final Result   Status post successful ultrasound/fluoroscopically guided placement of right   internal jugular triple-lumen central venous catheter as described above. Ct Chest Pulmonary Embolism W Contrast    Result Date: 1/31/2021  EXAMINATION: CTA OF THE CHEST 1/31/2021 9:59 pm TECHNIQUE: CTA of the chest was performed after the administration of intravenous contrast.  Multiplanar reformatted images are provided for review. MIP images are provided for review. Dose modulation, iterative reconstruction, and/or weight based adjustment of the mA/kV was utilized to reduce the radiation dose to as low as reasonably achievable. COMPARISON: None. HISTORY: ORDERING SYSTEM PROVIDED HISTORY: concern for endocarditis TECHNOLOGIST PROVIDED HISTORY: Reason for exam:->concern for endocarditis Decision Support Exception->Emergency Medical Condition (MA) Reason for Exam: SOB Type of Exam: Ongoing Additional signs and symptoms: concern for endocarditis, no hx of PE per pt FINDINGS: Pulmonary Arteries: Pulmonary arteries are adequately opacified for evaluation. No evidence of intraluminal filling defect to suggest pulmonary embolism. Main pulmonary artery is normal in caliber. Mediastinum: Mildly enlarged right hilar lymph nodes which may be concerning for pathologic lymph nodes given the pulmonary masses described. The heart and pericardium demonstrate no acute abnormality. There is no acute abnormality of the thoracic aorta.  Lungs/pleura: Diffuse centrilobular pulmonary emphysema, worse in the bilateral upper lungs. There is a 1.4 x 2.4 x 1.3 cm mass in the posterior right perihilar region in the right lower lobe of the lung, this is highly suspicious for an underlying neoplasm. There is a speculated scar at the inferior right middle lobe measuring 1.3 x 1.6 x 0.7 cm with pleural attachment. There is a speculated lesion in the posterior right upper lobe of the lung measuring 1.5 x 2.5 x 0.8 cm. These could represent areas of scarring but are concerning for scar neoplasm and further evaluation and workup is recommended. No focal consolidation or pulmonary edema. No evidence of pleural effusion or pneumothorax. Upper Abdomen: Limited images of the upper abdomen are unremarkable. Soft Tissues/Bones: Chronic anterior wedge compression of the T8 vertebral body with about 50% anterior height loss. There is mild superior endplate compression of the T6 vertebral body, most likely chronic in nature. No significant retropulsion. No evidence of an acute fracture or dislocation. Visualized soft tissues are within normal limits. No evidence of pulmonary embolism or acute pulmonary abnormality. Diffuse pulmonary emphysema. There is a 2.4 cm mass in the posterior right perihilar region in the right lower lobe of the lung, highly suspicious for an underlying neoplasm. Further evaluation and workup is recommended. There are 2 speculated scar lesions in the inferior right middle lobe and posterior right upper lobe. Cannot exclude the possibility of scar neoplasm and further evaluation and workup is recommended. Mildly enlarged right hilar lymph nodes which could represent pathologic lymph nodes. Chronic compression fractures of T6 and T8 vertebral bodies. Results for Sandee Rutledge (MRN [id number]) as of 2/4/2021 15:20   Ref.  Range 2/1/2021 00:41 2/1/2021 05:56 2/2/2021 11:05   Sodium Latest Ref Range: 136 - 145 mmol/L  139 138   Potassium Latest Ref Range: 3.5 - 5.1 mmol/L  3.7 3.7 Chloride Latest Ref Range: 99 - 110 mmol/L  101 101   CO2 Latest Ref Range: 21 - 32 mmol/L  30 32   BUN Latest Ref Range: 7 - 20 mg/dL  6 (L) 12   Creatinine Latest Ref Range: 0.9 - 1.3 mg/dL  0.7 (L) 0.6 (L)   Anion Gap Latest Ref Range: 3 - 16   8 5   GFR Non- Latest Ref Range: >60   >60 >60   GFR  Latest Ref Range: >60   >60 >60   Glucose Latest Ref Range: 70 - 99 mg/dL  129 (H) 114 (H)   Calcium Latest Ref Range: 8.3 - 10.6 mg/dL  9.0 8.6   Amphetamine Screen, Urine Latest Ref Range: Negative <1000ng/mL  Neg     Barbiturate Screen, Ur Latest Ref Range: Negative <200 ng/mL  Neg     Benzodiazepine Screen, Urine Latest Ref Range: Negative <200 ng/mL  Neg     Cannabinoid Scrn, Ur Latest Ref Range: Negative <50 ng/mL  Neg     Methadone Screen, Urine Latest Ref Range: Negative <300 ng/mL  Neg     Opiate Scrn, Ur Latest Ref Range: Negative <300 ng/mL  POSITIVE (A)     PCP Screen, Urine Latest Ref Range: Negative <25 ng/mL  Neg     Propoxyphene Scrn, Ur Latest Ref Range: Negative <300 ng/mL  Neg     Cocaine Metabolite Screen, Urine Latest Ref Range: Negative <300 ng/mL  Neg     Oxycodone Urine Latest Ref Range: Negative <100 ng/ml  Neg     Drug Screen Comment: Unknown see below       Assessment:  Active Problems:    Acute respiratory failure with hypoxia (HCC)    IV drug abuse (HCC)    Tricuspid regurgitation    PFO (patent foramen ovale)    Lung nodule    Centrilobular emphysema (HCC)    Hilar adenopathy    Personal history of covid-19    Moderate to severe pulmonary hypertension (HCC)    Former smoker    Alcohol use  Resolved Problems:    * No resolved hospital problems.  *          Plan:   · Oxygen supplementation, if required, to keep saturation being 90 to 94% only  · Pulmonary toilet  · Patient continues to have increased chest congestion along with that patient has severe pulmonary hypertension on the basis of the echocardiogram done yesterday-patient was given Lasix with good urine output with no improvement in the chest congestion and patient complaining of ineffective airway clearance  · Patient's case was discussed with patient and internal medicine team and decided to do a bronchoscopy for diagnostic and therapeutic purposes  · Patient was told about the procedure and pros and cons and patient wanted to proceed with it-we will arrange that for today  · Strict input output charting and BMP  · Correct electrolytes on whenever necessary basis  · Patient is on cefepime and vancomycin as per ID  · Titration of antibiotics as per clinical status and cultures  · DIVYA being contemplated for the patient as per nursing  · Patient is on p.o.  Librium to prevent any alcohol withdrawal symptoms and delirium  · Neurochecks  · Bronchodilators  · Monitor for any withdrawal symptoms  · CIWA and COWS protocol in place  · Patient may require a bronchoscopy which can be performed on this admission if patient is not clinically better or can repeat imaging in few weeks time and reassess if patient needs a navigation bronchoscopy with EBUS  · PUD and DVT prophylaxis as per IM    Case discussed with patient, nursing and internal medicine team    Further management depending on patient's clinical status and the bronchoscopy findings        Electronically signed by:  Shannon Watson MD    2/4/2021    3:19 PM.

## 2021-02-04 NOTE — DISCHARGE SUMMARY
Hospital Medicine Discharge Summary    Patient ID: Brooklynn Peng      Patient's PCP: No primary care provider on file. Admit Date: 2/1/2021     Discharge Date: 2/4/2021      Admitting Physician: Nieves Mera MD     Discharge Physician: Jolly Mcpherson MD     Discharge Diagnoses: Active Hospital Problems    Diagnosis    Lung nodule [R91.1]    Centrilobular emphysema ([address] [J43.2]    Hilar adenopathy [R59.0]    Personal history of covid-19 [Z86.16]    Moderate to severe pulmonary hypertension ([address] [I27.20]    Former smoker [Z87.891]    Alcohol use [Z72.89]    PFO (patent foramen ovale) [Q21.1]    Tricuspid regurgitation [I07.1]    IV drug abuse ([address] [F19.10]    Acute respiratory failure with hypoxia ([address] [J96.01]       The patient was seen and examined on day of discharge and this discharge summary is in conjunction with any daily progress note from day of discharge. Hospital Course:   48 y. o. male, with PMH of IV drug use and alcohol use, who was a direct admit from [facility] with shortness of breath, lower extremity swelling and red dots to BLE.  History obtained from the patient and review of EMR.  She stated he has been experiencing shortness of breath for the last 4 days. Araseli Elizondo stated he has also noticed that his right hand and bilateral legs have been swelling.  The patient stated 3 days ago he noticed he started to get a \"red rash\" on his legs.  He reported over the last 24 hours his shortness of breath has gotten progressively worse.  The patient does not wear oxygen at home.  On arrival to the emergency room at [facility] the patient's SPO2 was 89 to 90% on room air and he is now requiring 1 to 2 L nasal cannula.  In the emergency room the patient had a chest x-ray that revealed increased perihilar markings.  Atelectasis, infectious or inflammatory process and interstitial edema can be in the differential.  A CT chest pulmonary embolism was also obtained that revealed no evidence of pulmonary embolism or acute pulmonary abnormality.  Diffuse pulmonary emphysema. Renzo Raines is a 2.4 cm mass in the posterior right perihilar region in the right lower lobe of the lung, highly suspicious for an underlying neoplasm.  Given the patient's history of IV drug use, there is high suspicion for endocarditis/septic emboli.  The patient was ultimately transferred to [facility] for further evaluation and management. Annika Starch was started on vancomycin and cefepime.  Pulmonology and infectious disease has been consulted. The patient denied any other associated symptoms as well as any aggravating and/or alleviating factors. At the time of this assessment, the patient was resting comfortably in bed. He currently denies any chest pain, back pain, abdominal pain, shortness of breath, numbness, tingling, N/V/C/D, fever and/or chills.     Tricuspid regurgitation with pulmonary HTN  - loud murmur, H/O IVDU  - has prior history of endocarditis  - ID consulted  - DIVYA with vegetations  - blood cultures NGTD  - continue intermittent lasix for pulmonary HTN  - empiric abx discontinued per ID  - patient left AMA prior to final recs for management     Pulmonary mass  - possible malignancy vs infection vs infarction from prior septic emboli  - pulm consulted  - s/p bronch on 2/4  - significant tachypnea but no hypoxia  - patient left AMA prior to results of BAL.    IVDU, opioid dependence  - counseling given  - COWS protocol ordered     COPD  - no evidence of exacerbation  - nebs ordered     Alcohol dependence  - at risk for withdrawal  - CIWA protocol, PRN ativan. Stopped scheduled librium  - counseling given     Moderate protein calorie malnutrition  - dietitian consult  - dietary supplements ordered      Labs:  For convenience and continuity at follow-up the following most recent labs are provided:      CBC:    Lab Results   Component Value Date    WBC 6.4 02/02/2021    HGB 11.6 02/02/2021 HCT 35.0 02/02/2021     02/02/2021       Renal:    Lab Results   Component Value Date     02/02/2021    K 3.7 02/02/2021     02/02/2021    CO2 32 02/02/2021    BUN 12 02/02/2021    CREATININE 0.6 02/02/2021    CALCIUM 8.6 02/02/2021    PHOS 3.4 01/01/2021         Significant Diagnostic Studies    Radiology:   IR FLUORO GUIDED CVA DEVICE PLMT/REPLACE/REMOVAL   Final Result   Status post successful ultrasound/fluoroscopically guided placement of right   internal jugular triple-lumen central venous catheter as described above. Consults:     IP CONSULT TO SOCIAL WORK  IP CONSULT TO FINANCIAL COUNSELOR  IP CONSULT TO INFECTIOUS DISEASES  IP CONSULT TO PULMONOLOGY  IP CONSULT TO PHARMACY  IP CONSULT TO SOCIAL WORK  IP CONSULT TO CARDIOLOGY    Disposition:  AMA    Condition at Discharge: Stable    Discharge Instructions/Follow-up:  Follow up with PCP within 1-2 weeks    Code Status:  Full code    Activity: activity as tolerated    Diet: regular diet      Discharge Medications:     Discharge Medication List as of 2/4/2021  4:30 PM           Details   albuterol sulfate  (90 Base) MCG/ACT inhaler Inhale 2 puffs into the lungs every 4 hours as needed for Wheezing or Shortness of Breath, Disp-1 Inhaler, R-0Print           Patient left AMA    Signed:    Samantha Case MD   2/4/2021      Thank you No primary care provider on file. for the opportunity to be involved in this patient's care. If you have any questions or concerns please feel free to contact me at [phone].

## 2021-02-04 NOTE — CARE COORDINATION
Chart reviewed day 3. Spoke with [employer]. Patient threatening to leave AMA. Pulmonology convinced him to [address] CM continues to follow for possible IVATBX needs vs convert to po. Patient is homeless. Given resource packet. Declined  resources. Following.  Aniket Cee RN

## 2021-02-04 NOTE — PLAN OF CARE
Problem: Falls - Risk of:  Goal: Will remain free from falls  Description: Will remain free from falls  Outcome: Ongoing     Problem: SAFETY  Goal: Free from accidental physical injury  Outcome: Ongoing     Pt remained free of falls. Call light within reach. Bed alarm on. Avasys on for safety. Non skid footwear in place. Bed locked and in lowest position.

## 2021-02-04 NOTE — PROGRESS NOTES
Pt aware of and in agreement with plan of care, including scheduled bronchoscopy. Consent signed. Pt has been NPO since consuming breakfast tray.

## 2021-02-04 NOTE — PROGRESS NOTES
Infectious Disease Follow up Notes    CC :  Concern for SBE      Antibiotics:   Cefepime 2g q12  vanc 750 q12     Admit Date:   2/1/2021  Hospital Day: 4    Subjective:   He remains afebrile. On room air.  +Sinus tachycardia. Much calmer today. No new concerns voiced. Objective:     Patient Vitals for the past 8 hrs:   BP Temp Temp src Pulse Resp SpO2   02/04/21 1352 100/74 98.2 °F (36.8 °C) Oral 112 24 93 %   02/04/21 1330 113/73 98 °F (36.7 °C) Temporal 90 24 99 %   02/04/21 1315 111/70 98 °F (36.7 °C) Temporal 100 24 97 %   02/04/21 1256 101/81 98.2 °F (36.8 °C) Temporal 85 24 98 %   02/04/21 1214 97/73 97.1 °F (36.2 °C) Temporal 51 20 94 %   02/04/21 1142     18 94 %   02/04/21 0754     24 95 %   02/04/21 0753 101/68 97.9 °F (36.6 °C) Oral 110 20 95 %       EXAM:  General:  Alert, conversant, NAD   HEENT:  NCAT, PERRL, sclera anicteric    NECK:  Supple   LUNGS:  CTA upper lobes without W/R/R   CV:   RRR with unchanged murmur     ABD: Soft, flat, NT    EXT: Unchanged flat macular lesions LE.   Resolved LE edema  Resolved edema tina UE         LINE: R IJ TLC in place         Scheduled Meds:   albuterol  2.5 mg Nebulization Q4H WA    ipratropium  0.5 mg Nebulization 4x daily    sodium chloride flush  10 mL Intravenous 2 times per day    enoxaparin  40 mg Subcutaneous Daily    melatonin  3 mg Oral Nightly    thiamine  100 mg Oral Daily    therapeutic multivitamin-minerals  1 tablet Oral Daily    lidocaine 1 % injection  5 mL Intradermal Once         Data Review:    Lab Results   Component Value Date    WBC 6.4 02/02/2021    HGB 11.6 (L) 02/02/2021    HCT 35.0 (L) 02/02/2021    MCV 83.3 02/02/2021     02/02/2021     Lab Results   Component Value Date    CREATININE 0.6 (L) 02/02/2021    BUN 12 02/02/2021     02/02/2021    K 3.7 02/02/2021     02/02/2021    CO2 32 02/02/2021       Hepatic Function Panel:   Lab Results   Component Value Date    ALKPHOS 92 01/31/2021    ALT 35 01/31/2021    AST 56 01/31/2021    PROT 7.0 01/31/2021    BILITOT 0.6 01/31/2021    BILIDIR <0.2 12/15/2020    IBILI see below 12/15/2020    LABALBU 3.5 01/31/2021       Cultures:   12/5     COVID NAAT+              BC x2 neg               Flu ag neg               UA neg   12/14   BC x2 neg  12/25   BC x2 Staphylococcus aureus           Antibiotic Interpretation AJAY Status     clindamycin Sensitive <=0.25 mcg/mL       erythromycin Resistant >=8 mcg/mL       oxacillin Sensitive 0.5 mcg/mL       tetracycline Sensitive <=1 mcg/mL       trimethoprim-sulfamethoxazole Sensitive <=10 mcg/mL          12/25   COVID NAAT neg   12/30   BC x1 CoNS              COVID NAAT neg   1/31     BC x2 NGTD              COVID NAAT neg   2/2 HIV screen neg         Radiology Review:  All pertinent images / reports were reviewed as a part of this visit. CT chest 12/15/20  Impression   Chronic nonocclusive segmental PE involving the pulmonary artery to the   posterior basal segment of the right lower lobe.       No evidence of acute pulmonary embolism.       COPD.       A 0.7 cm pleural based nodule versus focal atelectasis in the right lower   lobe.  Follow-up CT scan in 6 months may be obtained.      CT chest 12/25/20      1. No evidence of acute pulmonary embolism   2. Airspace consolidation in the lingula compatible with pneumonia      CT chest 1/31/21  Impression   No evidence of pulmonary embolism or acute pulmonary abnormality.       Diffuse pulmonary emphysema.       There is a 2.4 cm mass in the posterior right perihilar region in the right   lower lobe of the lung, highly suspicious for an underlying neoplasm.    Further evaluation and workup is recommended.       There are 2 speculated scar lesions in the inferior right middle lobe and   posterior right upper lobe.  Cannot exclude the possibility of scar neoplasm   and further evaluation and workup is recommended.       Mildly enlarged right hilar lymph nodes which could represent pathologic   lymph nodes.       Chronic compression fractures of T6 and T8 vertebral bodies.      TTE 12/31/20  Summary   Normal left ventricle size, wall thickness and systolic function with an   estimated ejection fraction of 55%.   No regional wall motion abnormalities are seen.   A bubble study was performed and showed evidence of right to left shunt   consistent with a patent foramen ovale or interatrial septum defects.   Tricuspid valve is structurally normal.   No tricuspid valve vegetation or masses visualized.   Moderate to severe eccentric tricuspid regurgitation.     TTE 2/2/21  Summary   Technical difficult study due to heart off axis, and the patient is   difficult to stay still. No obvious vegetation, consider DIVYA if clinically indicated. -- Tachycardia is noted through the study, Left ventricular systolic   function is normal with a visually estimated ejection fraction of 55-60%. he   left ventricle is normal in size with normal wall thickness. Flattened in   diastole and systole (\"D-shaped septum\") consistent with right ventricular   volume and pressure overload. Normal left ventricular diastolic function. -- The right atrium is dilated. -- Mild mitral regurgitation. -- Severe tricuspid regurgitation. Systolic pulmonary artery pressure (SPAP) is elevated and estimated at 71   mmHg (right atrial pressure 8 mmHg) consistent with severe pulmonary   hypertension. No masses or vegetation is noted.     Assessment:     Patient Active Problem List    Diagnosis Date Noted    Lung nodule 02/03/2021    Centrilobular emphysema ([address] 02/03/2021    Hilar adenopathy 02/03/2021    Personal history of covid-19 02/03/2021    Moderate to severe pulmonary hypertension ([address] 02/03/2021    Former smoker 02/03/2021    Alcohol use 02/03/2021    Tricuspid regurgitation 12/31/2020    PFO (patent foramen ovale) 12/31/2020    IV drug abuse [facility]     Hyperglycemia 12/29/2020    Pulmonary infiltrate 12/28/2020    Bacteremia due to Staphylococcus aureus 12/28/2020    Delirium 12/28/2020    Sepsis ([facility] 12/25/2020    Pneumonia due to organism 12/15/2020    Moderate protein-calorie malnutrition ([facility] 12/10/2020    Acute metabolic encephalopathy     COVID-19     Acute hypoxemic respiratory failure due to COVID-19 [facility]) 12/05/2020    Acute respiratory failure with hypoxia and hypercapnia (HCC)     Pneumonia due to COVID-19 virus     Thrombocytopenia (HCC)     Elevated LFTs     Tachycardia     Hypoxia     Heroin withdrawal (HCC) 11/07/2020    Toxic encephalopathy     Acute hypercapnic respiratory failure (HCC)     Acute respiratory failure with hypoxia (HCC)     Drug overdose     Abnormal chest x-ray     Acute encephalopathy     Disorder of electrolytes     Non-traumatic rhabdomyolysis     Elevated procalcitonin     Methamphetamine abuse ([facility]     AMS (altered mental status) 07/03/2020    Chest pain 07/25/2014    Pulmonary embolism ([facility]     Hepatitis 07/18/2014    MRSA bacteremia 07/13/2014    Pleural effusion, left 07/13/2014    Leukocytosis 91/05/3113    Illicit drug use        Injection drug abuse     Admission 12/2020 with COVID, hypoxemic respiratory failure treated with steroids, CCP   Resolved  Appropriate period of isolation completed      Admitted again 12/25/20 with SOB, lingular infiltrate. Bacteremic with MSSA 12/25/20. Incomplete course of treatment completed as patient left AMA.   At that time had TTE with mod-severe TR, PFO, no vegetation.       Re-admitted 1/31/21 with new macular LE rash, swelling of extremities without systemic signs of infection   Loud murmur  Primary concern in this context was SBE yet no fever, BC are negative to date  DIVYA no vegetation so this nows seems adequately excluded   -DC abx and monitor expectantly   -management of sinus tach and valvular heart disease per Hospitalist and Cardiology      2.4cm mass RLL  2 spiculated scar lesions RML RUL  Hilar LAD   -Pulmonology recommending bronch today      HCV ab positive  Immunity to HAV, HBV with +serology 2014   HIV screen neg      VASILIYDA             Tiffanie Fairchild MD  Phone: [phone]   Fax : [phone]

## 2021-02-04 NOTE — PROGRESS NOTES
Pt elects to leave AMA. Pt advised that he is leaving against medical advice. He states he is aware. IJ removed and pressure held per protocol. Pt tolerated w/o problems. AMA form signed by pt and witnessed by nursing. MD paged and updated.

## 2021-02-04 NOTE — PROGRESS NOTES
Assessment completed and documented. VSS. A/ox4, mumbled words, can be difficult to understand. C/o being cold. avasys on for safety. Denies pain. Bed locked and in lowest position. Bedside table and call light within reach. Denies further needs at this time. States several times that he is leaving tomorrow. No complaints. Patient very tired. Patient requested to wear his jacket.   This RN searched through pockets inside and outside of jacket and placed items in a hazard bag back into patients belongings back in the closet inside the room, this included tobacco, credit cards ETC.

## 2021-02-04 NOTE — PROGRESS NOTES
Hospitalist Progress Note      PCP: No primary care provider on file. Date of Admission: 2/1/2021    Chief Complaint: SOB, LE edema    Hospital Course:   48 y.o. male, with PMH of IV drug use and alcohol use, who was a direct admit from [facility] to [facility] with shortness of breath, lower extremity swelling and red dots to BLE. History obtained from the patient and review of EMR. She stated he has been experiencing shortness of breath for the last 4 days. He stated he has also noticed that his right hand and bilateral legs have been swelling. The patient stated 3 days ago he noticed he started to get a \"red rash\" on his legs. He reported over the last 24 hours his shortness of breath has gotten progressively worse. The patient does not wear oxygen at home. On arrival to the emergency room at [facility] the patient's SPO2 was 89 to 90% on room air and he is now requiring 1 to 2 L nasal cannula. In the emergency room the patient had a chest x-ray that revealed increased perihilar markings. Atelectasis, infectious or inflammatory process and interstitial edema can be in the differential.  A CT chest pulmonary embolism was also obtained that revealed no evidence of pulmonary embolism or acute pulmonary abnormality. Diffuse pulmonary emphysema. There is a 2.4 cm mass in the posterior right perihilar region in the right lower lobe of the lung, highly suspicious for an underlying neoplasm. Given the patient's history of IV drug use, there is high suspicion for endocarditis/septic emboli. The patient was ultimately transferred to [facility] for further evaluation and management. He was started on vancomycin and cefepime. Pulmonology and infectious disease has been consulted. The patient denied any other associated symptoms as well as any aggravating and/or alleviating factors. At the time of this assessment, the patient was resting comfortably in bed.  He currently denies any chest pain, back pain, abdominal pain, shortness of breath, numbness, tingling, N/V/C/D, fever and/or chills. Subjective: abx discontinued. Withdrawal improved. Still tachypneic but not needing O2. Plan for bronch today. Patient was indicated he may leave AMA but agreeable to current course of treatment for now. Medications:  Reviewed    Infusion Medications   Scheduled Medications    sodium chloride flush  10 mL Intravenous 2 times per day    sodium chloride flush  10 mL Intravenous 2 times per day    albuterol  2.5 mg Nebulization Q4H WA    ipratropium  0.5 mg Nebulization 4x daily    sodium chloride flush  10 mL Intravenous 2 times per day    enoxaparin  40 mg Subcutaneous Daily    melatonin  3 mg Oral Nightly    chlordiazePOXIDE  10 mg Oral 4x Daily    sodium chloride flush  10 mL Intravenous 2 times per day    thiamine  100 mg Oral Daily    therapeutic multivitamin-minerals  1 tablet Oral Daily    lidocaine 1 % injection  5 mL Intradermal Once     PRN Meds: buprenorphine, sodium chloride flush, meperidine, HYDROmorphone, HYDROmorphone, morphine, morphine, labetalol, hydrALAZINE, sodium chloride flush, albuterol, perflutren lipid microspheres, sodium chloride flush, promethazine **OR** ondansetron, polyethylene glycol, acetaminophen **OR** acetaminophen, [DISCONTINUED] buprenorphine **AND** cloNIDine, sodium chloride flush, LORazepam **OR** LORazepam **OR** LORazepam **OR** LORazepam **OR** LORazepam **OR** LORazepam **OR** LORazepam **OR** LORazepam      Intake/Output Summary (Last 24 hours) at 2/4/2021 0916  Last data filed at 2/4/2021 0520  Gross per 24 hour   Intake 790 ml   Output 2300 ml   Net -1510 ml       Physical Exam Performed:    /68   Pulse 110   Temp 97.9 °F (36.6 °C) (Oral)   Resp 24   Ht 5' (1.524 m)   Wt 90 lb 8 oz (41.1 kg)   SpO2 95%   BMI 17.67 kg/m²     General appearance:  Pleasant male in no apparent distress, appears stated age and cooperative.   HEENT:  Normal cephalic, atraumatic without obvious deformity. Pupils equal, round, and reactive to light. Extra ocular muscles intact. Conjunctivae/corneas clear. Neck: Supple, with full range of motion. No jugular venous distention. Trachea midline. Respiratory:  Normal respiratory effort. Clear to auscultation, bilaterally without Rales/Wheezes/Rhonchi. 1 L NC  Cardiovascular:  Regular rate and rhythm with normal S1/S2 without murmurs, rubs or gallops. Abdomen: Soft, non-tender, non-distended with normal bowel sounds. Musculoskeletal:  No clubbing, cyanosis or edema bilaterally. Full range of motion without deformity. Skin: Skin color, texture, turgor normal. Petechiae rash lower legs. Neurologic:  Neurovascularly intact. Cranial nerves: II-XII intact, grossly non-focal.  Psychiatric:  Alert and oriented, thought content appropriate, normal insight  Capillary Refill: Brisk,< 3 seconds   Peripheral Pulses: +2 palpable, equal bilaterally       Labs:   Recent Labs     02/02/21  1105   WBC 6.4   HGB 11.6*   HCT 35.0*        Recent Labs     02/02/21  1105      K 3.7      CO2 32   BUN 12   CREATININE 0.6*   CALCIUM 8.6     No results for input(s): AST, ALT, BILIDIR, BILITOT, ALKPHOS in the last 72 hours. No results for input(s): INR in the last 72 hours. No results for input(s): Molina Scrivener in the last 72 hours. Urinalysis:      Lab Results   Component Value Date    NITRU Negative 02/01/2021    WBCUA 3-5 02/01/2021    BACTERIA 1+ 02/01/2021    RBCUA 11-20 02/01/2021    BLOODU LARGE 02/01/2021    SPECGRAV >=1.030 02/01/2021    GLUCOSEU Negative 02/01/2021       Radiology:  IR FLUORO GUIDED CVA DEVICE PLMT/REPLACE/REMOVAL   Final Result   Status post successful ultrasound/fluoroscopically guided placement of right   internal jugular triple-lumen central venous catheter as described above.                  Assessment/Plan:    Active Hospital Problems    Diagnosis    Lung nodule [R91.1]    Centrilobular emphysema ([facility] [J43.2]    Hilar adenopathy [R59.0]    Personal history of covid-19 [Z86.16]    Moderate to severe pulmonary hypertension ([facility] [I27.20]    Former smoker [Z87.891]    Alcohol use [Z72.89]    PFO (patent foramen ovale) [Q21.1]    Tricuspid regurgitation [I07.1]    IV drug abuse ([facility] [F19.10]    Acute respiratory failure with hypoxia (HCC) [J96.01]     Tricuspid regurgitation with pulmonary HTN  - loud murmur, H/O IVDU  - has prior history of endocarditis  - ID consulted  - DIVYA with vegetations  - blood cultures NGTD  - continue intermittent lasix for pulmonary HTN  - empiric abx discontinued per ID    Pulmonary mass  - possible malignancy vs infection vs infarction from prior septic emboli  - pulm consulted  - plan for bronch today  - significant tachypnea but no hypoxia     IVDU, opioid dependence  - counseling given  - COWS ordered     COPD  - no evidence of exacerbation  - nebs ordered     Alcohol dependence  - at risk for withdrawal  - continue CIWA, PRN ativan. Stopped scheduled librium  - counseling given    Moderate protein calorie malnutrition  - dietitian consult  - dietary supplements ordered    DVT Prophylaxis: lovenox  Diet: DIET GENERAL;  Code Status: Full Code    PT/OT Eval Status: not ordered    Dispo - possibly home tomorrow. Limited services available as patient is homeless.     Yee Albright MD

## 2021-02-04 NOTE — PROGRESS NOTES
Pt axo. Assessment completed as charted. Pt states he wants to leave. Asking for cab voucher and to have IJ removed. Attempted to educate pt on need to precede with additional treatment, especially as pt becomes significantly dyspneic with just ambulation to restroom. MD paged and updated. Will continue to monitor. Call light in reach.

## 2021-02-04 NOTE — PROCEDURES
Bronchoscopy note    Patient with increased shortness of breath, chest congestion which was not improving with IV Lasix, mucous plugging with ineffective airway clearance and patient being symptomatic it was decided to do a bronchoscopy for diagnostic and therapeutic purposes and for that reason after informed consent, the patient was taken to the endoscopy suite, patient was given oropharyngeal analgesia with benzocaine after timeout, patient was given lidocaine for tracheobronchial analgesia, patient was given conscious sedation with 5 mg of Versed and 100 mcg of fentanyl for the procedure and the details of the sedation are as following-    Physician/patient of face-to-face sedation start time was 12:42 PM  Physician/patient face-to-face sedation stop time was 12:53 PM  Total moderate sedation time in minutes was 11 minutes     The patient was monitored continuously  throughout the entire procedure while the sedation was being administered    The bronchoscope was introduced through the mouth using a bite block, patient was found to have thick mucus in the posterior pharynx around the vocal cords which was suctioned out, patient's vocal cords were normal, patient had scattered thick mucous plugs in the trachea which was suctioned out, patient's main lilo was sharp, patient had thick mucous plugs in right middle lobe right lower lobe as well as left lower lobe along with that patient had increased friability of the mucosa on the left side, patient did not have any endobronchial lesion or any tracheobronchomalacia, the bronchoscope was introduced to the left lower lobe bronchus and BAL was sent from the area which was sent for various cultures and cytology, rest of the tracheobronchial tree was therapeutically aspirated using Mucomyst and saline, patient tolerated the procedure well and did not have any apparent complications    Estimated blood loss was 0    Further management depending on patient's clinical status and the bronchoscopy results    Ignacio Colin MD

## 2021-02-05 LAB — PATH CONSULT FLUID: NORMAL

## 2021-02-06 LAB
ADENOVIRUS PCR: NOT DETECTED
CULTURE, RESPIRATORY: NORMAL
GRAM STAIN RESULT: NORMAL
HUMAN METAPNEUMOVIRUS PCR: NOT DETECTED
INFLUENZA A: NOT DETECTED
INFLUENZA B: NOT DETECTED
PARAINFLUENZA 1 PCR: NOT DETECTED
PARAINFLUENZA 2 PCR: NOT DETECTED
PARAINFLUENZA 3 PCR: NOT DETECTED
PARAINFLUENZA 4 PCR: NOT DETECTED
RHINO/ENTEROVIRUS PCR: NOT DETECTED
RSV BY PCR: NOT DETECTED
RSV SOURCE: NORMAL

## 2021-02-09 ENCOUNTER — CARE COORDINATION (OUTPATIENT)
Dept: CASE MANAGEMENT | Age: 51
End: 2021-02-09

## 2021-02-09 NOTE — CARE COORDINATION
[facility] Transitions Follow Up Call    2021    Patient: Liz Byrnes  Patient : 1970   MRN: [id number]  Reason for Admission: respiratory distress; COVID-19  Discharge Date: 21 RARS: Readmission Risk Score: 40       Attempted to contact patient for transitions call. Contact information left to  requesting call back at the earliest convenience. Pt noted to have left AMA on 21 from [facility].    Follow Up  No future appointments.     Annabelle De La Paz RN

## 2021-02-19 ENCOUNTER — CARE COORDINATION (OUTPATIENT)
Dept: CASE MANAGEMENT | Age: 51
End: 2021-02-19

## 2021-02-19 NOTE — CARE COORDINATION
[facility] Transitions Follow Up Call    2021    Patient: You Solano  Patient : 1970   MRN: [id number]  Reason for Admission:   Discharge Date: 21 RARS: Readmission Risk Score: 40    Follow Up: Attempted to contact patient for BPCI-A follow up. Unable to reach patient. Call was answered. When CTN attempted to introduce self, call was disconnected. Attempted to call back and left message with contact information and request for call back. No future appointments.     Keith Lim RN

## 2021-02-26 ENCOUNTER — CARE COORDINATION (OUTPATIENT)
Dept: CASE MANAGEMENT | Age: 51
End: 2021-02-26

## 2021-02-26 NOTE — CARE COORDINATION
[facility] Transitions Follow Up Call    2021    Patient: Wylie Schlatter  Patient : 1970   MRN: [id number]  Reason for Admission: COVID-19 resp distress  Discharge Date: 21 RARS: Readmission Risk Score: 40       Attempted to contact patient for BPCI-A call. Call goes directly to . Contact information left to  requesting call back at the earliest convenience. Follow Up  No future appointments.     Dorita Gar RN

## 2021-02-27 ENCOUNTER — HOSPITAL ENCOUNTER (INPATIENT)
Age: 51
LOS: 10 days | Discharge: LEFT BEFORE DISCHARGE | DRG: 180 | End: 2021-03-09
Attending: EMERGENCY MEDICINE | Admitting: INTERNAL MEDICINE
Payer: MEDICARE

## 2021-02-27 ENCOUNTER — APPOINTMENT (OUTPATIENT)
Dept: CT IMAGING | Age: 51
DRG: 180 | End: 2021-02-27
Payer: MEDICARE

## 2021-02-27 ENCOUNTER — APPOINTMENT (OUTPATIENT)
Dept: GENERAL RADIOLOGY | Age: 51
DRG: 180 | End: 2021-02-27
Payer: MEDICARE

## 2021-02-27 ENCOUNTER — TELEPHONE (OUTPATIENT)
Dept: OTHER | Facility: CLINIC | Age: 51
End: 2021-02-27

## 2021-02-27 DIAGNOSIS — R31.9 HEMATURIA, UNSPECIFIED TYPE: ICD-10-CM

## 2021-02-27 DIAGNOSIS — R91.1 LUNG NODULE: ICD-10-CM

## 2021-02-27 DIAGNOSIS — C79.51 BONE METASTASES (HCC): ICD-10-CM

## 2021-02-27 DIAGNOSIS — J96.01 ACUTE RESPIRATORY FAILURE WITH HYPOXIA (HCC): Primary | ICD-10-CM

## 2021-02-27 DIAGNOSIS — E87.6 HYPOKALEMIA: ICD-10-CM

## 2021-02-27 PROBLEM — J20.9 ACUTE BRONCHITIS: Status: ACTIVE | Noted: 2021-02-27

## 2021-02-27 PROBLEM — N17.9 AKI (ACUTE KIDNEY INJURY) (HCC): Status: ACTIVE | Noted: 2021-02-27

## 2021-02-27 LAB
A/G RATIO: 1 (ref 1.1–2.2)
ALBUMIN SERPL-MCNC: 3.8 G/DL (ref 3.4–5)
ALP BLD-CCNC: 84 U/L (ref 40–129)
ALT SERPL-CCNC: 8 U/L (ref 10–40)
AMPHETAMINE SCREEN, URINE: ABNORMAL
ANION GAP SERPL CALCULATED.3IONS-SCNC: 8 MMOL/L (ref 3–16)
ANISOCYTOSIS: ABNORMAL
APTT: 27.3 SEC (ref 24.2–36.2)
AST SERPL-CCNC: 17 U/L (ref 15–37)
ATYPICAL LYMPHOCYTE RELATIVE PERCENT: 1 % (ref 0–6)
BACTERIA: ABNORMAL /HPF
BANDED NEUTROPHILS RELATIVE PERCENT: 9 % (ref 0–7)
BARBITURATE SCREEN URINE: ABNORMAL
BASOPHILS ABSOLUTE: 0 K/UL (ref 0–0.2)
BASOPHILS RELATIVE PERCENT: 0 %
BENZODIAZEPINE SCREEN, URINE: ABNORMAL
BILIRUB SERPL-MCNC: 0.5 MG/DL (ref 0–1)
BILIRUBIN URINE: ABNORMAL
BLOOD, URINE: ABNORMAL
BUN BLDV-MCNC: 17 MG/DL (ref 7–20)
CALCIUM SERPL-MCNC: 8.7 MG/DL (ref 8.3–10.6)
CANNABINOID SCREEN URINE: ABNORMAL
CHLORIDE BLD-SCNC: 96 MMOL/L (ref 99–110)
CLARITY: ABNORMAL
CO2: 33 MMOL/L (ref 21–32)
COCAINE METABOLITE SCREEN URINE: POSITIVE
COLOR: ABNORMAL
CREAT SERPL-MCNC: 1.6 MG/DL (ref 0.9–1.3)
D DIMER: 388 NG/ML DDU (ref 0–229)
EOSINOPHILS ABSOLUTE: 0.2 K/UL (ref 0–0.6)
EOSINOPHILS RELATIVE PERCENT: 4 %
ETHANOL: NORMAL MG/DL (ref 0–0.08)
GFR AFRICAN AMERICAN: 56
GFR NON-AFRICAN AMERICAN: 46
GLOBULIN: 3.7 G/DL
GLUCOSE BLD-MCNC: 127 MG/DL (ref 70–99)
GLUCOSE URINE: ABNORMAL MG/DL
HCT VFR BLD CALC: 34.2 % (ref 40.5–52.5)
HEMOGLOBIN: 11.2 G/DL (ref 13.5–17.5)
INR BLD: 1.03 (ref 0.86–1.14)
INR BLD: 1.05 (ref 0.86–1.14)
KETONES, URINE: ABNORMAL MG/DL
LEUKOCYTE ESTERASE, URINE: ABNORMAL
LYMPHOCYTES ABSOLUTE: 1.4 K/UL (ref 1–5.1)
LYMPHOCYTES RELATIVE PERCENT: 22 %
Lab: ABNORMAL
MAGNESIUM: 2 MG/DL (ref 1.8–2.4)
MCH RBC QN AUTO: 27.1 PG (ref 26–34)
MCHC RBC AUTO-ENTMCNC: 32.8 G/DL (ref 31–36)
MCV RBC AUTO: 82.6 FL (ref 80–100)
METHADONE SCREEN, URINE: ABNORMAL
MICROSCOPIC EXAMINATION: YES
MONOCYTES ABSOLUTE: 0.2 K/UL (ref 0–1.3)
MONOCYTES RELATIVE PERCENT: 4 %
NEUTROPHILS ABSOLUTE: 4.1 K/UL (ref 1.7–7.7)
NEUTROPHILS RELATIVE PERCENT: 60 %
NITRITE, URINE: ABNORMAL
OPIATE SCREEN URINE: ABNORMAL
OXYCODONE URINE: ABNORMAL
PDW BLD-RTO: 14.3 % (ref 12.4–15.4)
PH UA: ABNORMAL
PH UA: ABNORMAL (ref 5–8)
PHENCYCLIDINE SCREEN URINE: ABNORMAL
PLATELET # BLD: 204 K/UL (ref 135–450)
PLATELET SLIDE REVIEW: ADEQUATE
PMV BLD AUTO: 7.4 FL (ref 5–10.5)
POIKILOCYTES: ABNORMAL
POTASSIUM REFLEX MAGNESIUM: 2.9 MMOL/L (ref 3.5–5.1)
PRO-BNP: 918 PG/ML (ref 0–124)
PROPOXYPHENE SCREEN: ABNORMAL
PROTEIN UA: ABNORMAL MG/DL
PROTHROMBIN TIME: 11.9 SEC (ref 10–13.2)
PROTHROMBIN TIME: 12.2 SEC (ref 10–13.2)
RBC # BLD: 4.14 M/UL (ref 4.2–5.9)
RBC UA: >100 /HPF (ref 0–4)
SLIDE REVIEW: ABNORMAL
SODIUM BLD-SCNC: 137 MMOL/L (ref 136–145)
SPECIFIC GRAVITY UA: ABNORMAL (ref 1–1.03)
TOTAL PROTEIN: 7.5 G/DL (ref 6.4–8.2)
TROPONIN: <0.01 NG/ML
URINE TYPE: ABNORMAL
UROBILINOGEN, URINE: ABNORMAL E.U./DL
WBC # BLD: 5.9 K/UL (ref 4–11)
WBC UA: ABNORMAL /HPF (ref 0–5)

## 2021-02-27 PROCEDURE — 96374 THER/PROPH/DIAG INJ IV PUSH: CPT

## 2021-02-27 PROCEDURE — 80307 DRUG TEST PRSMV CHEM ANLYZR: CPT

## 2021-02-27 PROCEDURE — 6370000000 HC RX 637 (ALT 250 FOR IP): Performed by: EMERGENCY MEDICINE

## 2021-02-27 PROCEDURE — 93005 ELECTROCARDIOGRAM TRACING: CPT | Performed by: EMERGENCY MEDICINE

## 2021-02-27 PROCEDURE — 36415 COLL VENOUS BLD VENIPUNCTURE: CPT

## 2021-02-27 PROCEDURE — 6360000004 HC RX CONTRAST MEDICATION: Performed by: EMERGENCY MEDICINE

## 2021-02-27 PROCEDURE — 94761 N-INVAS EAR/PLS OXIMETRY MLT: CPT

## 2021-02-27 PROCEDURE — 2700000000 HC OXYGEN THERAPY PER DAY

## 2021-02-27 PROCEDURE — 82077 ASSAY SPEC XCP UR&BREATH IA: CPT

## 2021-02-27 PROCEDURE — 84484 ASSAY OF TROPONIN QUANT: CPT

## 2021-02-27 PROCEDURE — 87086 URINE CULTURE/COLONY COUNT: CPT

## 2021-02-27 PROCEDURE — 81001 URINALYSIS AUTO W/SCOPE: CPT

## 2021-02-27 PROCEDURE — 83735 ASSAY OF MAGNESIUM: CPT

## 2021-02-27 PROCEDURE — 83880 ASSAY OF NATRIURETIC PEPTIDE: CPT

## 2021-02-27 PROCEDURE — 2580000003 HC RX 258: Performed by: INTERNAL MEDICINE

## 2021-02-27 PROCEDURE — 71045 X-RAY EXAM CHEST 1 VIEW: CPT

## 2021-02-27 PROCEDURE — 71260 CT THORAX DX C+: CPT

## 2021-02-27 PROCEDURE — 6370000000 HC RX 637 (ALT 250 FOR IP): Performed by: INTERNAL MEDICINE

## 2021-02-27 PROCEDURE — 85610 PROTHROMBIN TIME: CPT

## 2021-02-27 PROCEDURE — 85025 COMPLETE CBC W/AUTO DIFF WBC: CPT

## 2021-02-27 PROCEDURE — 99284 EMERGENCY DEPT VISIT MOD MDM: CPT

## 2021-02-27 PROCEDURE — 80053 COMPREHEN METABOLIC PANEL: CPT

## 2021-02-27 PROCEDURE — 6360000002 HC RX W HCPCS: Performed by: INTERNAL MEDICINE

## 2021-02-27 PROCEDURE — 85379 FIBRIN DEGRADATION QUANT: CPT

## 2021-02-27 PROCEDURE — 94640 AIRWAY INHALATION TREATMENT: CPT

## 2021-02-27 PROCEDURE — 85730 THROMBOPLASTIN TIME PARTIAL: CPT

## 2021-02-27 PROCEDURE — 2580000003 HC RX 258: Performed by: EMERGENCY MEDICINE

## 2021-02-27 PROCEDURE — 1200000000 HC SEMI PRIVATE

## 2021-02-27 PROCEDURE — 6360000002 HC RX W HCPCS: Performed by: EMERGENCY MEDICINE

## 2021-02-27 RX ORDER — 0.9 % SODIUM CHLORIDE 0.9 %
1000 INTRAVENOUS SOLUTION INTRAVENOUS ONCE
Status: DISCONTINUED | OUTPATIENT
Start: 2021-02-27 | End: 2021-03-03

## 2021-02-27 RX ORDER — ACETAMINOPHEN 325 MG/1
650 TABLET ORAL EVERY 6 HOURS PRN
Status: DISCONTINUED | OUTPATIENT
Start: 2021-02-27 | End: 2021-03-03

## 2021-02-27 RX ORDER — POTASSIUM CHLORIDE 7.45 MG/ML
10 INJECTION INTRAVENOUS
Status: COMPLETED | OUTPATIENT
Start: 2021-02-28 | End: 2021-02-27

## 2021-02-27 RX ORDER — POTASSIUM CHLORIDE 7.45 MG/ML
10 INJECTION INTRAVENOUS
Status: DISPENSED | OUTPATIENT
Start: 2021-02-27 | End: 2021-02-27

## 2021-02-27 RX ORDER — DOXYCYCLINE HYCLATE 100 MG
100 TABLET ORAL EVERY 12 HOURS SCHEDULED
Status: DISPENSED | OUTPATIENT
Start: 2021-02-27 | End: 2021-03-04

## 2021-02-27 RX ORDER — METHYLPREDNISOLONE SODIUM SUCCINATE 40 MG/ML
40 INJECTION, POWDER, LYOPHILIZED, FOR SOLUTION INTRAMUSCULAR; INTRAVENOUS EVERY 12 HOURS
Status: DISCONTINUED | OUTPATIENT
Start: 2021-02-28 | End: 2021-03-04

## 2021-02-27 RX ORDER — ONDANSETRON 2 MG/ML
4 INJECTION INTRAMUSCULAR; INTRAVENOUS EVERY 6 HOURS PRN
Status: DISCONTINUED | OUTPATIENT
Start: 2021-02-27 | End: 2021-03-03

## 2021-02-27 RX ORDER — POLYETHYLENE GLYCOL 3350 17 G/17G
17 POWDER, FOR SOLUTION ORAL DAILY PRN
Status: DISCONTINUED | OUTPATIENT
Start: 2021-02-27 | End: 2021-03-10 | Stop reason: HOSPADM

## 2021-02-27 RX ORDER — SODIUM CHLORIDE 0.9 % (FLUSH) 0.9 %
10 SYRINGE (ML) INJECTION PRN
Status: DISCONTINUED | OUTPATIENT
Start: 2021-02-27 | End: 2021-03-10 | Stop reason: HOSPADM

## 2021-02-27 RX ORDER — POTASSIUM CHLORIDE 20 MEQ/1
40 TABLET, EXTENDED RELEASE ORAL ONCE
Status: COMPLETED | OUTPATIENT
Start: 2021-02-27 | End: 2021-02-27

## 2021-02-27 RX ORDER — ACETAMINOPHEN 650 MG/1
650 SUPPOSITORY RECTAL EVERY 6 HOURS PRN
Status: DISCONTINUED | OUTPATIENT
Start: 2021-02-27 | End: 2021-03-03

## 2021-02-27 RX ORDER — IPRATROPIUM BROMIDE AND ALBUTEROL SULFATE 2.5; .5 MG/3ML; MG/3ML
1 SOLUTION RESPIRATORY (INHALATION)
Status: DISCONTINUED | OUTPATIENT
Start: 2021-02-28 | End: 2021-02-27

## 2021-02-27 RX ORDER — SODIUM CHLORIDE 9 MG/ML
INJECTION, SOLUTION INTRAVENOUS CONTINUOUS
Status: DISCONTINUED | OUTPATIENT
Start: 2021-02-27 | End: 2021-03-03

## 2021-02-27 RX ORDER — IPRATROPIUM BROMIDE AND ALBUTEROL SULFATE 2.5; .5 MG/3ML; MG/3ML
1 SOLUTION RESPIRATORY (INHALATION) EVERY 4 HOURS PRN
Status: DISCONTINUED | OUTPATIENT
Start: 2021-02-27 | End: 2021-03-05

## 2021-02-27 RX ORDER — IPRATROPIUM BROMIDE AND ALBUTEROL SULFATE 2.5; .5 MG/3ML; MG/3ML
3 SOLUTION RESPIRATORY (INHALATION) ONCE
Status: COMPLETED | OUTPATIENT
Start: 2021-02-27 | End: 2021-02-27

## 2021-02-27 RX ORDER — SODIUM CHLORIDE 0.9 % (FLUSH) 0.9 %
10 SYRINGE (ML) INJECTION EVERY 12 HOURS SCHEDULED
Status: DISCONTINUED | OUTPATIENT
Start: 2021-02-27 | End: 2021-03-10 | Stop reason: HOSPADM

## 2021-02-27 RX ORDER — METHYLPREDNISOLONE SODIUM SUCCINATE 125 MG/2ML
125 INJECTION, POWDER, LYOPHILIZED, FOR SOLUTION INTRAMUSCULAR; INTRAVENOUS ONCE
Status: COMPLETED | OUTPATIENT
Start: 2021-02-27 | End: 2021-02-27

## 2021-02-27 RX ORDER — PROMETHAZINE HYDROCHLORIDE 25 MG/1
12.5 TABLET ORAL EVERY 6 HOURS PRN
Status: DISCONTINUED | OUTPATIENT
Start: 2021-02-27 | End: 2021-03-03

## 2021-02-27 RX ADMIN — POTASSIUM CHLORIDE 10 MEQ: 7.46 INJECTION, SOLUTION INTRAVENOUS at 18:17

## 2021-02-27 RX ADMIN — IOPAMIDOL 75 ML: 755 INJECTION, SOLUTION INTRAVENOUS at 17:38

## 2021-02-27 RX ADMIN — DOXYCYCLINE HYCLATE 100 MG: 100 TABLET, COATED ORAL at 23:20

## 2021-02-27 RX ADMIN — METHYLPREDNISOLONE SODIUM SUCCINATE 125 MG: 125 INJECTION, POWDER, FOR SOLUTION INTRAMUSCULAR; INTRAVENOUS at 16:14

## 2021-02-27 RX ADMIN — POTASSIUM CHLORIDE 10 MEQ: 7.46 INJECTION, SOLUTION INTRAVENOUS at 23:32

## 2021-02-27 RX ADMIN — Medication 10 ML: at 23:28

## 2021-02-27 RX ADMIN — SODIUM CHLORIDE: 9 INJECTION, SOLUTION INTRAVENOUS at 23:20

## 2021-02-27 RX ADMIN — POTASSIUM CHLORIDE 40 MEQ: 1500 TABLET, EXTENDED RELEASE ORAL at 18:17

## 2021-02-27 RX ADMIN — IPRATROPIUM BROMIDE AND ALBUTEROL SULFATE 3 AMPULE: .5; 3 SOLUTION RESPIRATORY (INHALATION) at 16:04

## 2021-02-27 ASSESSMENT — ENCOUNTER SYMPTOMS
DIARRHEA: 0
BACK PAIN: 0
SHORTNESS OF BREATH: 1
CONSTIPATION: 0
ABDOMINAL PAIN: 0
TACHYPNEA: 1
VOMITING: 0
NAUSEA: 0
COUGH: 0
CHEST TIGHTNESS: 0

## 2021-02-27 NOTE — ED PROVIDER NOTES
[facility]  ED  EMERGENCYDEPARTMENT ENCOUNTER      Pt Name: Cydney Rose  MRN: [id number]  [place] 1970  Date of evaluation: 2/27/2021  Dinora Khan MD    [address] [place] [address]       Chief Complaint   Patient presents with    Shortness of Breath     x1year    Hematuria     3-4 weeks         HISTORY OF PRESENT ILLNESS   (Location/Symptom, Timing/Onset,Context/Setting, Quality, Duration, Modifying Factors, Severity)  Note limiting factors. Cydney Rose is a 48 y.o. male with history of PE, altered mental status, IVDU, who presents to the emergency department for hematuria and shortness of breath. Reports symptoms shortness of breath that has never improved since he was last admitted several weeks ago. He also reports hematuria that has been worsening for the past 3 weeks. Denies dysuria, abdominal pain, fever, diarrhea. He reports that he has had nausea and vomiting. HPI    Nursing Notes were reviewed. REVIEW OF SYSTEMS    (2-9 systems for level 4, 10 or more for level 5)     Review of Systems   Constitutional: Negative for activity change, appetite change, chills, diaphoresis and fever. Respiratory: Positive for shortness of breath. Negative for cough and chest tightness. Cardiovascular: Negative for chest pain and palpitations. Gastrointestinal: Negative for abdominal pain, constipation, diarrhea, nausea and vomiting. Genitourinary: Positive for hematuria. Negative for dysuria and flank pain. Musculoskeletal: Negative for back pain, gait problem and myalgias. Skin: Negative for rash and wound. Neurological: Negative for dizziness, weakness and numbness. Except as noted above the remainder of the review of systems was reviewedand negative.        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    PE (pulmonary thromboembolism) ([facility] [address]     Pneumonia          SURGICAL HISTORY       Past Surgical History:   Procedure Laterality Date    BRAIN SURGERY  1986    s/p trauma    BRAIN SURGERY      BRONCHOSCOPY N/A 2021    BRONCHOSCOPY ALVEOLAR LAVAGE performed by Amada Ross MD at [address] [facility] [address]  2021    BRONCHOSCOPY THERAPUTIC ASPIRATION INITIAL performed by Amada Ross MD at [address] [facility]      TRANSESOPHAGEAL ECHOCARDIOGRAM  2021    Dr Hemalatha Clancy       Previous Medications    ALBUTEROL SULFATE  (90 BASE) MCG/ACT INHALER    Inhale 2 puffs into the lungs every 4 hours as needed for Wheezing or Shortness of Breath       ALLERGIES     Patient has no known allergies. FAMILY HISTORY       Family History   Problem Relation Age of Onset    Other Mother         alzheimers          SOCIAL HISTORY       Social History     Socioeconomic History    Marital status: Single     Spouse name: Not on file    Number of children: Not on file    Years of education: Not on file    Highest education level: Not on file   Occupational History    Not on file   Social Needs    Financial resource strain: Not on file    Food insecurity     Worry: Not on file     Inability: Not on file    Transportation needs     Medical: Not on file     Non-medical: Not on file   Tobacco Use    Smoking status: Former Smoker     Packs/day: 2.00     Years: 30.00     Pack years: 60.00     Types: Cigarettes     Quit date: 2016     Years since quittin.0    Smokeless tobacco: Current User     Types: Snuff    Tobacco comment: states 5 yrs ago   Substance and Sexual Activity    Alcohol use:  Yes     Alcohol/week: 24.0 standard drinks Types: 24 Cans of beer per week    Drug use: Not Currently     Types: IV     Comment: 5-6 months ago opiates, per pt    Sexual activity: Yes     Partners: Female   Lifestyle    Physical activity     Days per week: Not on file     Minutes per session: Not on file    Stress: Not on file   Relationships    Social connections     Talks on phone: Not on file     Gets together: Not on file     Attends Sabianism service: Not on file     Active member of club or organization: Not on file     Attends meetings of clubs or organizations: Not on file     Relationship status: Not on file    Intimate partner violence     Fear of current or ex partner: Not on file     Emotionally abused: Not on file     Physically abused: Not on file     Forced sexual activity: Not on file   Other Topics Concern    Not on file   Social History Narrative    ** Merged History Encounter **            SCREENINGS             PHYSICAL EXAM    (up to 7 for level 4, 8 ormore for level 5)     ED Triage Vitals   BP Temp Temp Source Pulse Resp SpO2 Height Weight   02/27/21 1549 02/27/21 1549 02/27/21 1549 02/27/21 1532 02/27/21 1549 02/27/21 1532 -- --   128/88 97.6 °F (36.4 °C) Oral 112 20 (!) 86 %         Physical Exam  Constitutional:       General: He is not in acute distress. Appearance: Normal appearance. He is well-developed. He is not ill-appearing or toxic-appearing. Comments: Sitting in bed comfortably, speaking in full sentences, following verbal commands appropriately. Not in acute distress     HENT:      Head: Normocephalic and atraumatic. Eyes:      Conjunctiva/sclera: Conjunctivae normal.      Pupils: Pupils are equal, round, and reactive to light. Neck:      Musculoskeletal: Normal range of motion and neck supple. Cardiovascular:      Rate and Rhythm: Normal rate and regular rhythm. Heart sounds: Normal heart sounds. No murmur. No friction rub. No gallop.     Pulmonary:      Effort: Tachypnea, accessory muscle findings as described. XR CHEST PORTABLE   Final Result   Chronic findings in the chest without acute airspace disease identified. Scar in the right lung apex again noted.   The opacities recently described in   the right middle lobe and right lower lobe are not delineated on this exam.               ED BEDSIDE ULTRASOUND:   Performed by ED Physician - none    LABS:  Labs Reviewed   URINALYSIS - Abnormal; Notable for the following components:       Result Value    Color, UA RED (*)     Clarity, UA TURBID (*)     Glucose, Ur see below (*)     Bilirubin Urine see below (*)     Ketones, Urine see below (*)     Blood, Urine see below (*)     pH, UA see below (*)     Protein, UA see below (*)     Urobilinogen, Urine see below (*)     Nitrite, Urine see below (*)     Leukocyte Esterase, Urine see below (*)     All other components within normal limits    Narrative:     Performed at:  [place] [address] [place]   Phone [phone]   CBC WITH AUTO DIFFERENTIAL - Abnormal; Notable for the following components:    RBC 4.14 (*)     Hemoglobin 11.2 (*)     Hematocrit 34.2 (*)     Bands Relative 9 (*)     Anisocytosis Occasional (*)     Poikilocytes Occasional (*)     All other components within normal limits    Narrative:     Performed at:  [address], [facility] [place]   Phone [phone]   COMPREHENSIVE METABOLIC PANEL W/ REFLEX TO MG FOR LOW K - Abnormal; Notable for the following components:    Potassium reflex Magnesium 2.9 (*)     Chloride 96 (*)     CO2 33 (*)     Glucose 127 (*)     CREATININE 1.6 (*)     GFR Non- 46 (*)     GFR African American 56 (*)     Albumin/Globulin Ratio 1.0 (*)     ALT 8 (*)     All other components within normal limits    Narrative:     Karl Damon tel. [phone],  Chemistry results called to and read back by FAISAL Ortiz, 02/27/2021  16:40, by Sonia Carvajal  Performed at:  [address], [place]   Phone [phone]   BRAIN NATRIURETIC PEPTIDE - Abnormal; Notable for the following components:    Pro- (*)     All other components within normal limits    Narrative:     Franceen Phlegm tel. [phone],  Chemistry results called to and read back by FAISAL Boyce, 02/27/2021  16:40, by Sonia Carvajal  Performed at:  [address],  [place], [place]   Phone [phone]   D-DIMER, QUANTITATIVE - Abnormal; Notable for the following components:    D-Dimer, Quant 388 (*)     All other components within normal limits    Narrative:     Performed at:  [address],  [place], [place]   Phone [phone]   [address] - Abnormal; Notable for the following components:    Cocaine Metabolite Screen, Urine POSITIVE (*)     All other components within normal limits    Narrative:     Performed at:  [address],  [place], [employer]   Phone [phone]   MICROSCOPIC URINALYSIS - Abnormal; Notable for the following components:    WBC, UA see below (*)     RBC, UA >100 (*)     Bacteria, UA 4+ (*)     All other components within normal limits    Narrative:     Performed at:  [address], [place]   Phone [phone]   CULTURE, URINE   TROPONIN    Narrative:     Franceen Phlegm tel. [phone],  Chemistry results called to and read back by FAISAL Boyce, 02/27/2021  16:40, by Sonia Carvajal  Performed at:  [address],  [place], [employer]   Phone [phone]   ETHANOL    Narrative:     Performed at:  [address],  [place], [facility]   Phone [phone]   MAGNESIUM    Narrative:     Dimas Jones Rashaad Guillermos [phone],  Chemistry results called to and read back by FAISAL Hughes, 02/27/2021  16:40, by Oriana De La O  Performed at:  [address],  [place], [place]   Phone [phone]       All other labs were within normal range ornot returned as of this dictation. EMERGENCY DEPARTMENT COURSE and DIFFERENTIAL DIAGNOSIS/MDM:   Vitals:    Vitals:    02/27/21 1535 02/27/21 1549 02/27/21 1607 02/27/21 1620   BP: 128/88 128/88  128/75   Pulse: 102 101  99   Resp: 21 20 20 22   Temp: 97.8 °F (36.6 °C) 97.6 °F (36.4 °C)  97.6 °F (36.4 °C)   TempSrc:  Oral     SpO2: 100% 100% 100% 100%         MDM    ED COURSE/MDM    -Dax Dooley is a 48 y.o. male with a history of IV drug abuse, endocarditis who presents to ED for shortness of breath and hematuria. Arrival patient was found to be hypoxic at 86% on room air with pulse of 112. I was called into the room as patient was seen tripoding with acute retractions and respiratory distress. He was put on nonrebreather with improvement of oxygen saturation. He had notable decreased breath sounds with mild wheezing. He was given DuoNeb x3 as well as Solu-Medrol.  -Review patient has been admitted multiple times for acute respiratory failure. Was most recently admitted 2/1/2021 and discharged 2/4/2021, ID have been consulted for concern for possible endocarditis. They also noted a pulmonary mass for possible malignancy versus infection. However patient left AMA prior to results or final recommendations for management.   -Epoca significant for hyponatremia of 2.9, IRMA with creatinine of 1.6. Patient was given 40 mEq KCl p.o. and 20 mEq IV negative leukocytosis though bandemia of 9%. Elevated D-dimer 388. Ethanol level was negative and patient was positive for cocaine. Evaluation for UA was obscured due to color was red with greater than 100 RBC. WBC was present but obscured.   -CT PE shows no acute pulmonary embolism. Progression of disease increased size of right lower lobe nodule and bone metastasis. Soft tissue in the anterior mediastinum and anterior to the right middle lobe likely represents additional metastasis. Questionable mass at the base of the tongue.  -Labs and imaging reviewed and results discussed with patient. we did discuss at length the CT chest findings including enlarging lung nodule as well as bone metastasis. Reevaluation shows no obvious mass at the base of the tongue. Patient is agreeable to admission. He does have hematuria noted in his urine, CT abdomen pelvis was deferred as patient denies any abdominal pain.   -Discussed patient's presentation and findings with hospitalist who agrees to admit patient. REASSESSMENT      Well appearing, non toxic, alert, oriented speaking in full sentences and hemodynamically stable upon admission      CRITICAL CARE TIME   Total Critical Care time was 45 minutes, excluding separately reportableprocedures. There was a high probability of clinicallysignificant/life threatening deterioration in the patient's condition which required my urgent intervention. CONSULTS:  None    PROCEDURES:  Unless otherwise noted below, none     Procedures    FINAL IMPRESSION      1. Acute respiratory failure with hypoxia (HCC)    2. Lung nodule    3. Bone metastases ([address]    4. Hematuria, unspecified type    5. Hypokalemia          DISPOSITION/PLAN   DISPOSITION Decision To Admit 02/27/2021 07:21:22 PM      PATIENT REFERREDTO:  No follow-up provider specified.     DISCHARGE MEDICATIONS:  New Prescriptions    No medications on file          (Please note that portions of this note were completed with a voice recognition program.  Efforts were made to edit the dictations but occasionally wordsare mis-transcribed.)    Andre Resendiz MD (electronically signed)  Attending Emergency Physician            Andre Resendiz MD  02/27/21 Thera Spurling

## 2021-02-27 NOTE — TELEPHONE ENCOUNTER
Writer contacted Dr. Mily Haider to inform of 30 day readmission risk. Dr. Mily Haider informed writer of readmission.

## 2021-02-28 LAB
ANION GAP SERPL CALCULATED.3IONS-SCNC: 9 MMOL/L (ref 3–16)
BASOPHILS ABSOLUTE: 0 K/UL (ref 0–0.2)
BASOPHILS RELATIVE PERCENT: 0.1 %
BUN BLDV-MCNC: 23 MG/DL (ref 7–20)
CALCIUM SERPL-MCNC: 8.9 MG/DL (ref 8.3–10.6)
CHLORIDE BLD-SCNC: 101 MMOL/L (ref 99–110)
CO2: 27 MMOL/L (ref 21–32)
CREAT SERPL-MCNC: 1.6 MG/DL (ref 0.9–1.3)
EOSINOPHILS ABSOLUTE: 0 K/UL (ref 0–0.6)
EOSINOPHILS RELATIVE PERCENT: 0 %
GFR AFRICAN AMERICAN: 56
GFR NON-AFRICAN AMERICAN: 46
GLUCOSE BLD-MCNC: 272 MG/DL (ref 70–99)
HCT VFR BLD CALC: 31.3 % (ref 40.5–52.5)
HEMOGLOBIN: 10.3 G/DL (ref 13.5–17.5)
LYMPHOCYTES ABSOLUTE: 0.3 K/UL (ref 1–5.1)
LYMPHOCYTES RELATIVE PERCENT: 7.1 %
MCH RBC QN AUTO: 27.1 PG (ref 26–34)
MCHC RBC AUTO-ENTMCNC: 32.8 G/DL (ref 31–36)
MCV RBC AUTO: 82.7 FL (ref 80–100)
MONOCYTES ABSOLUTE: 0 K/UL (ref 0–1.3)
MONOCYTES RELATIVE PERCENT: 1.2 %
NEUTROPHILS ABSOLUTE: 3.6 K/UL (ref 1.7–7.7)
NEUTROPHILS RELATIVE PERCENT: 91.6 %
PDW BLD-RTO: 14.2 % (ref 12.4–15.4)
PLATELET # BLD: 171 K/UL (ref 135–450)
PMV BLD AUTO: 7.7 FL (ref 5–10.5)
POTASSIUM REFLEX MAGNESIUM: 3.7 MMOL/L (ref 3.5–5.1)
PROSTATE SPECIFIC ANTIGEN: 0.7 NG/ML (ref 0–4)
RBC # BLD: 3.78 M/UL (ref 4.2–5.9)
SODIUM BLD-SCNC: 137 MMOL/L (ref 136–145)
URINE CULTURE, ROUTINE: NORMAL
WBC # BLD: 3.9 K/UL (ref 4–11)

## 2021-02-28 PROCEDURE — 6370000000 HC RX 637 (ALT 250 FOR IP): Performed by: INTERNAL MEDICINE

## 2021-02-28 PROCEDURE — 99222 1ST HOSP IP/OBS MODERATE 55: CPT | Performed by: INTERNAL MEDICINE

## 2021-02-28 PROCEDURE — 93010 ELECTROCARDIOGRAM REPORT: CPT | Performed by: INTERNAL MEDICINE

## 2021-02-28 PROCEDURE — 94761 N-INVAS EAR/PLS OXIMETRY MLT: CPT

## 2021-02-28 PROCEDURE — 1200000000 HC SEMI PRIVATE

## 2021-02-28 PROCEDURE — 84153 ASSAY OF PSA TOTAL: CPT

## 2021-02-28 PROCEDURE — 80048 BASIC METABOLIC PNL TOTAL CA: CPT

## 2021-02-28 PROCEDURE — 2580000003 HC RX 258: Performed by: INTERNAL MEDICINE

## 2021-02-28 PROCEDURE — 36415 COLL VENOUS BLD VENIPUNCTURE: CPT

## 2021-02-28 PROCEDURE — 6360000002 HC RX W HCPCS: Performed by: INTERNAL MEDICINE

## 2021-02-28 PROCEDURE — 2700000000 HC OXYGEN THERAPY PER DAY

## 2021-02-28 PROCEDURE — 85025 COMPLETE CBC W/AUTO DIFF WBC: CPT

## 2021-02-28 RX ADMIN — Medication 10 ML: at 22:37

## 2021-02-28 RX ADMIN — METHYLPREDNISOLONE SODIUM SUCCINATE 40 MG: 40 INJECTION, POWDER, FOR SOLUTION INTRAMUSCULAR; INTRAVENOUS at 22:36

## 2021-02-28 RX ADMIN — METHYLPREDNISOLONE SODIUM SUCCINATE 40 MG: 40 INJECTION, POWDER, FOR SOLUTION INTRAMUSCULAR; INTRAVENOUS at 05:53

## 2021-02-28 RX ADMIN — DOXYCYCLINE HYCLATE 100 MG: 100 TABLET, COATED ORAL at 22:37

## 2021-02-28 RX ADMIN — DOXYCYCLINE HYCLATE 100 MG: 100 TABLET, COATED ORAL at 08:52

## 2021-02-28 ASSESSMENT — PAIN SCALES - GENERAL
PAINLEVEL_OUTOF10: 0
PAINLEVEL_OUTOF10: 0

## 2021-02-28 NOTE — PROGRESS NOTES
RESPIRATORY THERAPY ASSESSMENT    Name:  Brooks   Seth Sutherland Record Number:  [id number]  Age: 48 y.o. Gender: male  : 1970  Today's Date:  2021  Room:  [address] [place] [address]    Assessment     Is the patient being admitted for a COPD or Asthma exacerbation? No   (If yes the patient will be seen every 4 hours for the first 24 hours and then reassessed)    Patient Admission Diagnosis      Allergies  No Known Allergies    Minimum Predicted Vital Capacity:     N/A          Actual Vital Capacity:      N/A               Pulmonary History:COPD, Lung CA  Home Oxygen Therapy:  room air  Home Respiratory Therapy:Albuterol   Current Respiratory Therapy:  Duoneb HHN PRN  Treatment Type: HHN  Medications: Albuterol/Ipratropium    Respiratory Severity Index(RSI)   Patients with orders for inhalation medications, oxygen, or any therapeutic treatment modality will be placed on Respiratory Protocol. They will be assessed with the first treatment and at least every 72 hours thereafter. The following severity scale will be used to determine frequency of treatment intervention. Smoking History: Pulmonary Disease or Smoking History, Greater than 15 pack year = 2    Social History  Social History     Tobacco Use    Smoking status: Former Smoker     Packs/day: 2.00     Years: 30.00     Pack years: 60.00     Types: Cigarettes     Quit date: 2016     Years since quittin.0    Smokeless tobacco: Current User     Types: Chew    Tobacco comment: states 5 yrs ago   Substance Use Topics    Alcohol use:  Yes     Alcohol/week: 24.0 standard drinks     Types: 24 Cans of beer per week    Drug use: Not Currently     Types: IV     Comment: 5-6 months ago opiates, per pt       Recent Surgical History: None = 0  Past Surgical History  Past Surgical History:   Procedure Laterality Date    BRAIN SURGERY  1986    s/p trauma    BRAIN SURGERY      BRONCHOSCOPY N/A 2021    BRONCHOSCOPY ALVEOLAR LAVAGE performed by Nicolas Flowers MD at [address] [place] [address]  2/4/2021    BRONCHOSCOPY THERAPUTIC ASPIRATION INITIAL performed by [facility] MD at [facility]      TRANSESOPHAGEAL ECHOCARDIOGRAM  02/03/2021    Dr Eliot Jeong       Level of Consciousness: Alert, Oriented, and Cooperative = 0    Level of Activity: Walking unassisted = 0    Respiratory Pattern: Increased; RR 21-30 = 1    Breath Sounds: Clear = 0    Sputum   ,  , Sputum How Obtained: None  Cough: Strong, spontaneous, non-productive = 0    Vital Signs   BP (!) 111/95   Pulse 97   Temp 97.9 °F (36.6 °C) (Oral)   Resp 20   Wt 103 lb 9.9 oz (47 kg)   SpO2 98%   BMI 20.24 kg/m²   SPO2 (COPD values may differ): 90-91% on room air or greater than 92% on FiO2 24- 28% = 1    Peak Flow (asthma only): not applicable = 0    RSI: 0-4 = See once and convert to home regimen or discontinue        Plan       Goals: medication delivery, mobilize retained secretions, volume expansion and improve oxygenation    Patient/caregiver was educated on the proper method of use for Respiratory Care Devices:  Yes      Level of patient/caregiver understanding able to:   ? Verbalize understanding   ? Demonstrate understanding       ? Teach back        ? Needs reinforcement       ? No available caregiver               ? Other:     Response to education:  Good     Is patient being placed on Home Treatment Regimen? Yes     Does the patient have everything they need prior to discharge? NA     Comments: Evaluated pt and reviewed chart. Plan of Care: [facility] PRN    Electronically signed by Charanjit Turner RCP on 2/27/2021 at 11:48 PM    Respiratory Protocol Guidelines     1. Assessment and treatment by Respiratory Therapy will be initiated for medication and therapeutic interventions upon initiation of aerosolized medication. 2. Physician will be contacted for respiratory rate (RR) greater than 35 breaths per minute.  Therapy will be held for heart rate (HR) greater than 140 beats per minute, pending direction from physician. 3. Bronchodilators will be administered via Metered Dose Inhaler (MDI) with spacer when the following criteria are met:  a. Alert and cooperative     b. HR < 140 bpm  c. RR < 30 bpm                d. Can demonstrate a 23 second inspiratory hold  4. Bronchodilators will be administered via Hand Held Nebulizer [facility]) to patients when ANY of the following criteria are met  a. Incognizant or uncooperative          b. Patients treated with HHN at Home        c. Unable to demonstrate proper use of MDI with spacer     d. RR > 30 bpm   5. Bronchodilators will be delivered via Metered Dose Inhaler (MDI), HHN, Aerogen to intubated patients on mechanical ventilation. 6. Inhalation medication orders will be delivered and/or substituted as outlined below. Aerosolized Medications Ordering and Administration Guidelines:    1. All Medications will be ordered by a physician, and their frequency and/or modality will be adjusted as defined by the patients Respiratory Severity Index (RSI) score. 2. If the patient does not have documented COPD, consider discontinuing anticholinergics when RSI is less than 9.  3. If the bronchospasm worsens (increased RSI), then the bronchodilator frequency can be increased to a maximum of every 4 hours. If greater than every 4 hours is required, the physician will be contacted. 4. If the bronchospasm improves, the frequency of the bronchodilator can be decreased, based on the patient's RSI, but not less than home treatment regimen frequency. 5. Bronchodilator(s) will be discontinued if patient has a RSI less than 9 and has received no scheduled or as needed treatment for 72  Hrs. Patients Ordered on a Mucolytic Agent:    1. Must always be administered with a bronchodilator.     2. Discontinue if patient experiences worsened bronchospasm, or secretions have lessened to the point that the patient is able to clear them with a cough. Anti-inflammatory and Combination Medications:    1. If the patient lacks prior history of lung disease, is not using inhaled anti-inflammatory medication at home, and lacks wheezing by examination or by history for at least 24 hours, contact physician for possible discontinuation.

## 2021-02-28 NOTE — PROGRESS NOTES
Patient admitted to room 362 from ER. Report from [facility]. Patient oriented to room, call light, bed rails, phone, lights and bathroom. Patient instructed about the schedule of the day including: vital sign frequency, lab draws, possible tests, frequency of MD and staff rounds, including RN/MD rounding together at bedside, daily weights, and I &O's. Patient instructed about prescribed diet, how to contact dietary, and television. Bed alarm in place, patient aware of placement and reason. Bed locked, in lowest position, side rails up 2/4, call light within reach. Will continue to monitor.

## 2021-02-28 NOTE — PROGRESS NOTES
Hospitalist Progress Note      PCP: No primary care provider on file. Date of Admission: 2/27/2021    Chief Complaint:  hematuria      Subjective:   He feels OK in general.  Less dyspnea. Pain controlled. Medications:  Reviewed    Infusion Medications    sodium chloride 75 mL/hr at 02/27/21 2320     Scheduled Medications    sodium chloride flush  10 mL Intravenous 2 times per day    methylPREDNISolone  40 mg Intravenous Q12H    doxycycline hyclate  100 mg Oral 2 times per day    sodium chloride  1,000 mL Intravenous Once     PRN Meds: sodium chloride flush, promethazine **OR** ondansetron, polyethylene glycol, acetaminophen **OR** acetaminophen, ipratropium-albuterol      Intake/Output Summary (Last 24 hours) at 2/28/2021 1519  Last data filed at 2/28/2021 0415  Gross per 24 hour   Intake 370 ml   Output 325 ml   Net 45 ml       Physical Exam Performed:    /70   Pulse 79   Temp 97.6 °F (36.4 °C) (Oral)   Resp 18   Wt 103 lb 9.9 oz (47 kg)   SpO2 93%   BMI 20.24 kg/m²       General appearance: No apparent distress, appears stated age and cooperative. HEENT: Pupils equal, round, and reactive to light. Conjunctivae/corneas clear. Neck: Supple, with full range of motion. No jugular venous distention. Trachea midline. Respiratory:  Normal respiratory effort. Clear to auscultation, bilaterally without Rales/Wheezes/Rhonchi. Cardiovascular: Regular rate and rhythm with normal S1/S2 without murmurs, rubs or gallops. Abdomen: Soft, non-tender, non-distended with normal bowel sounds. Musculoskeletal: No clubbing, cyanosis. RUE with previous trauma and immobility, muscle wasting. Skin: Skin color, texture, turgor normal.  No rashes or lesions. Neurologic:  Neurovascularly intact without any focal sensory/motor deficits.  Cranial nerves: II-XII intact, grossly non-focal.  Psychiatric: Alert and oriented, thought content appropriate, normal insight  Capillary Refill: Brisk,< 3 seconds Peripheral Pulses: +2 palpable, equal bilaterally       Labs:   Recent Labs     02/27/21  1610 02/28/21  1038   WBC 5.9 3.9*   HGB 11.2* 10.3*   HCT 34.2* 31.3*    171     Recent Labs     02/27/21  1610 02/28/21  1038    137   K 2.9* 3.7   CL 96* 101   CO2 33* 27   BUN 17 23*   CREATININE 1.6* 1.6*   CALCIUM 8.7 8.9     Recent Labs     02/27/21  1610   AST 17   ALT 8*   BILITOT 0.5   ALKPHOS 84     Recent Labs     02/27/21  1618 02/27/21  2206   INR 1.03 1.05     Recent Labs     02/27/21  1610   TROPONINI <0.01       Urinalysis:      Lab Results   Component Value Date    NITRU see below 02/27/2021    WBCUA see below 02/27/2021    BACTERIA 4+ 02/27/2021    RBCUA >100 02/27/2021    BLOODU see below 02/27/2021    SPECGRAV see below 02/27/2021    GLUCOSEU see below 02/27/2021       Radiology:  CT CHEST PULMONARY EMBOLISM W CONTRAST   Final Result   1. Artifact degraded evaluation of the pulmonary arteries. No acute   pulmonary embolism to the segmental arteries given limitation. 2. Progression of disease. Increased size of right lower lobe nodule and   bone metastases. Soft tissue in the anterior mediastinum and anterior to the   right middle lobe likely represents additional metastases. Follow-up   recommended. 3. Questionable mass at the base of the tongue. Correlate with history. 4.  Other findings as described. XR CHEST PORTABLE   Final Result   Chronic findings in the chest without acute airspace disease identified. Scar in the right lung apex again noted.   The opacities recently described in   the right middle lobe and right lower lobe are not delineated on this exam.                 Assessment/Plan:    Active Hospital Problems    Diagnosis    Acute bronchitis [J20.9]    Hematuria [R31.9]    IRMA (acute kidney injury) ([address] [N17.9]    Hypokalemia [E87.6]    Centrilobular emphysema ([address] [J43.2]    Moderate to severe pulmonary hypertension ([address] [I27.20]    IV drug abuse ([facility] [F19.10]    Acute respiratory failure with hypoxia [facility]) [J96.01]       \"46 year old man with PMH of IVDU with heroin and meth, chronic hep C, tricuspid regurg, lung mass, COPD, alcohol dependence presented to the ED today with complaints of hematuria and shortness of breath. Patient apparently left AMA on 2/4 from [facility]. He was being worked up for his tricuspid regurgitation and lung mass. Patient reports ever since he left the hospital, he has been short of breath particularly worse with exertion. He reports some leg swelling that is dependent type. Denies any chest pain. Denies subjective fevers chills or rigors. No cough. Continues to inject drugs into his right leg almost daily. Patient reports 2 to 3 weeks history of dark red-colored urine. Denies any dysuria, urgency or frequency or difficulty urinating. \"      AECOPD, with severe pulmonary HTN and RV dysfunction  - steroids, inhaled bronchodilators. Doxycycline was started on admission. Suspected metastatic lung cancer. 4.8 cm RML mass with many thoracic soft tissue and bone mets. EBUS 2/28. Acute hypoxic respiratory failure. Due to above issues. Wean to RA as tolerated. The patient has no supplemental O2 requirement at baseline. Possible posterior tongue mass on CT. ENT consulted. IRMA. Baseline Cr normal.  Monitor response to IVFs. Hematuria. Urology planning eventual cystoscopy. F/u urine culture. F/u ANCA. Severe TV regurgitation. Cardiology was consulted. IVDA. COWS. Encouraged cessation. DVT Prophylaxis: SCDs  Diet: DIET GENERAL;  Diet NPO Time Specified  Code Status: Full Code    PT/OT Eval Status: not indicated    Dispo - when hematuria resolves and when the cancer workup can transition to outpatient. Perhaps 3/2. He lives at home.       Clara Kim MD

## 2021-02-28 NOTE — CONSULTS
2/28/2021  Josey Bear    Reason for Consult:  Gross hematuria  Requesting Physician:  Fabien      History Obtained From:  patient    HISTORY OF PRESENT ILLNESS:                The patient is a 48 y.o. male who presents with multiple medical issues, including gross hematuria for 3-4 weeks. He denies any difficulty voiding , clots or previous gu issues. He has tobacco hx and currently hx of IV drug use. He has significant pulmonary issues which will be evaluated. Also may have evidence of bone mets.     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    PE (pulmonary thromboembolism) ([address]     Pneumonia      Past Surgical History:        Procedure Laterality Date    BRAIN SURGERY  01/01/1986    s/p trauma    BRAIN SURGERY      BRONCHOSCOPY N/A 2/4/2021    BRONCHOSCOPY ALVEOLAR LAVAGE performed by Harsha Bianchi MD at [facility], [place], operated by [facility]  2/4/2021    BRONCHOSCOPY THERAPUTIC ASPIRATION INITIAL performed by Harsha Bianchi MD at [place] [address]      LEG SURGERY      TRANSESOPHAGEAL ECHOCARDIOGRAM  02/03/2021    Dr Leonora Whatley     Current Medications:   Current Facility-Administered Medications: sodium chloride flush 0.9 % injection 10 mL, 10 mL, Intravenous, 2 times per day  sodium chloride flush 0.9 % injection 10 mL, 10 mL, Intravenous, PRN  promethazine (PHENERGAN) tablet 12.5 mg, 12.5 mg, Oral, Q6H PRN **OR** ondansetron (ZOFRAN) injection 4 mg, 4 mg, Intravenous, [facility]

## 2021-02-28 NOTE — H&P
by Stacie Nash MD at [address] [facility]  2/4/2021    BRONCHOSCOPY THERAPUTIC ASPIRATION INITIAL [facility]      TRANSESOPHAGEAL ECHOCARDIOGRAM  02/03/2021    Dr Vandana Duncan       Medications Prior to Admission:      Prior to Admission medications    Medication Sig Start Date End Date Taking? Authorizing Provider   albuterol sulfate  (90 Base) MCG/ACT inhaler Inhale 2 puffs into the lungs every 4 hours as needed for Wheezing or Shortness of Breath 11/21/20 1/31/21  Vicky Caal MD       Allergies:  Patient has no known allergies. Social History:      The patient [place] the streets    TOBACCO:   reports that he quit smoking about 5 years ago. His smoking use included cigarettes. He has a 60.00 pack-year smoking history. His smokeless tobacco use includes snuff. ETOH:   reports current alcohol use of about 24.0 standard drinks of alcohol per week. E-Cigarettes/Vaping Use     Questions Responses    E-Cigarette/Vaping Use Never User    Start Date     Passive Exposure No    Quit Date     Counseling Given No    Comments             Family History:   Positive as follows:        Problem Relation Age of Onset    Other Mother         alzheimers       REVIEW OF SYSTEMS:   Pertinent positives as noted in the HPI. All other systems reviewed and negative. PHYSICAL EXAM PERFORMED:    /75   Pulse 99   Temp 97.6 °F (36.4 °C)   Resp 22   SpO2 100%     General appearance:  No apparent distress, appears stated age and cooperative. HEENT:  Normal cephalic, atraumatic without obvious deformity. Pupils equal, round, and reactive to light. Extra ocular muscles intact. Conjunctivae/corneas clear. Neck: Supple, with full range of motion. No jugular venous distention. Trachea midline. Respiratory: Tachypnea, increased respiratory effort.  Clear to auscultation bilaterally with occasional wheeze bilaterally Cardiovascular:  Regular rate and rhythm with normal S1/S2 without murmurs, rubs or gallops. Abdomen: Soft, non-tender, non-distended with normal bowel sounds. Musculoskeletal:  No clubbing, cyanosis or edema bilaterally. Full range of motion without deformity. Skin: Skin color, texture, turgor normal.  Track marks on the right leg medially from injections  Neurologic:  Neurovascularly intact without any focal sensory/motor deficits. Cranial nerves: II-XII intact, grossly non-focal.  Psychiatric:  Alert and oriented, thought content appropriate, normal insight  Capillary Refill: Brisk,< 3 seconds   Peripheral Pulses: +2 palpable, equal bilaterally       Labs:     Recent Labs     02/27/21  1610   WBC 5.9   HGB 11.2*   HCT 34.2*        Recent Labs     02/27/21  1610      K 2.9*   CL 96*   CO2 33*   BUN 17   CREATININE 1.6*   CALCIUM 8.7     Recent Labs     02/27/21  1610   AST 17   ALT 8*   BILITOT 0.5   ALKPHOS 84     No results for input(s): INR in the last 72 hours. Recent Labs     02/27/21  1610   TROPONINI <0.01       Urinalysis:      Lab Results   Component Value Date    NITRU see below 02/27/2021    WBCUA see below 02/27/2021    BACTERIA 4+ 02/27/2021    RBCUA >100 02/27/2021    BLOODU see below 02/27/2021    SPECGRAV see below 02/27/2021    GLUCOSEU see below 02/27/2021       Radiology:     Personally reviewed the CT chest pulmonary and CXR and also reviewed the radiologist interpretation      CT CHEST PULMONARY EMBOLISM W CONTRAST   Final Result   1. Artifact degraded evaluation of the pulmonary arteries. No acute   pulmonary embolism to the segmental arteries given limitation. 2. Progression of disease. Increased size of right lower lobe nodule and   bone metastases. Soft tissue in the anterior mediastinum and anterior to the   right middle lobe likely represents additional metastases. Follow-up   recommended. 3. Questionable mass at the base of the tongue. Correlate with history.

## 2021-02-28 NOTE — CONSULTS
INPATIENT PULMONARY CRITICAL CARE CONSULT NOTE      Chief Complaint/Referring Provider:  Patient is being seen at the request of Dr John Martell for a consultation for enlarging lung mass, previous BAL cytology negative for malignancy. Presenting HPI: Reji Whitfield is a 80-year-old male admitted from the ER on 2/27/2021 with diagnoses of acute respiratory failure with hypoxia, lung nodule, bone metastases, hematuria and hypokalemia. The patient presented to the ER with shortness of breath for about a year and hematuria for 3 to 4 weeks. He also endorsed nausea and vomiting. On presentation oxygen saturation was 86%, he was tachycardic and tachypneic but afebrile. He had bilateral wheezing and reduced breath sounds. Labs showed anemia, hypokalemia, mildly elevated glucose, elevated BUN and creatinine, elevated D-dimer. He was initially placed on a nonrebreather, given DuoNeb x3 and Solu-Medrol. The patient was given replacement of potassium chloride. His drug profile was positive for cocaine. CT chest with PE protocol showed progression of right lower lobe lung nodule and bone metastasis. Soft tissue in the anterior mediastinum and anterior to the right middle lobe reported as additional metastasis and there was also report of possible mass at the base of the tongue. CT of the abdomen and pelvis was deferred. The patient has COPD/centrilobular emphysema, had COVID-19 infection in December, has a history of hepatitis C, MRSA infection in the past, pulmonary embolism, brain surgery. He has a history of IV drug use, methamphetamine use, right arm paralysis, pneumonia the patient has been seen by our service in December 2020/January 2021 and again in February 2021. On 2/4/2021 he underwent bronchoscopy. The patient is known to our service, has advanced COPD.   He had respiratory failure with hypoxemia, lingular pneumonia, acute encephalopathy, staph aureus bacteremia with presumed  Thrombocytopenia (HCC)     Elevated LFTs     Tachycardia     Hypoxia     Heroin withdrawal (HCC) 11/07/2020    Toxic encephalopathy     Acute hypercapnic respiratory failure (HCC)     Acute respiratory failure with hypoxia (HCC)     Drug overdose     Abnormal chest x-ray     Acute encephalopathy     Disorder of electrolytes     Non-traumatic rhabdomyolysis     Elevated procalcitonin     Methamphetamine abuse (HCC)     AMS (altered mental status) 07/03/2020    Chest pain 07/25/2014    Pulmonary embolism (HCC)     Hepatitis 07/18/2014    MRSA bacteremia 07/13/2014    Pleural effusion, left 07/13/2014    Leukocytosis 47/03/3177    Illicit drug use        Past Medical History:   Diagnosis Date    IRMA (acute kidney injury) ([address] 02/27/2021    Centrilobular emphysema ([address] 02/03/2021    COVID-19 12/05/2020    EtOH dependence ([address]     H/O brain surgery     Hepatitis C antibody test positive 07/17/2014    History of surgery     L leg surgery    Intravenous drug abuse ([address]     Methamphetamine abuse ([address]     MRSA (methicillin resistant staph aureus) culture positive 07/12/2014    blood cx    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    PE (pulmonary thromboembolism) ([address]     Pneumonia         Past Surgical History:   Procedure Laterality Date    BRAIN SURGERY  01/01/1986    s/p trauma    BRAIN SURGERY      BRONCHOSCOPY N/A 2/4/2021    BRONCHOSCOPY ALVEOLAR LAVAGE performed by Amada Ross MD at [address] [place] [address]  2/4/2021    BRONCHOSCOPY THERAPUTIC ASPIRATION INITIAL performed by Amada Ross MD at [place] [address]      LEG SURGERY      TRANSESOPHAGEAL ECHOCARDIOGRAM  02/03/2021    Dr Jennifer Vaughn        Family History   Problem Relation Age of Onset    Other Mother         alzheimers        Social History     Tobacco Use    Smoking status: Former Smoker     Packs/day: 2.00     Years: 30.00     Pack years: 60.00     Types: Cigarettes Quit date: 2016     Years since quittin.0    Smokeless tobacco: Current User     Types: Chew    Tobacco comment: states 5 yrs ago   Substance Use Topics    Alcohol use: Yes     Alcohol/week: 24.0 standard drinks     Types: 24 Cans of beer per week        No Known Allergies    Physical Exam:  Blood pressure 107/70, pulse 79, temperature 97.6 °F (36.4 °C), temperature source Oral, resp. rate 18, weight 103 lb 9.9 oz (47 kg), SpO2 93 %.'   Constitutional: Very small built, underweight appearing. In no acute distress. HENT:  Oropharynx is clear and moist.  Missing teeth, no oral lesions. Mild bitemporal muscle wasting  Eyes:  Conjunctivae are normal. Pupils equal, round, and reactive to light. No scleral icterus. Neck: No JVD. No tracheal deviation present. No obvious thyroid mass. Cardiovascular:  Sinus rhythm, soft ejection systolic murmur. No right ventricular heave. No lower extremity edema. Pulmonary/Chest: Diminished air entry bilaterally. No wheezes. No rales. No accessory muscle usage or stridor. Abdominal: Soft. Bowel sounds present. No distension or tenderness. Musculoskeletal: No cyanosis. No clubbing. No obvious joint deformity except atrophy of the muscles of the right arm  Lymphadenopathy: No cervical or supraclavicular adenopathy. Skin: Skin is warm and dry. No rash or nodules on the exposed extremities. Multiple tattoos  Psychiatric: Normal mood and affect. Behavior is normal.  No anxiety. Neurologic: Alert, awake and oriented. PERRL. Speech fluent.   Reportedly has right arm weakness, detailed exam not performed    Results:  CBC:   Recent Labs     21  1610 21  1038   WBC 5.9 3.9*   HGB 11.2* 10.3*   HCT 34.2* 31.3*   MCV 82.6 82.7    171     BMP:   Recent Labs     21  1610 21  1038    137   K 2.9* 3.7   CL 96* 101   CO2 33* 27   BUN 17 23*   CREATININE 1.6* 1.6*     LIVER PROFILE:   Recent Labs     21   AST 17   ALT 8* Pulmonic Valve  The pulmonic valve is structurally normal.  No evidence of pulmonic valve regurgitation. Pericardial Effusion  No pericardial effusion noted. Pleural Effusion  No pleural effusion. Doppler Measurements   TR Velocity:345 cm/s  TR Gradient:47.61 mmHg  Estimated RAP:15 mmHg  Estimated RVSP: 63 mmHg      Echo Complete    Result Date: 2/2/2021  Transthoracic Echocardiography Report (TTE)  Demographics   Patient Name      [facility]   Date of Study     02/02/2021         Gender              Male   Patient Number    [id number]         Date of Birth       1970   Visit Number      [id number]          Age                 48 year(s)   Accession Number  [id number]         Room Number         3974   Corporate ID      [id number]           Khushboo Toussaint,    Interpreting        Fazal Dueñas M.D. Physician           Lily Elder MD  Procedure Type of Study   TTE procedure:ECHOCARDIOGRAM COMPLETE 2D W DOPPLER W COLOR. Procedure Date Date: 02/02/2021 Start: 12:48 PM Study Location: [address] [place] [address] - Echo Lab Technical Quality: Adequate visualization Indications:Heart murmur. Additional Indications:Suspected endocarditis. Patient Status: Routine Height: 60 inches Weight: 90 pounds BSA: 1.33 m2 BMI: 17.58 kg/m2 BP: 109/73 mmHg  Conclusions   Summary  Technical difficult study due to heart off axis, and the patient is  difficult to stay still. No obvious vegetation, consider DIVYA if clinically indicated. -- Tachycardia is noted through the study, Left ventricular systolic  function is normal with a visually estimated ejection fraction of 55-60%. he  left ventricle is normal in size with normal wall thickness. Flattened in  diastole and systole (\"D-shaped septum\") consistent with right ventricular  volume and pressure overload. Normal left ventricular diastolic function. -- The right atrium is dilated.   -- Mild mitral regurgitation. -- Severe tricuspid regurgitation. Systolic pulmonary artery pressure (SPAP) is elevated and estimated at 71  mmHg (right atrial pressure 8 mmHg) consistent with severe pulmonary  hypertension. No masses or vegetation is noted. Signature   ------------------------------------------------------------------  Electronically signed by Fan Durant MD (Interpreting  physician) on 02/02/2021 at 04:39 PM  ------------------------------------------------------------------   Findings   Left Ventricle  Tachycardia is noted through the study, Left ventricular systolic function  is normal with a visually estimated ejection fraction of 55-60%. The left  ventricle is normal in size with normal wall thickness. Flattened in  diastole and systole (\"D-shaped septum\") consistent with right ventricular  volume and pressure overload. Normal left ventricular diastolic function. Mitral Valve  Mild posterior mitral annular calcification is present. Mild mitral regurgitation. Left Atrium  The left atrium is normal in size. Aortic Valve  The aortic valve appears structurally normal.  No evidence of aortic valve regurgitation. No stenosis. Aorta  The aortic root is normal in size. Right Ventricle  The right ventricle is normal in size. Right ventricular systolic function is normal.  TAPSE is measured at 17 mm. S' prime velocity is measured at 10.4 cm/s. Tricuspid Valve  The tricuspid valve is normal in structure. Severe tricuspid regurgitation. Systolic pulmonary artery pressure (SPAP) is elevated and estimated at 71  mmHg (right atrial pressure 8 mmHg) consistent with severe pulmonary  hypertension. Right Atrium  The right atrium is mildly dilated. Right atrial area is 28.8 ml/m2. Pulmonic Valve  The pulmonic valve is normal in structure. No evidence of pulmonic regurgitation. Pericardial Effusion  No pericardial effusion noted. Pleural Effusion  No pleural effusion. Miscellaneous  The IVC is normal in size (<2.1 cm) but collapses <50% with respiration  consistent with elevated right atrial pressures (8 mmHg) . No masses or vegetation is noted. M-Mode/2D Measurements (cm)   LV Diastolic Dimension: 3.9 cm LV Systolic Dimension: 2.5 cm  LV Septum Diastolic: 2.15 cm  LV PW Diastolic: 3.32 cm       AO Root Dimension: 2.7 cm                                 AV Cusp Separation: 1.3 cm                                 LA Dimension: 2.4 cm                                 LA Area: 13.8 cm2                                 LA volume/Index: 28 ml /21 ml/m2  Doppler Measurements   AV Peak Velocity: 145 cm/s     MV Peak E-Wave: 79.1 cm/s  AV Peak Gradient: 8.41 mmHg    MV Peak A-Wave: 93.6 cm/s  LVOT Peak Velocity: 81 cm/s    MV E/A Ratio: 0.85   TR Velocity:396 cm/s  TR Gradient:62.73 mmHg  Estimated RAP:8 mmHg  Estimated RVSP: 71 mmHg  E' Septal Velocity: 9.57 cm/s  E' Lateral Velocity: 14.3 cm/s  E/E' ratio: 6.9   Aortic Valve   Peak Velocity: 145 cm/s  Peak Gradient: 8.41 mmHg   Cusp Separation: 1.3 cm  Aorta   Aortic Root: 2.7 cm      Ir Fluoro Guided Cva Device Plmt/replace/removal    Result Date: 2/1/2021  PROCEDURE: IR FLUOROSCOPY GUIDED CENTRAL VENOUS ACCESS DEVICE PLACEMENT 2/1/2021 HISTORY: ORDERING SYSTEM PROVIDED HISTORY: temporary CVC for endocarditis TECHNOLOGIST PROVIDED HISTORY: Reason for exam:->temporary CVC for endocarditis Reason for Exam: limited access Acuity: Acute Type of Exam: Initial Additional signs and symptoms: 16cm, 7Fr triplelumen CVC, US/Fluoro guidance, Rt IJ, 5 seconds fluoro Relevant Medical/Surgical History: 16cm, 7Fr triplelumen CVC, US/Fluoro guidance, Rt IJ, 5 seconds fluoro TECHNIQUE: Ultrasound and fluoroscopic guidance were utilized during placement of triple-lumen central venous catheter. CONTRAST: None. SEDATION: None. FLUOROSCOPY DOSE AND TYPE OR TIME AND EXPOSURES: Fluoroscopic time: 5 seconds.  Number of fluoroscopic images obtained:  0. Chest radiograph and CT 01/31/2021 HISTORY: ORDERING SYSTEM PROVIDED HISTORY: shortness of breath TECHNOLOGIST PROVIDED HISTORY: Reason for exam:->shortness of breath Reason for Exam: Shortness of Breath (x1year) FINDINGS: The cardiomediastinal silhouette is unchanged in appearance. Sequela of old granulomatous disease. Scarring in the right lung apex again demonstrated. Discrete nodule in the right middle lobe and right lower lobe described on CT are not clearly delineated on this exam.  There is no consolidation, pneumothorax, or evidence of edema. No effusion is appreciated. The osseous structures are unchanged in appearance. Old healed proximal left humerus fracture again noted. Chronic findings in the chest without acute airspace disease identified. Scar in the right lung apex again noted. The opacities recently described in the right middle lobe and right lower lobe are not delineated on this exam.     Xr Chest Portable    Result Date: 1/31/2021  EXAMINATION: ONE XRAY VIEW OF THE CHEST 1/31/2021 8:19 pm COMPARISON: Chest x-ray 12/27/2020. HISTORY: ORDERING SYSTEM PROVIDED HISTORY: SOB TECHNOLOGIST PROVIDED HISTORY: Reason for exam:->SOB Reason for Exam: SOB Acuity: Acute Type of Exam: Initial Additional signs and symptoms: pt c/o sob FINDINGS: Cardiac silhouette is upper normal limits in size. Mediastinal contours are otherwise suboptimally evaluated due to rotated projection. Lungs demonstrate no focal consolidation or pleural effusion. Right-sided granuloma and calcified lymph nodes in the mediastinum and radha again visualized. No pneumothorax appreciated on this projection. Irregular appearance of the proximal left humeral diaphysis noted. Increased perihilar markings. Atelectasis, infectious or inflammatory airway process, and interstitial edema are in the differential.     1.  Increased perihilar markings.   Atelectasis, infectious or inflammatory airway process, and interstitial edema are in the differential. 2. Irregular appearance of the proximal left humeral diaphysis. Correlate with history. Nonemergent MRI would better evaluate. Ct Chest Pulmonary Embolism W Contrast    Result Date: 2/27/2021  EXAMINATION: CTA OF THE CHEST 2/27/2021 5:25 pm TECHNIQUE: CTA of the chest was performed after the administration of intravenous contrast.  Multiplanar reformatted images are provided for review. MIP images are provided for review. Dose modulation, iterative reconstruction, and/or weight based adjustment of the mA/kV was utilized to reduce the radiation dose to as low as reasonably achievable. COMPARISON: CT angio chest 01/31/2021 HISTORY: ORDERING SYSTEM PROVIDED HISTORY: elevated d dimer, hypoxic TECHNOLOGIST PROVIDED HISTORY: Reason for exam:->elevated d dimer, hypoxic Decision Support Exception->Emergency Medical Condition (MA) Reason for Exam: hypoxic,shortness of breath Acuity: Acute Type of Exam: Initial Additional signs and symptoms: elevated d-dimer FINDINGS: Pulmonary Arteries: Pulmonary arteries are within normal limits in size. . Artifact degraded evaluation of the pulmonary arteries. No filling defect is identified in the pulmonary arteries to the level of thesegmental arteries. Mediastinum: Heart is within normal limits in size. Soft tissue in the anterior diaphragm abutting the pericardium measuring 1.4 by 4 cm. Enhancing soft tissue in or adjacent to the right middle lobe measuring up to 4.8 cm. Aorta is within normal limits in size. No luminal defect is appreciated in the visualized thoracic aorta. Lungs/pleura: Central airways are patent. Decreased anterior-posterior dimension of the trachea and main bronchi could reflect tracheobronchial malacia. Nodular opacities in the trachea could represent secretions versus polyps. 2.9 by 1.5 by 2.3 cm right lower lobe nodule. 1.1 cm lingular nodule. Scattered granulomas. Atelectasis in the right upper lobe. No pneumothorax.  Upper Abdomen: Granulomas in the spleen. Soft Tissues/Bones: Right hilar adenopathy versus central lung mass. Questionable enhancing mass in the base of the right tongue. Prominent venous collaterals throughout the right neck and chest.Lytic sclerotic appearance of the sternum, cervical spine, upper thoracic spine, and ribs. 1.  Artifact degraded evaluation of the pulmonary arteries. No acute pulmonary embolism to the segmental arteries given limitation. 2. Progression of disease. Increased size of right lower lobe nodule and bone metastases. Soft tissue in the anterior mediastinum and anterior to the right middle lobe likely represents additional metastases. Follow-up recommended. 3. Questionable mass at the base of the tongue. Correlate with history. 4.  Other findings as described. Ct Chest Pulmonary Embolism W Contrast    Result Date: 1/31/2021  EXAMINATION: CTA OF THE CHEST 1/31/2021 9:59 pm TECHNIQUE: CTA of the chest was performed after the administration of intravenous contrast.  Multiplanar reformatted images are provided for review. MIP images are provided for review. Dose modulation, iterative reconstruction, and/or weight based adjustment of the mA/kV was utilized to reduce the radiation dose to as low as reasonably achievable. COMPARISON: None. HISTORY: ORDERING SYSTEM PROVIDED HISTORY: concern for endocarditis TECHNOLOGIST PROVIDED HISTORY: Reason for exam:->concern for endocarditis Decision Support Exception->Emergency Medical Condition (MA) Reason for Exam: SOB Type of Exam: Ongoing Additional signs and symptoms: concern for endocarditis, no hx of PE per pt FINDINGS: Pulmonary Arteries: Pulmonary arteries are adequately opacified for evaluation. No evidence of intraluminal filling defect to suggest pulmonary embolism. Main pulmonary artery is normal in caliber.  Mediastinum: Mildly enlarged right hilar lymph nodes which may be concerning for pathologic lymph nodes given the pulmonary masses described. The heart and pericardium demonstrate no acute abnormality. There is no acute abnormality of the thoracic aorta. Lungs/pleura: Diffuse centrilobular pulmonary emphysema, worse in the bilateral upper lungs. There is a 1.4 x 2.4 x 1.3 cm mass in the posterior right perihilar region in the right lower lobe of the lung, this is highly suspicious for an underlying neoplasm. There is a speculated scar at the inferior right middle lobe measuring 1.3 x 1.6 x 0.7 cm with pleural attachment. There is a speculated lesion in the posterior right upper lobe of the lung measuring 1.5 x 2.5 x 0.8 cm. These could represent areas of scarring but are concerning for scar neoplasm and further evaluation and workup is recommended. No focal consolidation or pulmonary edema. No evidence of pleural effusion or pneumothorax. Upper Abdomen: Limited images of the upper abdomen are unremarkable. Soft Tissues/Bones: Chronic anterior wedge compression of the T8 vertebral body with about 50% anterior height loss. There is mild superior endplate compression of the T6 vertebral body, most likely chronic in nature. No significant retropulsion. No evidence of an acute fracture or dislocation. Visualized soft tissues are within normal limits. No evidence of pulmonary embolism or acute pulmonary abnormality. Diffuse pulmonary emphysema. There is a 2.4 cm mass in the posterior right perihilar region in the right lower lobe of the lung, highly suspicious for an underlying neoplasm. Further evaluation and workup is recommended. There are 2 speculated scar lesions in the inferior right middle lobe and posterior right upper lobe. Cannot exclude the possibility of scar neoplasm and further evaluation and workup is recommended. Mildly enlarged right hilar lymph nodes which could represent pathologic lymph nodes. Chronic compression fractures of T6 and T8 vertebral bodies.        Echocardiogram (DIVYA) 2/1/2021:  Ejection fraction is visually estimated to be 55-60 %. Mild mitral regurgitation. There is no evidence of mass or thrombus in the left atrium or appendage. A bubble study was performed and shows evidence of right to left shunting consistent with a patent foramen ovale. Severe tricuspid regurgitation. No vegetations detected  PFT:  None in EMR    Assessment and plan:  Acute respiratory failure with hypoxia. On room air with SaO2 93%  Right lung mass, possible lingular mass, right hilar adenopathy. This was not seen in December, was mild in January. This would be a rapid progression, may not necessarily represent neoplasm. In the lingula residual pneumonia is possible, in the right lung there could be mucous plugging. However the patient needs bronchoscopy with radial EBUS and linear EBUS. Explained the procedure to the patient. He will be kept n.p.o. if he can get the schedule for tomorrow. ?  Mass posterior tongue. IM to decide on ENT consultation  ? Bony metastases. Probably needs a nuclear scan. Moderate centrilobular emphysema, tracheobronchomalacia, possible COPD exacerbation. On doxycycline, DuoNeb and Solu-Medrol. Moderate to severe pulmonary hypertension, tricuspid regurgitation, PFO. Per IM  IRMA. Likely prerenal.  Hematuria. Per IM  Hypokalemia. Replace and monitor  Anemia. Mild. Normochromic, normocytic. Follow profile  Multidrug abuse, UDS positive for cocaine. Patient says he does not use cocaine although his UDS was positive. He says he uses heroin as he cannot obtain pain medications for his right arm. Would consider gabapentin. DVT prophylaxis. Lovenox after procedure completed. I have examined the patient, reviewed labs, images and medical records. My impression and recommendations are as above. Discussed with the patient. We will follow with you, thank you for the consultation.       Electronically signed by:  Chace Johnston MD    2/28/2021    1:28 PM.

## 2021-03-01 ENCOUNTER — APPOINTMENT (OUTPATIENT)
Dept: GENERAL RADIOLOGY | Age: 51
DRG: 180 | End: 2021-03-01
Payer: MEDICARE

## 2021-03-01 ENCOUNTER — ANESTHESIA EVENT (OUTPATIENT)
Dept: ENDOSCOPY | Age: 51
DRG: 180 | End: 2021-03-01
Payer: MEDICARE

## 2021-03-01 ENCOUNTER — ANESTHESIA (OUTPATIENT)
Dept: ENDOSCOPY | Age: 51
DRG: 180 | End: 2021-03-01
Payer: MEDICARE

## 2021-03-01 VITALS
RESPIRATION RATE: 9 BRPM | OXYGEN SATURATION: 100 % | DIASTOLIC BLOOD PRESSURE: 61 MMHG | SYSTOLIC BLOOD PRESSURE: 102 MMHG

## 2021-03-01 LAB
ANION GAP SERPL CALCULATED.3IONS-SCNC: 5 MMOL/L (ref 3–16)
BASOPHILS ABSOLUTE: 0 K/UL (ref 0–0.2)
BASOPHILS RELATIVE PERCENT: 0.4 %
BUN BLDV-MCNC: 30 MG/DL (ref 7–20)
CALCIUM SERPL-MCNC: 8.7 MG/DL (ref 8.3–10.6)
CHLORIDE BLD-SCNC: 108 MMOL/L (ref 99–110)
CO2: 28 MMOL/L (ref 21–32)
CREAT SERPL-MCNC: 1.5 MG/DL (ref 0.9–1.3)
EOSINOPHILS ABSOLUTE: 0 K/UL (ref 0–0.6)
EOSINOPHILS RELATIVE PERCENT: 0 %
GFR AFRICAN AMERICAN: 60
GFR NON-AFRICAN AMERICAN: 49
GLUCOSE BLD-MCNC: 146 MG/DL (ref 70–99)
HCT VFR BLD CALC: 31.5 % (ref 40.5–52.5)
HEMOGLOBIN: 10.4 G/DL (ref 13.5–17.5)
LYMPHOCYTES ABSOLUTE: 0.5 K/UL (ref 1–5.1)
LYMPHOCYTES RELATIVE PERCENT: 6 %
MCH RBC QN AUTO: 27.5 PG (ref 26–34)
MCHC RBC AUTO-ENTMCNC: 32.9 G/DL (ref 31–36)
MCV RBC AUTO: 83.6 FL (ref 80–100)
MONOCYTES ABSOLUTE: 0.1 K/UL (ref 0–1.3)
MONOCYTES RELATIVE PERCENT: 1.7 %
NEUTROPHILS ABSOLUTE: 7.6 K/UL (ref 1.7–7.7)
NEUTROPHILS RELATIVE PERCENT: 91.9 %
PDW BLD-RTO: 14.2 % (ref 12.4–15.4)
PLATELET # BLD: 243 K/UL (ref 135–450)
PMV BLD AUTO: 7.1 FL (ref 5–10.5)
POTASSIUM REFLEX MAGNESIUM: 4.6 MMOL/L (ref 3.5–5.1)
RBC # BLD: 3.77 M/UL (ref 4.2–5.9)
SARS-COV-2, NAAT: NOT DETECTED
SODIUM BLD-SCNC: 141 MMOL/L (ref 136–145)
WBC # BLD: 8.3 K/UL (ref 4–11)

## 2021-03-01 PROCEDURE — 87205 SMEAR GRAM STAIN: CPT

## 2021-03-01 PROCEDURE — 6360000002 HC RX W HCPCS: Performed by: NURSE ANESTHETIST, CERTIFIED REGISTERED

## 2021-03-01 PROCEDURE — 87102 FUNGUS ISOLATION CULTURE: CPT

## 2021-03-01 PROCEDURE — 99221 1ST HOSP IP/OBS SF/LOW 40: CPT | Performed by: STUDENT IN AN ORGANIZED HEALTH CARE EDUCATION/TRAINING PROGRAM

## 2021-03-01 PROCEDURE — 88177 CYTP FNA EVAL EA ADDL: CPT

## 2021-03-01 PROCEDURE — 87635 SARS-COV-2 COVID-19 AMP PRB: CPT

## 2021-03-01 PROCEDURE — 88305 TISSUE EXAM BY PATHOLOGIST: CPT

## 2021-03-01 PROCEDURE — 3209999900 FLUORO FOR SURGICAL PROCEDURES

## 2021-03-01 PROCEDURE — 0BD68ZX EXTRACTION OF RIGHT LOWER LOBE BRONCHUS, VIA NATURAL OR ARTIFICIAL OPENING ENDOSCOPIC, DIAGNOSTIC: ICD-10-PCS | Performed by: INTERNAL MEDICINE

## 2021-03-01 PROCEDURE — 02HV33Z INSERTION OF INFUSION DEVICE INTO SUPERIOR VENA CAVA, PERCUTANEOUS APPROACH: ICD-10-PCS | Performed by: INTERNAL MEDICINE

## 2021-03-01 PROCEDURE — 87015 SPECIMEN INFECT AGNT CONCNTJ: CPT

## 2021-03-01 PROCEDURE — 31624 DX BRONCHOSCOPE/LAVAGE: CPT | Performed by: INTERNAL MEDICINE

## 2021-03-01 PROCEDURE — 80048 BASIC METABOLIC PNL TOTAL CA: CPT

## 2021-03-01 PROCEDURE — 31652 BRONCH EBUS SAMPLNG 1/2 NODE: CPT | Performed by: INTERNAL MEDICINE

## 2021-03-01 PROCEDURE — 0BJ08ZZ INSPECTION OF TRACHEOBRONCHIAL TREE, VIA NATURAL OR ARTIFICIAL OPENING ENDOSCOPIC: ICD-10-PCS | Performed by: INTERNAL MEDICINE

## 2021-03-01 PROCEDURE — 87206 SMEAR FLUORESCENT/ACID STAI: CPT

## 2021-03-01 PROCEDURE — 99222 1ST HOSP IP/OBS MODERATE 55: CPT | Performed by: INTERNAL MEDICINE

## 2021-03-01 PROCEDURE — 88112 CYTOPATH CELL ENHANCE TECH: CPT

## 2021-03-01 PROCEDURE — 88173 CYTOPATH EVAL FNA REPORT: CPT

## 2021-03-01 PROCEDURE — 3603165200 HC BRNCHSC EBUS GUIDED SAMPL 1/2 NODE STATION/STRUX: Performed by: INTERNAL MEDICINE

## 2021-03-01 PROCEDURE — 7100000010 HC PHASE II RECOVERY - FIRST 15 MIN: Performed by: INTERNAL MEDICINE

## 2021-03-01 PROCEDURE — 0BB38ZX EXCISION OF RIGHT MAIN BRONCHUS, VIA NATURAL OR ARTIFICIAL OPENING ENDOSCOPIC, DIAGNOSTIC: ICD-10-PCS | Performed by: INTERNAL MEDICINE

## 2021-03-01 PROCEDURE — 2700000000 HC OXYGEN THERAPY PER DAY

## 2021-03-01 PROCEDURE — 2709999900 HC NON-CHARGEABLE SUPPLY: Performed by: INTERNAL MEDICINE

## 2021-03-01 PROCEDURE — 31575 DIAGNOSTIC LARYNGOSCOPY: CPT | Performed by: STUDENT IN AN ORGANIZED HEALTH CARE EDUCATION/TRAINING PROGRAM

## 2021-03-01 PROCEDURE — 88341 IMHCHEM/IMCYTCHM EA ADD ANTB: CPT

## 2021-03-01 PROCEDURE — 6360000002 HC RX W HCPCS: Performed by: INTERNAL MEDICINE

## 2021-03-01 PROCEDURE — 2580000003 HC RX 258: Performed by: INTERNAL MEDICINE

## 2021-03-01 PROCEDURE — 3700000000 HC ANESTHESIA ATTENDED CARE: Performed by: INTERNAL MEDICINE

## 2021-03-01 PROCEDURE — 3609011900 HC BRONCHOSCOPY NEEDLE BX TRACHEA MAIN STEM&/BRON: Performed by: INTERNAL MEDICINE

## 2021-03-01 PROCEDURE — 86255 FLUORESCENT ANTIBODY SCREEN: CPT

## 2021-03-01 PROCEDURE — 31623 DX BRONCHOSCOPE/BRUSH: CPT | Performed by: INTERNAL MEDICINE

## 2021-03-01 PROCEDURE — 6370000000 HC RX 637 (ALT 250 FOR IP): Performed by: INTERNAL MEDICINE

## 2021-03-01 PROCEDURE — 88104 CYTOPATH FL NONGYN SMEARS: CPT

## 2021-03-01 PROCEDURE — 1200000000 HC SEMI PRIVATE

## 2021-03-01 PROCEDURE — 87070 CULTURE OTHR SPECIMN AEROBIC: CPT

## 2021-03-01 PROCEDURE — 36415 COLL VENOUS BLD VENIPUNCTURE: CPT

## 2021-03-01 PROCEDURE — 3609011100 HC BRONCHOSCOPY BRUSHINGS: Performed by: INTERNAL MEDICINE

## 2021-03-01 PROCEDURE — 2500000003 HC RX 250 WO HCPCS: Performed by: NURSE ANESTHETIST, CERTIFIED REGISTERED

## 2021-03-01 PROCEDURE — 3609011300 HC BRONCHOSCOPY BRONCHIAL/ENDOBRNCL BX 1+ SITES: Performed by: INTERNAL MEDICINE

## 2021-03-01 PROCEDURE — 3700000001 HC ADD 15 MINUTES (ANESTHESIA): Performed by: INTERNAL MEDICINE

## 2021-03-01 PROCEDURE — 7100000011 HC PHASE II RECOVERY - ADDTL 15 MIN: Performed by: INTERNAL MEDICINE

## 2021-03-01 PROCEDURE — 2720000010 HC SURG SUPPLY STERILE: Performed by: INTERNAL MEDICINE

## 2021-03-01 PROCEDURE — 85025 COMPLETE CBC W/AUTO DIFF WBC: CPT

## 2021-03-01 PROCEDURE — 3609010800 HC BRONCHOSCOPY ALVEOLAR LAVAGE: Performed by: INTERNAL MEDICINE

## 2021-03-01 PROCEDURE — 71046 X-RAY EXAM CHEST 2 VIEWS: CPT

## 2021-03-01 PROCEDURE — 94761 N-INVAS EAR/PLS OXIMETRY MLT: CPT

## 2021-03-01 PROCEDURE — 88342 IMHCHEM/IMCYTCHM 1ST ANTB: CPT

## 2021-03-01 PROCEDURE — 99232 SBSQ HOSP IP/OBS MODERATE 35: CPT | Performed by: INTERNAL MEDICINE

## 2021-03-01 PROCEDURE — 0B9F8ZX DRAINAGE OF RIGHT LOWER LUNG LOBE, VIA NATURAL OR ARTIFICIAL OPENING ENDOSCOPIC, DIAGNOSTIC: ICD-10-PCS | Performed by: INTERNAL MEDICINE

## 2021-03-01 PROCEDURE — 31629 BRONCHOSCOPY/NEEDLE BX EACH: CPT | Performed by: INTERNAL MEDICINE

## 2021-03-01 PROCEDURE — 87116 MYCOBACTERIA CULTURE: CPT

## 2021-03-01 PROCEDURE — 88360 TUMOR IMMUNOHISTOCHEM/MANUAL: CPT

## 2021-03-01 RX ORDER — LABETALOL HYDROCHLORIDE 5 MG/ML
5 INJECTION, SOLUTION INTRAVENOUS EVERY 10 MIN PRN
Status: DISCONTINUED | OUTPATIENT
Start: 2021-03-01 | End: 2021-03-01 | Stop reason: HOSPADM

## 2021-03-01 RX ORDER — HYDRALAZINE HYDROCHLORIDE 20 MG/ML
5 INJECTION INTRAMUSCULAR; INTRAVENOUS EVERY 10 MIN PRN
Status: DISCONTINUED | OUTPATIENT
Start: 2021-03-01 | End: 2021-03-01 | Stop reason: HOSPADM

## 2021-03-01 RX ORDER — PROMETHAZINE HYDROCHLORIDE 25 MG/ML
6.25 INJECTION, SOLUTION INTRAMUSCULAR; INTRAVENOUS
Status: DISCONTINUED | OUTPATIENT
Start: 2021-03-01 | End: 2021-03-01 | Stop reason: HOSPADM

## 2021-03-01 RX ORDER — OXYCODONE HYDROCHLORIDE AND ACETAMINOPHEN 5; 325 MG/1; MG/1
2 TABLET ORAL PRN
Status: DISCONTINUED | OUTPATIENT
Start: 2021-03-01 | End: 2021-03-01 | Stop reason: HOSPADM

## 2021-03-01 RX ORDER — MORPHINE SULFATE 2 MG/ML
1 INJECTION, SOLUTION INTRAMUSCULAR; INTRAVENOUS EVERY 5 MIN PRN
Status: DISCONTINUED | OUTPATIENT
Start: 2021-03-01 | End: 2021-03-01 | Stop reason: HOSPADM

## 2021-03-01 RX ORDER — PHENYLEPHRINE HCL IN 0.9% NACL 1 MG/10 ML
SYRINGE (ML) INTRAVENOUS PRN
Status: DISCONTINUED | OUTPATIENT
Start: 2021-03-01 | End: 2021-03-01 | Stop reason: SDUPTHER

## 2021-03-01 RX ORDER — PROPOFOL 10 MG/ML
INJECTION, EMULSION INTRAVENOUS PRN
Status: DISCONTINUED | OUTPATIENT
Start: 2021-03-01 | End: 2021-03-01 | Stop reason: SDUPTHER

## 2021-03-01 RX ORDER — ROCURONIUM BROMIDE 10 MG/ML
INJECTION, SOLUTION INTRAVENOUS PRN
Status: DISCONTINUED | OUTPATIENT
Start: 2021-03-01 | End: 2021-03-01 | Stop reason: SDUPTHER

## 2021-03-01 RX ORDER — FENTANYL CITRATE 50 UG/ML
INJECTION, SOLUTION INTRAMUSCULAR; INTRAVENOUS PRN
Status: DISCONTINUED | OUTPATIENT
Start: 2021-03-01 | End: 2021-03-01 | Stop reason: SDUPTHER

## 2021-03-01 RX ORDER — ONDANSETRON 2 MG/ML
4 INJECTION INTRAMUSCULAR; INTRAVENOUS
Status: DISCONTINUED | OUTPATIENT
Start: 2021-03-01 | End: 2021-03-01 | Stop reason: HOSPADM

## 2021-03-01 RX ORDER — DEXAMETHASONE SODIUM PHOSPHATE 10 MG/ML
INJECTION INTRAMUSCULAR; INTRAVENOUS PRN
Status: DISCONTINUED | OUTPATIENT
Start: 2021-03-01 | End: 2021-03-01 | Stop reason: SDUPTHER

## 2021-03-01 RX ORDER — MAGNESIUM HYDROXIDE 1200 MG/15ML
LIQUID ORAL CONTINUOUS PRN
Status: COMPLETED | OUTPATIENT
Start: 2021-03-01 | End: 2021-03-01

## 2021-03-01 RX ORDER — MORPHINE SULFATE 2 MG/ML
2 INJECTION, SOLUTION INTRAMUSCULAR; INTRAVENOUS EVERY 5 MIN PRN
Status: DISCONTINUED | OUTPATIENT
Start: 2021-03-01 | End: 2021-03-01 | Stop reason: HOSPADM

## 2021-03-01 RX ORDER — OXYCODONE HYDROCHLORIDE AND ACETAMINOPHEN 5; 325 MG/1; MG/1
1 TABLET ORAL PRN
Status: DISCONTINUED | OUTPATIENT
Start: 2021-03-01 | End: 2021-03-01 | Stop reason: HOSPADM

## 2021-03-01 RX ORDER — MEPERIDINE HYDROCHLORIDE 50 MG/ML
12.5 INJECTION INTRAMUSCULAR; INTRAVENOUS; SUBCUTANEOUS EVERY 5 MIN PRN
Status: DISCONTINUED | OUTPATIENT
Start: 2021-03-01 | End: 2021-03-01 | Stop reason: HOSPADM

## 2021-03-01 RX ORDER — MIDAZOLAM HYDROCHLORIDE 1 MG/ML
INJECTION INTRAMUSCULAR; INTRAVENOUS PRN
Status: DISCONTINUED | OUTPATIENT
Start: 2021-03-01 | End: 2021-03-01 | Stop reason: SDUPTHER

## 2021-03-01 RX ORDER — DIPHENHYDRAMINE HYDROCHLORIDE 50 MG/ML
12.5 INJECTION INTRAMUSCULAR; INTRAVENOUS
Status: DISCONTINUED | OUTPATIENT
Start: 2021-03-01 | End: 2021-03-01 | Stop reason: HOSPADM

## 2021-03-01 RX ORDER — ONDANSETRON 2 MG/ML
INJECTION INTRAMUSCULAR; INTRAVENOUS PRN
Status: DISCONTINUED | OUTPATIENT
Start: 2021-03-01 | End: 2021-03-01 | Stop reason: SDUPTHER

## 2021-03-01 RX ADMIN — Medication 50 MCG: at 11:48

## 2021-03-01 RX ADMIN — DOXYCYCLINE HYCLATE 100 MG: 100 TABLET, COATED ORAL at 08:34

## 2021-03-01 RX ADMIN — ROCURONIUM BROMIDE 10 MG: 10 SOLUTION INTRAVENOUS at 12:26

## 2021-03-01 RX ADMIN — SUGAMMADEX 200 MG: 100 INJECTION, SOLUTION INTRAVENOUS at 12:54

## 2021-03-01 RX ADMIN — SODIUM CHLORIDE: 9 INJECTION, SOLUTION INTRAVENOUS at 02:20

## 2021-03-01 RX ADMIN — PROPOFOL 150 MG: 10 INJECTION, EMULSION INTRAVENOUS at 11:52

## 2021-03-01 RX ADMIN — DEXAMETHASONE SODIUM PHOSPHATE 4 MG: 10 INJECTION INTRAMUSCULAR; INTRAVENOUS at 12:54

## 2021-03-01 RX ADMIN — METHYLPREDNISOLONE SODIUM SUCCINATE 40 MG: 40 INJECTION, POWDER, FOR SOLUTION INTRAMUSCULAR; INTRAVENOUS at 05:36

## 2021-03-01 RX ADMIN — MIDAZOLAM 2 MG: 1 INJECTION INTRAMUSCULAR; INTRAVENOUS at 11:48

## 2021-03-01 RX ADMIN — ROCURONIUM BROMIDE 50 MG: 10 SOLUTION INTRAVENOUS at 11:52

## 2021-03-01 RX ADMIN — Medication 200 MCG: at 12:12

## 2021-03-01 RX ADMIN — Medication 100 MCG: at 12:26

## 2021-03-01 RX ADMIN — ONDANSETRON 4 MG: 2 INJECTION INTRAMUSCULAR; INTRAVENOUS at 12:54

## 2021-03-01 RX ADMIN — DOXYCYCLINE HYCLATE 100 MG: 100 TABLET, COATED ORAL at 21:34

## 2021-03-01 ASSESSMENT — ENCOUNTER SYMPTOMS
SHORTNESS OF BREATH: 1
RHINORRHEA: 0
SHORTNESS OF BREATH: 1
NAUSEA: 0
TROUBLE SWALLOWING: 0
VOMITING: 0
VOICE CHANGE: 0
EYE PAIN: 0

## 2021-03-01 ASSESSMENT — PULMONARY FUNCTION TESTS
PIF_VALUE: 32
PIF_VALUE: 40
PIF_VALUE: 21
PIF_VALUE: 42
PIF_VALUE: 48
PIF_VALUE: 21
PIF_VALUE: 46
PIF_VALUE: 50
PIF_VALUE: 43
PIF_VALUE: 19
PIF_VALUE: 2
PIF_VALUE: 35
PIF_VALUE: 46
PIF_VALUE: 2
PIF_VALUE: 46
PIF_VALUE: 48
PIF_VALUE: 17
PIF_VALUE: 21
PIF_VALUE: 33
PIF_VALUE: 17
PIF_VALUE: 46
PIF_VALUE: 5
PIF_VALUE: 20
PIF_VALUE: 15
PIF_VALUE: 28
PIF_VALUE: 17
PIF_VALUE: 1
PIF_VALUE: 2
PIF_VALUE: 15
PIF_VALUE: 25
PIF_VALUE: 10
PIF_VALUE: 22
PIF_VALUE: 2
PIF_VALUE: 48
PIF_VALUE: 20
PIF_VALUE: 19
PIF_VALUE: 19
PIF_VALUE: 50
PIF_VALUE: 3
PIF_VALUE: 35
PIF_VALUE: 36
PIF_VALUE: 2
PIF_VALUE: 22
PIF_VALUE: 48
PIF_VALUE: 41
PIF_VALUE: 19
PIF_VALUE: 18

## 2021-03-01 ASSESSMENT — PAIN SCALES - GENERAL: PAINLEVEL_OUTOF10: 0

## 2021-03-01 NOTE — CONSULTS
approximately 3 years ago and is ever since been struggling with shortness of breath. Patient denies any fever at this time. He does state that he has a chronic chest tightness and shortness of breath that is unchanged currently. \"      Patient Active Problem List   Diagnosis    MRSA bacteremia    Pleural effusion, left    Leukocytosis    Illicit drug use    Hepatitis    Chest pain    Pulmonary embolism ([address]    AMS (altered mental status)    Acute respiratory failure with hypoxia (HCC)    Drug overdose    Abnormal chest x-ray    Acute encephalopathy    Disorder of electrolytes    Non-traumatic rhabdomyolysis    Elevated procalcitonin    Methamphetamine abuse (HCC)    Toxic encephalopathy    Acute hypercapnic respiratory failure (HCC)    Heroin withdrawal (HCC)    Tachycardia    Hypoxia    Acute hypoxemic respiratory failure due to COVID-19 [facility])    Acute respiratory failure with hypoxia and hypercapnia ([facility])    Pneumonia due to COVID-19 virus    Thrombocytopenia (HCC)    Elevated LFTs    COVID-19    Acute metabolic encephalopathy    Moderate protein-calorie malnutrition (HCC)    Pneumonia due to organism    Sepsis ([address]    Pulmonary infiltrate    Bacteremia due to Staphylococcus aureus    Delirium    Hyperglycemia    IV drug abuse (HCC)    Tricuspid regurgitation    PFO (patent foramen ovale)    Lung nodule    Centrilobular emphysema (HCC)    Hilar adenopathy    Personal history of covid-19    Moderate to severe pulmonary hypertension ([address]    Former smoker    Alcohol use    Acute bronchitis    Hematuria    IRMA (acute kidney injury) ([address]    Hypokalemia         Cardiac Testing: I have reviewed the findings below.   EKG:  ECHO:   STRESS TEST:  CATH:  BYPASS:  VASCULAR:    Past Medical History:   has a past medical history of IRMA (acute kidney injury) ([address] Centrilobular emphysema ([address] COVID-19, EtOH dependence ([address] H/O brain surgery, Hepatitis C antibody test positive, History of surgery, Intravenous drug abuse ([facility] Methamphetamine abuse ([facility] MRSA (methicillin resistant staph aureus) culture positive, Paralysis of upper limb ([facility] PE (pulmonary thromboembolism) ([facility] and Pneumonia. Surgical History:   has a past surgical history that includes brain surgery (01/01/1986); fracture surgery; brain surgery; Leg Surgery; transesophageal echocardiogram (02/03/2021); bronchoscopy (N/A, 2/4/2021); and bronchoscopy (2/4/2021). Social History:   reports that he quit smoking about 5 years ago. His smoking use included cigarettes. He has a 60.00 pack-year smoking history. His smokeless tobacco use includes chew. He reports current alcohol use of about 24.0 standard drinks of alcohol per week. He reports previous drug use. Drug: IV. Family History:  No evidence for sudden cardiac death or premature CAD    Medications:  Reviewed and are listed in nursing record. and/or listed below  Outpatient Medications:  Prior to Admission medications    Medication Sig Start Date End Date Taking? Authorizing Provider   albuterol sulfate  (90 Base) MCG/ACT inhaler Inhale 2 puffs into the lungs every 4 hours as needed for Wheezing or Shortness of Breath 11/21/20 1/31/21  Mimi Velázquez MD       In-patient schedule medications:   sodium chloride flush  10 mL Intravenous 2 times per day    methylPREDNISolone  40 mg Intravenous Q12H    doxycycline hyclate  100 mg Oral 2 times per day    sodium chloride  1,000 mL Intravenous Once         Infusion Medications:   sodium chloride 75 mL/hr at 03/01/21 0220         Allergies:  Patient has no known allergies. Review of Systems:   14 point review of systems unable to be completed due to patient condition/cooperation. All available positives mentioned in history of present illness.          Physical Examination:    [unfilled]  /78   Pulse 79   Temp 97.4 °F (36.3 °C) (Oral)   Resp 16   Wt 103 lb 9.9 oz (47 kg)   SpO2 99% BMI 20.24 kg/m²    Weight: 103 lb 9.9 oz (47 kg)     Wt Readings from Last 3 Encounters:   02/27/21 103 lb 9.9 oz (47 kg)   02/01/21 90 lb 8 oz (41.1 kg)   01/31/21 100 lb (45.4 kg)       Intake/Output Summary (Last 24 hours) at 3/1/2021 1041  Last data filed at 3/1/2021 0043  Gross per 24 hour   Intake 1800 ml   Output 450 ml   Net 1350 ml       General Appearance:  Alert x 3, cooperative, no distress, appears older than stated age   Head:  Normocephalic, without obvious abnormality, atraumatic   Eyes:  PERRL, conjunctiva/corneas clear       Nose: Nares normal, no drainage or sinus tenderness   Throat: Lips, mucosa, and tongue normal   Neck: Supple, symmetrical, trachea midline, no adenopathy, thyroid: not enlarged, symmetric, no tenderness/mass/nodules, no carotid bruit or JVD       Lungs:   Reduced to auscultation bilaterally, respirations not labored   Chest Wall:  No tenderness or deformity   Heart:  Regular rhythm and normal rate; S1, S2 are normal; no murmur noted; no rub or gallop   Abdomen:   Soft, non-tender, bowel sounds active all four quadrants,  no masses, no organomegaly           Extremities: Extremities normal, atraumatic, no cyanosis or edema   Pulses: 2+ and symmetric   Skin: Skin color, texture, turgor normal, no rashes or lesions   Pysch: Normal mood and affect   Neurologic: Normal gross motor and sensory exam.          Labs  Recent Labs     02/28/21  1038 03/01/21  0707   WBC 3.9* 8.3   HGB 10.3* 10.4*   HCT 31.3* 31.5*   MCV 82.7 83.6    243     Recent Labs     02/28/21  1038 03/01/21  0707   CREATININE 1.6* 1.5*   BUN 23* 30*    141   K 3.7 4.6    108   CO2 27 28     Recent Labs     02/27/21  1618 02/27/21  2206   INR 1.03 1.05   PROTIME 11.9 12.2     Recent Labs     02/27/21  1610   TROPONINI <0.01     Invalid input(s): PRO-BNP  No results for input(s): CHOL, HDL in the last 72 hours.     Invalid input(s): LDL, TG      Imaging:  I have reviewed the below testing personally and my interpretation is below. EKG:   Normal sinus rhythmNormal ECGConfirmed by Amalia Barcenas MD, Alannah Bolaños ([id number]) on 2/28/2021 12:16:41 PM    CXR:      Assessment:  48 y.o. patient with:   Tricuspid valvular regurgitation    Acute respiratory failure with hypoxia (HCC)    IV drug abuse (HCC)    Centrilobular emphysema (HCC)    Moderate to severe pulmonary hypertension (HCC)    IRMA (acute kidney injury) ([facility]    Plan:  1. The patient's tricuspid valvular regurgitation history is chronic and currently noncontributory to his hospitalization. 2.  The etiology of his tricuspid valvular regurgitation has been secondary to intravenous drug abuse and subsequent valvular degeneration. 3.  No further cardiovascular testing is necessary at this time  4. It appears that the patient's lung pathology is more of a concern. 5.  Discussed abstinence from drug use. 6.  Urology will sign off. All questions and concerns were addressed to the patient/family. Alternatives to my treatment were discussed. The note was completed using EMR. Every effort was made to ensure accuracy; however, inadvertent computerized transcription errors may be present.     Carol Orellana MD, [id number], [place], [place]  [phone] [place] office  [phone] Main central  3/1/2021  10:41 AM

## 2021-03-01 NOTE — PROGRESS NOTES
Hospitalist Progress Note      PCP: No primary care provider on file. Date of Admission: 2/27/2021    Chief Complaint:  hematuria      Subjective:   He feels OK in general.  Throat sore after bronch. Medications:  Reviewed    Infusion Medications    sodium chloride 75 mL/hr at 03/01/21 0220     Scheduled Medications    sodium chloride flush  10 mL Intravenous 2 times per day    methylPREDNISolone  40 mg Intravenous Q12H    doxycycline hyclate  100 mg Oral 2 times per day    sodium chloride  1,000 mL Intravenous Once     PRN Meds: sodium chloride flush, promethazine **OR** ondansetron, polyethylene glycol, acetaminophen **OR** acetaminophen, ipratropium-albuterol      Intake/Output Summary (Last 24 hours) at 3/1/2021 1723  Last data filed at 3/1/2021 1544  Gross per 24 hour   Intake 1440 ml   Output 300 ml   Net 1140 ml       Physical Exam Performed:    /64   Pulse 68   Temp 97.4 °F (36.3 °C) (Temporal)   Resp 16   Ht 5' (1.524 m)   Wt 100 lb (45.4 kg)   SpO2 98%   BMI 19.53 kg/m²       General appearance: No apparent distress, appears stated age and cooperative. HEENT: Pupils equal, round, and reactive to light. Conjunctivae/corneas clear. Neck: Supple, with full range of motion. No jugular venous distention. Trachea midline. Respiratory:  Normal respiratory effort. Clear to auscultation, bilaterally without Rales/Wheezes/Rhonchi. Cardiovascular: Regular rate and rhythm with normal S1/S2 without murmurs, rubs or gallops. Abdomen: Soft, non-tender, non-distended with normal bowel sounds. Musculoskeletal: No clubbing, cyanosis. RUE with previous trauma and immobility, muscle wasting. Skin: Skin color, texture, turgor normal.  No rashes or lesions. Neurologic:  Neurovascularly intact without any focal sensory/motor deficits.  Cranial nerves: II-XII intact, grossly non-focal.  Psychiatric: Alert and oriented, thought content appropriate, normal insight  Capillary Refill: Brisk,< 3 seconds   Peripheral Pulses: +2 palpable, equal bilaterally       Labs:   Recent Labs     02/27/21  1610 02/28/21  1038 03/01/21  0707   WBC 5.9 3.9* 8.3   HGB 11.2* 10.3* 10.4*   HCT 34.2* 31.3* 31.5*    171 243     Recent Labs     02/27/21  1610 02/28/21  1038 03/01/21  0707    137 141   K 2.9* 3.7 4.6   CL 96* 101 108   CO2 33* 27 28   BUN 17 23* 30*   CREATININE 1.6* 1.6* 1.5*   CALCIUM 8.7 8.9 8.7     Recent Labs     02/27/21  1610   AST 17   ALT 8*   BILITOT 0.5   ALKPHOS 84     Recent Labs     02/27/21  1618 02/27/21  2206   INR 1.03 1.05     Recent Labs     02/27/21  1610   TROPONINI <0.01       Urinalysis:      Lab Results   Component Value Date    NITRU see below 02/27/2021    WBCUA see below 02/27/2021    BACTERIA 4+ 02/27/2021    RBCUA >100 02/27/2021    BLOODU see below 02/27/2021    SPECGRAV see below 02/27/2021    GLUCOSEU see below 02/27/2021       Radiology:  XR CHEST PA LATERAL W FLUOROSCOPY   Final Result   1. See above. FLUORO FOR SURGICAL PROCEDURES   Final Result   1. See above. CT CHEST PULMONARY EMBOLISM W CONTRAST   Final Result   1. Artifact degraded evaluation of the pulmonary arteries. No acute   pulmonary embolism to the segmental arteries given limitation. 2. Progression of disease. Increased size of right lower lobe nodule and   bone metastases. Soft tissue in the anterior mediastinum and anterior to the   right middle lobe likely represents additional metastases. Follow-up   recommended. 3. Questionable mass at the base of the tongue. Correlate with history. 4.  Other findings as described. XR CHEST PORTABLE   Final Result   Chronic findings in the chest without acute airspace disease identified. Scar in the right lung apex again noted.   The opacities recently described in   the right middle lobe and right lower lobe are not delineated on this exam.                 Assessment/Plan:    Active Hospital Problems    Diagnosis    Acute bronchitis [J20.9]    Hematuria [R31.9]    IRMA (acute [facility] injury) ([facility] [N17.9]    Hypokalemia [E87.6]    Centrilobular emphysema ([facility] [J43.2]    Moderate to severe pulmonary hypertension ([facility] [I27.20]    [facility] [F19.10]    Acute respiratory failure with hypoxia ([facility] [J96.01]       \"46 year old man with PMH of IVDU with heroin and meth, chronic hep C, tricuspid regurg, lung mass, COPD, alcohol dependence presented to the ED today with complaints of hematuria and shortness of breath. Patient apparently left AMA on 2/4 from [facility]. He was being worked up for his tricuspid regurgitation and lung mass. Patient reports ever since he left the hospital, he has been short of breath particularly worse with exertion. He reports some leg swelling that is dependent type. Denies any chest pain. Denies subjective fevers chills or rigors. No cough. Continues to inject drugs into his right leg almost daily. Patient reports 2 to 3 weeks history of dark red-colored urine. Denies any dysuria, urgency or frequency or difficulty urinating. \"      AECOPD, with severe pulmonary HTN and RV dysfunction  - steroids, inhaled bronchodilators. Doxycycline was started on admission. Suspected metastatic lung cancer. 4.8 cm RML mass with many thoracic soft tissue and bone mets. EBUS 3/1, f/u path. Acute hypoxic respiratory failure. Due to above issues. Wean to RA as tolerated. The patient has no supplemental O2 requirement at baseline. IRMA. Baseline Cr normal.  Monitor response to IVFs. Hematuria. Urology planning eventual cystoscopy, signed off. Negative urine culture. F/u ANCA from 3/1. Severe TV regurgitation. Cardiology determined that no further workup was indicated and signed off. IVDA. COWS. Encouraged cessation. Posterior tongue mass suggested on CT, now ruled out. ENT consulted, laryngoscopy negative, no further workup anticipated.       DVT Prophylaxis: SCDs Diet: DIET GENERAL;  Code Status: Full Code    PT/OT Eval Status: eval ordered    Dispo - when hematuria resolves, Cr improves, and when the cancer workup can transition to outpatient. Perhaps 3/3. He lives in his truck.       Papito Wilkins MD

## 2021-03-01 NOTE — ANESTHESIA PRE PROCEDURE
Department of Anesthesiology  Preprocedure Note       Name:  Cassandra Garcia   Age:  48 y.o.  :  1970                                          MRN:  [id number]         Date:  3/1/2021      Surgeon: Sarah Santiago):  Nate Hughes MD    Procedure: Procedure(s):  EBUS BRONCHOSCOPY ENDOBRONCHIAL ULTRASOUND -may beed raidal probe. - [facility]; pathology approved with [place]    Medications prior to admission:   Prior to Admission medications    Medication Sig Start Date End Date Taking?  Authorizing Provider   albuterol sulfate  (90 Base) MCG/ACT inhaler Inhale 2 puffs into the lungs every 4 hours as needed for Wheezing or Shortness of Breath 20  Augusto Murphy MD       Current medications:    Current Facility-Administered Medications   Medication Dose Route Frequency Provider Last Rate Last Admin    sodium chloride flush 0.9 % injection 10 mL  10 mL Intravenous 2 times per day Timothy Alvarado MD   10 mL at 21    sodium chloride flush 0.9 % injection 10 mL  10 mL Intravenous PRN Timothy Alvarado MD        promethazine (PHENERGAN) tablet 12.5 mg  12.5 mg Oral Q6H PRN Timothy Alvarado MD        Or    ondansetron (ZOFRAN) injection 4 mg  4 mg Intravenous Q6H PRN Timothy Alvarado MD        polyethylene glycol (GLYCOLAX) packet 17 g  17 g Oral Daily PRN Timothy Alvarado MD        acetaminophen (TYLENOL) tablet 650 mg  650 mg Oral Q6H PRN Timothy Alvarado MD        Or    acetaminophen (TYLENOL) suppository 650 mg  650 mg Rectal Q6H PRN Timothy Alvarado MD        0.9 % sodium chloride infusion   Intravenous Continuous Timothy Alvarado MD 75 mL/hr at 21 0220 New Bag at 21 0220    methylPREDNISolone sodium (SOLU-MEDROL) injection 40 mg  40 mg Intravenous Q12H Timothy Alvarado MD   40 mg at 21 0536    doxycycline hyclate (VIBRA-TABS) tablet 100 mg  100 mg Oral 2 times per day Timothy Alvarado MD   100 mg at 03/01/21 0834    0.9 % sodium chloride bolus  1,000 mL Intravenous Once Eustacio Leventhal, MD        ipratropium-albuterol (DUONEB) nebulizer solution 1 ampule  1 ampule Inhalation Q4H PRN Herber Lala MD           Allergies:  No Known Allergies    Problem List:    Patient Active Problem List   Diagnosis Code    MRSA bacteremia R78.81, B95.62    Pleural effusion, left J90    Leukocytosis K17.701    Illicit drug use X77.52    Hepatitis K75.9    Chest pain R07.9    Pulmonary embolism (HCC) I26.99    AMS (altered mental status) R41.82    Acute respiratory failure with hypoxia (HCC) J96.01    Drug overdose T50.901A    Abnormal chest x-ray R93.89    Acute encephalopathy G93.40    Disorder of electrolytes E87.8    Non-traumatic rhabdomyolysis M62.82    Elevated procalcitonin R79.89    Methamphetamine abuse (HCC) F15.10    Toxic encephalopathy G92    Acute hypercapnic respiratory failure (HCC) J96.02    [facility] withdrawal [facility] F11.23    Tachycardia R00.0    Hypoxia R09.02    Acute hypoxemic respiratory failure due to COVID-19 (HCC) U07.1, J96.01    Acute respiratory failure with hypoxia and hypercapnia (HCC) J96.01, J96.02    Pneumonia due to COVID-19 virus U07.1, J12.82    Thrombocytopenia (HCC) D69.6    Elevated LFTs R79.89    COVID-19 U07.1    Acute metabolic encephalopathy F88.10    Moderate protein-calorie malnutrition (HCC) E44.0    Pneumonia due to organism J18.9    Sepsis (HCC) A41.9    Pulmonary infiltrate R91.8    Bacteremia due to Staphylococcus aureus R78.81, B95.61    Delirium R41.0    Hyperglycemia R73.9    IV drug abuse (HCC) F19.10    Tricuspid regurgitation I07.1    PFO (patent foramen ovale) Q21.1    Lung nodule R91.1    Centrilobular emphysema (HCC) J43.2    Hilar adenopathy R59.0    Personal history of covid-19 Z86.16    Moderate to severe pulmonary hypertension ([address] I27.20    Former smoker Z87.891    Alcohol use Z72.89    Acute bronchitis J20.9    Hematuria R31.9    IRMA (acute kidney injury) ([facility] N17.9    Hypokalemia E87.6       Past Medical History:        Diagnosis Date    IRMA (acute kidney injury) ([facility] 2021    [facility]    COVID-19 2020    EtOH dependence ([facility]     H/O brain surgery     Hepatitis C antibody test positive 2014    History of surgery     L leg surgery    Intravenous drug abuse ([facility]     [facility]     MRSA (methicillin resistant staph aureus) culture positive 2014    blood cx    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    PE [facility]pulmonary thromboembolism) [facility]     Pneumonia        Past Surgical History:        Procedure Laterality Date    BRAIN SURGERY  1986    s/p trauma    BRAIN SURGERY      BRONCHOSCOPY N/A 2021    BRONCHOSCOPY ALVEOLAR LAVAGE performed by Jose Francisco Dickson MD at [address] [place] [address]  2021    BRONCHOSCOPY THERAPUTIC ASPIRATION INITIAL performed by Jose Francisco Dickson MD at [place] [address]      LEG SURGERY      TRANSESOPHAGEAL ECHOCARDIOGRAM  2021    Dr Alphia Frankel       Social History:    Social History     Tobacco Use    Smoking status: Former Smoker     Packs/day: 2.00     Years: 30.00     Pack years: 60.00     Types: Cigarettes     Quit date: 2016     Years since quittin.0    Smokeless tobacco: Current User     Types: Chew    Tobacco comment: states 5 yrs ago   Substance Use Topics    Alcohol use:  Yes     Alcohol/week: 24.0 standard drinks     Types: 24 Cans of beer per week                                Ready to quit: Not Answered  Counseling given: Not Answered  Comment: states 5 yrs ago      Vital Signs (Current):   Vitals:    21 2235 21 0215 21 0745 21 1135   BP: 104/71 106/71 114/78 123/86   Pulse: 101 84 79 72   Resp: 18 20 16 18   Temp: 97.5 °F (36.4 °C) 97.6 °F (36.4 °C) 97.4 °F (36.3 °C) 98.2 °F (36.8 °C)   TempSrc: Oral Oral Oral Temporal   SpO2: 97% 96% 99% 100%   Weight:    100 lb (45.4 kg)   Height:    5' (1.524 m)                                              BP Readings from Last 3 Encounters:   03/01/21 123/86   02/04/21 100/74   02/03/21 87/61       NPO Status: Time of last liquid consumption: 2200                        Time of last solid consumption: 2200                        Date of last liquid consumption: 02/28/21                        Date of last solid food consumption: 02/28/21    BMI:   Wt Readings from Last 3 Encounters:   03/01/21 100 lb (45.4 kg)   02/01/21 90 lb 8 oz (41.1 kg)   01/31/21 100 lb (45.4 kg)     Body mass index is 19.53 kg/m². CBC:   Lab Results   Component Value Date    WBC 8.3 03/01/2021    RBC 3.77 03/01/2021    HGB 10.4 03/01/2021    HCT 31.5 03/01/2021    MCV 83.6 03/01/2021    RDW 14.2 03/01/2021     03/01/2021       CMP:   Lab Results   Component Value Date     03/01/2021    K 4.6 03/01/2021     03/01/2021    CO2 28 03/01/2021    BUN 30 03/01/2021    CREATININE 1.5 03/01/2021    GFRAA 60 03/01/2021    AGRATIO 1.0 02/27/2021    LABGLOM 49 03/01/2021    GLUCOSE 146 03/01/2021    PROT 7.5 02/27/2021    CALCIUM 8.7 03/01/2021    BILITOT 0.5 02/27/2021    ALKPHOS 84 02/27/2021    AST 17 02/27/2021    ALT 8 02/27/2021       POC Tests: No results for input(s): POCGLU, POCNA, POCK, POCCL, POCBUN, POCHEMO, POCHCT in the last 72 hours.     Coags:   Lab Results   Component Value Date    PROTIME 12.2 02/27/2021    INR 1.05 02/27/2021    APTT 27.3 02/27/2021       HCG (If Applicable): No results found for: PREGTESTUR, PREGSERUM, HCG, HCGQUANT     ABGs:   Lab Results   Component Value Date    PHART 7.451 12/08/2020    PO2ART 82.7 12/08/2020    HZO7TNB 36.1 12/08/2020    OCL0PPY 24.6 12/08/2020    BEART 1.0 12/08/2020    L1PVMVBY 96.6 12/08/2020        Type & Screen (If Applicable):  No results found for: LABABO, LABRH    Drug/Infectious Status (If Applicable):  No results found for: HIV, HEPCAB    COVID-19 Screening (If Applicable):   Lab Results   Component Value Date    COVID19 Not Detected 03/01/2021    COVID19 Not Detected 07/04/2020         Anesthesia Evaluation    Airway: Mallampati: II  TM distance: <3 FB   Neck ROM: full  Mouth opening: > = 3 FB Dental:          Pulmonary:   (+) pneumonia:  COPD:  shortness of breath: chronic,                             Cardiovascular:    (+) valvular problems/murmurs (TR):, pulmonary hypertension:,                   Neuro/Psych:   (+) neuromuscular disease (right arm weakness s/p brain surgery):, psychiatric history:depression/anxiety             GI/Hepatic/Renal:   (+) hepatitis:, liver disease:,          ROS comment: IVDA, EtOH abuse. Endo/Other: Negative Endo/Other ROS                    Abdominal:           Vascular: negative vascular ROS. Anesthesia Plan      general     ASA 4     (Pt agrees to risks, benefits and alternatives of GETA. Potential need for prolonged ventilation post procedure or even re intubation if he fails after being extubated. Questions answered. Willing to proceed with plan.)  Induction: intravenous. Anesthetic plan and risks discussed with patient.                       Mika Strickland MD   3/1/2021

## 2021-03-01 NOTE — PROGRESS NOTES
Urology Attending Progress Note      Subjective: Patient states \"ok\"    50 y.o.male with gross hematuria x 3-4 weeks. No difficulty with urination, denies clots, previous Gu complaints. Tobacco and IV drug use history. Suspected metastatic lung cancer. Vitals:  /78   Pulse 79   Temp 97.4 °F (36.3 °C) (Oral)   Resp 16   Wt 103 lb 9.9 oz (47 kg)   SpO2 99%   BMI 20.24 kg/m²   Temp  Av.5 °F (36.4 °C)  Min: 97.3 °F (36.3 °C)  Max: 97.6 °F (36.4 °C)    Intake/Output Summary (Last 24 hours) at 3/1/2021 9089  Last data filed at 3/1/2021 0043  Gross per 24 hour   Intake 1800 ml   Output 450 ml   Net 1350 ml       Exam:    Well developed, well nourished in no acute distress   Orientated to time and place   Neck is supple, trachea is midline   Respiratory effort is normal without distress   Cardiovascular show no extremity swelling   Abdomen soft, nontender, nondistended, no guarding. No masses or hernias are palpated.     Skin warm and dry, exposed skin shows no abnormal lesions, rashes   Musculoskeletal no digital cyanosis, head normocephalic   Psych shows normal mood and affect, alert and appropriately answers questions    Labs:  WBC:    Lab Results   Component Value Date    WBC 8.3 2021     Hemoglobin/Hematocrit:    Lab Results   Component Value Date    HGB 10.4 2021    HCT 31.5 2021     BMP:    Lab Results   Component Value Date     2021    K 4.6 2021     2021    CO2 28 2021    BUN 30 2021    LABALBU 3.8 2021    CREATININE 1.5 2021    CALCIUM 8.7 2021    GFRAA 60 2021    LABGLOM 49 2021     PT/INR:    Lab Results   Component Value Date    PROTIME 12.2 2021    INR 1.05 2021     PTT:    Lab Results   Component Value Date    APTT 27.3 2021   [APTT    Urine Culture:  No growth at 18 to 24 hours        Impression/Plan:   50 y.o.male pmh of IV drug and tobacco use, suspected metastatic

## 2021-03-01 NOTE — CONSULTS
Nickolas      Patient Name: [address] Record Number:  [id number]  Primary Care Physician:  No primary care provider on file. Date of Consultation: 3/1/2021    Chief Complaint:   Chief Complaint   Patient presents with    Shortness of Breath     x1year    Hematuria     3-4 weeks        HISTORY OF PRESENT ILLNESS  Juan Luis Clemente is a(n) 48 y.o. male who presented to the ED with shortness of breath for approximately 1 year and hematuria for 3 to 4 weeks. He is a known intravenous drug user with heroin and meth and has hepatitis C tricuspid regurgitation, COPD, and a lung mass. ENT was consulted as he had a CT chest which showed the lung mass had grown, it also showed a possible tongue base lesion. The patient denies any difficulty with swallowing or speaking. He denies any recent weight gain or weight loss. He feels well from a head and neck standpoint without any complaints. He is only here because of the discolored urine and shortness of breath. He stopped smoking approximately 8 years ago. He has approximately a 20-30-pack-year history. Denies any change in voice.       Patient Active Problem List   Diagnosis    MRSA bacteremia    Pleural effusion, left    Leukocytosis    Illicit drug use    Hepatitis    Chest pain    Pulmonary embolism (HCC)    AMS (altered mental status)    Acute respiratory failure with hypoxia (HCC)    Drug overdose    Abnormal chest x-ray    Acute encephalopathy    Disorder of electrolytes    Non-traumatic rhabdomyolysis    Elevated procalcitonin    Methamphetamine abuse (HCC)    Toxic encephalopathy    Acute hypercapnic respiratory failure (HCC)    Heroin withdrawal (HCC)    Tachycardia    Hypoxia    Acute hypoxemic respiratory failure due to COVID-19 [facility])    Acute respiratory failure with hypoxia and hypercapnia (HCC)    Pneumonia due to COVID-19 virus    Thrombocytopenia (HCC)    Elevated Alcohol/week: 24.0 standard drinks     Types: 24 Cans of beer per week    Drug use: Not Currently     Types: IV     Comment: 5-6 months ago opiates, per pt    Sexual activity: Yes     Partners: Female   Lifestyle    Physical activity     Days per week: Not on file     Minutes per session: Not on file    Stress: Not on file   Relationships    Social connections     Talks on phone: Not on file     Gets together: Not on file     Attends [employer] service: Not on file     Active member of club or organization: Not on file     Attends meetings of clubs or organizations: Not on file     Relationship status: Not on file    Intimate partner violence     Fear of current or ex partner: Not on file     Emotionally abused: Not on file     Physically abused: Not on file     Forced sexual activity: Not on file   Other Topics Concern    Not on file   Social History Narrative    ** Merged History Encounter **            DRUG/FOOD ALLERGIES: Patient has no known allergies. CURRENT MEDICATIONS  Prior to Admission medications    Medication Sig Start Date End Date Taking? Authorizing Provider   albuterol sulfate  (90 Base) MCG/ACT inhaler Inhale 2 puffs into the lungs every 4 hours as needed for Wheezing or Shortness of Breath 11/21/20 1/31/21  Burak Anguiano MD       REVIEW OF SYSTEMS  The following systems were reviewed and revealed the following in addition to any already discussed in the HPI:    Review of Systems   Constitutional: Negative for fatigue and fever. HENT: Negative for congestion, ear pain, postnasal drip, rhinorrhea, sneezing, trouble swallowing and voice change. Eyes: Negative for pain and visual disturbance. Respiratory: Positive for shortness of breath. Cardiovascular: Negative for chest pain. Gastrointestinal: Negative for nausea and vomiting. Endocrine: Negative. Musculoskeletal: Negative for neck pain and neck stiffness. Skin: Negative for rash.    Neurological: Negative for dizziness and headaches. PHYSICAL EXAM  /78   Pulse 79   Temp 97.4 °F (36.3 °C) (Oral)   Resp 16   Wt 103 lb 9.9 oz (47 kg)   SpO2 99%   BMI 20.24 kg/m²     GENERAL: No Acute Distress, Alert and Oriented, no hoarseness  EYES: EOMI, Anti-icteric  NOSE: No epistaxis, nasal mucosa within normal limits, no purulent drainage  EARS: Normal external canal appearance, EAC patent bilaterally  FACE: 1/6 House-Brackmann Scale, symmetric, sensation equal bilaterally  ORAL CAVITY: No masses or lesions palpated, uvula is midline, moist mucous membranes, poor dentition with many absent teeth several retained inferiorly, tongue protrudes midline with no abnormalities on the mucosa or by palpation  NECK: Normal range of motion, no thyromegaly, trachea is midline, no lymphadenopathy, no neck masses, no crepitus  CHEST: Normal respiratory effort, no retractions, breathing comfortably  SKIN: No rashes, normal appearing skin, no evidence of skin lesions/tumors    RADIOLOGY  Summary of findings:  CT chest pulmonary embolism with contrast from February 27, 2021  .  Artifact degraded evaluation of the pulmonary arteries.  No acute   pulmonary embolism to the segmental arteries given limitation. 2. Progression of disease.  Increased size of right lower lobe nodule and   bone metastases.  Soft tissue in the anterior mediastinum and anterior to the   right middle lobe likely represents additional metastases.  Follow-up   recommended. 3. Questionable mass at the base of the tongue.  Correlate with history. 4.  Other findings as described. PROCEDURE  Flexible Laryngoscopy    Pre op: Tongue base lesion. Post op: Tongue base lesion  Procedure : Flexible Laryngoscopy  Surgeon: Eliot Vazquez, DO  Anesthesia: Afrin with 4% lidocaine  Indication: Laryngeal mirror examination was not tolerated due to gag reflex  Description:  The scope was passed along the floor of the right naris to the level of the larynx.  There was no evidence of concerning masses or lesions of the base of tongue, vallecula, epiglottis, aryepiglottic folds, arytenoids, false vocal folds, true vocal folds, or pyriform sinuses. True vocal folds exhibited symmetric motion bilaterally without evidence of paralysis or paresis. The scope was removed. The patient tolerated the procedure without difficulty. There were no complications. Pertinent findings: No evidence of any mucosal abnormalities of the right tongue base or tonsil area, patent airway      ASSESSMENT/PLAN  Rene Otero is a very pleasant 48 y.o. male with dense with hematuria and shortness of breath. ENT was consulted as there was concern for questionable base of tongue lesion on his CT chest.  Physical exam did not reveal any abnormalities in his oral cavity or upper airway. He also does not have any symptoms or complaints. I would consider CT scan designated for his head and neck; however, I do not feel it is warranted at this time as he has no symptoms and physical exam is normal.    Medical Decision Making:   The following items were considered in medical decision making:  Independent review of images  Review / order clinical lab tests  Review / order radiology tests  Decision to obtain old records

## 2021-03-01 NOTE — CARE COORDINATION
Chart review completed by . Triggered by less than 30 day readmission. Previous admit 2/1-2/4 for tricuspid regurgitation and pulmonary mass. Pt signed out [facility] on 2/4. Per ED provider documentation, pt presents to ER this admission with worsening hematuria over \"the last 3 weeks\". Pt case complicated d/t h/o IVDU and new cancer. Multiple speciality teams following - cardiology, otolaryngology, pulmonology and urology. Per CM assessment last admission, pt declined substance abuse resources. In addition, pt stated he was \"homeless\" at that time and \"living out of truck\". Cm team will follow up with patient today regarding potential needs for shelter and/or substance abuse resources.

## 2021-03-02 LAB
ANION GAP SERPL CALCULATED.3IONS-SCNC: 8 MMOL/L (ref 3–16)
BASOPHILS ABSOLUTE: 0.1 K/UL (ref 0–0.2)
BASOPHILS RELATIVE PERCENT: 0.6 %
BUN BLDV-MCNC: 32 MG/DL (ref 7–20)
CALCIUM SERPL-MCNC: 8.7 MG/DL (ref 8.3–10.6)
CHLORIDE BLD-SCNC: 107 MMOL/L (ref 99–110)
CO2: 24 MMOL/L (ref 21–32)
CREAT SERPL-MCNC: 1.4 MG/DL (ref 0.9–1.3)
EOSINOPHILS ABSOLUTE: 0 K/UL (ref 0–0.6)
EOSINOPHILS RELATIVE PERCENT: 0.1 %
GFR AFRICAN AMERICAN: >60
GFR NON-AFRICAN AMERICAN: 54
GLUCOSE BLD-MCNC: 102 MG/DL (ref 70–99)
HCT VFR BLD CALC: 35.1 % (ref 40.5–52.5)
HEMOGLOBIN: 10.8 G/DL (ref 13.5–17.5)
LYMPHOCYTES ABSOLUTE: 1.4 K/UL (ref 1–5.1)
LYMPHOCYTES RELATIVE PERCENT: 13.8 %
MCH RBC QN AUTO: 26.9 PG (ref 26–34)
MCHC RBC AUTO-ENTMCNC: 30.9 G/DL (ref 31–36)
MCV RBC AUTO: 87.2 FL (ref 80–100)
MONOCYTES ABSOLUTE: 0.5 K/UL (ref 0–1.3)
MONOCYTES RELATIVE PERCENT: 4.6 %
NEUTROPHILS ABSOLUTE: 8.3 K/UL (ref 1.7–7.7)
NEUTROPHILS RELATIVE PERCENT: 80.9 %
PDW BLD-RTO: 14.7 % (ref 12.4–15.4)
PLATELET # BLD: ABNORMAL K/UL (ref 135–450)
PMV BLD AUTO: ABNORMAL FL (ref 5–10.5)
POTASSIUM REFLEX MAGNESIUM: 4.7 MMOL/L (ref 3.5–5.1)
RBC # BLD: 4.02 M/UL (ref 4.2–5.9)
SODIUM BLD-SCNC: 139 MMOL/L (ref 136–145)
WBC # BLD: 10.2 K/UL (ref 4–11)

## 2021-03-02 PROCEDURE — 85025 COMPLETE CBC W/AUTO DIFF WBC: CPT

## 2021-03-02 PROCEDURE — 97161 PT EVAL LOW COMPLEX 20 MIN: CPT

## 2021-03-02 PROCEDURE — 6370000000 HC RX 637 (ALT 250 FOR IP): Performed by: INTERNAL MEDICINE

## 2021-03-02 PROCEDURE — 97165 OT EVAL LOW COMPLEX 30 MIN: CPT

## 2021-03-02 PROCEDURE — 1200000000 HC SEMI PRIVATE

## 2021-03-02 PROCEDURE — 80048 BASIC METABOLIC PNL TOTAL CA: CPT

## 2021-03-02 PROCEDURE — 94761 N-INVAS EAR/PLS OXIMETRY MLT: CPT

## 2021-03-02 PROCEDURE — 36415 COLL VENOUS BLD VENIPUNCTURE: CPT

## 2021-03-02 PROCEDURE — 6370000000 HC RX 637 (ALT 250 FOR IP): Performed by: NURSE PRACTITIONER

## 2021-03-02 PROCEDURE — 99232 SBSQ HOSP IP/OBS MODERATE 35: CPT | Performed by: INTERNAL MEDICINE

## 2021-03-02 PROCEDURE — 2700000000 HC OXYGEN THERAPY PER DAY

## 2021-03-02 RX ORDER — LORAZEPAM 1 MG/1
1 TABLET ORAL ONCE
Status: COMPLETED | OUTPATIENT
Start: 2021-03-02 | End: 2021-03-02

## 2021-03-02 RX ADMIN — DOXYCYCLINE HYCLATE 100 MG: 100 TABLET, COATED ORAL at 08:35

## 2021-03-02 RX ADMIN — DOXYCYCLINE HYCLATE 100 MG: 100 TABLET, COATED ORAL at 21:03

## 2021-03-02 RX ADMIN — PROMETHAZINE HYDROCHLORIDE 12.5 MG: 25 TABLET ORAL at 09:59

## 2021-03-02 RX ADMIN — PROMETHAZINE HYDROCHLORIDE 12.5 MG: 25 TABLET ORAL at 17:54

## 2021-03-02 RX ADMIN — LORAZEPAM 1 MG: 1 TABLET ORAL at 23:33

## 2021-03-02 NOTE — PROGRESS NOTES
Occupational Therapy   Occupational Therapy Initial /dischargeAssessment  1 x only    Date: 3/2/2021   Patient Name: Ghazala Joseph  MRN: [id number]     : 1970    Date of Service: 3/2/2021    Assessment      OT Education: OT Role  No Skilled OT: At baseline function  REQUIRES OT FOLLOW UP: No  Activity Tolerance  Activity Tolerance: Treatment limited secondary to medical complications (free text)(SOB at rest)  Activity Tolerance: high barboza;s:  BP = 126/86, HR = 74, 98% on 1 L O 2; sitting EOB after bathroom mobility: 97% O 2 sats on 1 L O , HR = 89, with repetitive sit<-->stand 92 % O 2 sats on 1 L O 2, HR= 104, rebounds to HR = 89 & 97 % O 2 sats on 1 L O 2;  Safety Devices  Safety Devices in place: Yes  Type of devices: Call light within reach; Left in bed;Nurse notified           Patient Diagnosis(es): The primary encounter diagnosis was Acute respiratory failure with hypoxia ([facility] [address] Diagnoses of Lung nodule, Bone metastases ([address] Hematuria, unspecified type, and Hypokalemia were also pertinent to this visit. has a past medical history of IRMA (acute kidney injury) ([address] Centrilobular emphysema ([address] COVID-19, EtOH dependence ([address] H/O brain surgery, Hepatitis C antibody test positive, History of surgery, Intravenous drug abuse ([address] Methamphetamine abuse ([address] MRSA (methicillin resistant staph aureus) culture positive, Paralysis of upper limb ([address] PE (pulmonary thromboembolism) ([address] and Pneumonia. has a past surgical history that includes brain surgery (1986); fracture surgery; brain surgery; Leg Surgery; transesophageal echocardiogram (2021); bronchoscopy (N/A, 2021); and bronchoscopy (2021).            Restrictions  Restrictions/Precautions  Restrictions/Precautions: Up as Tolerated, Fall Risk  Required Braces or Orthoses?: No  Position Activity Restriction  Other position/activity restrictions: 1 L O2    Subjective   General  Chart Reviewed: Yes  Patient assessed for Group Co-treatment   Time In 0820         Time Out 0834         Minutes [address], [place]
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
## 2021-03-02 NOTE — PROGRESS NOTES
Pema Doctor requires a cane due to impaired ambulation and for increased stability in order to participate in or complete daily living tasks of: ambulating. The patient is able to safely use the cane and the functional mobility deficit can be sufficiently resolved.
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
## 2021-03-02 NOTE — PROGRESS NOTES
INPATIENT PULMONARY CRITICAL CARE PROGRESS NOTE      Reason for visit    enlarging lung mass, previous BAL cytology negative for malignancy. SUBJECTIVE: Patient underwent bronchoscopy with EBUS and the prelim needle cytology data appeared to be abnormal as per verbal from pathology but final results are pending , patient when evaluated this morning was on 1.5 L of nasal cannula oxygen with saturation of 99%, patient has some cough with expectoration which is mucoid in nature, patient does not have any chest pain;no hemoptysis, patient was afebrile and hemodynamically  maintained, patient had adeqaute urine output with cumulative fluid balance of +685 mL,no other pertinent ROS of concern            Physical Exam:  Blood pressure 116/83, pulse 80, temperature 97.8 °F (36.6 °C), temperature source Oral, resp. rate 16, height 5' (1.524 m), weight 100 lb (45.4 kg), SpO2 99 %.'     Constitutional:  No acute distress. Mild tachypnea  HENT:  Oropharynx is clear and moist. No thyromegaly. Eyes:  Conjunctivae are normal. Pupils equal, round, and reactive to light. No scleral icterus. Neck: . No tracheal deviation present. No obvious thyroid mass. Cardiovascular: Sinus rhytm  LLSB murmur   No right ventricular heave. No lower extremity edema. decreased BSI   Pulmonary/Chest: No wheezes. B/L decreased rales. Continued chest congestion Chest wall is not dull to percussion. No accessory muscle usage or stridor. Abdominal: Soft. Bowel sounds present. No distension or hernia. No tenderness. Musculoskeletal: No cyanosis. No clubbing. No obvious joint deformity. Lymphadenopathy: No cervical or supraclavicular adenopathy. Skin: Skin is warm and dry. No rash or nodules on the exposed extremities. multiple tattoos  Neurologic: alert and communicative  when seen          Results:  CBC:   Recent Labs     02/28/21  1038 03/01/21  0707 03/02/21  0745   WBC 3.9* 8.3 10.2   HGB 10.3* 10.4* 10.8*   HCT 31.3* 31.5* 35.1*   MCV 82.7 83.6 87.2    243 see below     BMP:   Recent Labs     02/28/21  1038 03/01/21  0707 03/02/21  0745    141 139   K 3.7 4.6 4.7    108 107   CO2 27 28 24   BUN 23* 30* 32*   CREATININE 1.6* 1.5* 1.4*     LIVER PROFILE:   Recent Labs     02/27/21  1610   AST 17   ALT 8*   BILITOT 0.5   ALKPHOS 84     PT/INR:   Recent Labs     02/27/21  1618 02/27/21  2206   PROTIME 11.9 12.2   INR 1.03 1.05     APTT:   Recent Labs     02/27/21  2206   APTT 27.3     UA:  Recent Labs     02/27/21  1803   COLORU RED*   PHUR see below  see below*   WBCUA see below*   RBCUA >100*   BACTERIA 4+*   CLARITYU TURBID*   SPECGRAV see below   LEUKOCYTESUR see below*   UROBILINOGEN see below*   BILIRUBINUR see below*   BLOODU see below*   GLUCOSEU see below*       Imaging:  I have reviewed radiology images personally. XR CHEST PA LATERAL W FLUOROSCOPY   Final Result   1. See above. FLUORO FOR SURGICAL PROCEDURES   Final Result   1. See above. CT CHEST PULMONARY EMBOLISM W CONTRAST   Final Result   1. Artifact degraded evaluation of the pulmonary arteries. No acute   pulmonary embolism to the segmental arteries given limitation. 2. Progression of disease. Increased size of right lower lobe nodule and   bone metastases. Soft tissue in the anterior mediastinum and anterior to the   right middle lobe likely represents additional metastases. Follow-up   recommended. 3. Questionable mass at the base of the tongue. Correlate with history. 4.  Other findings as described. XR CHEST PORTABLE   Final Result   Chronic findings in the chest without acute airspace disease identified. Scar in the right lung apex again noted. The opacities recently described in   the right middle lobe and right lower lobe are not delineated on this exam.           Results for Thad Emerson (MRN [id number]) as of 3/2/2021 17:14   Ref.  Range 2/2/2021 11:05 2/27/2021 16:10 2/28/2021 10:38 3/1/2021 07:07 3/2/2021 07:45   Sodium Latest Ref Range: 136 - 145 mmol/L 138 137 137 141 139   Potassium Latest Ref Range: 3.5 - 5.1 mmol/L 3.7 2.9 (LL) 3.7 4.6 4.7   Chloride Latest Ref Range: 99 - 110 mmol/L 101 96 (L) 101 108 107   CO2 Latest Ref Range: 21 - 32 mmol/L 32 33 (H) 27 28 24   BUN Latest Ref Range: 7 - 20 mg/dL 12 17 23 (H) 30 (H) 32 (H)   Creatinine Latest Ref Range: 0.9 - 1.3 mg/dL 0.6 (L) 1.6 (H) 1.6 (H) 1.5 (H) 1.4 (H)   Anion Gap Latest Ref Range: 3 - 16  5 8 9 5 8   GFR Non- Latest Ref Range: >60  >60 46 (A) 46 (A) 49 (A) 54 (A)   GFR  Latest Ref Range: >60  >60 56 (A) 56 (A) 60 (A) >60   Magnesium Latest Ref Range: 1.80 - 2.40 mg/dL  2.00      Glucose Latest Ref Range: 70 - 99 mg/dL 114 (H) 127 (H) 272 (H) 146 (H) 102 (H)   Calcium Latest Ref Range: 8.3 - 10.6 mg/dL 8.6 8.7 8.9 8.7 8.7   Total Protein Latest Ref Range: 6.4 - 8.2 g/dL  7.5          Results for [facility] (MRN [id number]) as of 3/2/2021 17:14   Ref. Range 2/27/2021 16:10 2/28/2021 10:38 3/1/2021 07:07 3/2/2021 07:45   WBC Latest Ref Range: 4.0 - 11.0 K/uL 5.9 3.9 (L) 8.3 10.2   RBC Latest Ref Range: 4.20 - 5.90 M/uL 4.14 (L) 3.78 (L) 3.77 (L) 4.02 (L)   Hemoglobin Quant Latest Ref Range: 13.5 - 17.5 g/dL 11.2 (L) 10.3 (L) 10.4 (L) 10.8 (L)   Hematocrit Latest Ref Range: 40.5 - 52.5 % 34.2 (L) 31.3 (L) 31.5 (L) 35.1 (L)   MCV Latest Ref Range: 80.0 - 100.0 fL 82.6 82.7 83.6 87.2   MCH Latest Ref Range: 26.0 - 34.0 pg 27.1 27.1 27.5 26.9   MCHC Latest Ref Range: 31.0 - 36.0 g/dL 32.8 32.8 32.9 30.9 (L)   MPV Latest Ref Range: 5.0 - 10.5 fL 7.4 7.7 7.1 see below   RDW Latest Ref Range: 12.4 - 15.4 % 14.3 14.2 14.2 14.7   Platelet Count Latest Ref Range: 135 - 450 K/uL 204 171 243 see below   Neutrophils % Latest Units: % 60.0 91.6 91.9 80.9   Lymphocyte % Latest Units: % 22.0 7.1 6.0 13.8     Results for [facility] (MRN [id number]) as of 3/2/2021 17:14   Ref.  Range 3/1/2021 12:39   Gram Stain Result Unknown 1+ WBC's (Polymor. .. CULTURE, RESPIRATORY Unknown Further report to follow   CULTURE WITH SMEAR, ACID FAST BACILLIUS Unknown Rpt   AFB Smear Unknown No AFB observed by Fluorescent stain   Fungus Stain Unknown No Fungal elements seen   CULTURE, FUNGUS Unknown Rpt     EBUS cytology pending     Assessment:  Active Problems:    Acute respiratory failure with hypoxia (HCC)    IV drug abuse (HCC)    Tricuspid regurgitation    Lung nodule    Centrilobular emphysema (HCC)    Hilar adenopathy    Personal history of covid-19    Moderate to severe pulmonary hypertension (HCC)    Acute bronchitis    Hematuria    IRMA (acute kidney injury) ([facility] [address]    Hypokalemia  Resolved Problems:    * No resolved hospital problems.  *          Plan:   · Oxygen supplementation, if required, to keep saturation being 90 to 94% only  · Pulmonary toilet  · S/p Bronchoscopy with EBUS -resulst are pending   · Strict input output charting and BMP  · Correct electrolytes on whenever necessary basis  · Patient is on cefepime and vancomycin which has been changed to doxy   · Titration of antibiotics as per clinical status and cultures  · DIVYA being contemplated for the patient as per nursing  · Patient is now off librium   · Neurochecks  · Bronchodilators  · Monitor for any withdrawal symptoms  · PUD and DVT prophylaxis as per IM     Case discussed with patient and nursing  Case d/w IM      Further management depending on patient's clinical status and the bronchoscopy results         Electronically signed by:  Tanner Damian MD    3/2/2021    1:13 PM.

## 2021-03-02 NOTE — PROGRESS NOTES
INPATIENT PULMONARY CRITICAL CARE PROGRESS NOTE      Reason for visit    enlarging lung mass, previous BAL cytology negative for malignancy. SUBJECTIVE: Patient was supposed to follow-up in the office to evaluate for dilation bronchoscopy and EBUS but patient never did and patient ended up in the hospital with shortness of breath, patient when evaluated this morning was on 1 L of nasal cannula oxygen with saturation of 98%, patient has some cough with expectoration which is mucoid in nature, patient does not have any chest pain, patient was afebrile and he medically maintained, patient had borderline urine output with cumulative fluid balance of +895 mL,no other ROS of concern            Physical Exam:  Blood pressure 108/71, pulse 83, temperature 97.7 °F (36.5 °C), temperature source Oral, resp. rate 16, height 5' (1.524 m), weight 100 lb (45.4 kg), SpO2 98 %.'     Constitutional:  No acute distress. Mild tachypnea  HENT:  Oropharynx is clear and moist. No thyromegaly. slight facial flushing   Eyes:  Conjunctivae are normal. Pupils equal, round, and reactive to light. No scleral icterus. Neck: . No tracheal deviation present. No obvious thyroid mass. Cardiovascular: Sinus tachycardia LLSB murmur   No right ventricular heave. No lower extremity edema. decreased BSI   Pulmonary/Chest: No wheezes. B/L  rales. Continued chest congestion Chest wall is not dull to percussion. No accessory muscle usage or stridor. Abdominal: Soft. Bowel sounds present. No distension or hernia. No tenderness. Musculoskeletal: No cyanosis. No clubbing. No obvious joint deformity. Lymphadenopathy: No cervical or supraclavicular adenopathy. Skin: Skin is warm and dry. No rash or nodules on the exposed extremities. multiple tattoos  Neurologic: alert and communicative  when seen          Results:  CBC:   Recent Labs     02/27/21  1610 02/28/21  1038 03/01/21  0707   WBC 5.9 3.9* 8.3   HGB 11.2* 10.3* 10.4*   HCT 34.2* 31.3* Date of Study      02/03/2021         Gender               Male   Patient Number     [id number]         Date of Birth        1970   Visit Number       [id number]          Age                  48 year(s)   Accession Number   [id number]         Room Number          6707   Corporate ID       [id number]           Sonographer          Kaleb Rodriguez,    Ordering Physician Orlin Licea M.D. Physician            Mya Arthur MD  Procedure Type of Study   DIVYA procedure:ECHOCARDIOGRAM TRANSESOPHAGEAL. Procedure Date Date: 02/03/2021 Start: 12:51 PM Study Location: [address] [place] [address] - Echo Lab Technical Quality: Good visualization Indications:Endocarditis. Patient Status: Routine Height: 60 inches Weight: 90 pounds BSA: 1.33 m2 BMI: 17.58 kg/m2 BP: 116/62 mmHg DIVYA Performed By: the attending and the sonographer  Conclusions   Summary  Ejection fraction is visually estimated to be 55-60 %. Mild mitral regurgitation. There is no evidence of mass or thrombus in the left atrium or appendage. A bubble study was performed and shows evidence of right to left shunting  consistent with a patent foramen ovale. Severe tricuspid regurgitation. No vegetations detected   Signature   ------------------------------------------------------------------  Electronically signed by Mya Arthur MD (Interpreting  physician) on 02/03/2021 at 03:05 PM  ------------------------------------------------------------------   Findings   Left Ventricle  Ejection fraction is visually estimated to be 55-60 %. Mitral Valve  Mild thickening of leaflets of mitral valve. No mitral stenosis. Mild mitral regurgitation. Left Atrium  The left atrial size is normal.  There is no evidence of mass or thrombus in the left atrium or appendage. A bubble study was performed and shows evidence of right to left shunting  consistent with a patent foramen ovale.    Aortic Valve The aortic valve appears structurally normal.  No evidence of aortic valve regurgitation. Aorta  The aortic root is normal in size. The thoracic aorta is free of significant  plaque. Right Ventricle  The right ventricle is moderately enlarged/hypokinetic   Tricuspid Valve  Mild thickening of leaflets of tricuspid valve. No tricuspid stenosis. Severe tricuspid regurgitation. Right Atrium  The right atrium is moderately dilated. Pulmonic Valve  The pulmonic valve is structurally normal.  No evidence of pulmonic valve regurgitation. Pericardial Effusion  No pericardial effusion noted. Pleural Effusion  No pleural effusion. Doppler Measurements   TR Velocity:345 cm/s  TR Gradient:47.61 mmHg  Estimated RAP:15 mmHg  Estimated RVSP: 63 mmHg      Echo Complete    Result Date: 2/2/2021  Transthoracic Echocardiography Report (TTE)  Demographics   [facility]   Date of Study     02/02/2021         Gender              Male   Patient Number    [id number]         Date of Birth       1970   Visit Number      [id number]          Age                 48 year(s)   Accession Number  [id number]         Room Number         0777   Corporate ID      [id number]           Colletta April,    Ivan Kapadia M.D. Physician           Hugo Hunt MD  Procedure Type of Study   TTE procedure:ECHOCARDIOGRAM COMPLETE 2D W DOPPLER W COLOR. Procedure Date Date: 02/02/2021 Start: 12:48 PM Study Location: [facility] - Echo Lab Technical Quality: Adequate visualization Indications:Heart murmur. Additional Indications:Suspected endocarditis. Patient Status: Routine Height: 60 inches Weight: 90 pounds BSA: 1.33 m2 BMI: 17.58 kg/m2 BP: 109/73 mmHg  Conclusions   Summary  Technical difficult study due to heart off axis, and the patient is  difficult to stay still.   No obvious vegetation, consider pulmonary artery pressure (SPAP) is elevated and estimated at 71  mmHg (right atrial pressure 8 mmHg) consistent with severe pulmonary  hypertension. Right Atrium  The right atrium is mildly dilated. Right atrial area is 28.8 ml/m2. Pulmonic Valve  The pulmonic valve is normal in structure. No evidence of pulmonic regurgitation. Pericardial Effusion  No pericardial effusion noted. Pleural Effusion  No pleural effusion. Miscellaneous  The IVC is normal in size (<2.1 cm) but collapses <50% with respiration  consistent with elevated right atrial pressures (8 mmHg) . No masses or vegetation is noted. M-Mode/2D Measurements (cm)   LV Diastolic Dimension: 3.9 cm LV Systolic Dimension: 2.5 cm  LV Septum Diastolic: 7.35 cm  LV PW Diastolic: 7.34 cm       AO Root Dimension: 2.7 cm                                 AV Cusp Separation: 1.3 cm                                 LA Dimension: 2.4 cm                                 LA Area: 13.8 cm2                                 LA volume/Index: 28 ml /21 ml/m2  Doppler Measurements   AV Peak Velocity: 145 cm/s     MV Peak E-Wave: 79.1 cm/s  AV Peak Gradient: 8.41 mmHg    MV Peak A-Wave: 93.6 cm/s  LVOT Peak Velocity: 81 cm/s    MV E/A Ratio: 0.85   TR Velocity:396 cm/s  TR Gradient:62.73 mmHg  Estimated RAP:8 mmHg  Estimated RVSP: 71 mmHg  E' Septal Velocity: 9.57 cm/s  E' Lateral Velocity: 14.3 cm/s  E/E' ratio: 6.9   Aortic Valve   Peak Velocity: 145 cm/s  Peak Gradient: 8.41 mmHg   Cusp Separation: 1.3 cm  Aorta   Aortic Root: 2.7 cm      Xr Chest Pa Lateral W Fluoroscopy    Result Date: 3/1/2021  EXAMINATION: FLUOROSCOPIC SNIFF TEST; SPOT FLUOROSCOPIC IMAGES TECHNIQUE: 2 fluoroscopic images of the right chest. FLUOROSCOPY DOSE AND TYPE OR TIME AND EXPOSURES: 1 image at 17 seconds HISTORY: ORDERING SYSTEM PROVIDED HISTORY: or TECHNOLOGIST PROVIDED HISTORY: Reason for exam:->or FINDINGS: 2 fluoroscopic images of the right chest were obtained during bronchoscopy.  The bronchus cope overlies the right lower lobe bronchus. 1. See above. Ir Lonell Ruma Device Plmt/replace/removal    Result Date: 2/1/2021  PROCEDURE: IR FLUOROSCOPY GUIDED CENTRAL VENOUS ACCESS DEVICE PLACEMENT 2/1/2021 HISTORY: ORDERING SYSTEM PROVIDED HISTORY: temporary CVC for endocarditis TECHNOLOGIST PROVIDED HISTORY: Reason for exam:->temporary CVC for endocarditis Reason for Exam: limited access Acuity: Acute Type of Exam: Initial Additional signs and symptoms: 16cm, 7Fr triplelumen CVC, US/Fluoro guidance, Rt IJ, 5 seconds fluoro Relevant Medical/Surgical History: 16cm, 7Fr triplelumen CVC, US/Fluoro guidance, Rt IJ, 5 seconds fluoro TECHNIQUE: Ultrasound and fluoroscopic guidance were utilized during placement of triple-lumen central venous catheter. CONTRAST: None. SEDATION: None. FLUOROSCOPY DOSE AND TYPE OR TIME AND EXPOSURES: Fluoroscopic time: 5 seconds. Number of fluoroscopic images obtained:  0. Fluoroscopic image that was obtained was generated using last image hold technique. PROCEDURE NOTE: CENTRAL VENOUS CATHETER: 7 Maltese x 16 cm. triple-lumen central venous catheter. <Informed consent was obtained prior to initiation of the procedure. > Patient was placed on the fluoroscopic table in supine position. Time-out was performed. Limited ultrasonographic examination of the right neck was performed to select a site suitable for accessing the right internal jugular vein. Ultrasound images demonstrating patency and normal compressibility of the right internal jugular vein were captured and stored in PACS system. The anterior right neck was then prepped and draped in sterile fashion. Area was anesthetized with 2% Lidocaine. Using ultrasound guidance, the right internal jugular vein was accessed. Ultrasound image was obtained during access of the vessel; image was captured and stored in PACS system.   A 0.035 guidewire was placed through the needle with distal tip advanced into the region the inferior vena cava under fluoroscopic guidance. Needle was removed. Sequential dilatation of the tract was performed. Central venous catheter was then placed over the guidewire with distal tip advanced into the region of junction of superior vena cava and right atrium without difficulty under fluoroscopic guidance. Guidewire was removed. Catheter ports were flushed without difficulty. Proximal aspect of the catheter was then transfixed to the patient's skin using two 2-0 silk sutures. Patient tolerated the procedure well. FINDINGS: Distal tip of central venous catheter is visualized in region of junction of superior vena cava and right atrium. Status post successful ultrasound/fluoroscopically guided placement of right internal jugular triple-lumen central venous catheter as described above. Xr Chest Portable    Result Date: 2/27/2021  EXAMINATION: ONE XRAY VIEW OF THE CHEST 2/27/2021 3:51 pm COMPARISON: Chest radiograph and CT 01/31/2021 HISTORY: ORDERING SYSTEM PROVIDED HISTORY: shortness of breath TECHNOLOGIST PROVIDED HISTORY: Reason for exam:->shortness of breath Reason for Exam: Shortness of Breath (x1year) FINDINGS: The cardiomediastinal silhouette is unchanged in appearance. Sequela of old granulomatous disease. Scarring in the right lung apex again demonstrated. Discrete nodule in the right middle lobe and right lower lobe described on CT are not clearly delineated on this exam.  There is no consolidation, pneumothorax, or evidence of edema. No effusion is appreciated. The osseous structures are unchanged in appearance. Old healed proximal left humerus fracture again noted. Chronic findings in the chest without acute airspace disease identified. Scar in the right lung apex again noted.   The opacities recently described in the right middle lobe and right lower lobe are not delineated on this exam.     Xr Chest Portable    Result Date: 1/31/2021  EXAMINATION: ONE XRAY VIEW OF THE CHEST 1/31/2021 8:19 pm COMPARISON: Chest x-ray 12/27/2020. HISTORY: ORDERING SYSTEM PROVIDED HISTORY: SOB TECHNOLOGIST PROVIDED HISTORY: Reason for exam:->SOB Reason for Exam: SOB Acuity: Acute Type of Exam: Initial Additional signs and symptoms: pt c/o sob FINDINGS: Cardiac silhouette is upper normal limits in size. Mediastinal contours are otherwise suboptimally evaluated due to rotated projection. Lungs demonstrate no focal consolidation or pleural effusion. Right-sided granuloma and calcified lymph nodes in the mediastinum and radha again visualized. No pneumothorax appreciated on this projection. Irregular appearance of the proximal left humeral diaphysis noted. Increased perihilar markings. Atelectasis, infectious or inflammatory airway process, and interstitial edema are in the differential.     1.  Increased perihilar markings. Atelectasis, infectious or inflammatory airway process, and interstitial edema are in the differential. 2. Irregular appearance of the proximal left humeral diaphysis. Correlate with history. Nonemergent MRI would better evaluate. Ct Chest Pulmonary Embolism W Contrast    Result Date: 2/27/2021  EXAMINATION: CTA OF THE CHEST 2/27/2021 5:25 pm TECHNIQUE: CTA of the chest was performed after the administration of intravenous contrast.  Multiplanar reformatted images are provided for review. MIP images are provided for review. Dose modulation, iterative reconstruction, and/or weight based adjustment of the mA/kV was utilized to reduce the radiation dose to as low as reasonably achievable.  COMPARISON: CT angio chest 01/31/2021 HISTORY: ORDERING SYSTEM PROVIDED HISTORY: elevated d dimer, hypoxic TECHNOLOGIST PROVIDED HISTORY: Reason for exam:->elevated d dimer, hypoxic Decision Support Exception->Emergency Medical Condition (MA) Reason for Exam: hypoxic,shortness of breath Acuity: Acute Type of Exam: Initial Additional signs and symptoms: elevated d-dimer FINDINGS: Pulmonary Arteries: reformatted images are provided for review. MIP images are provided for review. Dose modulation, iterative reconstruction, and/or weight based adjustment of the mA/kV was utilized to reduce the radiation dose to as low as reasonably achievable. COMPARISON: None. HISTORY: ORDERING SYSTEM PROVIDED HISTORY: concern for endocarditis TECHNOLOGIST PROVIDED HISTORY: Reason for exam:->concern for endocarditis Decision Support Exception->Emergency Medical Condition (MA) Reason for Exam: SOB Type of Exam: Ongoing Additional signs and symptoms: concern for endocarditis, no hx of PE per pt FINDINGS: Pulmonary Arteries: Pulmonary arteries are adequately opacified for evaluation. No evidence of intraluminal filling defect to suggest pulmonary embolism. Main pulmonary artery is normal in caliber. Mediastinum: Mildly enlarged right hilar lymph nodes which may be concerning for pathologic lymph nodes given the pulmonary masses described. The heart and pericardium demonstrate no acute abnormality. There is no acute abnormality of the thoracic aorta. Lungs/pleura: Diffuse centrilobular pulmonary emphysema, worse in the bilateral upper lungs. There is a 1.4 x 2.4 x 1.3 cm mass in the posterior right perihilar region in the right lower lobe of the lung, this is highly suspicious for an underlying neoplasm. There is a speculated scar at the inferior right middle lobe measuring 1.3 x 1.6 x 0.7 cm with pleural attachment. There is a speculated lesion in the posterior right upper lobe of the lung measuring 1.5 x 2.5 x 0.8 cm. These could represent areas of scarring but are concerning for scar neoplasm and further evaluation and workup is recommended. No focal consolidation or pulmonary edema. No evidence of pleural effusion or pneumothorax. Upper Abdomen: Limited images of the upper abdomen are unremarkable. Soft Tissues/Bones: Chronic anterior wedge compression of the T8 vertebral body with about 50% anterior height loss.   There is mild superior endplate compression of the T6 vertebral body, most likely chronic in nature. No significant retropulsion. No evidence of an acute fracture or dislocation. Visualized soft tissues are within normal limits. No evidence of pulmonary embolism or acute pulmonary abnormality. Diffuse pulmonary emphysema. There is a 2.4 cm mass in the posterior right perihilar region in the right lower lobe of the lung, highly suspicious for an underlying neoplasm. Further evaluation and workup is recommended. There are 2 speculated scar lesions in the inferior right middle lobe and posterior right upper lobe. Cannot exclude the possibility of scar neoplasm and further evaluation and workup is recommended. Mildly enlarged right hilar lymph nodes which could represent pathologic lymph nodes. Chronic compression fractures of T6 and T8 vertebral bodies. Fluoro For Surgical Procedures    Result Date: 3/1/2021  EXAMINATION: FLUOROSCOPIC SNIFF TEST; SPOT FLUOROSCOPIC IMAGES TECHNIQUE: 2 fluoroscopic images of the right chest. FLUOROSCOPY DOSE AND TYPE OR TIME AND EXPOSURES: 1 image at 17 seconds HISTORY: ORDERING SYSTEM PROVIDED HISTORY: or TECHNOLOGIST PROVIDED HISTORY: Reason for exam:->or FINDINGS: 2 fluoroscopic images of the right chest were obtained during bronchoscopy. The bronchus cope overlies the right lower lobe bronchus. 1. See above. Assessment:  Active Problems:    Acute respiratory failure with hypoxia (HCC)    IV drug abuse (HCC)    Tricuspid regurgitation    Lung nodule    Centrilobular emphysema (HCC)    Hilar adenopathy    Personal history of covid-19    Moderate to severe pulmonary hypertension (HCC)    Acute bronchitis    Hematuria    IRMA (acute kidney injury) ([facility] [address]    Hypokalemia  Resolved Problems:    * No resolved hospital problems.  *          Plan:   · Oxygen supplementation, if required, to keep saturation being 90 to 94% only  · Pulmonary toilet  · Bronchoscopy with EBUS

## 2021-03-02 NOTE — PROGRESS NOTES
Pt lost IV access right before shift change this evening. This RN and another RN attempted to get a new line 4 times. MD paged and notified. MD stated to leave access out at this time and refer to day shift team if alternative access needs to be considered.

## 2021-03-03 PROBLEM — F10.931 DELIRIUM TREMENS (HCC): Status: ACTIVE | Noted: 2021-03-03

## 2021-03-03 PROBLEM — F14.90 COCAINE USE: Status: ACTIVE | Noted: 2021-03-03

## 2021-03-03 LAB
AMPHETAMINE SCREEN, URINE: ABNORMAL
ANCA IFA: NORMAL
ANION GAP SERPL CALCULATED.3IONS-SCNC: 8 MMOL/L (ref 3–16)
BARBITURATE SCREEN URINE: ABNORMAL
BENZODIAZEPINE SCREEN, URINE: ABNORMAL
BUN BLDV-MCNC: 27 MG/DL (ref 7–20)
CALCIUM SERPL-MCNC: 9 MG/DL (ref 8.3–10.6)
CANNABINOID SCREEN URINE: ABNORMAL
CHLORIDE BLD-SCNC: 103 MMOL/L (ref 99–110)
CO2: 31 MMOL/L (ref 21–32)
COCAINE METABOLITE SCREEN URINE: POSITIVE
CREAT SERPL-MCNC: 1.3 MG/DL (ref 0.9–1.3)
CULTURE, RESPIRATORY: NORMAL
GFR AFRICAN AMERICAN: >60
GFR NON-AFRICAN AMERICAN: 58
GLUCOSE BLD-MCNC: 81 MG/DL (ref 70–99)
GLUCOSE BLD-MCNC: 95 MG/DL (ref 70–99)
GRAM STAIN RESULT: NORMAL
Lab: ABNORMAL
METHADONE SCREEN, URINE: ABNORMAL
OPIATE SCREEN URINE: ABNORMAL
OXYCODONE URINE: ABNORMAL
PERFORMED ON: NORMAL
PH UA: 7
PHENCYCLIDINE SCREEN URINE: ABNORMAL
POTASSIUM REFLEX MAGNESIUM: 3.7 MMOL/L (ref 3.5–5.1)
PROPOXYPHENE SCREEN: ABNORMAL
SODIUM BLD-SCNC: 142 MMOL/L (ref 136–145)

## 2021-03-03 PROCEDURE — 6370000000 HC RX 637 (ALT 250 FOR IP): Performed by: INTERNAL MEDICINE

## 2021-03-03 PROCEDURE — 94761 N-INVAS EAR/PLS OXIMETRY MLT: CPT

## 2021-03-03 PROCEDURE — 80307 DRUG TEST PRSMV CHEM ANLYZR: CPT

## 2021-03-03 PROCEDURE — 2580000003 HC RX 258: Performed by: INTERNAL MEDICINE

## 2021-03-03 PROCEDURE — 36415 COLL VENOUS BLD VENIPUNCTURE: CPT

## 2021-03-03 PROCEDURE — 6370000000 HC RX 637 (ALT 250 FOR IP)

## 2021-03-03 PROCEDURE — 6360000002 HC RX W HCPCS: Performed by: NURSE PRACTITIONER

## 2021-03-03 PROCEDURE — 6360000002 HC RX W HCPCS: Performed by: INTERNAL MEDICINE

## 2021-03-03 PROCEDURE — 2000000000 HC ICU R&B

## 2021-03-03 PROCEDURE — 94640 AIRWAY INHALATION TREATMENT: CPT

## 2021-03-03 PROCEDURE — 99291 CRITICAL CARE FIRST HOUR: CPT | Performed by: INTERNAL MEDICINE

## 2021-03-03 PROCEDURE — 99223 1ST HOSP IP/OBS HIGH 75: CPT | Performed by: PSYCHIATRY & NEUROLOGY

## 2021-03-03 PROCEDURE — 2500000003 HC RX 250 WO HCPCS: Performed by: INTERNAL MEDICINE

## 2021-03-03 PROCEDURE — 80048 BASIC METABOLIC PNL TOTAL CA: CPT

## 2021-03-03 PROCEDURE — 6370000000 HC RX 637 (ALT 250 FOR IP): Performed by: NURSE PRACTITIONER

## 2021-03-03 PROCEDURE — 2700000000 HC OXYGEN THERAPY PER DAY

## 2021-03-03 RX ORDER — LORAZEPAM 2 MG/ML
4 INJECTION INTRAMUSCULAR ONCE
Status: COMPLETED | OUTPATIENT
Start: 2021-03-03 | End: 2021-03-03

## 2021-03-03 RX ORDER — ONDANSETRON 2 MG/ML
4 INJECTION INTRAMUSCULAR; INTRAVENOUS EVERY 6 HOURS PRN
Status: DISCONTINUED | OUTPATIENT
Start: 2021-03-03 | End: 2021-03-10 | Stop reason: HOSPADM

## 2021-03-03 RX ORDER — BUPRENORPHINE 2 MG/1
2 TABLET SUBLINGUAL PRN
Status: DISCONTINUED | OUTPATIENT
Start: 2021-03-03 | End: 2021-03-03

## 2021-03-03 RX ORDER — LORAZEPAM 1 MG/1
3 TABLET ORAL
Status: DISCONTINUED | OUTPATIENT
Start: 2021-03-03 | End: 2021-03-10 | Stop reason: HOSPADM

## 2021-03-03 RX ORDER — SODIUM CHLORIDE 0.9 % (FLUSH) 0.9 %
10 SYRINGE (ML) INJECTION EVERY 12 HOURS SCHEDULED
Status: DISCONTINUED | OUTPATIENT
Start: 2021-03-03 | End: 2021-03-03

## 2021-03-03 RX ORDER — ACETAMINOPHEN 325 MG/1
650 TABLET ORAL EVERY 6 HOURS PRN
Status: DISCONTINUED | OUTPATIENT
Start: 2021-03-03 | End: 2021-03-10 | Stop reason: HOSPADM

## 2021-03-03 RX ORDER — DEXMEDETOMIDINE HYDROCHLORIDE 4 UG/ML
.2-1.4 INJECTION, SOLUTION INTRAVENOUS CONTINUOUS
Status: DISCONTINUED | OUTPATIENT
Start: 2021-03-03 | End: 2021-03-09

## 2021-03-03 RX ORDER — ACETAMINOPHEN 650 MG/1
650 SUPPOSITORY RECTAL EVERY 6 HOURS PRN
Status: DISCONTINUED | OUTPATIENT
Start: 2021-03-03 | End: 2021-03-10 | Stop reason: HOSPADM

## 2021-03-03 RX ORDER — LORAZEPAM 2 MG/ML
3 INJECTION INTRAMUSCULAR ONCE
Status: COMPLETED | OUTPATIENT
Start: 2021-03-03 | End: 2021-03-03

## 2021-03-03 RX ORDER — LORAZEPAM 2 MG/ML
2 INJECTION INTRAMUSCULAR
Status: DISCONTINUED | OUTPATIENT
Start: 2021-03-03 | End: 2021-03-10 | Stop reason: HOSPADM

## 2021-03-03 RX ORDER — CLONIDINE HYDROCHLORIDE 0.1 MG/1
0.1 TABLET ORAL PRN
Status: DISCONTINUED | OUTPATIENT
Start: 2021-03-03 | End: 2021-03-03

## 2021-03-03 RX ORDER — PHENOBARBITAL SODIUM 65 MG/ML
130 INJECTION INTRAMUSCULAR ONCE
Status: COMPLETED | OUTPATIENT
Start: 2021-03-03 | End: 2021-03-03

## 2021-03-03 RX ORDER — LORAZEPAM 2 MG/ML
4 INJECTION INTRAMUSCULAR
Status: DISCONTINUED | OUTPATIENT
Start: 2021-03-03 | End: 2021-03-10 | Stop reason: HOSPADM

## 2021-03-03 RX ORDER — LORAZEPAM 2 MG/ML
1 INJECTION INTRAMUSCULAR
Status: DISCONTINUED | OUTPATIENT
Start: 2021-03-03 | End: 2021-03-10 | Stop reason: HOSPADM

## 2021-03-03 RX ORDER — HALOPERIDOL 5 MG/ML
3 INJECTION INTRAMUSCULAR EVERY 4 HOURS PRN
Status: DISCONTINUED | OUTPATIENT
Start: 2021-03-03 | End: 2021-03-04

## 2021-03-03 RX ORDER — LORAZEPAM 1 MG/1
4 TABLET ORAL
Status: DISCONTINUED | OUTPATIENT
Start: 2021-03-03 | End: 2021-03-10 | Stop reason: HOSPADM

## 2021-03-03 RX ORDER — LORAZEPAM 2 MG/ML
2 INJECTION INTRAMUSCULAR EVERY 4 HOURS PRN
Status: DISCONTINUED | OUTPATIENT
Start: 2021-03-03 | End: 2021-03-03

## 2021-03-03 RX ORDER — BISACODYL 10 MG
10 SUPPOSITORY, RECTAL RECTAL DAILY PRN
Status: DISCONTINUED | OUTPATIENT
Start: 2021-03-03 | End: 2021-03-10 | Stop reason: HOSPADM

## 2021-03-03 RX ORDER — LORAZEPAM 1 MG/1
2 TABLET ORAL EVERY 4 HOURS PRN
Status: DISCONTINUED | OUTPATIENT
Start: 2021-03-03 | End: 2021-03-03

## 2021-03-03 RX ORDER — SODIUM CHLORIDE 0.9 % (FLUSH) 0.9 %
10 SYRINGE (ML) INJECTION PRN
Status: DISCONTINUED | OUTPATIENT
Start: 2021-03-03 | End: 2021-03-03

## 2021-03-03 RX ORDER — LORAZEPAM 2 MG/ML
3 INJECTION INTRAMUSCULAR
Status: DISCONTINUED | OUTPATIENT
Start: 2021-03-03 | End: 2021-03-10 | Stop reason: HOSPADM

## 2021-03-03 RX ORDER — ZIPRASIDONE MESYLATE 20 MG/ML
20 INJECTION, POWDER, LYOPHILIZED, FOR SOLUTION INTRAMUSCULAR ONCE
Status: COMPLETED | OUTPATIENT
Start: 2021-03-03 | End: 2021-03-03

## 2021-03-03 RX ORDER — LORAZEPAM 1 MG/1
2 TABLET ORAL
Status: DISCONTINUED | OUTPATIENT
Start: 2021-03-03 | End: 2021-03-10 | Stop reason: HOSPADM

## 2021-03-03 RX ORDER — LORAZEPAM 1 MG/1
1 TABLET ORAL
Status: DISCONTINUED | OUTPATIENT
Start: 2021-03-03 | End: 2021-03-10 | Stop reason: HOSPADM

## 2021-03-03 RX ADMIN — MUPIROCIN: 20 OINTMENT TOPICAL at 09:31

## 2021-03-03 RX ADMIN — Medication 10 ML: at 21:16

## 2021-03-03 RX ADMIN — LORAZEPAM 4 MG: 2 INJECTION, SOLUTION INTRAMUSCULAR; INTRAVENOUS at 11:47

## 2021-03-03 RX ADMIN — ZIPRASIDONE MESYLATE 20 MG: 20 INJECTION, POWDER, LYOPHILIZED, FOR SOLUTION INTRAMUSCULAR at 04:14

## 2021-03-03 RX ADMIN — LORAZEPAM 4 MG: 2 INJECTION, SOLUTION INTRAMUSCULAR; INTRAVENOUS at 08:40

## 2021-03-03 RX ADMIN — LORAZEPAM 2 MG: 2 INJECTION INTRAMUSCULAR; INTRAVENOUS at 10:37

## 2021-03-03 RX ADMIN — ENOXAPARIN SODIUM 40 MG: 40 INJECTION SUBCUTANEOUS at 14:28

## 2021-03-03 RX ADMIN — Medication 0.8 MCG/KG/HR: at 08:25

## 2021-03-03 RX ADMIN — PHENOBARBITAL SODIUM 130 MG: 65 INJECTION INTRAMUSCULAR at 08:50

## 2021-03-03 RX ADMIN — Medication: at 14:27

## 2021-03-03 RX ADMIN — Medication 0.6 MCG/KG/HR: at 18:01

## 2021-03-03 RX ADMIN — LORAZEPAM 3 MG: 2 INJECTION, SOLUTION INTRAMUSCULAR; INTRAVENOUS at 07:26

## 2021-03-03 RX ADMIN — Medication 10 ML: at 09:31

## 2021-03-03 RX ADMIN — IPRATROPIUM BROMIDE AND ALBUTEROL SULFATE 1 AMPULE: .5; 3 SOLUTION RESPIRATORY (INHALATION) at 08:07

## 2021-03-03 RX ADMIN — METHYLPREDNISOLONE SODIUM SUCCINATE 40 MG: 40 INJECTION, POWDER, FOR SOLUTION INTRAMUSCULAR; INTRAVENOUS at 09:30

## 2021-03-03 RX ADMIN — METHYLPREDNISOLONE SODIUM SUCCINATE 40 MG: 40 INJECTION, POWDER, FOR SOLUTION INTRAMUSCULAR; INTRAVENOUS at 18:02

## 2021-03-03 RX ADMIN — LORAZEPAM 2 MG: 1 TABLET ORAL at 03:06

## 2021-03-03 RX ADMIN — MUPIROCIN: 20 OINTMENT TOPICAL at 21:15

## 2021-03-03 RX ADMIN — LORAZEPAM 2 MG: 2 INJECTION, SOLUTION INTRAMUSCULAR; INTRAVENOUS at 06:10

## 2021-03-03 RX ADMIN — Medication 0.4 MCG/KG/HR: at 21:14

## 2021-03-03 NOTE — PROGRESS NOTES
Galilea Barba  Nocturnist / Cross-Cover Note  Dr. Loco Martinez MD    Patient became extremely and persistently agitated overnight. Behavior control could not be achieved despite use of geodon and lorazepam.  Patient has a h/o polysubstance abuse including IV heroin use. Will attempt treatment w/ buprenorphine and clonidine per COWS scoring protocol, but if all else fails consider transfer to ICU for precedex infusion.

## 2021-03-03 NOTE — PROGRESS NOTES
03/03/21 1204   Oxygen Therapy/Pulse Ox   O2 Therapy Oxygen   O2 Device Nasal cannula   O2 Flow Rate (L/min) 2 L/min   Resp 28   SpO2 98 %   End Tidal CO2 49

## 2021-03-03 NOTE — PROGRESS NOTES
Hospitalist Progress Note      PCP: No primary care provider on file. Date of Admission: 2/27/2021    Chief Complaint:  hematuria      Subjective:    3/3: Difficult night with increasing aggitation and confusion. Not responding well to medications of Ativan, Geodon, Buprenorphine and was transferred to ICU for precedex. Patient lethargic and not able to participate in exam this AM. Had been given Phenobarb and BZ in ICU. Precedex gtt present. Plan: EBUS: Awaiting bx results. Remains on 1-2 liters. Abx changed to Doxy for acute bronchitis    Medications:  Reviewed    Infusion Medications    dexmedetomidine HCl in NaCl 1.4 mcg/kg/hr (03/03/21 0657)     Scheduled Medications    mupirocin   Nasal BID    enoxaparin  40 mg Subcutaneous Daily    Lip Balm        sodium chloride flush  10 mL Intravenous 2 times per day    methylPREDNISolone  40 mg Intravenous Q12H    doxycycline hyclate  100 mg Oral 2 times per day     PRN Meds: acetaminophen **OR** acetaminophen, ondansetron, bisacodyl, LORazepam **OR** LORazepam **OR** LORazepam **OR** LORazepam **OR** LORazepam **OR** LORazepam **OR** LORazepam **OR** LORazepam, sodium chloride flush, polyethylene glycol, ipratropium-albuterol      Intake/Output Summary (Last 24 hours) at 3/3/2021 1050  Last data filed at 3/2/2021 2324  Gross per 24 hour   Intake 480 ml   Output 1525 ml   Net -1045 ml       Physical Exam Performed:    BP (!) 150/113   Pulse 126   Temp 97.5 °F (36.4 °C) (Axillary)   Resp (!) 38   Ht 5' (1.524 m)   Wt 100 lb (45.4 kg)   SpO2 95%   BMI 19.53 kg/m²       General appearance: No apparent distress, appears stated age and sedated this AM.   HEENT: Pupils equal, round, and reactive to light. Conjunctivae/corneas clear. Neck: Supple, with full range of motion. No jugular venous distention. Trachea midline. Respiratory:  Normal respiratory effort. Clear to auscultation, bilaterally without Rales/Wheezes/Rhonchi.   Cardiovascular: Regular rate and rhythm with normal S1/S2 without murmurs, rubs or gallops. Abdomen: Soft, non-tender, non-distended with normal bowel sounds. Musculoskeletal: No clubbing, cyanosis. RUE with previous trauma and immobility, muscle wasting. Skin: Skin color, texture, turgor normal.  No rashes or lesions. Multiple tattoos to UE. Neurologic:  Unable to participate in exam this AM.  Psychiatric: Sedated. Capillary Refill: Brisk,< 3 seconds   Peripheral Pulses: +2 palpable, equal bilaterally       Labs:   Recent Labs     03/01/21 0707 03/02/21 0745   WBC 8.3 10.2   HGB 10.4* 10.8*   HCT 31.5* 35.1*    see below     Recent Labs     03/01/21  0707 03/02/21 0745 03/03/21  1004    139 142   K 4.6 4.7 3.7    107 103   CO2 28 24 31   BUN 30* 32* 27*   CREATININE 1.5* 1.4* 1.3   CALCIUM 8.7 8.7 9.0     No results for input(s): AST, ALT, BILIDIR, BILITOT, ALKPHOS in the last 72 hours. No results for input(s): INR in the last 72 hours. No results for input(s): Neldon Docker in the last 72 hours. Urinalysis:      Lab Results   Component Value Date    NITRU see below 02/27/2021    WBCUA see below 02/27/2021    BACTERIA 4+ 02/27/2021    RBCUA >100 02/27/2021    BLOODU see below 02/27/2021    SPECGRAV see below 02/27/2021    GLUCOSEU see below 02/27/2021       Radiology:  XR CHEST PA LATERAL W FLUOROSCOPY   Final Result   1. See above. FLUORO FOR SURGICAL PROCEDURES   Final Result   1. See above. CT CHEST PULMONARY EMBOLISM W CONTRAST   Final Result   1. Artifact degraded evaluation of the pulmonary arteries. No acute   pulmonary embolism to the segmental arteries given limitation. 2. Progression of disease. Increased size of right lower lobe nodule and   bone metastases. Soft tissue in the anterior mediastinum and anterior to the   right middle lobe likely represents additional metastases. Follow-up   recommended. 3. Questionable mass at the base of the tongue. Correlate with history. 4.  Other findings as described. XR CHEST PORTABLE   Final Result   Chronic findings in the chest without acute airspace disease identified. Scar in the right lung apex again noted. The opacities recently described in   the right middle lobe and right lower lobe are not delineated on this exam.                 Assessment/Plan:    Active Hospital Problems    Diagnosis    Acute bronchitis [J20.9]    Hematuria [R31.9]    IRMA (acute kidney injury) ([address] [N17.9]    Hypokalemia [E87.6]    Centrilobular emphysema ([address] [J43.2]    Moderate to severe pulmonary hypertension ([address] [I27.20]    Hilar adenopathy [R59.0]    Lung nodule [R91.1]    Personal history of covid-19 [Z86.16]    Tricuspid regurgitation [I07.1]    IV drug abuse ([address] [F19.10]    Acute respiratory failure with hypoxia ([address] [J96.01]       \"46 year old man with PMH of IVDU with heroin and meth, chronic hep C, tricuspid regurg, lung mass, COPD, alcohol dependence presented to the ED today with complaints of hematuria and shortness of breath. Patient apparently left AMA on 2/4 from [facility]. He was being worked up for his tricuspid regurgitation and lung mass. Patient reports ever since he left the hospital, he has been short of breath particularly worse with exertion. He reports some leg swelling that is dependent type. Denies any chest pain. Denies subjective fevers chills or rigors. No cough. Continues to inject drugs into his right leg almost daily. Patient reports 2 to 3 weeks history of dark red-colored urine. Denies any dysuria, urgency or frequency or difficulty urinating. \"      AECOPD, with severe pulmonary HTN and RV dysfunction  - steroids, inhaled bronchodilators. Doxycycline was started on admission. Suspected metastatic lung cancer. 4.8 cm RML mass with many thoracic soft tissue and bone mets. EBUS 3/1, lymph node biopsy showed epithelium and mucus, no malignancy identified. Appreciate further pulmonary input. Pending biopsy results. Acute hypoxic respiratory failure. Due to above issues. Wean to RA as tolerated. The patient has no supplemental O2 requirement at baseline. IRMA. Baseline Cr normal.  Some response to IVFs. Hematuria. Urology planning eventual cystoscopy, signed off. Negative urine culture. F/u ANCA from 3/1. Hematuria resolved with time. Severe TV regurgitation. Cardiology determined that no further workup was indicated and signed off. Polysubstance abuse: Alcohol abuse with withdrawal acute, Opiate use disorder, Stimulant use disorder:  Current use of ETOH, IV Heroin, and Meth  - COWS evaluation  -Librium had been completed. Would recommend Precedex gtt for Alcohol withdrawal. If maxed, consider Phenobarbital.   -Consider Subutex once ETOH is under control. Concern for CNS/Resp depresiion with concomitant use of Bz and Buprenorphine. ETOH must be stabilized first.   -Off label use of Wellbutrin has been studied to assist with Meth cravings and abuse. Can consider at discharge if patient is interested in cessation.   -Recommend outpt addiction services to assist with recovery and provide support for patient. Posterior tongue mass suggested on CT, now ruled out. ENT consulted, laryngoscopy negative, no further workup anticipated. DVT Prophylaxis: SCDs  Diet: Diet NPO Effective Now  Code Status: Full Code    PT/OT Eval Status: he wasn't interested in putting forth much effort. They will not be back to evaluate him. Dispo - when the cancer workup can transition to outpatient. Perhaps 3/5-6, pending pulmonary input. He lives in his truck and admits that his ability to f/u as outpatient is questionable.        Enrique Kendrick MD

## 2021-03-03 NOTE — PROGRESS NOTES
PULM/CCM     EBUS CYTOLOGY (+) FOR Small cell lung cancer    Will request palliative care consult for goals of care and code status     Ignacio tavera MD

## 2021-03-03 NOTE — PROGRESS NOTES
Dr. Lorin Qureshi discussed with writer that we need to investigate why pt has been in hospital for a few days but tested positive for cocaine this morning (see lab results) so writer checked pt belongings and found a small paper reciept crumpled up in wallet. Upon opening it, found a powder substance. Unit manager and Lorin Qureshi and Dr. Haider Zamudio were informed. Security informed. [facility] called and took report and substance with him.

## 2021-03-03 NOTE — PROGRESS NOTES
Pt continues to try to get out of bed, take his oxygen out despite verbalizing that he can't breathe, and is still talking somewhat nonsensical.  Writer entered room to assess pt oxygenation status and pt grabbed writer's person. Writer stepped away from bed and ordered pt to stop. Pt O2 was 83% and pt was trying to get out of bed to find car keys. Pt continued to try to get out of bed, writer called \"staff assist\". Staff assisted pt back into bed to transport patient into new room closer to nurses' station. Pt not able to follow directions. 2-point soft restraints applied BUE. Pt left sleeping in bed.

## 2021-03-03 NOTE — PROGRESS NOTES
Pt is heard yelling and growling into hallway. Pt reports he is \"dope sick\". Pt reports he used heroin on the day of his admission which was 5 days past.  Administered 1mg Ativan, pt is not quiet at all and continues to yell out into hallway. Urine drug test upon admission showed only cocaine positive. Drug test on 3/3/21 resulted with again only cocaine positive.   Will page NP for bedside eval.

## 2021-03-03 NOTE — CONSULTS
Full note dictated. Pt on precedex and couldn't join the conversation. Reviewed chart and spoke with nurses providing care/history. Dx:  Cocaine intoxication         Polysubstance dependence    Rec:  1). I am unclear without talking to the patient how much alcohol he may be using. The family has called and confirmed a long history of drug use but whether this could be alcohol withdrawal is unclear. Agree with the use of CIWA for the time being. COWS not indicated. He was tox negative for opiates on admission and on retesting yesterday. I will add Haldol which typically does well for cocaine related agitation in addition to benzodiazepines. He can take this IV while on telemetry. I will assess how he wants to proceed once he is more alert.       Navi Pickard MD

## 2021-03-03 NOTE — CARE COORDINATION
Chart review completed. Patient a 48year old male, admitted for hematuria. Hospital day 4, currently in ICU level of care on precedex drip. CM met with patient upon admission and offered resources for homelessness and substance abuse. Pt declined. Will readdress once stabilizes from events of night.   Gerardine Signs, RN

## 2021-03-03 NOTE — PROGRESS NOTES
INPATIENT PULMONARY CRITICAL CARE PROGRESS NOTE      Reason for visit    enlarging lung mass, previous BAL cytology negative for malignancy. SUBJECTIVE: Patient apparently decompensated last night and clinical status became critical , patient started having increased withdrawal symptoms, patient was quite agitated restless tachycardic tachypneic with profound shortness of breath and was given Ativan with Geodon last night without any improvement, patient was transferred down to the ICU for further management, patient has been started on Precedex infusion which was going at 1 mg/h but then also patient was quite restless agitated and was having a heart rate of 141/min along with that patient was having respite distress and was breathing about 40 times a minute, patient was also having audible wheezing, patient was  Also having profound autonomic instability along with profound hypotension when evaluated, patient was afebrile and patient was on 2 L of nasal cannula oxygen with saturation 98%, patient has had adequate urine output overnight with cumulative fluid balance of -265 mL, patient's glycemic control was acceptable,         Physical Exam:  Blood pressure 135/86, pulse 79, temperature 97.5 °F (36.4 °C), temperature source Bladder, resp. rate 30, height 5' (1.524 m), weight 100 lb (45.4 kg), SpO2 98 %.'     Constitutional:  No acute distress. Mild tachypnea  HENT:  Oropharynx is clear and moist. No thyromegaly. Eyes:  Conjunctivae are normal. Pupils equal, round, and reactive to light. No scleral icterus. Neck: . No tracheal deviation present. No obvious thyroid mass. Cardiovascular: Sinus rhytm  LLSB murmur   No right ventricular heave. No lower extremity edema. decreased BSI   Pulmonary/Chest: No wheezes. B/L decreased rales. Continued chest congestion Chest wall is not dull to percussion. No accessory muscle usage or stridor. Abdominal: Soft. Bowel sounds present. No distension or hernia.  No tenderness. Musculoskeletal: No cyanosis. No clubbing. No obvious joint deformity. Lymphadenopathy: No cervical or supraclavicular adenopathy. Skin: Skin is warm and dry. No rash or nodules on the exposed extremities. multiple tattoos  Neurologic: alert and communicative  when seen          Results:  CBC:   Recent Labs     03/01/21  0707 03/02/21  0745   WBC 8.3 10.2   HGB 10.4* 10.8*   HCT 31.5* 35.1*   MCV 83.6 87.2    see below     BMP:   Recent Labs     03/01/21  0707 03/02/21  0745 03/03/21  1004    139 142   K 4.6 4.7 3.7    107 103   CO2 28 24 31   BUN 30* 32* 27*   CREATININE 1.5* 1.4* 1.3       Imaging:  I have reviewed radiology images personally. XR CHEST PA LATERAL W FLUOROSCOPY   Final Result   1. See above. FLUORO FOR SURGICAL PROCEDURES   Final Result   1. See above. CT CHEST PULMONARY EMBOLISM W CONTRAST   Final Result   1. Artifact degraded evaluation of the pulmonary arteries. No acute   pulmonary embolism to the segmental arteries given limitation. 2. Progression of disease. Increased size of right lower lobe nodule and   bone metastases. Soft tissue in the anterior mediastinum and anterior to the   right middle lobe likely represents additional metastases. Follow-up   recommended. 3. Questionable mass at the base of the tongue. Correlate with history. 4.  Other findings as described. XR CHEST PORTABLE   Final Result   Chronic findings in the chest without acute airspace disease identified. Scar in the right lung apex again noted. The opacities recently described in   the right middle lobe and right lower lobe are not delineated on this exam.           Results for [employer] (MRN [id number]) as of 3/2/2021 17:14   Ref.  Range 2/2/2021 11:05 2/27/2021 16:10 2/28/2021 10:38 3/1/2021 07:07 3/2/2021 07:45   Sodium Latest Ref Range: 136 - 145 mmol/L 138 137 137 141 139   Potassium Latest Ref Range: 3.5 - 5.1 mmol/L 3.7 2.9 (LL) 3.7 4.6 4.7   Chloride Latest Ref Range: 99 - 110 mmol/L 101 96 (L) 101 108 107   CO2 Latest Ref Range: 21 - 32 mmol/L 32 33 (H) 27 28 24   BUN Latest Ref Range: 7 - 20 mg/dL 12 17 23 (H) 30 (H) 32 (H)   Creatinine Latest Ref Range: 0.9 - 1.3 mg/dL 0.6 (L) 1.6 (H) 1.6 (H) 1.5 (H) 1.4 (H)   Anion Gap Latest Ref Range: 3 - 16  5 8 9 5 8   GFR Non- Latest Ref Range: >60  >60 46 (A) 46 (A) 49 (A) 54 (A)   GFR  Latest Ref Range: >60  >60 56 (A) 56 (A) 60 (A) >60   Magnesium Latest Ref Range: 1.80 - 2.40 mg/dL  2.00      Glucose Latest Ref Range: 70 - 99 mg/dL 114 (H) 127 (H) 272 (H) 146 (H) 102 (H)   Calcium Latest Ref Range: 8.3 - 10.6 mg/dL 8.6 8.7 8.9 8.7 8.7   Total Protein Latest Ref Range: 6.4 - 8.2 g/dL  7.5          Results for Wang Palma (MRN [id number]) as of 3/2/2021 17:14   Ref. Range 2/27/2021 16:10 2/28/2021 10:38 3/1/2021 07:07 3/2/2021 07:45   WBC Latest Ref Range: 4.0 - 11.0 K/uL 5.9 3.9 (L) 8.3 10.2   RBC Latest Ref Range: 4.20 - 5.90 M/uL 4.14 (L) 3.78 (L) 3.77 (L) 4.02 (L)   Hemoglobin Quant Latest Ref Range: 13.5 - 17.5 g/dL 11.2 (L) 10.3 (L) 10.4 (L) 10.8 (L)   Hematocrit Latest Ref Range: 40.5 - 52.5 % 34.2 (L) 31.3 (L) 31.5 (L) 35.1 (L)   MCV Latest Ref Range: 80.0 - 100.0 fL 82.6 82.7 83.6 87.2   MCH Latest Ref Range: 26.0 - 34.0 pg 27.1 27.1 27.5 26.9   MCHC Latest Ref Range: 31.0 - 36.0 g/dL 32.8 32.8 32.9 30.9 (L)   MPV Latest Ref Range: 5.0 - 10.5 fL 7.4 7.7 7.1 see below   RDW Latest Ref Range: 12.4 - 15.4 % 14.3 14.2 14.2 14.7   Platelet Count Latest Ref Range: 135 - 450 K/uL 204 171 243 see below   Neutrophils % Latest Units: % 60.0 91.6 91.9 80.9   Lymphocyte % Latest Units: % 22.0 7.1 6.0 13.8     Results for Wang Palma (MRN [id number]) as of 3/2/2021 17:14   Ref. Range 3/1/2021 12:39   Gram Stain Result Unknown 1+ WBC's (Polymor. ..    CULTURE, RESPIRATORY Unknown Further report to follow   CULTURE WITH SMEAR, ACID FAST BACILLIUS Unknown Rpt   AFB Smear Unknown No AFB observed by Fluorescent stain   Fungus Stain Unknown No Fungal elements seen   CULTURE, FUNGUS Unknown Rpt     EBUS cytology is still pending       Results for Connor Campbell (MRN [id number]) as of 3/3/2021 14:38   Ref. Range 2/27/2021 18:03 2/28/2021 10:38 3/1/2021 07:07 3/2/2021 07:45 3/3/2021 01:18   Amphetamine Screen, Urine Latest Ref Range: Negative <1000ng/mL  Neg    Neg   Barbiturate Screen, Ur Latest Ref Range: Negative <200 ng/mL  Neg    Neg   Benzodiazepine Screen, Urine Latest Ref Range: Negative <200 ng/mL  Neg    Neg   Cannabinoid Scrn, Ur Latest Ref Range: Negative <50 ng/mL  Neg    Neg   Methadone Screen, Urine Latest Ref Range: Negative <300 ng/mL  Neg    Neg   Opiate Scrn, Ur Latest Ref Range: Negative <300 ng/mL  Neg    Neg   PCP Screen, Urine Latest Ref Range: Negative <25 ng/mL  Neg    Neg   Propoxyphene Scrn, Ur Latest Ref Range: Negative <300 ng/mL  Neg    Neg   Cocaine Metabolite Screen, Urine Latest Ref Range: Negative <300 ng/mL  POSITIVE (A)    POSITIVE (A)   Oxycodone Urine Latest Ref Range: Negative <100 ng/ml  Neg    Neg   Drug Screen Comment: Unknown see below    see below       Results for Connor Campbell (MRN [id number]) as of 3/3/2021 14:38   Ref.  Range 2/28/2021 10:38 3/1/2021 07:07 3/2/2021 07:45 3/3/2021 06:35 3/3/2021 10:04   Sodium Latest Ref Range: 136 - 145 mmol/L 137 141 139  142   Potassium Latest Ref Range: 3.5 - 5.1 mmol/L 3.7 4.6 4.7  3.7   Chloride Latest Ref Range: 99 - 110 mmol/L 101 108 107  103   CO2 Latest Ref Range: 21 - 32 mmol/L 27 28 24  31   BUN Latest Ref Range: 7 - 20 mg/dL 23 (H) 30 (H) 32 (H)  27 (H)   Creatinine Latest Ref Range: 0.9 - 1.3 mg/dL 1.6 (H) 1.5 (H) 1.4 (H)  1.3   Anion Gap Latest Ref Range: 3 - 16  9 5 8  8   GFR Non- Latest Ref Range: >60  46 (A) 49 (A) 54 (A)  58 (A)   GFR  Latest Ref Range: >60  56 (A) 60 (A) >60  >60   Glucose Latest Ref Range: 70 - 99 mg/dL 272 (H) 146 (H) 102 (H)  81 POC Glucose Latest Ref Range: 70 - 99 mg/dl    95    Calcium Latest Ref Range: 8.3 - 10.6 mg/dL 8.9 8.7 8.7  9.0       Assessment:  Active Problems:    Acute respiratory failure with hypoxia (HCC)    IV drug abuse (HCC)    Tricuspid regurgitation    Lung nodule    Centrilobular emphysema (HCC)    Hilar adenopathy    Personal history of covid-19    Moderate to severe pulmonary hypertension (HCC)    Acute bronchitis    Hematuria    IRMA (acute kidney injury) ([facility]    Hypokalemia    Cocaine use    Delirium tremens ([facility]  Resolved Problems:    * No resolved hospital problems. *          Plan:   · Patent was transferred to ICU  · Patient was started n precedex drip which had to be increased   · Patient was given IV benzodiazepine  · Patient was given IM Phenobarbital  · CIWA and COWS protocols initiated  · Monitor for any airway compromise and if patient cannot protect airways -would require intubation and mechanical ventilatory support  · Monitor for any worsening autonomic instability  · Nursing requested to investigate about patient being postive for cocaine in the hospital as a terminal elimination phase was also observed for cocaine metabolites with half-life estimates ranging from 14.6 to 52.4 h.  As per literature   · Oxygen supplementation, if required, to keep saturation being 90 to 94% only  · Pulmonary toilet  · S/p Bronchoscopy with EBUS -results are pending   · Strict input output charting and BMP  · Correct electrolytes on whenever necessary basis  · Patient is on cefepime and vancomycin which has been changed to doxy   · Titration of antibiotics as per clinical status and cultures  · Monitor I/O and BMP  · Correct electrolytes on PRN basis   · Monitor for any hypoglycemia   · Neurochecks  · Bronchodilators  · Monitor for any withdrawal symptoms  · PUD and DVT prophylaxis as per IM     Case discussed with ICU team     Patient was seen and managed multiple times during the course of the day    Patient's clinical status has decompensated and is critical and has potential for further decompensation     Critical care time spent -35 minutes(exclusive of any procedures)        Electronically signed by:  Bari Runner, MD    3/3/2021    3:12 PM.

## 2021-03-04 PROBLEM — C34.91 SMALL CELL LUNG CANCER, RIGHT (HCC): Status: ACTIVE | Noted: 2021-03-04

## 2021-03-04 PROBLEM — E43 SEVERE MALNUTRITION (HCC): Chronic | Status: ACTIVE | Noted: 2021-03-04

## 2021-03-04 LAB
ANION GAP SERPL CALCULATED.3IONS-SCNC: 7 MMOL/L (ref 3–16)
BASOPHILS ABSOLUTE: 0 K/UL (ref 0–0.2)
BASOPHILS RELATIVE PERCENT: 0.4 %
BUN BLDV-MCNC: 36 MG/DL (ref 7–20)
CALCIUM SERPL-MCNC: 9.1 MG/DL (ref 8.3–10.6)
CHLORIDE BLD-SCNC: 100 MMOL/L (ref 99–110)
CO2: 32 MMOL/L (ref 21–32)
CREAT SERPL-MCNC: 1.3 MG/DL (ref 0.9–1.3)
EOSINOPHILS ABSOLUTE: 0 K/UL (ref 0–0.6)
EOSINOPHILS RELATIVE PERCENT: 0.1 %
GFR AFRICAN AMERICAN: >60
GFR NON-AFRICAN AMERICAN: 58
GLUCOSE BLD-MCNC: 121 MG/DL (ref 70–99)
HCT VFR BLD CALC: 39.1 % (ref 40.5–52.5)
HEMOGLOBIN: 12.8 G/DL (ref 13.5–17.5)
LYMPHOCYTES ABSOLUTE: 1.3 K/UL (ref 1–5.1)
LYMPHOCYTES RELATIVE PERCENT: 14.2 %
MCH RBC QN AUTO: 26.6 PG (ref 26–34)
MCHC RBC AUTO-ENTMCNC: 32.6 G/DL (ref 31–36)
MCV RBC AUTO: 81.5 FL (ref 80–100)
MONOCYTES ABSOLUTE: 0.5 K/UL (ref 0–1.3)
MONOCYTES RELATIVE PERCENT: 6 %
NEUTROPHILS ABSOLUTE: 7.1 K/UL (ref 1.7–7.7)
NEUTROPHILS RELATIVE PERCENT: 79.3 %
PDW BLD-RTO: 14 % (ref 12.4–15.4)
PLATELET # BLD: 294 K/UL (ref 135–450)
PMV BLD AUTO: 7.2 FL (ref 5–10.5)
POTASSIUM SERPL-SCNC: 4.2 MMOL/L (ref 3.5–5.1)
RBC # BLD: 4.8 M/UL (ref 4.2–5.9)
SODIUM BLD-SCNC: 139 MMOL/L (ref 136–145)
WBC # BLD: 9 K/UL (ref 4–11)

## 2021-03-04 PROCEDURE — 99233 SBSQ HOSP IP/OBS HIGH 50: CPT | Performed by: PSYCHIATRY & NEUROLOGY

## 2021-03-04 PROCEDURE — 2580000003 HC RX 258: Performed by: INTERNAL MEDICINE

## 2021-03-04 PROCEDURE — 6360000002 HC RX W HCPCS: Performed by: INTERNAL MEDICINE

## 2021-03-04 PROCEDURE — 80048 BASIC METABOLIC PNL TOTAL CA: CPT

## 2021-03-04 PROCEDURE — 6370000000 HC RX 637 (ALT 250 FOR IP): Performed by: INTERNAL MEDICINE

## 2021-03-04 PROCEDURE — 94761 N-INVAS EAR/PLS OXIMETRY MLT: CPT

## 2021-03-04 PROCEDURE — 99233 SBSQ HOSP IP/OBS HIGH 50: CPT | Performed by: INTERNAL MEDICINE

## 2021-03-04 PROCEDURE — 2500000003 HC RX 250 WO HCPCS: Performed by: INTERNAL MEDICINE

## 2021-03-04 PROCEDURE — 2700000000 HC OXYGEN THERAPY PER DAY

## 2021-03-04 PROCEDURE — 2000000000 HC ICU R&B

## 2021-03-04 PROCEDURE — 36415 COLL VENOUS BLD VENIPUNCTURE: CPT

## 2021-03-04 PROCEDURE — 85025 COMPLETE CBC W/AUTO DIFF WBC: CPT

## 2021-03-04 RX ORDER — HALOPERIDOL 5 MG/ML
5 INJECTION INTRAMUSCULAR EVERY 4 HOURS PRN
Status: DISCONTINUED | OUTPATIENT
Start: 2021-03-04 | End: 2021-03-10 | Stop reason: HOSPADM

## 2021-03-04 RX ORDER — PREDNISONE 20 MG/1
20 TABLET ORAL DAILY
Status: DISCONTINUED | OUTPATIENT
Start: 2021-03-05 | End: 2021-03-08

## 2021-03-04 RX ADMIN — LORAZEPAM 4 MG: 2 INJECTION, SOLUTION INTRAMUSCULAR; INTRAVENOUS at 17:57

## 2021-03-04 RX ADMIN — Medication 10 ML: at 08:24

## 2021-03-04 RX ADMIN — METHYLPREDNISOLONE SODIUM SUCCINATE 40 MG: 40 INJECTION, POWDER, FOR SOLUTION INTRAMUSCULAR; INTRAVENOUS at 06:46

## 2021-03-04 RX ADMIN — Medication 1.4 MCG/KG/HR: at 14:02

## 2021-03-04 RX ADMIN — MUPIROCIN: 20 OINTMENT TOPICAL at 08:24

## 2021-03-04 RX ADMIN — ENOXAPARIN SODIUM 40 MG: 40 INJECTION SUBCUTANEOUS at 08:24

## 2021-03-04 RX ADMIN — LORAZEPAM 4 MG: 2 INJECTION, SOLUTION INTRAMUSCULAR; INTRAVENOUS at 11:12

## 2021-03-04 RX ADMIN — Medication 1.4 MCG/KG/HR: at 20:38

## 2021-03-04 RX ADMIN — MUPIROCIN: 20 OINTMENT TOPICAL at 20:00

## 2021-03-04 NOTE — PROGRESS NOTES
Comprehensive Nutrition Assessment    Type and Reason for Visit:  RD Nutrition Re-Screen/LOS, Initial    Nutrition Recommendations/Plan:   Continue general diet as tolerated  Start ONS: Ensure Enlive BID  Encourage nutrition  Monitor nutrition adequacy, pertinent labs, bowel habits, wt changes, and clinical progress    Nutrition Assessment:  LOS assessment: Pt is a 47 y/o male admitted with hematuria. Positive for cocaine x2 this admission. Has had AMS with agitation throughout admission, but is more calm today and able to take PO. Diet advanced per MD. Per EMR, pt is homeless. Hx of malnutrition. Attempted to get an actual wt yesterday, but bedscale not working.  lbs stated. ONS added to meals. Encourage nutrition as tolerated. Per MDR, EBUS positive for SCLC. Palliative care consulted.     Malnutrition Assessment:  Malnutrition Status:  Severe malnutrition    Context:  Chronic Illness     Findings of the 6 clinical characteristics of malnutrition:  Energy Intake:  7 - 75% or less estimated energy requirements for 1 month or longer  Weight Loss:  7 - Greater than 20% over 1 year     Body Fat Loss:  7 - Severe body fat loss Triceps   Muscle Mass Loss:  7 - Severe muscle mass loss Thigh (quadraceps)  Fluid Accumulation:  Unable to assess     Strength:  Not Performed    Estimated Daily Nutrient Needs:  Energy (kcal):  2887-3378 kcals; Weight Used for Energy Requirements:  Current(45 kg)     Protein (g):  68-90 g; Weight Used for Protein Requirements:  Current(1.2-2)        Fluid (ml/day):  1 mL/kcal; Method Used for Fluid Requirements:  1 ml/kcal      Nutrition Related Findings:  + BM 3/4      Wounds:  None       Current Nutrition Therapies:    DIET GENERAL;  Dietary Nutrition Supplements: Standard High Calorie Oral Supplement    Anthropometric Measures:  · Height: 5' (152.4 cm)  · Current Body Weight: 100 lb (45.4 kg)   · Usual Body Weight: 125 lb (56.7 kg)(July 2020)     · Ideal Body Weight: 106 lbs  · BMI: 19.5  · BMI Categories: Underweight (BMI less than 18.5)       Nutrition Diagnosis:   · Severe malnutrition related to inadequate protein-energy intake as evidenced by severe muscle loss, intake 0-25%      Nutrition Interventions:   Food and/or Nutrient Delivery:  Start Oral Nutrition Supplement, Start Oral Diet  Nutrition Education/Counseling:  No recommendation at this time   Coordination of Nutrition Care:  Continue to monitor while inpatient    Goals: Tolerate diet and consume greater than 50% of meals and ONS this admission without wt loss, or electrolyte disturbances       Nutrition Monitoring and Evaluation:   Food/Nutrient Intake Outcomes:  Food and Nutrient Intake, Supplement Intake  Physical Signs/Symptoms Outcomes:  GI Status, Biochemical Data, Weight     Discharge Planning:    Continue current diet, Continue Oral Nutrition Supplement     Electronically signed by Colby Farooq.  Flavio Astudillo RD, LD on 3/4/21 at 11:19 AM EST    Contact: [phone]

## 2021-03-04 NOTE — PROGRESS NOTES
INPATIENT PULMONARY CRITICAL CARE PROGRESS NOTE      Reason for visit    enlarging lung mass, previous BAL cytology negative for malignancy. SUBJECTIVE: Patient when seen this morning was continued to be on IV Precedex infusion along with a CIWA and COWS protocol, patient was much more comfortable and oriented as compared to yesterday, patient was not wheezing audibly along with that patient had less shortness of breath, patient continues to be afebrile and patient's blood pressure is still fluctuating, patient was in sinus rhythm which was ranging from normal sinus rhythm to sinus tachycardia, patient when evaluated was on 2 L of nasal cannula oxygen with saturation of 94%,  Patient has had good urine output overnight with cumulative fluid balance of -3.4 L, patient was n.p.o. overnight secondary to precarious respiratory status, no other ROS of concern which could be obtained       Physical Exam:  Blood pressure (!) 157/106, pulse 59, temperature 97.2 °F (36.2 °C), temperature source Bladder, resp. rate 22, height 5' (1.524 m), weight 100 lb (45.4 kg), SpO2 96 %.'     Constitutional:  No acute distress. Mild tachypnea  HENT:  Oropharynx is clear and moist. No thyromegaly. Eyes:  Conjunctivae are normal. Pupils equal, round, and reactive to light. No scleral icterus. Neck: . No tracheal deviation present. No obvious thyroid mass. Cardiovascular: Sinus tachycardia LLSB murmur   No right ventricular heave. No lower extremity edema. decreased BSI   Pulmonary/Chest: No wheezes. B/L decreased rales. Decreased chest congestion Chest wall is not dull to percussion. No accessory muscle usage or stridor. Abdominal: Soft. Bowel sounds present. No distension or hernia. No tenderness. Musculoskeletal: No cyanosis. No clubbing. No obvious joint deformity. Lymphadenopathy: No cervical or supraclavicular adenopathy. Skin: Skin is warm and dry. No rash or nodules on the exposed extremities. multiple tattoos Neurologic: alert and communicative  when seen          Results:  CBC:   Recent Labs     03/02/21  0745 03/04/21  0925   WBC 10.2 9.0   HGB 10.8* 12.8*   HCT 35.1* 39.1*   MCV 87.2 81.5   PLT see below 294     BMP:   Recent Labs     03/02/21  0745 03/03/21  1004 03/04/21  0925    142 139   K 4.7 3.7 4.2    103 100   CO2 24 31 32   BUN 32* 27* 36*   CREATININE 1.4* 1.3 1.3       Imaging:  I have reviewed radiology images personally. XR CHEST PA LATERAL W FLUOROSCOPY   Final Result   1. See above. FLUORO FOR SURGICAL PROCEDURES   Final Result   1. See above. CT CHEST PULMONARY EMBOLISM W CONTRAST   Final Result   1. Artifact degraded evaluation of the pulmonary arteries. No acute   pulmonary embolism to the segmental arteries given limitation. 2. Progression of disease. Increased size of right lower lobe nodule and   bone metastases. Soft tissue in the anterior mediastinum and anterior to the   right middle lobe likely represents additional metastases. Follow-up   recommended. 3. Questionable mass at the base of the tongue. Correlate with history. 4.  Other findings as described. XR CHEST PORTABLE   Final Result   Chronic findings in the chest without acute airspace disease identified. Scar in the right lung apex again noted. The opacities recently described in   the right middle lobe and right lower lobe are not delineated on this exam.           A. Lung, right lower lobe, brushing:        -  Negative for malignancy. B. Lymph node, right hilar, transbronchial ultrasound guided fine needle   aspiration:         -  Positive for malignancy.       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         - See comment     C. Lung, right lower lobe, bronchial alveolar lavage:        -  Negative for malignancy.      COMMENT:      The right hilar sample (specimen B) shows hypercellularity   composed of undifferentiated cells with nuclear molding and hyperchromasia.  Immunoperoxidase stains performed using pancytokeratin,   chromogranin, synaptophysin, TTF-1 and Ki-67 show that the tumor cells   are diffusely positive for pancytokeratin, synaptophysin and TTF-1.  The   Ki-67 proliferation index is 100%.  The tumor cells are negative for   chromogranin.  The cytomorphology and immunoperoxidase profile are   consistent with a small cell carcinoma of the lung. Assessment:  Active Problems:    Acute respiratory failure with hypoxia (HCC)    IV drug abuse (HCC)    Tricuspid regurgitation    Lung nodule    Centrilobular emphysema (HCC)    Hilar adenopathy    Personal history of covid-19    Moderate to severe pulmonary hypertension (HCC)    Acute bronchitis    Hematuria    IRMA (acute kidney injury) ([address]    Hypokalemia    Cocaine use    Delirium tremens (HCC)    Severe malnutrition (HCC)    Polysubstance dependence ([facility] [address]    Small cell lung cancer, right ([address]  Resolved Problems:    * No resolved hospital problems.  *          Plan:   · Patent to be continued on precedex drip which had to be increased   · Patient was given IV benzodiazepine  · Patient was given IM Phenobarbital  · CIWA and COWS protocols in place  · Monitor for any airway compromise and if patient cannot protect airways -would require intubation and mechanical ventilatory support-respiratory status has improved as compared to yesterday  · Monitor for any worsening autonomic instability  · [facility] department evaluating the substance was found from patient's clothing if it is cocaine or not  · Oxygen supplementation, if required, to keep saturation being 90 to 94% only  · Pulmonary toilet  · S/p Bronchoscopy with EBUS -results as history of small cell lung cancer  · Patient appears to have metastatic lung cancer along with that patient does not have any insight and patient continues to be a drug abuser with limited social support-not sure if patient will do well on full follow-up with oncology  · Will get palliative care consult for goals of care and CODE STATUS and if full CODE STATUS maintained and patient wants to be aggressive along with the family can reconsider about oncology evaluations  · Strict input output charting and BMP  · Correct electrolytes on whenever necessary basis  · Patient is on cefepime and vancomycin which has been changed to doxy   · Titration of antibiotics as per clinical status and cultures  · Monitor I/O and BMP  · Correct electrolytes on PRN basis   · Monitor for any hypoglycemia   · Neurochecks  · Bronchodilators  · Monitor for any withdrawal symptoms  · PUD and DVT prophylaxis as per IM     Case discussed with ICU team     Patient was seen and managed multiple times during the course of the day    Patient's clinical status has decompensated and is critical and has potential for further decompensation    Further management depending on patient clinical status and discussions between the patient/family and palliative  care team along with options        Electronically signed by:  Danica Cutler MD    3/4/2021    8:50 PM.

## 2021-03-04 NOTE — PROGRESS NOTES
03/04/21 0724   Oxygen Therapy/Pulse Ox   O2 Therapy Oxygen   $Oxygen $Daily Charge   O2 Device Nasal cannula   O2 Flow Rate (L/min) 2 L/min   Resp 22   SpO2 96 %   $Pulse Oximeter $Spot check (multiple/continuous)   End Tidal CO2 36

## 2021-03-04 NOTE — PROGRESS NOTES
Patient is more alert today and speech is more clear. RN assisted patient to eat his breakfast after passing a bedside swallow eval with writer. RN also spoke with the patient's mother, Louisa Amaya, and gave her a clinical update on patient. At this time, the patient remains in restraints for his safety and on Precedex to help with withdraw symptoms. VSS at this time and Avasys in room. Will monitor closely.

## 2021-03-04 NOTE — PROGRESS NOTES
Centrilobular emphysema ([facility] [address] [J43.2]    Moderate to severe pulmonary hypertension (HCC) [I27.20]    Hilar adenopathy [R59.0]    Lung nodule [R91.1]    Personal history of covid-19 [Z86.16]    Tricuspid regurgitation [I07.1]    IV drug abuse ([facility] [address] [F19.10]    Acute respiratory failure with hypoxia (HCC) [J96.01]             AECOPD, with severe pulmonary HTN and RV dysfunction  - steroids, inhaled bronchodilators. Doxycycline was started on admission. Suspected metastatic lung cancer. 4.8 cm RML mass with many thoracic soft tissue and bone mets. EBUS 3/1, lymph node biopsy showed epithelium and mucus, no malignancy identified. 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 Pulmonary assisting. Will consult oncology. Acute hypoxic respiratory failure. Due to above issues. Wean to RA as tolerated. The patient has no supplemental O2 requirement at baseline. IRMA. Baseline Cr normal.  Some response to IVFs. Hematuria. Urology planning eventual cystoscopy, signed off. Negative urine culture. Hematuria resolved with time. Severe TV regurgitation. Cardiology determined that no further workup was indicated and signed off. Polysubstance abuse: Alcohol abuse with withdrawal acute, Opiate use disorder, Stimulant use disorder:  Current use of ETOH, IV Heroin, and Meth. Psych recs appreciated. Continue Precedex drip and wean as tolerated, CIWA protocol. Posterior tongue mass suggested on CT, now ruled out. ENT consulted, laryngoscopy negative, no further workup anticipated. DVT Prophylaxis: SCDs  Diet: DIET GENERAL;  Dietary Nutrition Supplements: Standard High Calorie Oral Supplement  Code Status: Full Code    PT/OT Eval Status: he wasn't interested in putting forth much effort. They will not be back to evaluate him currently in restraints at time of eval for patient safety.     Dispo - when the cancer workup can

## 2021-03-04 NOTE — CONSULTS
[facility]                 [facility]                                  CONSULTATION    PATIENT NAME: Humza Dinh                     :        1970  MED REC NO:   [id number]                          ROOM:       0231  ACCOUNT NO:   [de-identified]                           ADMIT DATE: 2021  PROVIDER:     Tamiko Jordan MD    CONSULT DATE:  2021    PSYCHIATRIC CONSULTATION    HISTORY OF THE PRESENT ILLNESS:  The patient is a 80-year-old male who  was referred for admission after presenting complaining with hematuria,  complicated by a past medical history of intravenous drug use for both  heroin and methamphetamine, complicated by chronic hepatitis C, a  recently diagnosed lung mass, COPD and reportedly alcohol dependence. The patient became acutely agitated yesterday and after presenting on   with cocaine in his urine, the staff had the insight to check  another tox screen and his cocaine was still positive five days into his  hospital stay and the staff found a white powder on his person that was  found to be cocaine and security was involved in. The police have taken  that report. Psychiatry was asked to evaluate his current mental  status, his agitation, his drug use and then make recommendations. The  patient is currently on a Precedex drip and so, was unable to really  meaningfully join the conversation. The information is going from a  review of the chart speaking to the nurses who have been involved and  looking at the notes from the family that are better well documented. PAST PSYCHIATRIC HISTORY:  I cannot find any documentation that the  patient has been admitted to Psychiatry before. I did review his last  hospital stays on the medical floors, both here and at [place] and it  looks as though typically he refuses outpatient or detox last rehab for  his drug problems.   It is unclear to me whether he has been to these  facilities and failed or whether he has any success with sobriety. There was no documentation of suicide attempts as family psychiatric  history is unknown given his inability to participate in the  conversation. SOCIAL HISTORY:  The chart reports that the patient is homeless and that  this has been his status for quite a while. He does have obviously a  mother and someone who calls themselves his wife, who have called to  check on him, who do document his longstanding history of drug  dependence and alcohol use, but I do not have a good sense of quantity  or how often. It is unclear if the patient has any legal problems  pending or if there is any history of trauma. REVIEW OF SYSTEMS:  Unable to be performed because of his inability to  participate. I did review his labs from admission in his past hospital  stays. The patient's mental status exam could not really be performed  because of his level of alertness. I did review the notes in the chart  from the events within the last 24 hours and I will do a more formal  mental status exam when the patient can cooperate. DIAGNOSIS:  AXIS I:  1. Cocaine intoxication. 2.  Polysubstance dependence. 3.  Alcohol dependence by history. AXIS II:  Deferred. AXIS III:  1. Hematuria. 2.  Small cell cancer of the lung. AXIS IV:  Severe. AXIS V:  Current GAF 25, highest in the past year unclear. RECOMMENDATIONS:  1. The patient will be started on Haldol 3 mg IV push every 4 hours  p.r.n. for agitation. The plan is to start  weaning down off the Precedex and  should the patient become agitated, I think that is the better choice  given his circumstances. As long as he is on telemetry we can use the  Haldol IV push and avoid an IM injection.   2.  It is unclear whether the patient will warrant any kind of treatment  for his drug and alcohol use, but we will have that conversation when he  is more participatory and I think it is a very good idea to continue the  CIWA however, although his tox screen was negative for opioids on  admission and on the recent re-testing. So, I do not believe the COWS  protocol is required. 3.  I will continue to follow the patient and call me if there is any  question.         Amaya Zavala MD    D: 03/03/2021 18:44:03       T: 03/03/2021 20:26:13     [id number]  Job#: [id number]     Doc#: [id number]    CC:

## 2021-03-04 NOTE — CONSULTS
tablet 650 mg, 650 mg, Oral, Q6H PRN **OR** acetaminophen (TYLENOL) suppository 650 mg, 650 mg, Rectal, Q6H PRN  ondansetron (ZOFRAN) injection 4 mg, 4 mg, Intravenous, Q6H PRN  bisacodyl (DULCOLAX) suppository 10 mg, 10 mg, Rectal, Daily PRN  LORazepam (ATIVAN) tablet 1 mg, 1 mg, Oral, Q1H PRN **OR** LORazepam (ATIVAN) injection 1 mg, 1 mg, Intravenous, Q1H PRN **OR** LORazepam (ATIVAN) tablet 2 mg, 2 mg, Oral, Q1H PRN **OR** LORazepam (ATIVAN) injection 2 mg, 2 mg, Intravenous, Q1H PRN **OR** LORazepam (ATIVAN) tablet 3 mg, 3 mg, Oral, Q1H PRN **OR** LORazepam (ATIVAN) injection 3 mg, 3 mg, Intravenous, Q1H PRN **OR** LORazepam (ATIVAN) tablet 4 mg, 4 mg, Oral, Q1H PRN **OR** LORazepam (ATIVAN) injection 4 mg, 4 mg, Intravenous, Q1H PRN  haloperidol lactate (HALDOL) injection 3 mg, 3 mg, Intravenous, Q4H PRN  sodium chloride flush 0.9 % injection 10 mL, 10 mL, Intravenous, 2 times per day  sodium chloride flush 0.9 % injection 10 mL, 10 mL, Intravenous, PRN  polyethylene glycol (GLYCOLAX) packet 17 g, 17 g, Oral, Daily PRN  doxycycline hyclate (VIBRA-TABS) tablet 100 mg, 100 mg, Oral, 2 times per day  ipratropium-albuterol (DUONEB) nebulizer solution 1 ampule, 1 ampule, Inhalation, Q4H PRN    ASSESSMENT AND PLAN    Primary Psych Dx:  Delirium    Active Problems:    Acute respiratory failure with hypoxia (HCC)    IV drug abuse (HCC)    Tricuspid regurgitation    Lung nodule    Centrilobular emphysema (HCC)    Hilar adenopathy    Personal history of covid-19    Moderate to severe pulmonary hypertension (HCC)    Acute bronchitis    Hematuria    IRMA (acute kidney injury) ([facility] [address]    Hypokalemia    Cocaine use    Delirium tremens (HCC)    Severe malnutrition (HCC)  Resolved Problems:    * No resolved hospital problems. *       1. Patient s symptoms better in that his eyes are open but remains unpredictable and aggressive/labile.   I believe currently until he can participate more coherently and actively in the assessment, I would continue the restraints to protect both the patient and the staff. He has responded to high doses of ativan but I will increase the Haldol because I believe it is better in the long run given his delirium  2. Probable discharge unknown   3. Discharge planning is incomplete  4 Suicidal ideation is unable to be assessed  5 total time 40 minutes more than 50% was spent in direct time with the patient

## 2021-03-04 NOTE — CONSULTS
Palliative Care Initial Note  Palliative Care Admit date:  3/4/21  Reason for c/s:  GOC, Code status    Advance Directives: It is not believed that pt has AD's. In the absence of a HCPOA, pts four (4) children are viewed as his collective NOK. The children are Blair Schaumann, Fabian and Marimar Mackenzie. Pts mom, Leonard Miller, initially said she didn't know how to reach pts carinaren then, later in our discussion, agreed that she will provide numbers for them which \"are in my phone. \"    When writer contacted Sharla Bhakta again, she had already contacted pts son, Madhuri Ferrer, but hadn't been willing to provide his information to writer. Sharla Bhakta also reports that pt was  and may still be, she is uncertain if they legally . Plan of care/goals: Only working number on file is for Leonard Miller; every call to Emeli results in a busy signal.   Sharla Bhakta has been apprised of pts metastatic lung cancer dx. By her own admission, she is unwell (has cancer) and anxious. Plan:  Sharla Bhakta agree's to call writer or floor w/ the phone numbers for pts four children so we can then begin to verify NOK/decision making authority if pt cannot make own decisions. ADDENDUM @ 3497: With persistence, able to receive contact info for pts son, Madhuri Ferrer: [phone]. Spoke w/ Madhuri Ferrer via phone who shared that he has very little contact w/ pt or his mom and, in fact, is uncertain if pt and spouse are still legally . Madhuri Ferrer believes his sister, Russell Hammond, has the \"most contact\" w/ pt. Explained the [place] order of decision making to Madhuri Ferrer and he has graciously agreed to verify if pt is  (providing mom's name and number) and will try to obtain his siblings numbers. Madhuri Ferrer, despite his lack of contact w/ pt, is agreeable to being the primary contact and, at least for now, [facility]. He will f/u w/ writer to provide family numbers.         Reason for consult:  _X_ Advance Care Planning  ___ Transition of Care Planning  ___ Psychosocial/Spiritual Support  ___ Symptom Management                                                                                                                                                        Janis Encarnacion RN

## 2021-03-05 ENCOUNTER — APPOINTMENT (OUTPATIENT)
Dept: INTERVENTIONAL RADIOLOGY/VASCULAR | Age: 51
DRG: 180 | End: 2021-03-05
Payer: MEDICARE

## 2021-03-05 LAB
BASE EXCESS ARTERIAL: 4 (ref -3–3)
GLUCOSE BLD-MCNC: 155 MG/DL (ref 70–99)
HCO3 ARTERIAL: 28.4 MMOL/L (ref 21–29)
O2 SAT, ARTERIAL: 97 % (ref 93–100)
PCO2 ARTERIAL: 46.5 MM HG (ref 35–45)
PERFORMED ON: ABNORMAL
PH ARTERIAL: 7.39 (ref 7.35–7.45)
PO2 ARTERIAL: 87.1 MM HG (ref 75–108)
POC SAMPLE TYPE: ABNORMAL
TCO2 ARTERIAL: 30 MMOL/L

## 2021-03-05 PROCEDURE — 2580000003 HC RX 258: Performed by: NURSE PRACTITIONER

## 2021-03-05 PROCEDURE — 6370000000 HC RX 637 (ALT 250 FOR IP): Performed by: INTERNAL MEDICINE

## 2021-03-05 PROCEDURE — 82803 BLOOD GASES ANY COMBINATION: CPT

## 2021-03-05 PROCEDURE — 94761 N-INVAS EAR/PLS OXIMETRY MLT: CPT

## 2021-03-05 PROCEDURE — 2000000000 HC ICU R&B

## 2021-03-05 PROCEDURE — 05H933Z INSERTION OF INFUSION DEVICE INTO RIGHT BRACHIAL VEIN, PERCUTANEOUS APPROACH: ICD-10-PCS | Performed by: INTERNAL MEDICINE

## 2021-03-05 PROCEDURE — 6360000002 HC RX W HCPCS: Performed by: INTERNAL MEDICINE

## 2021-03-05 PROCEDURE — 6370000000 HC RX 637 (ALT 250 FOR IP): Performed by: NURSE PRACTITIONER

## 2021-03-05 PROCEDURE — 6360000002 HC RX W HCPCS: Performed by: PSYCHIATRY & NEUROLOGY

## 2021-03-05 PROCEDURE — 2700000000 HC OXYGEN THERAPY PER DAY

## 2021-03-05 PROCEDURE — 2580000003 HC RX 258: Performed by: INTERNAL MEDICINE

## 2021-03-05 PROCEDURE — 2500000003 HC RX 250 WO HCPCS: Performed by: INTERNAL MEDICINE

## 2021-03-05 PROCEDURE — 2709999900 IR MIDLINE CATH

## 2021-03-05 PROCEDURE — 99233 SBSQ HOSP IP/OBS HIGH 50: CPT | Performed by: INTERNAL MEDICINE

## 2021-03-05 PROCEDURE — 82947 ASSAY GLUCOSE BLOOD QUANT: CPT

## 2021-03-05 PROCEDURE — 94640 AIRWAY INHALATION TREATMENT: CPT

## 2021-03-05 RX ORDER — SODIUM CHLORIDE 9 MG/ML
INJECTION, SOLUTION INTRAVENOUS CONTINUOUS
Status: DISCONTINUED | OUTPATIENT
Start: 2021-03-05 | End: 2021-03-09

## 2021-03-05 RX ORDER — METHYLPREDNISOLONE SODIUM SUCCINATE 125 MG/2ML
125 INJECTION, POWDER, LYOPHILIZED, FOR SOLUTION INTRAMUSCULAR; INTRAVENOUS ONCE
Status: COMPLETED | OUTPATIENT
Start: 2021-03-05 | End: 2021-03-05

## 2021-03-05 RX ORDER — SODIUM CHLORIDE 0.9 % (FLUSH) 0.9 %
10 SYRINGE (ML) INJECTION EVERY 12 HOURS SCHEDULED
Status: DISCONTINUED | OUTPATIENT
Start: 2021-03-05 | End: 2021-03-05

## 2021-03-05 RX ORDER — ALLOPURINOL 300 MG/1
300 TABLET ORAL DAILY
Status: DISCONTINUED | OUTPATIENT
Start: 2021-03-05 | End: 2021-03-10 | Stop reason: HOSPADM

## 2021-03-05 RX ORDER — LIDOCAINE HYDROCHLORIDE 10 MG/ML
5 INJECTION, SOLUTION INFILTRATION; PERINEURAL ONCE
Status: DISCONTINUED | OUTPATIENT
Start: 2021-03-05 | End: 2021-03-08

## 2021-03-05 RX ORDER — IPRATROPIUM BROMIDE AND ALBUTEROL SULFATE 2.5; .5 MG/3ML; MG/3ML
1 SOLUTION RESPIRATORY (INHALATION) EVERY 4 HOURS
Status: DISCONTINUED | OUTPATIENT
Start: 2021-03-05 | End: 2021-03-05

## 2021-03-05 RX ORDER — SODIUM CHLORIDE 0.9 % (FLUSH) 0.9 %
10 SYRINGE (ML) INJECTION PRN
Status: DISCONTINUED | OUTPATIENT
Start: 2021-03-05 | End: 2021-03-05

## 2021-03-05 RX ORDER — IPRATROPIUM BROMIDE AND ALBUTEROL SULFATE 2.5; .5 MG/3ML; MG/3ML
1 SOLUTION RESPIRATORY (INHALATION)
Status: DISCONTINUED | OUTPATIENT
Start: 2021-03-06 | End: 2021-03-10 | Stop reason: HOSPADM

## 2021-03-05 RX ORDER — PHENOBARBITAL SODIUM 65 MG/ML
130 INJECTION INTRAMUSCULAR ONCE
Status: COMPLETED | OUTPATIENT
Start: 2021-03-05 | End: 2021-03-05

## 2021-03-05 RX ADMIN — LORAZEPAM 2 MG: 2 INJECTION INTRAMUSCULAR; INTRAVENOUS at 10:20

## 2021-03-05 RX ADMIN — LORAZEPAM 4 MG: 2 INJECTION, SOLUTION INTRAMUSCULAR; INTRAVENOUS at 15:50

## 2021-03-05 RX ADMIN — PREDNISONE 20 MG: 20 TABLET ORAL at 09:33

## 2021-03-05 RX ADMIN — LORAZEPAM 4 MG: 2 INJECTION, SOLUTION INTRAMUSCULAR; INTRAVENOUS at 12:19

## 2021-03-05 RX ADMIN — LORAZEPAM 2 MG: 1 TABLET ORAL at 13:20

## 2021-03-05 RX ADMIN — LORAZEPAM 2 MG: 2 INJECTION INTRAMUSCULAR; INTRAVENOUS at 05:37

## 2021-03-05 RX ADMIN — PHENOBARBITAL SODIUM 130 MG: 65 INJECTION INTRAMUSCULAR at 15:35

## 2021-03-05 RX ADMIN — MUPIROCIN: 20 OINTMENT TOPICAL at 09:33

## 2021-03-05 RX ADMIN — SODIUM CHLORIDE: 9 INJECTION, SOLUTION INTRAVENOUS at 15:00

## 2021-03-05 RX ADMIN — IPRATROPIUM BROMIDE AND ALBUTEROL SULFATE 1 AMPULE: .5; 3 SOLUTION RESPIRATORY (INHALATION) at 20:05

## 2021-03-05 RX ADMIN — LORAZEPAM 4 MG: 2 INJECTION, SOLUTION INTRAMUSCULAR; INTRAVENOUS at 14:51

## 2021-03-05 RX ADMIN — LORAZEPAM 4 MG: 2 INJECTION, SOLUTION INTRAMUSCULAR; INTRAVENOUS at 17:06

## 2021-03-05 RX ADMIN — IPRATROPIUM BROMIDE AND ALBUTEROL SULFATE 1 AMPULE: .5; 3 SOLUTION RESPIRATORY (INHALATION) at 15:33

## 2021-03-05 RX ADMIN — METHYLPREDNISOLONE SODIUM SUCCINATE 125 MG: 125 INJECTION, POWDER, FOR SOLUTION INTRAMUSCULAR; INTRAVENOUS at 16:28

## 2021-03-05 RX ADMIN — IPRATROPIUM BROMIDE AND ALBUTEROL SULFATE 1 AMPULE: .5; 3 SOLUTION RESPIRATORY (INHALATION) at 23:37

## 2021-03-05 RX ADMIN — IPRATROPIUM BROMIDE AND ALBUTEROL SULFATE 1 AMPULE: .5; 3 SOLUTION RESPIRATORY (INHALATION) at 12:18

## 2021-03-05 RX ADMIN — ENOXAPARIN SODIUM 40 MG: 40 INJECTION SUBCUTANEOUS at 09:33

## 2021-03-05 RX ADMIN — Medication 1.4 MCG/KG/HR: at 21:15

## 2021-03-05 RX ADMIN — Medication 10 ML: at 09:33

## 2021-03-05 RX ADMIN — IPRATROPIUM BROMIDE AND ALBUTEROL SULFATE 1 AMPULE: .5; 3 SOLUTION RESPIRATORY (INHALATION) at 16:32

## 2021-03-05 RX ADMIN — LORAZEPAM 4 MG: 2 INJECTION, SOLUTION INTRAMUSCULAR; INTRAVENOUS at 23:55

## 2021-03-05 RX ADMIN — HALOPERIDOL LACTATE 5 MG: 5 INJECTION, SOLUTION INTRAMUSCULAR at 13:19

## 2021-03-05 RX ADMIN — MUPIROCIN: 20 OINTMENT TOPICAL at 20:34

## 2021-03-05 RX ADMIN — Medication 1.4 MCG/KG/HR: at 14:54

## 2021-03-05 ASSESSMENT — PAIN SCALES - GENERAL: PAINLEVEL_OUTOF10: 0

## 2021-03-05 NOTE — PROGRESS NOTES
Patient found this morning around 0930 with the orange tourniquet applied to his right upper arm. Tourniquet released, arm swollen, bluish, fine motor skills decreased, cap refills wnl, pulses intact; warm compresses applied, active and passive range of motions performed. Will continue to monitor closely.

## 2021-03-05 NOTE — CONSULTS
Hematology/Oncology Consultation         Patient:   Noris Hoffmann       Reason for Consult: small cell lung cancer, new diagnosis     Requesting Physician: No ref. provider found    Chief Complaint:     Chief Complaint   Patient presents with    Shortness of Breath     x1year    Hematuria     3-4 weeks                  History Obtained from:     patient, electronic medical record    History of Present Illness:     Noris Hoffmann is a 48 y.o. male  with significant past medical history of drug addiction who presents with dyspnea. CTPA on 2/27 shows R sided lung mass and bone mets. Cytology from hilar node shows small cell lung carcinoma. We are urgently consulted for treatment options. CBC stable. He is now in the ICU in restraints and on Precedex drip to help with agitation. He was told he has lung cancer. He is now stating, \" I'm going to die! \".      Past Medical History:         Diagnosis Date    IRMA (acute kidney injury) ([facility] 02/27/2021    Centrilobular emphysema ([address] 02/03/2021    COVID-19 12/05/2020    EtOH dependence ([address]     H/O brain surgery     Hepatitis C antibody test positive 07/17/2014    History of surgery     L leg surgery    Intravenous drug abuse ([address]     Methamphetamine abuse ([facility]     MRSA (methicillin resistant staph aureus) culture positive 07/12/2014    blood cx    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    PE (pulmonary thromboembolism) ([address]     Pneumonia     Small cell lung cancer, right ([address] 3/4/2021       Past Surgical History:         Procedure Laterality Date    BRAIN SURGERY  01/01/1986    s/p trauma    BRAIN SURGERY      BRONCHOSCOPY N/A 2/4/2021    BRONCHOSCOPY ALVEOLAR LAVAGE performed by Juan R Michael MD at [address]  2/4/2021    BRONCHOSCOPY THERAPUTIC ASPIRATION INITIAL performed by Juan R Michael MD at [address] N/A 3/1/2021    BRONCHOSCOPY ALVEOLAR LAVAGE performed by Juan R Michael MD at [facility]    BRONCHOSCOPY  3/1/2021    BRONCHOSCOPY BIOPSY BRONCHUS performed by Caren Burnett MD at [address] [place] [address]  3/1/2021    BRONCHOSCOPY BRUSHINGS performed by Caren Burnett MD at [address] [place] [address]  3/1/2021    BRONCHOSCOPY/TRANSBRONCHIAL NEEDLE BIOPSY performed by Caren Burnett MD at [address] [place] [address]  3/1/2021    BRONCHOSCOPY ENDOBRONCHIAL ULTRASOUND performed by Caren Burnett MD at [address] [place] [address] SURGERY      TRANSESOPHAGEAL ECHOCARDIOGRAM  02/03/2021    Dr Maritza Perera       Current Medications:     Current Facility-Administered Medications   Medication Dose Route Frequency Provider Last Rate Last Admin    allopurinol (ZYLOPRIM) tablet 300 mg  300 mg Oral Daily Verdie Kand, APRN - CNP        0.9 % sodium chloride infusion   Intravenous Continuous Verdie Kand, APRN - CNP        lidocaine 1 % injection 5 mL  5 mL Intradermal Once Verdie Kand, APRN - CNP        predniSONE (DELTASONE) tablet 20 mg  20 mg Oral Daily Izora Rasher, APRN - CNP   20 mg at 03/05/21 0933    haloperidol lactate (HALDOL) injection 5 mg  5 mg Intravenous Q4H PRN Brenden Hicks MD        dexmedetomidine (PRECEDEX) 400 mcg in sodium chloride 0.9 % 100 mL infusion  0.2-1.4 mcg/kg/hr Intravenous Continuous Devora Vázquez MD 13.6 mL/hr at 03/05/21 0015 1.2 mcg/kg/hr at 03/05/21 0015    mupirocin (BACTROBAN) 2 % ointment   Nasal BID Caren Burnett MD   Given at 03/05/21 0933    enoxaparin (LOVENOX) injection 40 mg  40 mg Subcutaneous Daily Caren Burnett MD   40 mg at 03/05/21 0933    acetaminophen (TYLENOL) tablet 650 mg  650 mg Oral Q6H PRN Caren Burnett MD        Or   Polly Cushing acetaminophen (TYLENOL) suppository 650 mg  650 mg Rectal Q6H PRN Caren Burnett MD        ondansetron (ZOFRAN) injection 4 mg  4 mg Intravenous Q6H PRN Caren Burnett MD        bisacodyl (DULCOLAX) suppository 10 mg  10 mg Rectal Daily PRN Juan R Michael MD        LORazepam (ATIVAN) tablet 1 mg  1 mg Oral Q1H PRN Juan R Michael MD        Or    LORazepam (ATIVAN) injection 1 mg  1 mg Intravenous Q1H PRN Juan R Michael MD        Or    LORazepam (ATIVAN) tablet 2 mg  2 mg Oral Q1H PRN Juan R Michael MD        Or    LORazepam (ATIVAN) injection 2 mg  2 mg Intravenous Q1H PRN Juan R Michael MD   2 mg at 21 1020    Or    LORazepam (ATIVAN) tablet 3 mg  3 mg Oral Q1H PRN Juan R Michael MD        Or    LORazepam (ATIVAN) injection 3 mg  3 mg Intravenous Q1H PRN Juan R Michael MD        Or    LORazepam (ATIVAN) tablet 4 mg  4 mg Oral Q1H PRN Juan R Michael MD        Or    LORazepam (ATIVAN) injection 4 mg  4 mg Intravenous Q1H PRN Juan R Michael MD   4 mg at 21 1757    sodium chloride flush 0.9 % injection 10 mL  10 mL Intravenous 2 times per day Daysi Guzmán MD   10 mL at 21 0933    sodium chloride flush 0.9 % injection 10 mL  10 mL Intravenous PRN Daysi Guzmán MD        polyethylene glycol (GLYCOLAX) packet 17 g  17 g Oral Daily PRN Daysi Guzmán MD        ipratropium-albuterol (DUONEB) nebulizer solution 1 ampule  1 ampule Inhalation Q4H PRN Daysi Guzmán MD   1 ampule at 21 0807       Allergies:     No Known Allergies    Social History:     Social History     Socioeconomic History    Marital status: Single     Spouse name: Not on file    Number of children: Not on file    Years of education: Not on file    Highest education level: Not on file   Occupational History    Not on file   Social Needs    Financial resource strain: Not on file    Food insecurity     Worry: Not on file     Inability: Not on file    Transportation needs     Medical: Not on file     Non-medical: Not on file   Tobacco Use    Smoking status: Former Smoker     Packs/day: 2.00     Years: 30.00     Pack years: 60.00     Types: Cigarettes     Quit date: 2016     Years since quittin.0    Smokeless tobacco: Current User     Types: Chew    Tobacco comment: states 5 yrs ago   Substance and Sexual Activity    Alcohol use: Yes     Alcohol/week: 24.0 standard drinks     Types: 24 Cans of beer per week    Drug use: Not Currently     Types: IV     Comment: 5-6 months ago opiates, per pt    Sexual activity: Yes     Partners: Female   Lifestyle    Physical activity     Days per week: Not on file     Minutes per session: Not on file    Stress: Not on file   Relationships    Social connections     Talks on phone: Not on file     Gets together: Not on file     Attends Sikh service: Not on file     Active member of club or organization: Not on file     Attends meetings of clubs or organizations: Not on file     Relationship status: Not on file    Intimate partner violence     Fear of current or ex partner: Not on file     Emotionally abused: Not on file     Physically abused: Not on file     Forced sexual activity: Not on file   Other Topics Concern    Not on file   Social History Narrative    ** Merged History Encounter **          Social History     Substance and Sexual Activity   Drug Use Not Currently    Types: IV    Comment: 5-6 months ago opiates, per pt     Social History     Substance and Sexual Activity   Alcohol Use Yes    Alcohol/week: 24.0 standard drinks    Types: 24 Cans of beer per week     Social History     Substance and Sexual Activity   Sexual Activity Yes    Partners: Female     Social History     Tobacco Use   Smoking Status Former Smoker    Packs/day: 2.00    Years: 30.00    Pack years: 60.00    Types: Cigarettes    Quit date: 2016    Years since quittin.0   Smokeless Tobacco Current User    Types: Chew   Tobacco Comment    states 5 yrs ago       Family History:     Family History   Problem Relation Age of Onset    Other Mother         alzheimers       Review of Systems:     Constitutional: Denies fever, sweats, weight loss. .     Eyes: No visual changes or diplopia. No scleral icterus. ENT: No Headaches, no hearing loss, no  vertigo. No mouth sores or sore throat. Cardiovascular: No chest pain, no dyspnea on exertion, no palpitations, no  loss of consciousness. Respiratory: see HPI   Gastrointestinal: No abdominal pain, no appetite loss, no blood in stools. No change in bowel habits. Genitourinary: No dysuria, trouble voiding, or hematuria. Musculoskeletal:  Generalized weakness. No joint complaints. Integumentary: No rash or pruritis. Neurological: No headache, diplopia. No change in gait, balance, or coordination. No paresthesias. Endocrine: No temperature intolerance. No excessive thirst, fluid intake, or urination. Hematologic/Lymphatic: No abnormal bruising or ecchymoses, no blood clots or swollen lymph nodes. Allergic/Immunologic: No nasal congestion or hives. Physical Exam:     BP (!) 147/133   Pulse 124   Temp 97.6 °F (36.4 °C) (Bladder)   Resp 20   Ht 5' (1.524 m)   Wt 100 lb (45.4 kg)   SpO2 91%   BMI 19.53 kg/m²     CONSTITUTIONAL: moderate distress, in restraints, appears acute on chronically ill and cachectic , in Bilateral soft wrist restraints   EYES:  Pupils equal, round and reactive to light, sclera non-icteric, conjunctiva normal  ENT:  Normocephalic, without obvious abnormality, atraumatic, sinuses nontender on palpation, external ears without lesions, oral pharynx with moist mucus membranes, no mucositis.   NECK:  Supple, symmetrical, trachea midline, no adenopathy, thyroid symmetric, not enlarged and no tenderness, skin normal  HEMATOLOGIC/LYMPHATICS:  no cervical lymphadenopathy, no supraclavicular lymphadenopathy, no axillary lymphadenopathy and no inguinal lymphadenopathy  BACK:  Symmetric, no curvature, spinous processes are non-tender on palpation, paraspinous muscles are non-tender on palpation, no costal vertebral tenderness  LUNGS:  Labored breathing   CARDIOVASCULAR:  Regular rate and rhythm, normal S1 and S2, no S3 or S4, and no murmur noted  ABDOMEN:  Normal bowel sounds, soft, non-distended, non-tender, no masses palpated, no hepatosplenomegally  MUSCULOSKELETAL:  There is no redness, warmth, or swelling of the joints  NEUROLOGIC:   No focal findings. SKIN:  Scattered bruising and ecchymoses. EXT: without clubbing, cyanosis or edema. Data:     CBC:  Recent Labs     21  0925 21  0745 21  0707   WBC 9.0 10.2 8.3   HGB 12.8* 10.8* 10.4*   HCT 39.1* 35.1* 31.5*   MCV 81.5 87.2 83.6    see below 243       BMP:  Recent Labs     21  0925 21  1004 21  0745 21  1610 21  1610    142 139   < > 137   K 4.2 3.7 4.7   < > 2.9*   CO2 32 31 24   < > 33*   BUN 36* 27* 32*   < > 17   CREATININE 1.3 1.3 1.4*   < > 1.6*   MG  --   --   --   --  2.00    < > = values in this interval not displayed. HEPATIC:  Recent Labs     21  1610   AST 17   ALT 8*   ALKPHOS 84   PROT 7.5   BILITOT 0.5       TUMOR MARKERS:  Recent Labs     21  1038   PSA 0.70         MAGNESIUM:    Lab Results   Component Value Date    MG 2.00 2021       PT/INR:    No results found for: PTINR    Lab Results   Component Value Date    INR 1.05 2021       PTT:    Lab Results   Component Value Date    APTT 27.3 2021       U/A:    Lab Results   Component Value Date    COLORU RED 2021    PHUR 7.0 2021    WBCUA see below 2021    RBCUA >100 2021    MUCUS 1+ 2020    BACTERIA 4+ 2021    CLARITYU TURBID 2021    SPECGRAV see below 2021    LEUKOCYTESUR see below 2021    UROBILINOGEN see below 2021    BILIRUBINUR see below 2021    BLOODU see below 2021    GLUCOSEU see below 2021    AMORPHOUS 4+ 2021       Imagin.  Artifact degraded evaluation of the pulmonary arteries.  No acute   pulmonary embolism to the segmental arteries given limitation.    2. Progression of disease.  Increased size of right lower lobe nodule and   bone metastases.  Soft tissue in the anterior mediastinum and anterior to the   right middle lobe likely represents additional metastases.  Follow-up   recommended. 3. Questionable mass at the base of the tongue.  Correlate with history. 4.  Other findings as described.         FINAL DIAGNOSIS:     A. Lung, right lower lobe, brushing:        -  Negative for malignancy. B. Lymph node, right hilar, transbronchial ultrasound guided fine needle   aspiration:         -  Positive for malignancy.       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         - See comment     C. Lung, right lower lobe, bronchial alveolar lavage:        -  Negative for malignancy. COMMENT:      The right hilar sample (specimen B) shows hypercellularity   composed of undifferentiated cells with nuclear molding and   hyperchromasia.  Immunoperoxidase stains performed using pancytokeratin,   chromogranin, synaptophysin, TTF-1 and Ki-67 show that the tumor cells   are diffusely positive for pancytokeratin, synaptophysin and TTF-1.  The   Ki-67 proliferation index is 100%.  The tumor cells are negative for   chromogranin.  The cytomorphology and immunoperoxidase profile are   consistent with a small cell carcinoma of the lung.    MUTGE/MUTGE     Impression:     See below     Plan:     Newly diagnosed small cell lung CA  - CT A/P with PO contrast, bone scan and MRI brain for full staging   - CTPA on 2/27 noted possible bone mets     Base of Tongue Mass   - PET outpatient     I discussed with POA son Dudley Hunt via phone today. He plans on being at [facility] tomorrow to discuss with Dr. Suad Patricio. He is okay with PICC line placement. Will order chemo Carbo/ 16 cycle 1 inpatient if family and patient agreeable pending further discussion of goals.      TLS prevention   - NS at 125 cc/hour   - Allopurinol started   - check uric acid and phos daily     Psych-Social  - He is homeless and with drug addiction   - treatment and placement may be complicated   - currently in restraints and Precedex for anxiety     Thank you very much for allowing me to participate in the care of this patient.     [facility]   Medical Oncology/Hematology    [facility] - [address],  [address]  Phone: [phone]  Fax: [phone]

## 2021-03-05 NOTE — PROGRESS NOTES
Hospitalist Progress Note      PCP: No primary care provider on file. Date of Admission: 2/27/2021    Chief Complaint:  hematuria      Subjective:          Pt had lost IV access and had been off precedex ggt for several hours. Pt very agitated. Midline was  placed and precedex ggt now resumed. Medications:  Reviewed    Infusion Medications    sodium chloride      dexmedetomidine HCl in NaCl 1.4 mcg/kg/hr (03/05/21 1454)     Scheduled Medications    allopurinol  300 mg Oral Daily    lidocaine 1 % injection  5 mL Intradermal Once    ipratropium-albuterol  1 ampule Inhalation Q4H    predniSONE  20 mg Oral Daily    mupirocin   Nasal BID    enoxaparin  40 mg Subcutaneous Daily    sodium chloride flush  10 mL Intravenous 2 times per day     PRN Meds: haloperidol lactate, acetaminophen **OR** acetaminophen, ondansetron, bisacodyl, LORazepam **OR** LORazepam **OR** LORazepam **OR** LORazepam **OR** LORazepam **OR** LORazepam **OR** LORazepam **OR** LORazepam, sodium chloride flush, polyethylene glycol      Intake/Output Summary (Last 24 hours) at 3/5/2021 1459  Last data filed at 3/5/2021 1100  Gross per 24 hour   Intake    Output 2000 ml   Net -2000 ml       Physical Exam Performed:    BP (!) 147/133   Pulse 124   Temp 97.6 °F (36.4 °C) (Bladder)   Resp 20   Ht 5' (1.524 m)   Wt 100 lb (45.4 kg)   SpO2 93%   BMI 19.53 kg/m²       General appearance: agitated, in resp distress, appears stated age. HEENT: Pupils equal, round,. Conjunctivae/corneas clear. Neck: Supple, with full range of motion. No jugular venous distention. Trachea midline. Respiratory:  Normal respiratory effort. Clear to auscultation, bilaterally without Rales/Wheezes/Rhonchi. Cardiovascular:tachycardiac with regular rhythm with normal S1/S2 without murmurs, rubs or gallops. Abdomen: Soft, non-tender, non-distended with normal bowel sounds. Musculoskeletal: No clubbing, cyanosis. Skin:  warm and dry.  Multiple tattoos to UE. Neurologic: AAOX3. Alert,agitated,  bilat wrist in restraints. Moving bilat LE's spontaneously  Psychiatric: awake          Labs:   Recent Labs     03/04/21  0925   WBC 9.0   HGB 12.8*   HCT 39.1*        Recent Labs     03/03/21  1004 03/04/21  0925    139   K 3.7 4.2    100   CO2 31 32   BUN 27* 36*   CREATININE 1.3 1.3   CALCIUM 9.0 9.1     No results for input(s): AST, ALT, BILIDIR, BILITOT, ALKPHOS in the last 72 hours. No results for input(s): INR in the last 72 hours. No results for input(s): Katshayleejustine Eliesernel in the last 72 hours. Urinalysis:      Lab Results   Component Value Date    NITRU see below 02/27/2021    WBCUA see below 02/27/2021    BACTERIA 4+ 02/27/2021    RBCUA >100 02/27/2021    BLOODU see below 02/27/2021    SPECGRAV see below 02/27/2021    GLUCOSEU see below 02/27/2021       Radiology:  XR CHEST PA LATERAL W FLUOROSCOPY   Final Result   1. See above. FLUORO FOR SURGICAL PROCEDURES   Final Result   1. See above. CT CHEST PULMONARY EMBOLISM W CONTRAST   Final Result   1. Artifact degraded evaluation of the pulmonary arteries. No acute   pulmonary embolism to the segmental arteries given limitation. 2. Progression of disease. Increased size of right lower lobe nodule and   bone metastases. Soft tissue in the anterior mediastinum and anterior to the   right middle lobe likely represents additional metastases. Follow-up   recommended. 3. Questionable mass at the base of the tongue. Correlate with history. 4.  Other findings as described. XR CHEST PORTABLE   Final Result   Chronic findings in the chest without acute airspace disease identified. Scar in the right lung apex again noted.   The opacities recently described in   the right middle lobe and right lower lobe are not delineated on this exam.         MRI BRAIN W WO CONTRAST    (Results Pending)   CT ABDOMEN PELVIS WO CONTRAST Additional Contrast? Oral (Results Pending)   NM BONE SCAN WHOLE BODY    (Results Pending)   IR MIDLINE CATH    (Results Pending)           Assessment/Plan:    Active Hospital Problems    Diagnosis    Severe malnutrition ([facility] [address] [E43]    Small cell lung cancer, right ([address] [C34.91]    Polysubstance dependence ([address] [F19.20]    Cocaine use [F14.90]    Delirium tremens ([address] [F10.231]    Acute bronchitis [J20.9]    Hematuria [R31.9]    IRMA (acute kidney injury) ([address] [N17.9]    Hypokalemia [E87.6]    Centrilobular emphysema ([address] [J43.2]    Moderate to severe pulmonary hypertension ([address] [I27.20]    Hilar adenopathy [R59.0]    Lung nodule [R91.1]    Personal history of covid-19 [Z86.16]    Tricuspid regurgitation [I07.1]    IV drug abuse ([address] [F19.10]    Acute respiratory failure with hypoxia (HCC) [J96.01]             AECOPD, with severe pulmonary HTN and RV dysfunction  - steroids, inhaled bronchodilators. Doxycycline was started on admission. Suspected metastatic lung cancer. 4.8 cm RML mass with many thoracic soft tissue and bone mets. EBUS 3/1, lymph node biopsy showed epithelium and mucus, no malignancy identified. 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 Pulmonary assisting. Oncology consulted. recs appreciated. Palliative care assisting with goals of care      Acute hypoxic respiratory failure. Due to above issues. Wean to RA as tolerated. The patient has no supplemental O2 requirement at baseline. IRMA. Baseline Cr normal.  Some response to IVFs. Cr stable at 1.3. Hematuria. Urology planning eventual cystoscopy, signed off. Negative urine culture. Hematuria resolved with time. Severe TV regurgitation. Cardiology determined that no further workup was indicated and signed off. Polysubstance abuse: Alcohol abuse with withdrawal acute, Opiate use disorder, Stimulant use disorder:  Current use of ETOH, IV Heroin, and Meth.   Psych assisting. Continue Precedex drip and wean as tolerated, CIWA protocol. Posterior tongue mass suggested on CT, now ruled out. ENT consulted, laryngoscopy negative, no further workup anticipated. DVT Prophylaxis: SCDs  Diet: DIET GENERAL;  Dietary Nutrition Supplements: Standard High Calorie Oral Supplement  Code Status: Full Code    PT/OT Eval Status: differed for now as in restraints     Dispo - pending clinical course, likely when no longer in acute withdrawal and the cancer workup/treatment can be transitioned to outpatient. He lives in his truck and admits that his ability to f/u as outpatient is questionable.        Windsor Schlatter, MD

## 2021-03-05 NOTE — CARE COORDINATION
Chart review completed. Patient a 48year old male, admitted for hematuria. Hospital day 6, currently in ICU level of care for precedex drip. CM spoke to patient on initial assessment and pt declined resources for substance abuse and homelessness. Pt with a new CA diagnosis this admission. Pt was seen by palliative care yesterday, who spoke to family to update on pt current condition. Goals of care are unclear to this writer at this time. Will follow for CA work up and assist as needed as pt has clearly stated before he wishes to return to his truck.  Lilian Howell, RN

## 2021-03-05 NOTE — PROGRESS NOTES
INPATIENT PULMONARY CRITICAL CARE PROGRESS NOTE      Reason for visit    enlarging lung mass, previous BAL cytology negative for malignancy. SUBJECTIVE: Patient when seen this morning was continued to be on IV Precedex infusion along with a CIWA and COWS protocol, patient continues to have intermittent agitation with intermittent shortness of breath/wheezing when he is agitated, patient was in sinus rhythm which was ranging from normal sinus rhythm to sinus tachycardia, patient when evaluated was on RA of nasal cannula oxygen with saturation of 99%,Patient has had good urine output overnight with cumulative fluid balance of -3.4 L, patient has been eating by mouth intermittently with assistance when he is comfortable and not withdrawing;no other pertinent ROS could be obtained       Physical Exam:  Blood pressure 137/84, pulse 135, temperature 97.6 °F (36.4 °C), temperature source Bladder, resp. rate (!) 40, height 5' (1.524 m), weight 100 lb (45.4 kg), SpO2 99 %.'     Constitutional:  No acute distress. Mild tachypnea  HENT:  Oropharynx is clear and moist. No thyromegaly. Eyes:  Conjunctivae are normal. Pupils equal, round, and reactive to light. No scleral icterus. Neck: . No tracheal deviation present. No obvious thyroid mass. Cardiovascular: Sinus tachycardia LLSB murmur   No right ventricular heave. No lower extremity edema. decreased BSI   Pulmonary/Chest: No wheezes. B/L decreased rales. Decreased chest congestion Chest wall is not dull to percussion. No accessory muscle usage or stridor. Abdominal: Soft. Bowel sounds present. No distension or hernia. No tenderness. Musculoskeletal: No cyanosis. No clubbing. No obvious joint deformity. Lymphadenopathy: No cervical or supraclavicular adenopathy. Skin: Skin is warm and dry. No rash or nodules on the exposed extremities. multiple tattoos  Neurologic: alert and communicative  when seen          Results:  CBC:   Recent Labs     03/04/21  0925 WBC 9.0   HGB 12.8*   HCT 39.1*   MCV 81.5        BMP:   Recent Labs     03/03/21  1004 03/04/21  0925    139   K 3.7 4.2    100   CO2 31 32   BUN 27* 36*   CREATININE 1.3 1.3       Imaging:  I have reviewed radiology images personally. IR MIDLINE CATH   Preliminary Result   Successful placement of dual lumen right upper extremity midline catheter. XR CHEST PA LATERAL W FLUOROSCOPY   Final Result   1. See above. FLUORO FOR SURGICAL PROCEDURES   Final Result   1. See above. CT CHEST PULMONARY EMBOLISM W CONTRAST   Final Result   1. Artifact degraded evaluation of the pulmonary arteries. No acute   pulmonary embolism to the segmental arteries given limitation. 2. Progression of disease. Increased size of right lower lobe nodule and   bone metastases. Soft tissue in the anterior mediastinum and anterior to the   right middle lobe likely represents additional metastases. Follow-up   recommended. 3. Questionable mass at the base of the tongue. Correlate with history. 4.  Other findings as described. XR CHEST PORTABLE   Final Result   Chronic findings in the chest without acute airspace disease identified. Scar in the right lung apex again noted. The opacities recently described in   the right middle lobe and right lower lobe are not delineated on this exam.         MRI BRAIN W WO CONTRAST    (Results Pending)   CT ABDOMEN PELVIS WO CONTRAST Additional Contrast? Oral    (Results Pending)   NM BONE SCAN WHOLE BODY    (Results Pending)     A. Lung, right lower lobe, brushing:        -  Negative for malignancy. B. Lymph node, right hilar, transbronchial ultrasound guided fine needle   aspiration:         -  Positive for malignancy.       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         - See comment     C. Lung, right lower lobe, bronchial alveolar lavage:        -  Negative for malignancy.      COMMENT:      The right hilar sample (specimen B) shows hypercellularity   composed of undifferentiated cells with nuclear molding and   hyperchromasia.  Immunoperoxidase stains performed using pancytokeratin,   chromogranin, synaptophysin, TTF-1 and Ki-67 show that the tumor cells   are diffusely positive for pancytokeratin, synaptophysin and TTF-1.  The   Ki-67 proliferation index is 100%.  The tumor cells are negative for   chromogranin.  The cytomorphology and immunoperoxidase profile are   consistent with a small cell carcinoma of the lung. Results for Mya Sharp (MRN [id number]) as of 3/5/2021 15:46   Ref. Range 3/3/2021 10:04 3/4/2021 09:25   Sodium Latest Ref Range: 136 - 145 mmol/L 142 139   Potassium Latest Ref Range: 3.5 - 5.1 mmol/L 3.7 4.2   Chloride Latest Ref Range: 99 - 110 mmol/L 103 100   CO2 Latest Ref Range: 21 - 32 mmol/L 31 32   BUN Latest Ref Range: 7 - 20 mg/dL 27 (H) 36 (H)   Creatinine Latest Ref Range: 0.9 - 1.3 mg/dL 1.3 1.3   Anion Gap Latest Ref Range: 3 - 16  8 7   GFR Non- Latest Ref Range: >60  58 (A) 58 (A)   GFR  Latest Ref Range: >60  >60 >60   Glucose Latest Ref Range: 70 - 99 mg/dL 81 121 (H)   Calcium Latest Ref Range: 8.3 - 10.6 mg/dL 9.0 9.1     Results for Mya Sharp (MRN [id number]) as of 3/5/2021 15:46   Ref.  Range 2/27/2021 16:10 2/28/2021 10:38 3/1/2021 07:07 3/2/2021 07:45 3/4/2021 09:25   WBC Latest Ref Range: 4.0 - 11.0 K/uL 5.9 3.9 (L) 8.3 10.2 9.0   RBC Latest Ref Range: 4.20 - 5.90 M/uL 4.14 (L) 3.78 (L) 3.77 (L) 4.02 (L) 4.80   Hemoglobin Quant Latest Ref Range: 13.5 - 17.5 g/dL 11.2 (L) 10.3 (L) 10.4 (L) 10.8 (L) 12.8 (L)   Hematocrit Latest Ref Range: 40.5 - 52.5 % 34.2 (L) 31.3 (L) 31.5 (L) 35.1 (L) 39.1 (L)   MCV Latest Ref Range: 80.0 - 100.0 fL 82.6 82.7 83.6 87.2 81.5   MCH Latest Ref Range: 26.0 - 34.0 pg 27.1 27.1 27.5 26.9 26.6   MCHC Latest Ref Range: 31.0 - 36.0 g/dL 32.8 32.8 32.9 30.9 (L) 32.6   MPV Latest Ref Range: 5.0 - 10.5 fL 7.4 7.7 7.1 see below 7.2   RDW Latest Ref Range: 12.4 - 15.4 % 14.3 14.2 14.2 14.7 14.0   Platelet Count Latest Ref Range: 135 - 450 K/uL 204 171 243 see below 294   Neutrophils % Latest Units: % 60.0 91.6 91.9 80.9 79.3       Assessment:  Active Problems:    Acute respiratory failure with hypoxia (HCC)    IV drug abuse (HCC)    Tricuspid regurgitation    Lung nodule    Centrilobular emphysema (HCC)    Hilar adenopathy    Personal history of covid-19    Moderate to severe pulmonary hypertension (HCC)    Acute bronchitis    Hematuria    IRMA (acute kidney injury) ([facility] [address]    Hypokalemia    Cocaine use    Delirium tremens (HCC)    Severe malnutrition (HCC)    Polysubstance dependence (HCC)    Small cell lung cancer, right (HCC)  Resolved Problems:    * No resolved hospital problems.  *          Plan:   · Patent to be continued on precedex drip which had to be increased   · Patient was given IV benzodiazepine  · Patient was given IM Phenobarbital and may need to Rpt  · CIWA and COWS protocols in place  · Monitor for any airway compromise and if patient cannot protect airways -would require intubation and mechanical ventilatory support-respiratory status has improved as compared to yesterday  · Monitor for any worsening autonomic instability  · Oxygen supplementation, if required, to keep saturation being 90 to 94% only  · Pulmonary toilet  · S/p Bronchoscopy with EBUS -results as history of small cell lung cancer  · Oncology has been consulted by IM   · Will get palliative care consult for goals of care and CODE STATUS   · Strict input output charting and BMP  · Correct electrolytes on whenever necessary basis  · Patient is on cefepime and vancomycin which has been changed to doxy   · Titration of antibiotics as per clinical status and cultures  · Monitor I/O and BMP  · Correct electrolytes on PRN basis   · Monitor for any hypoglycemia   · Neurochecks  · Bronchodilators  · Monitor for any withdrawal symptoms  · PUD and DVT prophylaxis as per IM     Case discussed with ICU team     Patient was seen and managed multiple times during the course of the day    Patient's clinical status has decompensated and is critical and has potential for further decompensation            Electronically signed by:  Moises Lowery MD    3/5/2021    3:41 PM.

## 2021-03-05 NOTE — PROCEDURES
Awaiting family meeting for PICC consent. RUE midline placed without difficulty. Midline is good to use.

## 2021-03-05 NOTE — PROGRESS NOTES
Patient in bed RR40, O2 99% on 4L NC, , Alert and oriented, moderately anxious. Patient states \"I need to get out of here\". At this time patient appears to be HDU to go for bone scan and MRI. Hospitalist aware, Intensivist aware. If patient stabilizes will reevaluate attempt for CT later.

## 2021-03-06 LAB
A/G RATIO: 1.2 (ref 1.1–2.2)
ALBUMIN SERPL-MCNC: 3.3 G/DL (ref 3.4–5)
ALP BLD-CCNC: 63 U/L (ref 40–129)
ALT SERPL-CCNC: 11 U/L (ref 10–40)
ANION GAP SERPL CALCULATED.3IONS-SCNC: 7 MMOL/L (ref 3–16)
AST SERPL-CCNC: 18 U/L (ref 15–37)
BASOPHILS ABSOLUTE: 0 K/UL (ref 0–0.2)
BASOPHILS RELATIVE PERCENT: 0.2 %
BILIRUB SERPL-MCNC: <0.2 MG/DL (ref 0–1)
BUN BLDV-MCNC: 27 MG/DL (ref 7–20)
CALCIUM SERPL-MCNC: 8.6 MG/DL (ref 8.3–10.6)
CHLORIDE BLD-SCNC: 108 MMOL/L (ref 99–110)
CO2: 28 MMOL/L (ref 21–32)
CREAT SERPL-MCNC: 1.1 MG/DL (ref 0.9–1.3)
EOSINOPHILS ABSOLUTE: 0 K/UL (ref 0–0.6)
EOSINOPHILS RELATIVE PERCENT: 0 %
GFR AFRICAN AMERICAN: >60
GFR NON-AFRICAN AMERICAN: >60
GLOBULIN: 2.7 G/DL
GLUCOSE BLD-MCNC: 168 MG/DL (ref 70–99)
HCT VFR BLD CALC: 34.8 % (ref 40.5–52.5)
HEMOGLOBIN: 11.3 G/DL (ref 13.5–17.5)
LYMPHOCYTES ABSOLUTE: 0.6 K/UL (ref 1–5.1)
LYMPHOCYTES RELATIVE PERCENT: 9.2 %
MCH RBC QN AUTO: 26.5 PG (ref 26–34)
MCHC RBC AUTO-ENTMCNC: 32.5 G/DL (ref 31–36)
MCV RBC AUTO: 81.3 FL (ref 80–100)
MONOCYTES ABSOLUTE: 0.2 K/UL (ref 0–1.3)
MONOCYTES RELATIVE PERCENT: 3.8 %
NEUTROPHILS ABSOLUTE: 5.3 K/UL (ref 1.7–7.7)
NEUTROPHILS RELATIVE PERCENT: 86.8 %
PDW BLD-RTO: 14.1 % (ref 12.4–15.4)
PHOSPHORUS: 3.5 MG/DL (ref 2.5–4.9)
PLATELET # BLD: 239 K/UL (ref 135–450)
PMV BLD AUTO: 6.9 FL (ref 5–10.5)
POTASSIUM SERPL-SCNC: 4 MMOL/L (ref 3.5–5.1)
RBC # BLD: 4.28 M/UL (ref 4.2–5.9)
SODIUM BLD-SCNC: 143 MMOL/L (ref 136–145)
TOTAL PROTEIN: 6 G/DL (ref 6.4–8.2)
URIC ACID, SERUM: 3 MG/DL (ref 3.5–7.2)
WBC # BLD: 6.1 K/UL (ref 4–11)

## 2021-03-06 PROCEDURE — 2000000000 HC ICU R&B

## 2021-03-06 PROCEDURE — 94640 AIRWAY INHALATION TREATMENT: CPT

## 2021-03-06 PROCEDURE — 36410 VNPNXR 3YR/> PHY/QHP DX/THER: CPT

## 2021-03-06 PROCEDURE — 2700000000 HC OXYGEN THERAPY PER DAY

## 2021-03-06 PROCEDURE — 84100 ASSAY OF PHOSPHORUS: CPT

## 2021-03-06 PROCEDURE — 2500000003 HC RX 250 WO HCPCS: Performed by: INTERNAL MEDICINE

## 2021-03-06 PROCEDURE — 2580000003 HC RX 258: Performed by: INTERNAL MEDICINE

## 2021-03-06 PROCEDURE — 94761 N-INVAS EAR/PLS OXIMETRY MLT: CPT

## 2021-03-06 PROCEDURE — 6360000002 HC RX W HCPCS: Performed by: PSYCHIATRY & NEUROLOGY

## 2021-03-06 PROCEDURE — 80053 COMPREHEN METABOLIC PANEL: CPT

## 2021-03-06 PROCEDURE — 6370000000 HC RX 637 (ALT 250 FOR IP): Performed by: INTERNAL MEDICINE

## 2021-03-06 PROCEDURE — 6360000002 HC RX W HCPCS: Performed by: INTERNAL MEDICINE

## 2021-03-06 PROCEDURE — 99233 SBSQ HOSP IP/OBS HIGH 50: CPT | Performed by: INTERNAL MEDICINE

## 2021-03-06 PROCEDURE — 85025 COMPLETE CBC W/AUTO DIFF WBC: CPT

## 2021-03-06 PROCEDURE — 84550 ASSAY OF BLOOD/URIC ACID: CPT

## 2021-03-06 PROCEDURE — 76937 US GUIDE VASCULAR ACCESS: CPT

## 2021-03-06 RX ADMIN — IPRATROPIUM BROMIDE AND ALBUTEROL SULFATE 1 AMPULE: .5; 3 SOLUTION RESPIRATORY (INHALATION) at 20:32

## 2021-03-06 RX ADMIN — IPRATROPIUM BROMIDE AND ALBUTEROL SULFATE 1 AMPULE: .5; 3 SOLUTION RESPIRATORY (INHALATION) at 11:56

## 2021-03-06 RX ADMIN — HALOPERIDOL LACTATE 5 MG: 5 INJECTION, SOLUTION INTRAMUSCULAR at 14:11

## 2021-03-06 RX ADMIN — ENOXAPARIN SODIUM 40 MG: 40 INJECTION SUBCUTANEOUS at 11:14

## 2021-03-06 RX ADMIN — LORAZEPAM 4 MG: 1 TABLET ORAL at 14:45

## 2021-03-06 RX ADMIN — Medication 0.8 MCG/KG/HR: at 11:14

## 2021-03-06 RX ADMIN — LORAZEPAM 4 MG: 2 INJECTION, SOLUTION INTRAMUSCULAR; INTRAVENOUS at 13:21

## 2021-03-06 RX ADMIN — IPRATROPIUM BROMIDE AND ALBUTEROL SULFATE 1 AMPULE: .5; 3 SOLUTION RESPIRATORY (INHALATION) at 15:46

## 2021-03-06 RX ADMIN — LORAZEPAM 3 MG: 2 INJECTION, SOLUTION INTRAMUSCULAR; INTRAVENOUS at 17:17

## 2021-03-06 RX ADMIN — MUPIROCIN: 20 OINTMENT TOPICAL at 11:14

## 2021-03-06 RX ADMIN — Medication 10 ML: at 11:14

## 2021-03-06 RX ADMIN — Medication 10 ML: at 20:25

## 2021-03-06 RX ADMIN — Medication 1.4 MCG/KG/HR: at 20:24

## 2021-03-06 RX ADMIN — MUPIROCIN: 20 OINTMENT TOPICAL at 20:26

## 2021-03-06 RX ADMIN — LORAZEPAM 2 MG: 2 INJECTION INTRAMUSCULAR; INTRAVENOUS at 21:35

## 2021-03-06 RX ADMIN — IPRATROPIUM BROMIDE AND ALBUTEROL SULFATE 1 AMPULE: .5; 3 SOLUTION RESPIRATORY (INHALATION) at 08:14

## 2021-03-06 RX ADMIN — Medication 1.4 MCG/KG/HR: at 03:45

## 2021-03-06 ASSESSMENT — PAIN SCALES - GENERAL
PAINLEVEL_OUTOF10: 0
PAINLEVEL_OUTOF10: 0

## 2021-03-06 NOTE — PROGRESS NOTES
INPATIENT PULMONARY CRITICAL CARE PROGRESS NOTE      Reason for visit    enlarging lung mass, previous BAL cytology negative for malignancy. SUBJECTIVE: Patient when seen this morning was continued to be on IV Precedex infusion along with a CIWA and COWS protocol, patient continues to have intermittent agitation and periods of sleepiness ; patient was in sinus rhythm which was ranging from normal sinus rhythm to sinus tachycardia, patient when evaluated was on 2 lts/min  nasal cannula oxygen with saturation of 99%,Patient has had good urine output overnight with cumulative fluid balance of -4.6 L, patient has been eating by mouth intermittently with assistance when he is comfortable and not withdrawing;no other pertinent ROS could be obtained       Physical Exam:  Blood pressure 135/86, pulse 79, temperature 97.8 °F (36.6 °C), temperature source Bladder, resp. rate 18, height 5' (1.524 m), weight 100 lb 1.4 oz (45.4 kg), SpO2 100 %.'     Constitutional:  No acute distress. Mild tachypnea  HENT:  Oropharynx is clear and moist. No thyromegaly. Eyes:  Conjunctivae are normal. Pupils equal, round, and reactive to light. No scleral icterus. Neck: . No tracheal deviation present. No obvious thyroid mass. Cardiovascular: Sinus rhythm;LLSB murmur   No right ventricular heave. No lower extremity edema. decreased BSI   Pulmonary/Chest: No wheezes. B/L decreased rales. Decreased chest congestion Chest wall is not dull to percussion. No accessory muscle usage or stridor. Abdominal: Soft. Bowel sounds present. No distension or hernia. No tenderness. Musculoskeletal: No cyanosis. No clubbing. No obvious joint deformity. Lymphadenopathy: No cervical or supraclavicular adenopathy. Skin: Skin is warm and dry. No rash or nodules on the exposed extremities. multiple tattoos  Neurologic: alert and communicative  when seen          Results:  CBC:   Recent Labs     03/04/21  0925 03/06/21  0435   WBC 9.0 6.1   HGB 12.8* 11.3*   HCT 39.1* 34.8*   MCV 81.5 81.3    239     BMP:   Recent Labs     03/04/21  0925 03/06/21  0435    143   K 4.2 4.0    108   CO2 32 28   PHOS  --  3.5   BUN 36* 27*   CREATININE 1.3 1.1       Imaging:  I have reviewed radiology images personally. IR MIDLINE CATH   Preliminary Result   Successful placement of dual lumen right upper extremity midline catheter. XR CHEST PA LATERAL W FLUOROSCOPY   Final Result   1. See above. FLUORO FOR SURGICAL PROCEDURES   Final Result   1. See above. CT CHEST PULMONARY EMBOLISM W CONTRAST   Final Result   1. Artifact degraded evaluation of the pulmonary arteries. No acute   pulmonary embolism to the segmental arteries given limitation. 2. Progression of disease. Increased size of right lower lobe nodule and   bone metastases. Soft tissue in the anterior mediastinum and anterior to the   right middle lobe likely represents additional metastases. Follow-up   recommended. 3. Questionable mass at the base of the tongue. Correlate with history. 4.  Other findings as described. XR CHEST PORTABLE   Final Result   Chronic findings in the chest without acute airspace disease identified. Scar in the right lung apex again noted. The opacities recently described in   the right middle lobe and right lower lobe are not delineated on this exam.         MRI BRAIN W WO CONTRAST    (Results Pending)   CT ABDOMEN PELVIS WO CONTRAST Additional Contrast? Oral    (Results Pending)   NM BONE SCAN WHOLE BODY    (Results Pending)     A. Lung, right lower lobe, brushing:        -  Negative for malignancy. B. Lymph node, right hilar, transbronchial ultrasound guided fine needle   aspiration:         -  Positive for malignancy.       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         - See comment     C. Lung, right lower lobe, bronchial alveolar lavage:        -  Negative for malignancy.      COMMENT:      The right hilar sample (specimen B) shows hypercellularity   composed of undifferentiated cells with nuclear molding and   hyperchromasia.  Immunoperoxidase stains performed using pancytokeratin,   chromogranin, synaptophysin, TTF-1 and Ki-67 show that the tumor cells   are diffusely positive for pancytokeratin, synaptophysin and TTF-1.  The   Ki-67 proliferation index is 100%.  The tumor cells are negative for   chromogranin.  The cytomorphology and immunoperoxidase profile are   consistent with a small cell carcinoma of the lung. Assessment:  Active Problems:    Acute respiratory failure with hypoxia (HCC)    IV drug abuse (HCC)    Tricuspid regurgitation    Lung nodule    Centrilobular emphysema (HCC)    Hilar adenopathy    Personal history of covid-19    Moderate to severe pulmonary hypertension (HCC)    Acute bronchitis    Hematuria    IRMA (acute kidney injury) ([facility] [address]    Hypokalemia    Cocaine use    Delirium tremens (HCC)    Severe malnutrition (HCC)    Polysubstance dependence ([facility] [address]    Small cell lung cancer, right ([facility] [address]  Resolved Problems:    * No resolved hospital problems.  *          Plan:   · Patent to be continued on precedex drip for prevention of withdrawal   · Patient was given IV benzodiazepine  · Patient was given IM Phenobarbital and may need to Rpt  · CIWA and COWS protocols in place  · Monitor for any airway compromise and if patient cannot protect airways -would require intubation and mechanical ventilatory support-respiratory status has improved as compared to yesterday  · Monitor for any worsening autonomic instability  · Oxygen supplementation, if required, to keep saturation being 90 to 94% only  · Pulmonary toilet  · S/p Bronchoscopy with EBUS -results as history of small cell lung cancer  · Oncology consult and recommendations reviewed  · Will get palliative care consult for goals of care and CODE STATUS   · Family coming to discuss with patient and treating physicians about options

## 2021-03-06 NOTE — PROGRESS NOTES
1249: Secure message sent to Dr. Vasyl Iraheta; Patient and family have decided that they would like to proceed with chemotherapy. He is more lucid today and I have been able to wean his Precedex. Just wanted you to be aware. Thanks! 1250: From Dr. Vasyl Iraheta; OK! They were supposed to be there at [facility] for a family meeting and no one showed up. I waited for about 15 minutes. He was not very lucid this morning when I rounded. We will need to get a Port-A-Cath etc. I will try to get everything arranged for early this week. 1253: Great, thank you! Can you put in the orders for a port-a-cath? Thank you! 1253: From Dr. Vasyl Iraheta;  No problem!!!

## 2021-03-06 NOTE — PROGRESS NOTES
Hospitalist Progress Note      PCP: No primary care provider on file. Date of Admission: 2/27/2021    Chief Complaint:  hematuria      Subjective:       Patient less agitated today. Remains on Precedex drip which is being weaned. Family at bedside    Medications:  Reviewed    Infusion Medications    sodium chloride 125 mL/hr at 03/05/21 1500    dexmedetomidine HCl in NaCl 0.8 mcg/kg/hr (03/06/21 1114)     Scheduled Medications    allopurinol  300 mg Oral Daily    lidocaine 1 % injection  5 mL Intradermal Once    ipratropium-albuterol  1 ampule Inhalation Q4H WA    predniSONE  20 mg Oral Daily    mupirocin   Nasal BID    enoxaparin  40 mg Subcutaneous Daily    sodium chloride flush  10 mL Intravenous 2 times per day     PRN Meds: haloperidol lactate, acetaminophen **OR** acetaminophen, ondansetron, bisacodyl, LORazepam **OR** LORazepam **OR** LORazepam **OR** LORazepam **OR** LORazepam **OR** LORazepam **OR** LORazepam **OR** LORazepam, sodium chloride flush, polyethylene glycol      Intake/Output Summary (Last 24 hours) at 3/6/2021 1326  Last data filed at 3/6/2021 1211  Gross per 24 hour   Intake 2890 ml   Output 2025 ml   Net 865 ml       Physical Exam Performed:    /78   Pulse 118   Temp 97.3 °F (36.3 °C) (Bladder)   Resp 20   Ht 5' (1.524 m)   Wt 100 lb 1.4 oz (45.4 kg)   SpO2 99%   BMI 19.55 kg/m²       General appearance: agitated, in mild resp distress, appears stated age. HEENT: Pupils equal, round,. Conjunctivae/corneas clear. Neck: Supple, with full range of motion. No jugular venous distention. Trachea midline. Respiratory:  Normal respiratory effort. Clear to auscultation, bilaterally without Rales/Wheezes/Rhonchi. Cardiovascular:tachycardiac with regular rhythm with normal S1/S2 without murmurs, rubs or gallops. Abdomen: Soft, non-tender, non-distended with normal bowel sounds. Musculoskeletal: No clubbing, cyanosis. Skin:  warm and dry. Multiple tattoos to UE. Neurologic: AAOX3. Alert,agitated,  bilat wrist in restraints. Moving bilat LE's spontaneously  Psychiatric: awake          Labs:   Recent Labs     03/04/21  0925 03/06/21 0435   WBC 9.0 6.1   HGB 12.8* 11.3*   HCT 39.1* 34.8*    239     Recent Labs     03/04/21  0925 03/06/21 0435    143   K 4.2 4.0    108   CO2 32 28   BUN 36* 27*   CREATININE 1.3 1.1   CALCIUM 9.1 8.6   PHOS  --  3.5     Recent Labs     03/06/21 0435   AST 18   ALT 11   BILITOT <0.2   ALKPHOS 63     No results for input(s): INR in the last 72 hours. No results for input(s): [place] in the last 72 hours. Urinalysis:      Lab Results   Component Value Date    NITRU see below 02/27/2021    WBCUA see below 02/27/2021    BACTERIA 4+ 02/27/2021    RBCUA >100 02/27/2021    BLOODU see below 02/27/2021    SPECGRAV see below 02/27/2021    GLUCOSEU see below 02/27/2021       Radiology:  IR MIDLINE CATH   Preliminary Result   Successful placement of dual lumen right upper extremity midline catheter. XR CHEST PA LATERAL W FLUOROSCOPY   Final Result   1. See above. FLUORO FOR SURGICAL PROCEDURES   Final Result   1. See above. CT CHEST PULMONARY EMBOLISM W CONTRAST   Final Result   1. Artifact degraded evaluation of the pulmonary arteries. No acute   pulmonary embolism to the segmental arteries given limitation. 2. Progression of disease. Increased size of right lower lobe nodule and   bone metastases. Soft tissue in the anterior mediastinum and anterior to the   right middle lobe likely represents additional metastases. Follow-up   recommended. 3. Questionable mass at the base of the tongue. Correlate with history. 4.  Other findings as described. XR CHEST PORTABLE   Final Result   Chronic findings in the chest without acute airspace disease identified. Scar in the right lung apex again noted.   The opacities recently described in   the right middle lobe and right lower lobe are not delineated on this exam.         MRI BRAIN W WO CONTRAST    (Results Pending)   CT ABDOMEN PELVIS WO CONTRAST Additional Contrast? Oral    (Results Pending)   NM BONE SCAN WHOLE BODY    (Results Pending)           Assessment/Plan:    Active Hospital Problems    Diagnosis    Severe malnutrition ([address] [E43]    Small cell lung cancer, right ([address] [C34.91]    Polysubstance dependence ([address] [F19.20]    Cocaine use [F14.90]    Delirium tremens ([address] [F10.231]    Acute bronchitis [J20.9]    Hematuria [R31.9]    IRMA (acute kidney injury) ([address] [N17.9]    Hypokalemia [E87.6]    Centrilobular emphysema ([address] [J43.2]    Moderate to severe pulmonary hypertension ([address] [I27.20]    Hilar adenopathy [R59.0]    Lung nodule [R91.1]    Personal history of covid-19 [Z86.16]    Tricuspid regurgitation [I07.1]    IV drug abuse (HCC) [F19.10]    Acute respiratory failure with hypoxia (HCC) [J96.01]             AECOPD, with severe pulmonary HTN and RV dysfunction  - steroids, inhaled bronchodilators. Doxycycline was started on admission. Metastatic lung cancer. 4.8 cm RML mass with many thoracic soft tissue and bone mets. EBUS 3/1, lymph node biopsy showed epithelium and mucus, no malignancy identified. 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 Pulmonary assisting and oncology assisting. Pt and family interested in treatment. Eval for disease staging ongoing per oncology. Acute hypoxic respiratory failure. Due to above issues. Wean to RA as tolerated. The patient has no supplemental O2 requirement at baseline. IRMA. Baseline Cr normal.  Some response to IVFs. Cr stable at 1.3. Hematuria. Urology planning eventual cystoscopy, signed off. Negative urine culture. Hematuria resolved with time. Severe TV regurgitation. Cardiology determined that no further workup was indicated and signed off.     Polysubstance abuse: Alcohol abuse with withdrawal acute, Opiate use disorder, Stimulant use disorder:  Current use of ETOH, IV Heroin, and Meth. Psych assisting. Continue Precedex drip and wean as tolerated, CIWA protocol. Posterior tongue mass suggested on CT, now ruled out. ENT consulted, laryngoscopy negative, no further workup anticipated. DVT Prophylaxis: SCDs  Diet: DIET GENERAL;  Dietary Nutrition Supplements: Standard High Calorie Oral Supplement  Code Status: Full Code    PT/OT Eval Status: differed for now as in restraints     Dispo - pending clinical course, likely when no longer in acute withdrawal and the cancer workup/treatment can be transitioned to outpatient. He lives in his truck and admits that his ability to f/u as outpatient is questionable.        Bryce Craft MD

## 2021-03-06 NOTE — PROGRESS NOTES
ONCOLOGY HEMATOLOGY CARE PROGRESS NOTE      SUBJECTIVE:     The patient has mental status changes today and cannot give a history. ROS:   The remaining 10 point review of symptoms is unremarkable. OBJECTIVE        Physical    VITALS:  BP (!) 159/91   Pulse 54   Temp 98 °F (36.7 °C) (Bladder)   Resp 18   Ht 5' (1.524 m)   Wt 100 lb 1.4 oz (45.4 kg)   SpO2 100%   BMI 19.55 kg/m²   TEMPERATURE:  Current - Temp: 98 °F (36.7 °C); Max - Temp  Av.2 °F (36.8 °C)  Min: 97.6 °F (36.4 °C)  Max: 98.8 °F (37.1 °C)  PULSE OXIMETRY RANGE: SpO2  Av.5 %  Min: 79 %  Max: 100 %  24HR INTAKE/OUTPUT:      Intake/Output Summary (Last 24 hours) at 3/6/2021 7630  Last data filed at 3/6/2021 0600  Gross per 24 hour   Intake 2640 ml   Output 1325 ml   Net 1315 ml       CONSTITUTIONAL:  awake, alert, cooperative, no apparent distress, HEENT oral pharynx , no scleral icterus  HEMATOLOGIC/LYMPHATICS:  no cervical lymphadenopathy, no supraclavicular lymphadenopathy, no axillary lymphadenopathy and no inguinal lymphadenopathy  LUNGS:  No increased work of breathing, good air exchange, clear to auscultation bilaterally, no crackles or wheezing  CARDIOVASCULAR:  , regular rate and rhythm, normal S1 and S2, no S3 or S4, and no murmur noted  ABDOMEN:  No scars, normal bowel sounds, soft, non-distended, non-tender, no masses palpated, no hepatosplenomegally  MUSCULOSKELETAL:  There is no redness, warmth, or swelling of the joints. EXTREMETIES: No clubbing cynosis or edema  NEUROLOGIC:  He is sedated, Likely undergoing drug withdrawal and is restrained.   SKIN:  no bruising or bleeding      Data      Recent Labs     21  0925 21  0435   WBC 9.0 6.1   HGB 12.8* 11.3*   HCT 39.1* 34.8*    239   MCV 81.5 81.3        Recent Labs     21  1004 21  0925 21  0435    139 143   K 3.7 4.2 4.0    100 108   CO2 31 32 28   PHOS  --   --  3.5   BUN 27* 36* 27* CREATININE 1.3 1.3 1.1     Recent Labs     03/06/21  0435   AST 18   ALT 11   BILITOT <0.2   ALKPHOS 63       Magnesium:    Lab Results   Component Value Date    MG 2.00 02/27/2021    MG 2.30 12/29/2020    MG 2.00 12/25/2020         Problem List  Patient Active Problem List   Diagnosis    MRSA bacteremia    Pleural effusion, left    Leukocytosis    Illicit drug use    Hepatitis    Chest pain    Pulmonary embolism ([address]    AMS (altered mental status)    Acute respiratory failure with hypoxia (HCC)    Drug overdose    Abnormal chest x-ray    Acute encephalopathy    Disorder of electrolytes    Non-traumatic rhabdomyolysis    Elevated procalcitonin    Methamphetamine abuse (HCC)    Toxic encephalopathy    Acute hypercapnic respiratory failure (HCC)    Heroin withdrawal (HCC)    Tachycardia    Hypoxia    Acute hypoxemic respiratory failure due to COVID-19 [facility])    Acute respiratory failure with hypoxia and hypercapnia (HCC)    Pneumonia due to COVID-19 virus    Thrombocytopenia (HCC)    Elevated LFTs    COVID-19    Acute metabolic encephalopathy    Moderate protein-calorie malnutrition (HCC)    Pneumonia due to organism    Sepsis ([address]    Pulmonary infiltrate    Bacteremia due to Staphylococcus aureus    Delirium    Hyperglycemia    IV drug abuse (HCC)    Tricuspid regurgitation    PFO (patent foramen ovale)    Lung nodule    Centrilobular emphysema (HCC)    Hilar adenopathy    Personal history of covid-19    Moderate to severe pulmonary hypertension ([address]    Former smoker    Alcohol use    Acute bronchitis    Hematuria    IRMA (acute kidney injury) ([address]    Hypokalemia    Cocaine use    Delirium tremens ([address]    Severe malnutrition (HCC)    Polysubstance dependence ([address]    Small cell lung cancer, right (HCC)       ASSESSMENT AND PLAN:    Small cell lung carcinoma:  -This is likely extensive disease, but staging has not been completed  -MRI of the brain is pending -Bone scan is pending  -He has an extremely poor prognosis  -He is in no medical condition to be treated at this time  -I came to the bedside at 9:00 and stayed for about 15 minutes for a family meeting, but no one showed up.  -Further recommendations once I discussed this with the family and the patient improves. If he does not improve, I do not think he is suitable for treatment.           ONCOLOGIC DISPOSITION:    -Pending his mental status    Krystina Dent MD  Please contact through [address] [place] [address]

## 2021-03-06 NOTE — PROGRESS NOTES
Writer had a long discussion with the patient today about his prognosis and options for plan of care. Patient is more lucid today and able to answer simple questions such as place and year. Patient asked a lot of questions about his need for chemotherapy for his new diagnoses. Oncology came to bedside at 0900 today to speak with the patient's family but the family did not show until 36. Writer went to bedside and spoke with the family as well. The patient's son and daughter are at bedside at this time. They have questions about his plan of care and requested to speak with Dr. Justyn Flores. RN then called and asked Dr. Justyn Flores to come to bedside. At this time, the patient remains on Precedex for withdraw symptoms but is being weaned.  Will continue to monitor patient

## 2021-03-07 ENCOUNTER — APPOINTMENT (OUTPATIENT)
Dept: CT IMAGING | Age: 51
DRG: 180 | End: 2021-03-07
Payer: MEDICARE

## 2021-03-07 PROCEDURE — 74176 CT ABD & PELVIS W/O CONTRAST: CPT

## 2021-03-07 PROCEDURE — 6370000000 HC RX 637 (ALT 250 FOR IP): Performed by: INTERNAL MEDICINE

## 2021-03-07 PROCEDURE — 94761 N-INVAS EAR/PLS OXIMETRY MLT: CPT

## 2021-03-07 PROCEDURE — 99232 SBSQ HOSP IP/OBS MODERATE 35: CPT | Performed by: INTERNAL MEDICINE

## 2021-03-07 PROCEDURE — 2580000003 HC RX 258: Performed by: INTERNAL MEDICINE

## 2021-03-07 PROCEDURE — 2500000003 HC RX 250 WO HCPCS: Performed by: INTERNAL MEDICINE

## 2021-03-07 PROCEDURE — 6370000000 HC RX 637 (ALT 250 FOR IP): Performed by: NURSE PRACTITIONER

## 2021-03-07 PROCEDURE — 2580000003 HC RX 258: Performed by: NURSE PRACTITIONER

## 2021-03-07 PROCEDURE — 6360000002 HC RX W HCPCS: Performed by: INTERNAL MEDICINE

## 2021-03-07 PROCEDURE — 2000000000 HC ICU R&B

## 2021-03-07 PROCEDURE — 6360000004 HC RX CONTRAST MEDICATION: Performed by: NURSE PRACTITIONER

## 2021-03-07 PROCEDURE — 94640 AIRWAY INHALATION TREATMENT: CPT

## 2021-03-07 PROCEDURE — 2700000000 HC OXYGEN THERAPY PER DAY

## 2021-03-07 RX ORDER — CHLORDIAZEPOXIDE HYDROCHLORIDE 5 MG/1
10 CAPSULE, GELATIN COATED ORAL 3 TIMES DAILY
Status: DISCONTINUED | OUTPATIENT
Start: 2021-03-07 | End: 2021-03-08

## 2021-03-07 RX ORDER — ALBUTEROL SULFATE 2.5 MG/3ML
2.5 SOLUTION RESPIRATORY (INHALATION)
Status: DISCONTINUED | OUTPATIENT
Start: 2021-03-07 | End: 2021-03-10 | Stop reason: HOSPADM

## 2021-03-07 RX ADMIN — LORAZEPAM 3 MG: 2 INJECTION, SOLUTION INTRAMUSCULAR; INTRAVENOUS at 06:30

## 2021-03-07 RX ADMIN — ENOXAPARIN SODIUM 40 MG: 40 INJECTION SUBCUTANEOUS at 09:05

## 2021-03-07 RX ADMIN — IPRATROPIUM BROMIDE AND ALBUTEROL SULFATE 1 AMPULE: .5; 3 SOLUTION RESPIRATORY (INHALATION) at 15:30

## 2021-03-07 RX ADMIN — SODIUM CHLORIDE: 9 INJECTION, SOLUTION INTRAVENOUS at 01:00

## 2021-03-07 RX ADMIN — PREDNISONE 20 MG: 20 TABLET ORAL at 09:05

## 2021-03-07 RX ADMIN — SODIUM CHLORIDE: 9 INJECTION, SOLUTION INTRAVENOUS at 08:48

## 2021-03-07 RX ADMIN — IPRATROPIUM BROMIDE AND ALBUTEROL SULFATE 1 AMPULE: .5; 3 SOLUTION RESPIRATORY (INHALATION) at 07:31

## 2021-03-07 RX ADMIN — CHLORDIAZEPOXIDE HYDROCHLORIDE 10 MG: 5 CAPSULE ORAL at 20:30

## 2021-03-07 RX ADMIN — SODIUM CHLORIDE: 9 INJECTION, SOLUTION INTRAVENOUS at 17:34

## 2021-03-07 RX ADMIN — Medication 1.4 MCG/KG/HR: at 18:34

## 2021-03-07 RX ADMIN — CHLORDIAZEPOXIDE HYDROCHLORIDE 10 MG: 5 CAPSULE ORAL at 16:28

## 2021-03-07 RX ADMIN — Medication 1.4 MCG/KG/HR: at 10:40

## 2021-03-07 RX ADMIN — LORAZEPAM 2 MG: 2 INJECTION INTRAMUSCULAR; INTRAVENOUS at 20:00

## 2021-03-07 RX ADMIN — IPRATROPIUM BROMIDE AND ALBUTEROL SULFATE 1 AMPULE: .5; 3 SOLUTION RESPIRATORY (INHALATION) at 19:21

## 2021-03-07 RX ADMIN — Medication 10 ML: at 09:09

## 2021-03-07 RX ADMIN — LORAZEPAM 2 MG: 1 TABLET ORAL at 12:58

## 2021-03-07 RX ADMIN — LORAZEPAM 4 MG: 2 INJECTION, SOLUTION INTRAMUSCULAR; INTRAVENOUS at 22:31

## 2021-03-07 RX ADMIN — ALLOPURINOL 300 MG: 300 TABLET ORAL at 09:05

## 2021-03-07 RX ADMIN — MUPIROCIN: 20 OINTMENT TOPICAL at 09:09

## 2021-03-07 RX ADMIN — IPRATROPIUM BROMIDE AND ALBUTEROL SULFATE 1 AMPULE: .5; 3 SOLUTION RESPIRATORY (INHALATION) at 12:35

## 2021-03-07 RX ADMIN — Medication 10 ML: at 20:26

## 2021-03-07 RX ADMIN — Medication 1.4 MCG/KG/HR: at 01:00

## 2021-03-07 RX ADMIN — IOHEXOL 50 ML: 240 INJECTION, SOLUTION INTRATHECAL; INTRAVASCULAR; INTRAVENOUS; ORAL at 10:54

## 2021-03-07 RX ADMIN — MUPIROCIN: 20 OINTMENT TOPICAL at 20:26

## 2021-03-07 RX ADMIN — CHLORDIAZEPOXIDE HYDROCHLORIDE 10 MG: 5 CAPSULE ORAL at 10:54

## 2021-03-07 ASSESSMENT — PAIN SCALES - GENERAL
PAINLEVEL_OUTOF10: 0

## 2021-03-07 NOTE — PROGRESS NOTES
Gave Bedside report to: Birgit MALONE    4 Eyes Skin Assessment     The patient is being assess for   Shift Handoff    I agree that 2 RN's have performed a thorough Head to Toe Skin Assessment on the patient. ALL assessment sites listed below have been assessed. Areas assessed by both nurses:   [x]   Head, Face, and Ears   [x]   Shoulders, Back, and Chest, Abdomen  [x]   Arms, Elbows, and Hands   [x]   Coccyx, Sacrum, and Ischium  [x]   Legs, Feet, and Heels        Does the Patient have Skin Breakdown?   No          Candido Prevention initiated:  Yes   Wound Care Orders initiated:  No      WOC nurse consulted for Pressure Injury (Stage 3,4, Unstageable, DTI, NWPT, Complex wounds)and New or Established Ostomies:  NA      Primary Nurse eSignature: Electronically signed by Doss Kussmaul, RN on 3/7/21 at 7:34 PM EST    **SHARE this note so that the co-signing nurse is able to place an eSignature**    Co-signer eSignature: {Esignature:[id number]}

## 2021-03-07 NOTE — PROGRESS NOTES
ONCOLOGY HEMATOLOGY CARE PROGRESS NOTE      SUBJECTIVE:     The patient is talking on the phone and definitely more alert and oriented today. ROS:   The remaining 10 point review of symptoms is unremarkable. OBJECTIVE        Physical    VITALS:  /89   Pulse 78   Temp 98 °F (36.7 °C) (Bladder)   Resp 16   Ht 5' (1.524 m)   Wt 100 lb 1.4 oz (45.4 kg)   SpO2 100%   BMI 19.55 kg/m²   TEMPERATURE:  Current - Temp: 98 °F (36.7 °C); Max - Temp  Av.8 °F (36.6 °C)  Min: 97 °F (36.1 °C)  Max: 98.2 °F (36.8 °C)  PULSE OXIMETRY RANGE: SpO2  Av.4 %  Min: 95 %  Max: 100 %  24HR INTAKE/OUTPUT:      Intake/Output Summary (Last 24 hours) at 3/7/2021 0956  Last data filed at 3/7/2021 0600  Gross per 24 hour   Intake 710 ml   Output 3050 ml   Net -2340 ml       CONSTITUTIONAL:  awake, alert, cooperative, no apparent distress, HEENT oral pharynx , no scleral icterus  HEMATOLOGIC/LYMPHATICS:  no cervical lymphadenopathy, no supraclavicular lymphadenopathy, no axillary lymphadenopathy and no inguinal lymphadenopathy  LUNGS:  No increased work of breathing, good air exchange, clear to auscultation bilaterally, no crackles or wheezing  CARDIOVASCULAR:  , regular rate and rhythm, normal S1 and S2, no S3 or S4, and no murmur noted  ABDOMEN:  No scars, normal bowel sounds, soft, non-distended, non-tender, no masses palpated, no hepatosplenomegally  MUSCULOSKELETAL:  There is no redness, warmth, or swelling of the joints. EXTREMETIES: No clubbing cynosis or edema  NEUROLOGIC:  He is much more alert and oriented this morning.   SKIN:  no bruising or bleeding      Data      Recent Labs     21   WBC 6.1   HGB 11.3*   HCT 34.8*      MCV 81.3        Recent Labs     21      K 4.0      CO2 28   PHOS 3.5   BUN 27*   CREATININE 1.1     Recent Labs     21   AST 18   ALT 11   BILITOT <0.2   ALKPHOS 63       Magnesium:    Lab Results Component Value Date    MG 2.00 02/27/2021    MG 2.30 12/29/2020    MG 2.00 12/25/2020         Problem List  Patient Active Problem List   Diagnosis    MRSA bacteremia    Pleural effusion, left    Leukocytosis    Illicit drug use    Hepatitis    Chest pain    Pulmonary embolism ([address]    AMS (altered mental status)    Acute respiratory failure with hypoxia (HCC)    Drug overdose    Abnormal chest x-ray    Acute encephalopathy    Disorder of electrolytes    Non-traumatic rhabdomyolysis    Elevated procalcitonin    Methamphetamine abuse ([address]    Toxic encephalopathy    Acute hypercapnic respiratory failure (HCC)    Heroin withdrawal (HCC)    Tachycardia    Hypoxia    Acute hypoxemic respiratory failure due to COVID-19 [facility])    Acute respiratory failure with hypoxia and hypercapnia ([facility])    Pneumonia due to COVID-19 virus    Thrombocytopenia (HCC)    Elevated LFTs    COVID-19    Acute metabolic encephalopathy    Moderate protein-calorie malnutrition (HCC)    Pneumonia due to organism    Sepsis ([address]    Pulmonary infiltrate    Bacteremia due to Staphylococcus aureus    Delirium    Hyperglycemia    IV drug abuse (HCC)    Tricuspid regurgitation    PFO (patent foramen ovale)    Lung nodule    Centrilobular emphysema (HCC)    Hilar adenopathy    Personal history of covid-19    Moderate to severe pulmonary hypertension ([address]    Former smoker    Alcohol use    Acute bronchitis    Hematuria    IRMA (acute kidney injury) ([address]    Hypokalemia    Cocaine use    Delirium tremens ([address]    Severe malnutrition (HCC)    Polysubstance dependence ([address]    Small cell lung cancer, right (HCC)       ASSESSMENT AND PLAN:    Small cell lung carcinoma:  -This is likely extensive disease, but staging has not been completed  -MRI of the brain is pending  -Bone scan is pending  -He has an extremely poor prognosis    Family meeting:  -The family did not show up further 9:00 meeting on 3/6/2021

## 2021-03-07 NOTE — FLOWSHEET NOTE
Bedside report given by Rex Patel RN, Drips verified and Skin assessment completed. Shift assessment completed and documented; see flowsheets for details and Pt not vented. Pt resting in bed, VSS, Lines checked and verified, alarms on and audible. Repositioned patient, sacral Mepilex in place, call light within reach, will continue to closely monitor. Recent Labs     03/06/21  0435   WBC 6.1   HGB 11.3*   HCT 34.8*   MCV 81.3        Recent Labs     03/06/21  0435      K 4.0      CO2 28   GLUCOSE 168*   PHOS 3.5   BUN 27*   CREATININE 1.1   CALCIUM 8.6   LABGLOM >60   GFRAA >60     [facility] 3/7/2021 10:12 AM  CrCl cannot be calculated (Unknown ideal weight.). DVT Prophylaxis: enoxaparin (Lovenox) 40 mg SQ Once daily.     GI Prophylaxis: None ordered

## 2021-03-07 NOTE — PROGRESS NOTES
Hospitalist Progress Note      PCP: No primary care provider on file. Date of Admission: 2/27/2021    Chief Complaint:  hematuria      Subjective:       Pt remains on precedex for agitation. Denied any new complaints. Mother at bedside     Medications:  Reviewed    Infusion Medications    sodium chloride 125 mL/hr at 03/07/21 0848    dexmedetomidine HCl in NaCl 1.4 mcg/kg/hr (03/07/21 1040)     Scheduled Medications    chlordiazePOXIDE  10 mg Oral TID    allopurinol  300 mg Oral Daily    lidocaine 1 % injection  5 mL Intradermal Once    ipratropium-albuterol  1 ampule Inhalation Q4H WA    predniSONE  20 mg Oral Daily    mupirocin   Nasal BID    enoxaparin  40 mg Subcutaneous Daily    sodium chloride flush  10 mL Intravenous 2 times per day     PRN Meds: haloperidol lactate, acetaminophen **OR** acetaminophen, ondansetron, bisacodyl, LORazepam **OR** LORazepam **OR** LORazepam **OR** LORazepam **OR** LORazepam **OR** LORazepam **OR** LORazepam **OR** LORazepam, sodium chloride flush, polyethylene glycol      Intake/Output Summary (Last 24 hours) at 3/7/2021 1244  Last data filed at 3/7/2021 1200  Gross per 24 hour   Intake 1460 ml   Output 4350 ml   Net -2890 ml       Physical Exam Performed:    BP (!) 135/106   Pulse 119   Temp 99 °F (37.2 °C) (Bladder)   Resp (!) 33   Ht 5' (1.524 m)   Wt 100 lb 1.4 oz (45.4 kg)   SpO2 100%   BMI 19.55 kg/m²       General appearance: agitated, in mild resp distress, appears stated age. se  HEENT: Pupils equal, round,. Conjunctivae/corneas clear. Neck: Supple, with full range of motion. No jugular venous distention. Trachea midline. Respiratory:  Normal respiratory effort. Decreased air entry bilat with diffuse exp wheezes. .  Cardiovascular:tachycardiac with regular rhythm with normal S1/S2 without murmurs, rubs or gallops. Abdomen: Soft, non-tender, non-distended with normal bowel sounds. Musculoskeletal: No clubbing, cyanosis. Skin:  warm and dry. Multiple tattoos to UE. Neurologic: AAOX3. Alert,agitated,  Moving bilat LE's spontaneously  Psychiatric: awake          Labs:   Recent Labs     03/06/21  0435   WBC 6.1   HGB 11.3*   HCT 34.8*        Recent Labs     03/06/21  0435      K 4.0      CO2 28   BUN 27*   CREATININE 1.1   CALCIUM 8.6   PHOS 3.5     Recent Labs     03/06/21  0435   AST 18   ALT 11   BILITOT <0.2   ALKPHOS 63     No results for input(s): INR in the last 72 hours. No results for input(s): Towana Misha in the last 72 hours. Urinalysis:      Lab Results   Component Value Date    NITRU see below 02/27/2021    WBCUA see below 02/27/2021    BACTERIA 4+ 02/27/2021    RBCUA >100 02/27/2021    BLOODU see below 02/27/2021    SPECGRAV see below 02/27/2021    GLUCOSEU see below 02/27/2021       Radiology:  IR MIDLINE CATH   Final Result   Successful placement of dual lumen right upper extremity midline catheter. XR CHEST PA LATERAL W FLUOROSCOPY   Final Result   1. See above. FLUORO FOR SURGICAL PROCEDURES   Final Result   1. See above. CT CHEST PULMONARY EMBOLISM W CONTRAST   Final Result   1. Artifact degraded evaluation of the pulmonary arteries. No acute   pulmonary embolism to the segmental arteries given limitation. 2. Progression of disease. Increased size of right lower lobe nodule and   bone metastases. Soft tissue in the anterior mediastinum and anterior to the   right middle lobe likely represents additional metastases. Follow-up   recommended. 3. Questionable mass at the base of the tongue. Correlate with history. 4.  Other findings as described. XR CHEST PORTABLE   Final Result   Chronic findings in the chest without acute airspace disease identified. Scar in the right lung apex again noted.   The opacities recently described in   the right middle lobe and right lower lobe are not delineated on this exam.         MRI BRAIN W WO CONTRAST    (Results Pending) CT ABDOMEN PELVIS WO CONTRAST Additional Contrast? Oral    (Results Pending)   NM BONE SCAN WHOLE BODY    (Results Pending)           Assessment/Plan:    Active Hospital Problems    Diagnosis    Severe malnutrition ([facility] [E43]    Small cell lung cancer, right ([address] [C34.91]    Polysubstance dependence ([address] [F19.20]    Cocaine use [F14.90]    Delirium tremens ([facility] [F10.231]    Acute bronchitis [J20.9]    Hematuria [R31.9]    IRMA (acute kidney injury) ([address] [N17.9]    Hypokalemia [E87.6]    Centrilobular emphysema ([facility] [J43.2]    Moderate to severe pulmonary hypertension ([address] [I27.20]    Hilar adenopathy [R59.0]    Lung nodule [R91.1]    Personal history of covid-19 [Z86.16]    Tricuspid regurgitation [I07.1]    IV drug abuse (HCC) [F19.10]    Acute respiratory failure with hypoxia (HCC) [J96.01]             AECOPD, with severe pulmonary HTN and RV dysfunction  - wheezy today. On steroids, scheduled duonebs. Added albuterol Q2H PRN      Metastatic lung cancer. 4.8 cm RML mass with many thoracic soft tissue and bone mets. EBUS 3/1, lymph node biopsy showed epithelium and mucus, no malignancy identified. 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 Pulmonary assisting and oncology assisting. Pt and family interested in treatment. Eval for disease staging ongoing per oncology. Acute hypoxic respiratory failure. Due to above issues. Wean to RA as tolerated. The patient has no supplemental O2 requirement at baseline. IRMA. Baseline Cr normal.  Some response to IVFs. Cr  Improved. 1.1 toady      Hematuria. Urology planning eventual cystoscopy, signed off. Negative urine culture. Hematuria resolved with time. Severe TV regurgitation. Cardiology determined that no further workup was indicated and signed off.     Polysubstance abuse: Alcohol abuse with withdrawal acute, Opiate use disorder, Stimulant use disorder: Current use of ETOH, IV Heroin, and Meth. Psych assisted. Continue Precedex drip and wean as tolerated, CIWA protocol. Posterior tongue mass suggested on CT, now ruled out. ENT consulted, laryngoscopy negative, no further workup anticipated. DVT Prophylaxis: SCDs  Diet: DIET GENERAL;  Dietary Nutrition Supplements: Standard High Calorie Oral Supplement  Code Status: Full Code    PT/OT Eval Status: differed for now as in restraints     Dispo - pending clinical course, likely when no longer in acute withdrawal and the cancer workup/treatment can be transitioned to outpatient. He lives in his truck and admits that his ability to f/u as outpatient is questionable.        Letty Kohler MD

## 2021-03-08 ENCOUNTER — APPOINTMENT (OUTPATIENT)
Dept: MRI IMAGING | Age: 51
DRG: 180 | End: 2021-03-08
Payer: MEDICARE

## 2021-03-08 LAB
ABO/RH: NORMAL
ANION GAP SERPL CALCULATED.3IONS-SCNC: 7 MMOL/L (ref 3–16)
ANTIBODY SCREEN: NORMAL
BASOPHILS ABSOLUTE: 0 K/UL (ref 0–0.2)
BASOPHILS RELATIVE PERCENT: 0.1 %
BUN BLDV-MCNC: 18 MG/DL (ref 7–20)
CALCIUM SERPL-MCNC: 8.2 MG/DL (ref 8.3–10.6)
CHLORIDE BLD-SCNC: 106 MMOL/L (ref 99–110)
CO2: 33 MMOL/L (ref 21–32)
CREAT SERPL-MCNC: 1 MG/DL (ref 0.9–1.3)
EOSINOPHILS ABSOLUTE: 0 K/UL (ref 0–0.6)
EOSINOPHILS RELATIVE PERCENT: 0.2 %
FUNGUS (MYCOLOGY) CULTURE: ABNORMAL
FUNGUS STAIN: ABNORMAL
GFR AFRICAN AMERICAN: >60
GFR NON-AFRICAN AMERICAN: >60
GLUCOSE BLD-MCNC: 164 MG/DL (ref 70–99)
HCT VFR BLD CALC: 33.5 % (ref 40.5–52.5)
HEMOGLOBIN: 10.8 G/DL (ref 13.5–17.5)
INR BLD: 0.9 (ref 0.86–1.14)
LYMPHOCYTES ABSOLUTE: 1.1 K/UL (ref 1–5.1)
LYMPHOCYTES RELATIVE PERCENT: 10.7 %
MAGNESIUM: 3 MG/DL (ref 1.8–2.4)
MCH RBC QN AUTO: 26.6 PG (ref 26–34)
MCHC RBC AUTO-ENTMCNC: 32.2 G/DL (ref 31–36)
MCV RBC AUTO: 82.6 FL (ref 80–100)
MONOCYTES ABSOLUTE: 0.5 K/UL (ref 0–1.3)
MONOCYTES RELATIVE PERCENT: 5.3 %
NEUTROPHILS ABSOLUTE: 8.5 K/UL (ref 1.7–7.7)
NEUTROPHILS RELATIVE PERCENT: 83.7 %
ORGANISM: ABNORMAL
PDW BLD-RTO: 14.5 % (ref 12.4–15.4)
PHOSPHORUS: 1.6 MG/DL (ref 2.5–4.9)
PLATELET # BLD: 220 K/UL (ref 135–450)
PMV BLD AUTO: 7.1 FL (ref 5–10.5)
POTASSIUM REFLEX MAGNESIUM: 3 MMOL/L (ref 3.5–5.1)
PROTHROMBIN TIME: 10.4 SEC (ref 10–13.2)
RBC # BLD: 4.05 M/UL (ref 4.2–5.9)
SODIUM BLD-SCNC: 146 MMOL/L (ref 136–145)
URIC ACID, SERUM: 1.4 MG/DL (ref 3.5–7.2)
WBC # BLD: 10.1 K/UL (ref 4–11)

## 2021-03-08 PROCEDURE — 2700000000 HC OXYGEN THERAPY PER DAY

## 2021-03-08 PROCEDURE — 2500000003 HC RX 250 WO HCPCS: Performed by: INTERNAL MEDICINE

## 2021-03-08 PROCEDURE — 70553 MRI BRAIN STEM W/O & W/DYE: CPT

## 2021-03-08 PROCEDURE — 6370000000 HC RX 637 (ALT 250 FOR IP): Performed by: INTERNAL MEDICINE

## 2021-03-08 PROCEDURE — 94761 N-INVAS EAR/PLS OXIMETRY MLT: CPT

## 2021-03-08 PROCEDURE — 2000000000 HC ICU R&B

## 2021-03-08 PROCEDURE — 6360000004 HC RX CONTRAST MEDICATION: Performed by: INTERNAL MEDICINE

## 2021-03-08 PROCEDURE — 84550 ASSAY OF BLOOD/URIC ACID: CPT

## 2021-03-08 PROCEDURE — 6370000000 HC RX 637 (ALT 250 FOR IP): Performed by: NURSE PRACTITIONER

## 2021-03-08 PROCEDURE — 83735 ASSAY OF MAGNESIUM: CPT

## 2021-03-08 PROCEDURE — A9579 GAD-BASE MR CONTRAST NOS,1ML: HCPCS | Performed by: INTERNAL MEDICINE

## 2021-03-08 PROCEDURE — 99233 SBSQ HOSP IP/OBS HIGH 50: CPT | Performed by: INTERNAL MEDICINE

## 2021-03-08 PROCEDURE — 86900 BLOOD TYPING SEROLOGIC ABO: CPT

## 2021-03-08 PROCEDURE — 86901 BLOOD TYPING SEROLOGIC RH(D): CPT

## 2021-03-08 PROCEDURE — 85610 PROTHROMBIN TIME: CPT

## 2021-03-08 PROCEDURE — 6360000002 HC RX W HCPCS: Performed by: PSYCHIATRY & NEUROLOGY

## 2021-03-08 PROCEDURE — 80048 BASIC METABOLIC PNL TOTAL CA: CPT

## 2021-03-08 PROCEDURE — 94640 AIRWAY INHALATION TREATMENT: CPT

## 2021-03-08 PROCEDURE — 2580000003 HC RX 258: Performed by: INTERNAL MEDICINE

## 2021-03-08 PROCEDURE — 86850 RBC ANTIBODY SCREEN: CPT

## 2021-03-08 PROCEDURE — 2580000003 HC RX 258: Performed by: NURSE PRACTITIONER

## 2021-03-08 PROCEDURE — 85025 COMPLETE CBC W/AUTO DIFF WBC: CPT

## 2021-03-08 PROCEDURE — 84100 ASSAY OF PHOSPHORUS: CPT

## 2021-03-08 PROCEDURE — 36415 COLL VENOUS BLD VENIPUNCTURE: CPT

## 2021-03-08 PROCEDURE — 99222 1ST HOSP IP/OBS MODERATE 55: CPT | Performed by: SURGERY

## 2021-03-08 PROCEDURE — 6360000002 HC RX W HCPCS: Performed by: INTERNAL MEDICINE

## 2021-03-08 PROCEDURE — 99233 SBSQ HOSP IP/OBS HIGH 50: CPT | Performed by: PSYCHIATRY & NEUROLOGY

## 2021-03-08 RX ORDER — CHLORDIAZEPOXIDE HYDROCHLORIDE 5 MG/1
10 CAPSULE, GELATIN COATED ORAL 3 TIMES DAILY
Status: DISCONTINUED | OUTPATIENT
Start: 2021-03-08 | End: 2021-03-10 | Stop reason: HOSPADM

## 2021-03-08 RX ORDER — QUETIAPINE FUMARATE 25 MG/1
25 TABLET, FILM COATED ORAL 2 TIMES DAILY
Status: DISCONTINUED | OUTPATIENT
Start: 2021-03-08 | End: 2021-03-10 | Stop reason: HOSPADM

## 2021-03-08 RX ORDER — CHLORDIAZEPOXIDE HYDROCHLORIDE 25 MG/1
50 CAPSULE, GELATIN COATED ORAL 3 TIMES DAILY
Status: DISCONTINUED | OUTPATIENT
Start: 2021-03-08 | End: 2021-03-08

## 2021-03-08 RX ADMIN — GADOTERIDOL 9 ML: 279.3 INJECTION, SOLUTION INTRAVENOUS at 16:19

## 2021-03-08 RX ADMIN — CHLORDIAZEPOXIDE HYDROCHLORIDE 10 MG: 5 CAPSULE ORAL at 08:14

## 2021-03-08 RX ADMIN — Medication 1.4 MCG/KG/HR: at 22:00

## 2021-03-08 RX ADMIN — LORAZEPAM 4 MG: 2 INJECTION, SOLUTION INTRAMUSCULAR; INTRAVENOUS at 13:44

## 2021-03-08 RX ADMIN — PREDNISONE 20 MG: 20 TABLET ORAL at 08:14

## 2021-03-08 RX ADMIN — CHLORDIAZEPOXIDE HYDROCHLORIDE 50 MG: 25 CAPSULE ORAL at 13:45

## 2021-03-08 RX ADMIN — Medication 10 ML: at 08:15

## 2021-03-08 RX ADMIN — LORAZEPAM 4 MG: 2 INJECTION, SOLUTION INTRAMUSCULAR; INTRAVENOUS at 07:11

## 2021-03-08 RX ADMIN — CHLORDIAZEPOXIDE HYDROCHLORIDE 50 MG: 25 CAPSULE ORAL at 09:13

## 2021-03-08 RX ADMIN — SODIUM CHLORIDE: 9 INJECTION, SOLUTION INTRAVENOUS at 09:14

## 2021-03-08 RX ADMIN — IPRATROPIUM BROMIDE AND ALBUTEROL SULFATE 1 AMPULE: .5; 3 SOLUTION RESPIRATORY (INHALATION) at 07:56

## 2021-03-08 RX ADMIN — LORAZEPAM 4 MG: 2 INJECTION, SOLUTION INTRAMUSCULAR; INTRAVENOUS at 21:30

## 2021-03-08 RX ADMIN — QUETIAPINE FUMARATE 25 MG: 25 TABLET ORAL at 20:00

## 2021-03-08 RX ADMIN — Medication 1.4 MCG/KG/HR: at 02:00

## 2021-03-08 RX ADMIN — CHLORDIAZEPOXIDE HYDROCHLORIDE 10 MG: 5 CAPSULE ORAL at 20:00

## 2021-03-08 RX ADMIN — QUETIAPINE FUMARATE 25 MG: 25 TABLET ORAL at 09:13

## 2021-03-08 RX ADMIN — IPRATROPIUM BROMIDE AND ALBUTEROL SULFATE 1 AMPULE: .5; 3 SOLUTION RESPIRATORY (INHALATION) at 11:16

## 2021-03-08 RX ADMIN — ENOXAPARIN SODIUM 40 MG: 40 INJECTION SUBCUTANEOUS at 08:13

## 2021-03-08 RX ADMIN — ALLOPURINOL 300 MG: 300 TABLET ORAL at 08:13

## 2021-03-08 RX ADMIN — SODIUM CHLORIDE: 9 INJECTION, SOLUTION INTRAVENOUS at 02:00

## 2021-03-08 RX ADMIN — HALOPERIDOL LACTATE 5 MG: 5 INJECTION, SOLUTION INTRAMUSCULAR at 17:13

## 2021-03-08 RX ADMIN — IPRATROPIUM BROMIDE AND ALBUTEROL SULFATE 1 AMPULE: .5; 3 SOLUTION RESPIRATORY (INHALATION) at 16:52

## 2021-03-08 RX ADMIN — Medication 10 ML: at 20:00

## 2021-03-08 RX ADMIN — IPRATROPIUM BROMIDE AND ALBUTEROL SULFATE 1 AMPULE: .5; 3 SOLUTION RESPIRATORY (INHALATION) at 19:18

## 2021-03-08 RX ADMIN — Medication 1.4 MCG/KG/HR: at 08:15

## 2021-03-08 RX ADMIN — LORAZEPAM 4 MG: 2 INJECTION, SOLUTION INTRAMUSCULAR; INTRAVENOUS at 18:22

## 2021-03-08 NOTE — PROGRESS NOTES
ordered and surgery consulted for this  -Palliative care was involved last week, may need to reinvolve them.  Also complicating factors is that his NOK has not had much contact with him for some time and did not show up to fam mtg set up for last week   -ICU level of care while on precedex gtt    Dossie Navneet OBRIEN

## 2021-03-08 NOTE — CONSULTS
Department of Psychiatry  consultant Progress Note  Chief Complaint: follow-up delirium and agitation. Awake and able to carry on a conversation today. Met with nurse to review care plan. His wife was in his room and stayed for the evaluation today. Pt aware he has been diagnosed with cancer and asked whether it had \"spread\". That evaluation continues. Started on Librium now at 50 mgs in effort to wean off Precedex and also started Seroquel. Getting ativan prn. No haldol for the last couple days. Unsure of its effectiveness. Patient's chart was reviewed and collaborated with  about the treatment plan. .  SUBJECTIVE:    Patient is feeling better. Suicidal ideation: denies suicidal ideation  Patient denies    ROS: Patient has new complaints: no  Sleeping adequately:  Yes  Appetite adequate: yes  Attending groups: N/A  Visitors:yes - wofe    OBJECTIVE    Physical  VITALS:  BP (!) 150/105   Pulse 137   Temp 99.1 °F (37.3 °C) (Bladder)   Resp 21   Ht 5' (1.524 m)   Wt 100 lb 1.4 oz (45.4 kg)   SpO2 100%   BMI 19.55 kg/m²     Mental Status Examination:  Patients appearance lying in bed. Thoughts are more organized today   Homicidal ideations no  No abnormal movements, tics or mannerisms. Memory short term memory loss Aims 0. ConcentrationImpaired   Alert and oriented X 4. Insight and Judgement poor  . Patient gait not tested  Mood labile, affect congruent with mood Hallucinations Absent  , suicidal ideations no planSpeech appropriate, soft and slow  Data  Labs:   No results displayed because visit has over 200 results.                Medications  Current Facility-Administered Medications: chlordiazePOXIDE (LIBRIUM) capsule 50 mg, 50 mg, Oral, TID  QUEtiapine (SEROQUEL) tablet 25 mg, 25 mg, Oral, BID  albuterol (PROVENTIL) nebulizer solution 2.5 mg, 2.5 mg, Nebulization, Q2H PRN  allopurinol (ZYLOPRIM) tablet 300 mg, 300 mg, Oral, Daily  0.9 % sodium chloride infusion, , Intravenous, Continuous ipratropium-albuterol (DUONEB) nebulizer solution 1 ampule, 1 ampule, Inhalation, Q4H WA  haloperidol lactate (HALDOL) injection 5 mg, 5 mg, Intravenous, Q4H PRN  dexmedetomidine (PRECEDEX) 400 mcg in sodium chloride 0.9 % 100 mL infusion, 0.2-1.4 mcg/kg/hr, Intravenous, Continuous  enoxaparin (LOVENOX) injection 40 mg, 40 mg, Subcutaneous, Daily  acetaminophen (TYLENOL) tablet 650 mg, 650 mg, Oral, Q6H PRN **OR** acetaminophen (TYLENOL) suppository 650 mg, 650 mg, Rectal, Q6H PRN  ondansetron (ZOFRAN) injection 4 mg, 4 mg, Intravenous, Q6H PRN  bisacodyl (DULCOLAX) suppository 10 mg, 10 mg, Rectal, Daily PRN  LORazepam (ATIVAN) tablet 1 mg, 1 mg, Oral, Q1H PRN **OR** LORazepam (ATIVAN) injection 1 mg, 1 mg, Intravenous, Q1H PRN **OR** LORazepam (ATIVAN) tablet 2 mg, 2 mg, Oral, Q1H PRN **OR** LORazepam (ATIVAN) injection 2 mg, 2 mg, Intravenous, Q1H PRN **OR** LORazepam (ATIVAN) tablet 3 mg, 3 mg, Oral, Q1H PRN **OR** LORazepam (ATIVAN) injection 3 mg, 3 mg, Intravenous, Q1H PRN **OR** LORazepam (ATIVAN) tablet 4 mg, 4 mg, Oral, Q1H PRN **OR** LORazepam (ATIVAN) injection 4 mg, 4 mg, Intravenous, Q1H PRN  sodium chloride flush 0.9 % injection 10 mL, 10 mL, Intravenous, 2 times per day  sodium chloride flush 0.9 % injection 10 mL, 10 mL, Intravenous, PRN  polyethylene glycol (GLYCOLAX) packet 17 g, 17 g, Oral, Daily PRN    ASSESSMENT AND PLAN    Primary psych dx:  delirium    Active Problems:    Acute respiratory failure with hypoxia (HCC)    IV drug abuse (HCC)    Tricuspid regurgitation    Lung nodule    Centrilobular emphysema (HCC)    Hilar adenopathy    Personal history of covid-19    Moderate to severe pulmonary hypertension (HCC)    Acute bronchitis    Hematuria    IRMA (acute kidney injury) ([address]    Hypokalemia    Cocaine use    Delirium tremens (HCC)    Severe malnutrition (HCC)    Polysubstance dependence (HCC)    Small cell lung cancer, right (HCC)    Bone metastases ([facility]  Resolved Problems:    * No resolved hospital problems. *       1. Patient s symptoms better. I think he is finally coming off the effects of the cocaine and other substances he was taking both prior to and during this hospitalization. He is more cooperative today. Concerned that between seroquel and librium and precedex it may become too sedating. However, I will not adjust today until I see how he does with current orders. 2.Probable discharge unknown   3. Discharge planning is incomplete  4 Suicidal ideation is denies suicidal ideation  5 total time 40 minutes more than 50% was spent in direct time with the patient    Alicia Luu MD

## 2021-03-08 NOTE — PROGRESS NOTES
cure him. Also there is a potential for it not to work at all.  -I went over the risks and benefits in detail.  -He would like to go ahead and have a Port-A-Cath placed for chemotherapy  -A port was ordered    Family meeting:  -The family did not show up further 9:00 meeting on 3/6/2021        Drug abuse:  -This is a concern with his Port-A-Cath; however, I suspect he is going to continue to use narcotics with or without the port.           ONCOLOGIC DISPOSITION:    -Pending his mental status    Alisa Agrawal MD  Please contact through Miguelito Karimi

## 2021-03-08 NOTE — PROGRESS NOTES
Hospitalist Progress Note      PCP: No primary care provider on file. Date of Admission: 2/27/2021    Chief Complaint:  hematuria      Subjective:  Patient looks very weak and tired - so much worse than when I saw him a week ago. Breathing quickly, tachycardic. He hardly has the strength to move his lips when he speaks. He complains of dyspnea       Medications:  Reviewed    Infusion Medications    sodium chloride 125 mL/hr at 03/08/21 0914    dexmedetomidine HCl in NaCl 1.4 mcg/kg/hr (03/08/21 0815)     Scheduled Medications    chlordiazePOXIDE  50 mg Oral TID    QUEtiapine  25 mg Oral BID    allopurinol  300 mg Oral Daily    ipratropium-albuterol  1 ampule Inhalation Q4H WA    enoxaparin  40 mg Subcutaneous Daily    sodium chloride flush  10 mL Intravenous 2 times per day     PRN Meds: albuterol, haloperidol lactate, acetaminophen **OR** acetaminophen, ondansetron, bisacodyl, LORazepam **OR** LORazepam **OR** LORazepam **OR** LORazepam **OR** LORazepam **OR** LORazepam **OR** LORazepam **OR** LORazepam, sodium chloride flush, polyethylene glycol      Intake/Output Summary (Last 24 hours) at 3/8/2021 1442  Last data filed at 3/8/2021 1200  Gross per 24 hour   Intake 3615 ml   Output 4350 ml   Net -735 ml       Physical Exam Performed:    BP (!) 150/105   Pulse 137   Temp 99.1 °F (37.3 °C) (Bladder)   Resp 21   Ht 5' (1.524 m)   Wt 100 lb 1.4 oz (45.4 kg)   SpO2 100%   BMI 19.55 kg/m²       General appearance: NAD, appears stated age  HEENT: Pupils equal, round,. Conjunctivae/corneas clear. Neck: Supple, with full range of motion. No jugular venous distention. Trachea midline. Respiratory:  Mildly increased respiratory effor. Decreased air entry bilat. Cardiovascular: tachycardic with regular rhythm with normal S1/S2 without murmurs, rubs or gallops. Abdomen: Soft, non-tender, non-distended with normal bowel sounds. Musculoskeletal: No clubbing, cyanosis. No edema.   Skin:  warm and dry.   Neurologic:  Alert and mostly oriented. Calm and cooperative. Has some insight   Psychiatric: awake          Labs:   Recent Labs     03/06/21 0435   WBC 6.1   HGB 11.3*   HCT 34.8*        Recent Labs     03/06/21 0435      K 4.0      CO2 28   BUN 27*   CREATININE 1.1   CALCIUM 8.6   PHOS 3.5     Recent Labs     03/06/21 0435   AST 18   ALT 11   BILITOT <0.2   ALKPHOS 63     No results for input(s): INR in the last 72 hours. No results for input(s): Towana Misha in the last 72 hours. Urinalysis:      Lab Results   Component Value Date    NITRU see below 02/27/2021    WBCUA see below 02/27/2021    BACTERIA 4+ 02/27/2021    RBCUA >100 02/27/2021    BLOODU see below 02/27/2021    SPECGRAV see below 02/27/2021    GLUCOSEU see below 02/27/2021       Radiology:  CT ABDOMEN PELVIS WO CONTRAST Additional Contrast? Oral   Final Result   Limited study for lung cancer staging in the absence of intravenous contrast   material.  However, there is no appreciable evidence of metastatic disease   within the abdomen or pelvis allowing for the limitations of the study. Small amount of pelvic free fluid noted. IR MIDLINE CATH   Final Result   Successful placement of dual lumen right upper extremity midline catheter. XR CHEST PA LATERAL W FLUOROSCOPY   Final Result   1. See above. FLUORO FOR SURGICAL PROCEDURES   Final Result   1. See above. CT CHEST PULMONARY EMBOLISM W CONTRAST   Final Result   1. Artifact degraded evaluation of the pulmonary arteries. No acute   pulmonary embolism to the segmental arteries given limitation. 2. Progression of disease. Increased size of right lower lobe nodule and   bone metastases. Soft tissue in the anterior mediastinum and anterior to the   right middle lobe likely represents additional metastases. Follow-up   recommended. 3. Questionable mass at the base of the tongue. Correlate with history.    4.  Other findings as described. XR CHEST PORTABLE   Final Result   Chronic findings in the chest without acute airspace disease identified. Scar in the right lung apex again noted. The opacities recently described in   the right middle lobe and right lower lobe are not delineated on this exam.         MRI BRAIN W WO CONTRAST    (Results Pending)   NM BONE SCAN WHOLE BODY    (Results Pending)           Assessment/Plan:    Active Hospital Problems    Diagnosis    Bone metastases ([address] [C79.51]    Severe malnutrition ([address] [E43]    Small cell lung cancer, right ([address] [C34.91]    Polysubstance dependence ([address] [F19.20]    Cocaine use [F14.90]    Delirium tremens ([address] [F10.231]    Acute bronchitis [J20.9]    Hematuria [R31.9]    IRMA (acute kidney injury) ([address] [N17.9]    Hypokalemia [E87.6]    Centrilobular emphysema ([address] [J43.2]    Moderate to severe pulmonary hypertension ([address] [I27.20]    Hilar adenopathy [R59.0]    Lung nodule [R91.1]    Personal history of covid-19 [Z86.16]    Tricuspid regurgitation [I07.1]    IV drug abuse ([address] [F19.10]    Acute respiratory failure with hypoxia ([address] [J96.01]         \"46 year old man with PMH of IVDU with heroin and meth, chronic hep C, tricuspid regurg, lung mass, COPD, alcohol dependence presented to the ED today with complaints of hematuria and shortness of breath.  Patient apparently left AMA on 2/4 from [facility] was being worked up for his tricuspid regurgitation and lung mass. Patient reports ever since he left the hospital, he has been short of breath particularly worse with exertion.  He reports some leg swelling that is dependent type.  Denies any chest pain.  Denies subjective fevers chills or rigors.  No cough.  Continues to inject drugs into his right leg almost daily. Patient reports 2 to 3 weeks history of dark red-colored urine.  Denies any dysuria, urgency or frequency or difficulty urinating. \"          SCLC, extensive stage  - patient and significant other contemplating the offered port placement and chemotherapy. - f/u MRI brain. AECOPD, with severe pulmonary HTN and RV dysfunction  - completed course of steroids and doxycycline. Inhaled bronchodilators.      Acute hypoxic respiratory failure. Due to above issues. Wean to RA as tolerated. The patient has no supplemental O2 requirement at baseline.       IRMA. Baseline Cr normal.  Responded to IVFs.    Hematuria. Urology planned eventual cystoscopy, signed off. Negative urine culture. Negative ANCA. Hematuria resolved with time.     Polysubstance abuse, with opioid dependence and withdrawal.  Treated with multiple sedatives. Encouraged cessation.  - he was caught snorting cocaine during this admission.        Acute metabolic encephalopathy, which developed during this hospital stay, due to above issues, treated accordingly. Improved with time. Posterior tongue mass suggested on CT, now ruled out. ENT consulted, laryngoscopy negative, no further workup anticipated. Severe TV regurgitation. Cardiology determined that no further workup was indicated and signed off. DVT Prophylaxis: SCDs  Diet: DIET GENERAL;  Dietary Nutrition Supplements: Standard High Calorie Oral Supplement  Code Status: Full Code    PT/OT Eval Status: early in this hospital stay, before he got so sick, he wasn't interested in putting forth much effort. They will not be back to evaluate him. Dispo - when his respiratory status is stable and when his mental state allows for safe discharge. Also would like to have a more definite oncologic plan prior to discharge. He lives in his truck and admits that his ability to f/u as outpatient is questionable. Alternatively, hospice would be reasonable at any point if he wants this instead (unclear whether home vs IP hospice would be most appropriate).       Misty Rowley MD

## 2021-03-08 NOTE — CARE COORDINATION
Chart review completed. Patient a 48year old male, admitted for hematuria. Hospital day 9, currently in ICU level of care on precedex drip. Initial assessment shows pt lives in his truck and intends to return there. Substance abuse also addressed at that time and pt declined all resources for this. Since then new cancer diagnosis has been received and patient and family has chosen to move forward with treatment options. Noted consult for port placement. CM notes that IV chemo is likely and will complicate discharge further. Current barriers are homelessness, substance abuse, and now chemotherapy. Writer did place call to [place] with [facility] to explore that as option. They are unable to accept due to IV chemo needs. Call placed to patient room to discuss likely rehab options to see if would be agreeable and readdress substance abuse/homeless resources without success. Could be in part to precedex drip. CM will attempt again at another time. Amelia Mcginnis RN       Addendum [phone]: writer spoke to [place] with Palliative care who spoke to family this morning regarding ongoing needs. Family continuing to discuss possibilities of patient maybe going home with one of his children but this is not confirmed as a definitive plan. Discussed different facility options as pt does have Medicaid as a secondary payor. Spoke to Alexander allen at [facility] and they are unable to accept due to addiction needs. Suggested that we contact [facility] for facility placement as they work with addiction more regularly. Call placed to [facility] with [facility] to discuss. [facility] possibly able to place in one of her facilities. Information faxed for review.   Amelia Mcginnis RN

## 2021-03-08 NOTE — PROGRESS NOTES
INPATIENT PULMONARY CRITICAL CARE PROGRESS NOTE      Reason for visit    enlarging lung mass, previous BAL cytology negative for malignancy. SUBJECTIVE: Patient when seen this morning was continued to be on IV Precedex infusion along with a CIWA and COWS protocol, patient when evaluated was more communicative and responsive this morning  ; patient was in sinus rhythm on monitor when seen , patient when evaluated was on 1 lts/min  nasal cannula oxygen with saturation of 100%,Patient has had good urine output overnight with cumulative fluid balance of -8 L, patient has been eating by mouth with assistance ;no other pertinent ROS could be obtained       Physical Exam:  Blood pressure (!) 154/95, pulse 82, temperature 97.9 °F (36.6 °C), temperature source Bladder, resp. rate 26, height 5' (1.524 m), weight 100 lb 1.4 oz (45.4 kg), SpO2 99 %.'     Constitutional:  No acute distress. Mild tachypnea  HENT:  Oropharynx is clear and moist. No thyromegaly. Eyes:  Conjunctivae are normal. Pupils equal, round, and reactive to light. No scleral icterus. Neck: . No tracheal deviation present. No obvious thyroid mass. Cardiovascular: Sinus rhythm;LLSB murmur   No right ventricular heave. No lower extremity edema. decreased BSI   Pulmonary/Chest: scattered  wheezes. No significant rales. No chest congestion Chest wall is not dull to percussion. No accessory muscle usage or stridor. Abdominal: Soft. Bowel sounds present. No distension or hernia. No tenderness. Musculoskeletal: No cyanosis. No clubbing. No obvious joint deformity. Lymphadenopathy: No cervical or supraclavicular adenopathy. Skin: Skin is warm and dry. No rash or nodules on the exposed extremities. multiple tattoos  Neurologic: alert and communicative  when seen          Results:  CBC:   Recent Labs     03/06/21  0435   WBC 6.1   HGB 11.3*   HCT 34.8*   MCV 81.3        BMP:   Recent Labs     03/06/21  0435      K 4.0      CO2 28 PHOS 3.5   BUN 27*   CREATININE 1.1       Imaging:  I have reviewed radiology images personally. CT ABDOMEN PELVIS WO CONTRAST Additional Contrast? Oral   Final Result   Limited study for lung cancer staging in the absence of intravenous contrast   material.  However, there is no appreciable evidence of metastatic disease   within the abdomen or pelvis allowing for the limitations of the study. Small amount of pelvic free fluid noted. IR MIDLINE CATH   Final Result   Successful placement of dual lumen right upper extremity midline catheter. XR CHEST PA LATERAL W FLUOROSCOPY   Final Result   1. See above. FLUORO FOR SURGICAL PROCEDURES   Final Result   1. See above. CT CHEST PULMONARY EMBOLISM W CONTRAST   Final Result   1. Artifact degraded evaluation of the pulmonary arteries. No acute   pulmonary embolism to the segmental arteries given limitation. 2. Progression of disease. Increased size of right lower lobe nodule and   bone metastases. Soft tissue in the anterior mediastinum and anterior to the   right middle lobe likely represents additional metastases. Follow-up   recommended. 3. Questionable mass at the base of the tongue. Correlate with history. 4.  Other findings as described. XR CHEST PORTABLE   Final Result   Chronic findings in the chest without acute airspace disease identified. Scar in the right lung apex again noted. The opacities recently described in   the right middle lobe and right lower lobe are not delineated on this exam.         MRI BRAIN W WO CONTRAST    (Results Pending)   NM BONE SCAN WHOLE BODY    (Results Pending)     A. Lung, right lower lobe, brushing:        -  Negative for malignancy. B. Lymph node, right hilar, transbronchial ultrasound guided fine needle   aspiration:         -  Positive for malignancy.       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         - See comment     C.  Lung, right lower lobe, bronchial alveolar lavage:        -  Negative for malignancy. COMMENT:      The right hilar sample (specimen B) shows hypercellularity   composed of undifferentiated cells with nuclear molding and   hyperchromasia.  Immunoperoxidase stains performed using pancytokeratin,   chromogranin, synaptophysin, TTF-1 and Ki-67 show that the tumor cells   are diffusely positive for pancytokeratin, synaptophysin and TTF-1.  The   Ki-67 proliferation index is 100%.  The tumor cells are negative for   chromogranin.  The cytomorphology and immunoperoxidase profile are   consistent with a small cell carcinoma of the lung. Results for [facility] (MRN [id number]) as of 3/7/2021 21:47   Ref.  Range 3/4/2021 09:25 3/5/2021 14:48 3/5/2021 16:57 3/6/2021 04:35   Sodium Latest Ref Range: 136 - 145 mmol/L 139   143   Potassium Latest Ref Range: 3.5 - 5.1 mmol/L 4.2   4.0   Chloride Latest Ref Range: 99 - 110 mmol/L 100   108   CO2 Latest Ref Range: 21 - 32 mmol/L 32   28   BUN Latest Ref Range: 7 - 20 mg/dL 36 (H)   27 (H)   Creatinine Latest Ref Range: 0.9 - 1.3 mg/dL 1.3   1.1   Anion Gap Latest Ref Range: 3 - 16  7   7   GFR Non- Latest Ref Range: >60  58 (A)   >60   GFR  Latest Ref Range: >60  >60   >60   Glucose Latest Ref Range: 70 - 99 mg/dL 121 (H)   168 (H)   POC Glucose Latest Ref Range: 70 - 99 mg/dl   155 (H)    Calcium Latest Ref Range: 8.3 - 10.6 mg/dL 9.1   8.6   Phosphorus Latest Ref Range: 2.5 - 4.9 mg/dL    3.5   Total Protein Latest Ref Range: 6.4 - 8.2 g/dL    6.0 (L)   Uric Acid, Serum Latest Ref Range: 3.5 - 7.2 mg/dL    3.0 (L)   Albumin Latest Ref Range: 3.4 - 5.0 g/dL    3.3 (L)   Globulin Latest Units: g/dL    2.7   Albumin/Globulin Ratio Latest Ref Range: 1.1 - 2.2     1.2   Alk Phos Latest Ref Range: 40 - 129 U/L    63   ALT Latest Ref Range: 10 - 40 U/L    11   AST Latest Ref Range: 15 - 37 U/L    18   Bilirubin Latest Ref Range: 0.0 - 1.0 mg/dL    <0.2   WBC Latest Ref Range: 4.0 - 11.0 K/uL 9.0   6.1   RBC Latest Ref Range: 4.20 - 5.90 M/uL 4.80   4.28   Hemoglobin Quant Latest Ref Range: 13.5 - 17.5 g/dL 12.8 (L)   11.3 (L)   Hematocrit Latest Ref Range: 40.5 - 52.5 % 39.1 (L)   34.8 (L)   MCV Latest Ref Range: 80.0 - 100.0 fL 81.5   81.3   MCH Latest Ref Range: 26.0 - 34.0 pg 26.6   26.5   MCHC Latest Ref Range: 31.0 - 36.0 g/dL 32.6   32.5   MPV Latest Ref Range: 5.0 - 10.5 fL 7.2   6.9   RDW Latest Ref Range: 12.4 - 15.4 % 14.0   14.1   Platelet Count Latest Ref Range: 135 - 450 K/uL 294   239   Neutrophils % Latest Units: % 79.3   86.8         Assessment:  Active Problems:    Acute respiratory failure with hypoxia (HCC)    IV drug abuse (HCC)    Tricuspid regurgitation    Lung nodule    Centrilobular emphysema (HCC)    Hilar adenopathy    Personal history of covid-19    Moderate to severe pulmonary hypertension (HCC)    Acute bronchitis    Hematuria    IRMA (acute kidney injury) (HCC)    Hypokalemia    Cocaine use    Delirium tremens (HCC)    Severe malnutrition (HCC)    Polysubstance dependence (HCC)    Small cell lung cancer, right (HCC)  Resolved Problems:    * No resolved hospital problems.  *          Plan:   · Patent to be continued on precedex drip for prevention of withdrawal   · Patient was given IV benzodiazepine  · Patient was given IM Phenobarbital   · CIWA and COWS protocols in place  · Will start oral librium with the hope that precxedex can be tapred to d/c   · Monitor for any airway compromise and if patient cannot protect airways -would require intubation and mechanical ventilatory support-respiratory status has improved as compared to yesterday  · Monitor for any worsening autonomic instability  · Oxygen supplementation, if required, to keep saturation being 90 to 94% only  · Pulmonary toilet  · S/p Bronchoscopy with EBUS -results as history of small cell lung cancer  · Metastatic workup in progress   · Oncology consult and recommendations reviewed  · Will get palliative care consult for goals of care and CODE STATUS   · Family coming to discuss with patient and treating physicians about options   · Strict input output charting and BMP  · Correct electrolytes on whenever necessary basis  · Off antibiotics   · Monitor I/O and BMP  · Correct electrolytes on PRN basis   · Neurochecks  · Bronchodilators  · PUD and DVT prophylaxis as per IM     Case discussed with ICU team       Patient wants to proceed with cancer treatment and full code and family in agreement       Electronically signed by:  Jonna Kirk MD    3/7/2021    9:45 PM.

## 2021-03-08 NOTE — PLAN OF CARE
Palliative Care Progress Note  Palliative Care Admit date: 3/4/21    Advance Directives:  Full code remains in place. Plan of care/goals: Had spoken w/ son, Aurea Chavira earlier this morning and he called writer again this afternoon. Aurea Chavira apprised of pending brain MRI & bone scan and pts poor mentation today. We have reviewed the Onc input thus far to which Fito Hannarine his thought \"I don't think he (pt) should do the chemo. \"   He understands that pt, in the best case scenario, will need reliable care/oversight and housing and isn't confident pt will agree to coming to son or dtr's home, let alone a nursing home. We have discussed the likelihood for evolving & different discussions, depending on pts mentation, imaging results and/or how pt tolerates chemo.       Plan: son is very receptive to ongoing ACP discussion's, unable to reach spouse        Reason for consult:  _X_ Advance Care Planning  ___ Transition of Care Planning  _X_ Psychosocial/Spiritual Support  ___ Symptom Management                                                                                                                                    Jess Pierce RN

## 2021-03-08 NOTE — PROGRESS NOTES
Comprehensive Nutrition Assessment    Type and Reason for Visit:  Reassess    Nutrition Recommendations/Plan:   1. Continue General diet with Ensure tid  2. Will monitor nutritional adequacy, nutrition-related labs, weights, BMs, and clinical progress   3. May benefit from [address] [facility] referral for home Ensure if/when patient goes home  4. Will monitor nutritional adequacy, nutrition-related labs, weights, BMs, and clinical progress     Nutrition Assessment:  Follow up:  Continues on a general diet with Ensure tid eating % meals. Per rounds goal is to wean Precedex by increasing Librium. Patient is yelling out at times but non aggressive when speakling with patient in room. Will continue to monitor nutritional progress, medical and cognitive stability and plan of care.     Malnutrition Assessment:  Malnutrition Status:  Severe malnutrition    Context:  Chronic Illness     Findings of the 6 clinical characteristics of malnutrition:  Energy Intake:  7 - 75% or less estimated energy requirements for 1 month or longer  Weight Loss:  7 - Greater than 20% over 1 year     Body Fat Loss:  7 - Severe body fat loss Triceps   Muscle Mass Loss:  7 - Severe muscle mass loss Thigh (quadraceps)  Fluid Accumulation:  Unable to assess     Strength:  Not Performed    Estimated Daily Nutrient Needs:  Energy (kcal):  6021-2947 kcals; Weight Used for Energy Requirements:  Current(45 kg)     Protein (g):  68-90 g; Weight Used for Protein Requirements:  Current(1.2-2)        Fluid (ml/day):  1 mL/kcal; Method Used for Fluid Requirements:  1 ml/kcal      Nutrition Related Findings:  Last BM on 3/5      Wounds:  (scattered bruising and abrasions)       Current Nutrition Therapies:    DIET GENERAL;  Dietary Nutrition Supplements: Standard High Calorie Oral Supplement    Anthropometric Measures:  · Height: 5' (152.4 cm)  · Current Body Weight: 100 lb 1 oz (45.4 kg)   · Usual Body Weight: 125 lb (56.7 kg)(July 2020) · Ideal Body Weight: 106 lbs; % Ideal Body Weight     · BMI: 19.5   · BMI Categories: Underweight (BMI less than 18.5)       Nutrition Diagnosis:   · Severe malnutrition related to inadequate protein-energy intake as evidenced by severe muscle loss, intake 0-25%    Nutrition Interventions:   Food and/or Nutrient Delivery:  Continue Current Diet, Continue Oral Nutrition Supplement  Nutrition Education/Counseling:  No recommendation at this time   Coordination of Nutrition Care:  Continue to monitor while inpatient    Goals:   Tolerate diet and consume greater than 50% of meals and ONS this admission without wt loss, or electrolyte disturbances       Nutrition Monitoring and Evaluation:   Behavioral-Environmental Outcomes:      Food/Nutrient Intake Outcomes:  Food and Nutrient Intake, Supplement Intake  Physical Signs/Symptoms Outcomes:  GI Status, Biochemical Data, Weight     Discharge Planning:    Continue current diet, Continue Oral Nutrition Supplement     Electronically signed by Connie De La Garza RD, LD on 3/8/21 at 9:37 AM EST    Contact: Office: [phone]; [address] [place] [address] Road: [phone]

## 2021-03-08 NOTE — CONSULTS
THERAPUTIC ASPIRATION INITIAL performed by Farheen Jorgensen MD at [facility] N/A 3/1/2021    BRONCHOSCOPY ALVEOLAR LAVAGE performed by Farheen Jorgensen MD at [facility]  3/1/2021    BRONCHOSCOPY BIOPSY BRONCHUS performed by Farheen Jorgensen MD at [facility]  3/1/2021    BRONCHOSCOPY BRUSHINGS performed by Farheen Jorgensen MD at [facility]  3/1/2021    BRONCHOSCOPY/TRANSBRONCHIAL NEEDLE BIOPSY performed by Farheen Jorgensen MD at [facility]  3/1/2021    BRONCHOSCOPY ENDOBRONCHIAL ULTRASOUND performed by Farheen Jorgensen MD at [place] [id number]      IR [address] CATH  3/5/2021    IR MIDLINE CATH 3/5/2021 [facility] SPECIAL PROCEDURES    LEG SURGERY      TRANSESOPHAGEAL ECHOCARDIOGRAM  02/03/2021    Dr Lisa Suarez       Current Medications:   Current Facility-Administered Medications: chlordiazePOXIDE (LIBRIUM) capsule 50 mg, 50 mg, Oral, TID  QUEtiapine (SEROQUEL) tablet 25 mg, 25 mg, Oral, BID  albuterol (PROVENTIL) nebulizer solution 2.5 mg, 2.5 mg, Nebulization, Q2H PRN  allopurinol (ZYLOPRIM) tablet 300 mg, 300 mg, Oral, Daily  0.9 % sodium chloride infusion, , Intravenous, Continuous  ipratropium-albuterol (DUONEB) nebulizer solution 1 ampule, 1 ampule, Inhalation, Q4H WA  haloperidol lactate (HALDOL) injection 5 mg, 5 mg, Intravenous, Q4H PRN  dexmedetomidine (PRECEDEX) 400 mcg in sodium chloride 0.9 % 100 mL infusion, 0.2-1.4 mcg/kg/hr, Intravenous, Continuous  enoxaparin (LOVENOX) injection 40 mg, 40 mg, Subcutaneous, Daily  acetaminophen (TYLENOL) tablet 650 mg, 650 mg, Oral, Q6H PRN **OR** acetaminophen (TYLENOL) suppository 650 mg, 650 mg, Rectal, Q6H PRN  ondansetron (ZOFRAN) injection 4 mg, 4 mg, Intravenous, Q6H PRN  bisacodyl (DULCOLAX) suppository 10 mg, 10 mg, Rectal, Daily PRN  LORazepam (ATIVAN) tablet 1 mg, 1 mg, Oral, Q1H PRN **OR** LORazepam (ATIVAN) injection 1 mg, 1 mg, Intravenous, Q1H PRN **OR** LORazepam (ATIVAN) tablet 2 mg, 2 mg, Oral, Q1H PRN **OR** LORazepam (ATIVAN) injection 2 mg, 2 mg, Intravenous, Q1H PRN **OR** LORazepam (ATIVAN) tablet 3 mg, 3 mg, Oral, Q1H PRN **OR** LORazepam (ATIVAN) injection 3 mg, 3 mg, Intravenous, Q1H PRN **OR** LORazepam (ATIVAN) tablet 4 mg, 4 mg, Oral, Q1H PRN **OR** LORazepam (ATIVAN) injection 4 mg, 4 mg, Intravenous, Q1H PRN  sodium chloride flush 0.9 % injection 10 mL, 10 mL, Intravenous, 2 times per day  sodium chloride flush 0.9 % injection 10 mL, 10 mL, Intravenous, PRN  polyethylene glycol (GLYCOLAX) packet 17 g, 17 g, Oral, Daily PRN  Prior to Admission medications    Medication Sig Start Date End Date Taking? Authorizing Provider   albuterol sulfate  (90 Base) MCG/ACT inhaler Inhale 2 puffs into the lungs every 4 hours as needed for Wheezing or Shortness of Breath 11/21/20 1/31/21  Anabella Garcia MD        Allergies:  Patient has no known allergies. Social History:   TOBACCO:  Former smoker. Type of tobacco used:  Cigarettes. Quantity per day:  2 pack(s). Duration of tobacco use:  25+ years. ETOH:  Current alcohol usage:  Type of Drink(s):  Beer. Frequency of use:  Daily. DRUGS:  Formerly used Heroin. Family History:        Problem Relation Age of Onset    Other Mother         alzheimers       REVIEW OF SYSTEMS:  CONSTITUTIONAL:  negative  HEENT:  negative  RESPIRATORY:  negative  CARDIOVASCULAR:  negative  GASTROINTESTINAL:  negative   GENITOURINARY:  negative  HEMATOLOGIC/LYMPHATIC:  negative  NEUROLOGICAL:  Negative  * All other ROS reviewed and negative. PHYSICAL EXAM:  VITALS:  BP (!) 154/100   Pulse 76   Temp 97.4 °F (36.3 °C) (Bladder)   Resp (!) 31   Ht 5' (1.524 m)   Wt 100 lb 1.4 oz (45.4 kg)   SpO2 100%   BMI 19.55 kg/m²   24HR INTAKE/OUTPUT:    I/O last 3 completed shifts: In: 4035 [P.O.:1460; I.V.:2795]  Out: 4500 [Urine:4500]  No intake/output data recorded.     CONSTITUTIONAL: alert, no apparent distress and cachectic  EYES:  PERRL, sclera clear  ENT:  Normocephalic,atraumatic, without obvious abnormality  NECK:  supple, symmetrical, trachea midline  LUNGS: Resp effort easy and unlabored, on RA  CARDIOVASCULAR:  NO JVD, regular rate and rhythm  ABDOMEN: soft, non-distended, non-tender  MUSCULOSKELETAL: No clubbing or cyanosis, 0+ pitting edema lower extremities  NEUROLOGIC:  Mental Status Exam:  Level of Alertness:   awake  PSYCHIATRIC:   person, place, time  SKIN:  normal skin color, texture, turgor    DATA:    CBC:   Recent Labs     03/06/21 0435   WBC 6.1   HGB 11.3*   HCT 34.8*        BMP:    Recent Labs     03/06/21 0435      K 4.0      CO2 28   BUN 27*   CREATININE 1.1   GLUCOSE 168*     Hepatic:   Recent Labs     03/06/21 0435   AST 18   ALT 11   BILITOT <0.2   ALKPHOS 63     Mag:    No results for input(s): MG in the last 72 hours. Phos:     Recent Labs     03/06/21 0435   PHOS 3.5      INR: No results for input(s): INR in the last 72 hours. Radiology Review:   CT Chest 2/27/2021  1.  Artifact degraded evaluation of the pulmonary arteries.  No acute   pulmonary embolism to the segmental arteries given limitation. 2. Progression of disease.  Increased size of right lower lobe nodule and   bone metastases.  Soft tissue in the anterior mediastinum and anterior to the   right middle lobe likely represents additional metastases.  Follow-up   recommended. 3. Questionable mass at the base of the tongue.  Correlate with history. 4.  Other findings as described. CT A/P 3/7/2021  Limited study for lung cancer staging in the absence of intravenous contrast   material.  However, there is no appreciable evidence of metastatic disease   within the abdomen or pelvis allowing for the limitations of the study. Small amount of pelvic free fluid noted.      Pathology/Cytology 3/1/2021  FINAL DIAGNOSIS:     A. Lung, right lower lobe, brushing:        -  Negative for malignancy. B. Lymph node, right hilar, transbronchial ultrasound guided fine needle   aspiration:         -  Positive for malignancy.       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         - See comment     C. Lung, right lower lobe, bronchial alveolar lavage:        -  Negative for malignancy. IMPRESSION/RECOMMENDATIONS:    Mr. Douglas Lara is a 48year old male who was recently diagnosed with small cell lung cancer. He has been seen by oncology and chemotherapy recommended. At this time the patient is not sure if he wants to proceed with treatment. General surgery is available to place port if patient agrees to proceed with treatment. Electronically signed by Minh Becker, [facility]  [id number]    Patient seen and agree with above. Discussed portacath with patient and family present in room. At this point will await further discussion between family/patient and Oncology. If they decide they want to pursue chemotherapy then will place portacath.   Kristyn Menjivar MD

## 2021-03-08 NOTE — PROGRESS NOTES
Wasted 0.5mg of Lorazepam with [address] Med disposed of into the Rx Destroyer per protocol.     Primary Nurse eSignature: Electronically signed by Eliezer Rowley RN on 3/7/21 at 7:35 PM EST    **SHARE this note so that the co-signing nurse is able to place an eSignature**    Co-signer eSignature: {Esignature:[id number]}

## 2021-03-09 ENCOUNTER — APPOINTMENT (OUTPATIENT)
Dept: CT IMAGING | Age: 51
DRG: 180 | End: 2021-03-09
Payer: MEDICARE

## 2021-03-09 VITALS
TEMPERATURE: 99.2 F | RESPIRATION RATE: 30 BRPM | DIASTOLIC BLOOD PRESSURE: 144 MMHG | BODY MASS INDEX: 19.65 KG/M2 | HEIGHT: 60 IN | HEART RATE: 130 BPM | SYSTOLIC BLOOD PRESSURE: 171 MMHG | WEIGHT: 100.09 LBS | OXYGEN SATURATION: 98 %

## 2021-03-09 PROCEDURE — 2500000003 HC RX 250 WO HCPCS: Performed by: INTERNAL MEDICINE

## 2021-03-09 PROCEDURE — 99232 SBSQ HOSP IP/OBS MODERATE 35: CPT | Performed by: INTERNAL MEDICINE

## 2021-03-09 PROCEDURE — 6360000004 HC RX CONTRAST MEDICATION: Performed by: NURSE PRACTITIONER

## 2021-03-09 PROCEDURE — 2580000003 HC RX 258: Performed by: INTERNAL MEDICINE

## 2021-03-09 PROCEDURE — 97116 GAIT TRAINING THERAPY: CPT

## 2021-03-09 PROCEDURE — 99231 SBSQ HOSP IP/OBS SF/LOW 25: CPT | Performed by: SURGERY

## 2021-03-09 PROCEDURE — 94640 AIRWAY INHALATION TREATMENT: CPT

## 2021-03-09 PROCEDURE — 6370000000 HC RX 637 (ALT 250 FOR IP): Performed by: NURSE PRACTITIONER

## 2021-03-09 PROCEDURE — 6370000000 HC RX 637 (ALT 250 FOR IP): Performed by: INTERNAL MEDICINE

## 2021-03-09 PROCEDURE — 94761 N-INVAS EAR/PLS OXIMETRY MLT: CPT

## 2021-03-09 PROCEDURE — 97166 OT EVAL MOD COMPLEX 45 MIN: CPT

## 2021-03-09 PROCEDURE — 2700000000 HC OXYGEN THERAPY PER DAY

## 2021-03-09 PROCEDURE — 97168 OT RE-EVAL EST PLAN CARE: CPT

## 2021-03-09 PROCEDURE — 6360000002 HC RX W HCPCS: Performed by: INTERNAL MEDICINE

## 2021-03-09 PROCEDURE — 97535 SELF CARE MNGMENT TRAINING: CPT

## 2021-03-09 PROCEDURE — 97164 PT RE-EVAL EST PLAN CARE: CPT

## 2021-03-09 PROCEDURE — 74177 CT ABD & PELVIS W/CONTRAST: CPT

## 2021-03-09 RX ORDER — DEXTROSE MONOHYDRATE 50 MG/ML
INJECTION, SOLUTION INTRAVENOUS CONTINUOUS
Status: DISCONTINUED | OUTPATIENT
Start: 2021-03-09 | End: 2021-03-09

## 2021-03-09 RX ADMIN — ALLOPURINOL 300 MG: 300 TABLET ORAL at 08:50

## 2021-03-09 RX ADMIN — QUETIAPINE FUMARATE 25 MG: 25 TABLET ORAL at 20:30

## 2021-03-09 RX ADMIN — Medication 10 ML: at 08:51

## 2021-03-09 RX ADMIN — CHLORDIAZEPOXIDE HYDROCHLORIDE 10 MG: 5 CAPSULE ORAL at 08:50

## 2021-03-09 RX ADMIN — IOHEXOL 50 ML: 240 INJECTION, SOLUTION INTRATHECAL; INTRAVASCULAR; INTRAVENOUS; ORAL at 11:57

## 2021-03-09 RX ADMIN — CHLORDIAZEPOXIDE HYDROCHLORIDE 10 MG: 5 CAPSULE ORAL at 15:10

## 2021-03-09 RX ADMIN — QUETIAPINE FUMARATE 25 MG: 25 TABLET ORAL at 08:50

## 2021-03-09 RX ADMIN — IOPAMIDOL 75 ML: 755 INJECTION, SOLUTION INTRAVENOUS at 14:53

## 2021-03-09 RX ADMIN — ENOXAPARIN SODIUM 40 MG: 40 INJECTION SUBCUTANEOUS at 08:50

## 2021-03-09 RX ADMIN — IPRATROPIUM BROMIDE AND ALBUTEROL SULFATE 1 AMPULE: .5; 3 SOLUTION RESPIRATORY (INHALATION) at 07:30

## 2021-03-09 RX ADMIN — SODIUM CHLORIDE: 9 INJECTION, SOLUTION INTRAVENOUS at 04:53

## 2021-03-09 RX ADMIN — LORAZEPAM 2 MG: 2 INJECTION INTRAMUSCULAR; INTRAVENOUS at 04:30

## 2021-03-09 RX ADMIN — IPRATROPIUM BROMIDE AND ALBUTEROL SULFATE 1 AMPULE: .5; 3 SOLUTION RESPIRATORY (INHALATION) at 19:44

## 2021-03-09 RX ADMIN — LORAZEPAM 1 MG: 2 INJECTION, SOLUTION INTRAMUSCULAR; INTRAVENOUS at 17:38

## 2021-03-09 RX ADMIN — CHLORDIAZEPOXIDE HYDROCHLORIDE 10 MG: 5 CAPSULE ORAL at 20:30

## 2021-03-09 RX ADMIN — Medication 0.4 MCG/KG/HR: at 06:10

## 2021-03-09 RX ADMIN — LORAZEPAM 2 MG: 2 INJECTION INTRAMUSCULAR; INTRAVENOUS at 11:55

## 2021-03-09 NOTE — PROGRESS NOTES
MD at bedside and discussed plan of care with the patient. MD states patient will potentially be discharged tomorrow. Patient verbalized understanding, New orders for transferring patient out of the ICU, PT/OT, Mondragon cath removal, Precedex discontinued. Will continue to monitor.

## 2021-03-09 NOTE — CARE COORDINATION
Writer received call from Yi Noland at [facility]. The only facility that is able to accept patient is the [facility]. Pt would have to transition to an oncologist close to them should he choose to go to that facility. Pt is being followed here by [facility]. Will await patient choice on treatment to further pursue alternate LOCAL options. Will likely need a treatment regimin to obtain facility.    Tanner Zhang RN

## 2021-03-09 NOTE — PROGRESS NOTES
Occupational Therapy   Occupational Therapy Re-Evaluation and Treatment Note  Date: 3/9/2021   Patient Name: Brooklynn Peng  MRN: [id number]     : 1970    Date of Service: 3/9/2021    Discharge Recommendations:  [address]  OT Equipment Recommendations  Equipment Needed: No  Other: defer to next level of care    Assessment   Performance deficits / Impairments: Decreased functional mobility ; Decreased high-level IADLs;Decreased ADL status; Decreased endurance;Decreased balance;Decreased safe awareness  Assessment: Pt reports typically ambulatory without device, and IND with BADL, presenting grossly at CGA-Min A in these areas at time of eval, limited by almaraz line and endurance this date. Anticipate pt could benefit from further therapy at d/c, to increase endurance and safety/IND in these areas. Continue OT tx. Prognosis: Good  Decision Making: Medium Complexity  OT Education: OT Role;Plan of Care;Precautions; ADL Adaptive Strategies;Transfer Training;Equipment;Orientation; Energy Conservation  Patient Education: importance of mobility and ROM  REQUIRES OT FOLLOW UP: Yes  Activity Tolerance  Activity Tolerance: Patient Tolerated treatment well  Activity Tolerance: /85, 120, Spo2 100% on 1L O2  Safety Devices  Safety Devices in place: Yes  Type of devices: Call light within reach;Nurse notified; Left in chair;Chair alarm in place;Gait belt           Patient Diagnosis(es): The primary encounter diagnosis was Acute respiratory failure with hypoxia ([address] Diagnoses of Lung nodule, Bone metastases ([address] Hematuria, unspecified type, and Hypokalemia were also pertinent to this visit.      has a past medical history of IRMA (acute kidney injury) ([address] Centrilobular emphysema ([address] COVID-19, EtOH dependence ([address] H/O brain surgery, Hepatitis C antibody test positive, History of surgery, Intravenous drug abuse ([address] Methamphetamine abuse ([address] MRSA (methicillin resistant staph aureus) culture positive, Paralysis of upper limb ([facility] PE (pulmonary thromboembolism) ([facility] [address] [facility] Pneumonia, and Small cell lung cancer, right ([facility] [address] [facility] has a past surgical history that includes brain surgery (01/01/1986); fracture surgery; brain surgery; Leg Surgery; transesophageal echocardiogram (02/03/2021); bronchoscopy (N/A, 2/4/2021); bronchoscopy (2/4/2021); bronchoscopy (N/A, 3/1/2021); bronchoscopy (3/1/2021); bronchoscopy (3/1/2021); bronchoscopy (3/1/2021); bronchoscopy (3/1/2021); and IR MIDLINE CATH (3/5/2021). Restrictions  Restrictions/Precautions  Restrictions/Precautions: Fall Risk, General Precautions  Required Braces or Orthoses?: No  Position Activity Restriction  Other position/activity restrictions:  Up with assistance    Subjective   General  Chart Reviewed: Yes  Patient assessed for rehabilitation services?: Yes  Family / Caregiver Present: No  Referring Practitioner: Reji Zimmerman MD  Diagnosis: SCLC and bone mets on CT; Polysubstance abuse, with opioid dependence and withdrawal, hypoxia, hematuria, AECOPD  Subjective  Subjective: Pt in bed on arrival, pleasant and agreeable to re-eval and treat  General Comment  Comments: Per RN okay to treat  Patient Currently in Pain: No  Vital Signs  Patient Currently in Pain: No  Social/Functional History  Social/Functional History  Type of Home: Homeless(Pt also reports \"living out of a truck\")  ADL Assistance: Independent  Ambulation Assistance: Independent  Occupation: On disability       Objective   Vision: Within Functional Limits  Hearing: Within functional limits    Orientation  Overall Orientation Status: Within Functional Limits  Observation/Palpation  Observation: Pt bleeding at almaraz catheter insertion site - RN aware  Standing Balance  Time: 1-2 minutes, <1 minute  Activity: transfers, standing ADL, mobility  Functional Mobility  Functional - Mobility Device: No device  Activity: Other  Assist Level: Contact guard assistance  ADL  Feeding: Setup  Grooming: Setup;Stand by assistance  LE Dressing: Minimal assistance;Setup(assist to thread pants d/t almaraz line)  Toileting: Dependent/Total(almaraz)  Coordination  Coordination and Movement description: Fine motor impairments;Gross motor impairments;Right UE     Bed mobility  Supine to Sit: Stand by assistance  Sit to Supine: Unable to assess(pt left up in chair)  Scooting: Stand by assistance  Transfers  Sit to stand: Contact guard assistance  Stand to sit: Contact guard assistance     Cognition  Overall Cognitive Status: Exceptions(pleasant, cooperative)  Attention Span: Attends with cues to redirect  Safety Judgement: Decreased awareness of need for safety;Decreased awareness of need for assistance  Problem Solving: Assistance required to generate solutions  Initiation: Requires cues for some        Sensation  Overall Sensation Status: Impaired(Pt reports RUE nerve damage - hx of injury)  Light Touch: Severe deficits in the RUE  Stereognosis: Severe deficits in the RUE        LUE AROM (degrees)  LUE AROM : WFL  Left Hand AROM (degrees)  Left Hand AROM: WFL  RUE AROM (degrees)  RUE General AROM: Decreased ROM at wrist, elbow from hx of injury  Right Hand AROM (degrees)  Right Hand AROM: WFL           Plan   Plan  Times per week: 3-5x/week  Current Treatment Recommendations: Strengthening, Balance Training, Functional Mobility Training, Endurance Training, Safety Education & Training, Positioning, Patient/Caregiver Education & Training, Self-Care / ADL, Equipment Evaluation, Education, & procurement, Gait Training  Plan Comment: OT eval only    AM-PAC Score        AM-PAC Inpatient Daily Activity Raw Score: 15 (03/09/21 1418)  AM-PAC Inpatient ADL T-Scale Score : 34.69 (03/09/21 1418)  ADL Inpatient CMS 0-100% Score: 56.46 (03/09/21 1418)  ADL Inpatient CMS G-Code Modifier : CK (03/09/21 1418)    Goals  Short term goals  Time Frame for Short term goals: 1 week (by 3/16/21)  Short term goal 1: Pt will complete functional transfers with Supervision  Short term goal 2: Pt will complete LE dressing with Min A  Short term goal 3: Pt will perform 2 minutes dynamic standing task with SBA by 3/13  Patient Goals   Patient goals : \"to get better\"       Therapy Time   Individual Concurrent Group Co-treatment   Time In 1128(10 minutes for eval)         Time Out 1150         Minutes 22         Timed Code Treatment Minutes: 12 Minutes     This note to serve as discharge summary should pt discharge prior to next session.     Ry Nicholas OTR/L

## 2021-03-09 NOTE — PROGRESS NOTES
Pulmonary & Critical Care Inpatient Progress Note   Brenda Naik MD     REASON FOR TODAY'S VISIT:  Encephalopathy, lung cancer    SUBJECTIVE:   Confusion improved, but per nursing report he will episodes still of crying out and being more confused  Only on 1 LPM of oxygen and saturating 100%  Off precedex    Scheduled Meds:   QUEtiapine  25 mg Oral BID    chlordiazePOXIDE  10 mg Oral TID    allopurinol  300 mg Oral Daily    ipratropium-albuterol  1 ampule Inhalation Q4H WA    enoxaparin  40 mg Subcutaneous Daily    sodium chloride flush  10 mL Intravenous 2 times per day       Continuous Infusions:      PRN Meds:  albuterol, haloperidol lactate, acetaminophen **OR** acetaminophen, ondansetron, bisacodyl, LORazepam **OR** LORazepam **OR** LORazepam **OR** LORazepam **OR** LORazepam **OR** LORazepam **OR** LORazepam **OR** LORazepam, sodium chloride flush, polyethylene glycol    ALLERGIES:  Patient has No Known Allergies. Objective:   PHYSICAL EXAM:  BP (!) 150/119   Pulse 101   Temp 99.2 °F (37.3 °C) (Bladder)   Resp (!) 35   Ht 5' (1.524 m)   Wt 100 lb 1.4 oz (45.4 kg)   SpO2 97%   BMI 19.55 kg/m²    Physical Exam  Constitutional:       General: He is not in acute distress. Appearance: He is well-developed. He is not diaphoretic. HENT:      Head: Normocephalic and atraumatic. Mouth/Throat:      Pharynx: No oropharyngeal exudate. Eyes:      Pupils: Pupils are equal, round, and reactive to light. Neck:      Musculoskeletal: Neck supple. Vascular: No JVD. Cardiovascular:      Heart sounds: Normal heart sounds. No murmur. No friction rub. No gallop. Pulmonary:      Effort: Pulmonary effort is normal.      Breath sounds: Rales present. No wheezing. Abdominal:      General: Bowel sounds are normal. There is no distension. Palpations: Abdomen is soft. Tenderness: There is no abdominal tenderness. Musculoskeletal: Normal range of motion.    Lymphadenopathy:      Cervical: No cervical adenopathy. Skin:     General: Skin is warm and dry. Findings: No rash. Neurological:      Mental Status: He is alert. Cranial Nerves: No cranial nerve deficit. Comments: CN 2-12 grossly intact            Data Reviewed:   LABS:  CBC:  Recent Labs     03/08/21 2142   WBC 10.1   HGB 10.8*   HCT 33.5*   MCV 82.6        BMP:  Recent Labs     03/08/21 2142   *   K 3.0*      CO2 33*   PHOS 1.6*   BUN 18   CREATININE 1.0     LIVER PROFILE:   No results for input(s): AST, ALT, LIPASE, ALB, BILIDIR, BILITOT, ALKPHOS in the last 72 hours. Invalid input(s): AMYLASE  PT/INR:  Recent Labs     03/08/21 2142   PROTIME 10.4   INR 0.90     APTT: No results for input(s): APTT in the last 72 hours. UA:No results for input(s): NITRITE, COLORU, PHUR, LABCAST, WBCUA, RBCUA, MUCUS, TRICHOMONAS, YEAST, BACTERIA, CLARITYU, SPECGRAV, LEUKOCYTESUR, UROBILINOGEN, BILIRUBINUR, BLOODU, GLUCOSEU, AMORPHOUS in the last 72 hours. Invalid input(s): KETONESU  No results for input(s): PHART, RWP3UPV, PO2ART in the last 72 hours. Vent Information  Skin Assessment: Clean, dry, & intact  SpO2: 97 %    CT chest personally reviewed, right lower lobe nodule and adenopathy consistent with malignancy diagnosis           Assessment:     1. Limited stage small cell lung cancer  2. Acute encephalopathy, unclear etiology   -underlying behavioral chronic issues  3. Acute resp failure hypoxic    -sedation/benzo effect; hypoventilation  4. Malnutrition, dysphagia due to PO intake  5. IRMA improved, dehydration  6. Polysubstance abuse including etoh, opiates, meth, heroin, tobacco    Plan:      -Continue librium  -Seroquel  -PRN ativan  -Wean oxygen  -Oncology involved, limited stage but it is unclear if the patient/family want to proceed with treatment. Port ordered and surgery consulted for this  -Palliative care was involved last week, may need to [place] them.  Also complicating factors is that his MAURILIO has not had much contact with him for some time and did not show up to fam mtg set up for last week   -Ok to leave ICU, he has a tendency to leave AMA and not complete his treatment    Kat Amaya MD

## 2021-03-09 NOTE — PROGRESS NOTES
Hospitalist Progress Note      PCP: No primary care provider on file. Date of Admission: 2/27/2021    Chief Complaint:  hematuria      Subjective:  He is more alert and talkative today. Still quite weak. Repeatedly asking to go home and \"come back tomorrow\" if necessary. He is still undecided about chemo vs hospice, says he will give me an answer tomorrow (this is the same thing he told me yesterday). Medications:  Reviewed    Infusion Medications    dextrose      dexmedetomidine HCl in NaCl Stopped (03/09/21 0720)     Scheduled Medications    QUEtiapine  25 mg Oral BID    chlordiazePOXIDE  10 mg Oral TID    allopurinol  300 mg Oral Daily    ipratropium-albuterol  1 ampule Inhalation Q4H WA    enoxaparin  40 mg Subcutaneous Daily    sodium chloride flush  10 mL Intravenous 2 times per day     PRN Meds: albuterol, haloperidol lactate, acetaminophen **OR** acetaminophen, ondansetron, bisacodyl, LORazepam **OR** LORazepam **OR** LORazepam **OR** LORazepam **OR** LORazepam **OR** LORazepam **OR** LORazepam **OR** LORazepam, sodium chloride flush, polyethylene glycol      Intake/Output Summary (Last 24 hours) at 3/9/2021 0838  Last data filed at 3/9/2021 0500  Gross per 24 hour   Intake 830 ml   Output 14808 ml   Net -48676 ml       Physical Exam Performed:    BP (!) 150/119   Pulse 101   Temp 99.2 °F (37.3 °C) (Bladder)   Resp (!) 35   Ht 5' (1.524 m)   Wt 100 lb 1.4 oz (45.4 kg)   SpO2 97%   BMI 19.55 kg/m²       General appearance: NAD, appears stated age  HEENT: Pupils equal, round,. Conjunctivae/corneas clear. Neck: Supple, with full range of motion. No jugular venous distention. Trachea midline. Respiratory:  Mildly increased respiratory effor. Decreased air entry bilat. Cardiovascular: tachycardic with regular rhythm with normal S1/S2 without murmurs, rubs or gallops. Abdomen: Soft, non-tender, non-distended with normal bowel sounds. Musculoskeletal: No clubbing, cyanosis.   No edema.  Skin:  warm and dry. Neurologic:  Alert and fully oriented. Calm and cooperative. Has insight. Impulsive. Psychiatric: awake          Labs:   Recent Labs     03/08/21 2142   WBC 10.1   HGB 10.8*   HCT 33.5*        Recent Labs     03/08/21 2142   *   K 3.0*      CO2 33*   BUN 18   CREATININE 1.0   CALCIUM 8.2*   PHOS 1.6*     No results for input(s): AST, ALT, BILIDIR, BILITOT, ALKPHOS in the last 72 hours. Recent Labs     03/08/21 2142   INR 0.90     No results for input(s): Maryl Peek in the last 72 hours. Urinalysis:      Lab Results   Component Value Date    NITRU see below 02/27/2021    WBCUA see below 02/27/2021    BACTERIA 4+ 02/27/2021    RBCUA >100 02/27/2021    BLOODU see below 02/27/2021    SPECGRAV see below 02/27/2021    GLUCOSEU see below 02/27/2021       Radiology:  MRI BRAIN W WO CONTRAST   Preliminary Result   No acute intracranial abnormality. No brain metastasis. CT ABDOMEN PELVIS WO CONTRAST Additional Contrast? Oral   Final Result   Limited study for lung cancer staging in the absence of intravenous contrast   material.  However, there is no appreciable evidence of metastatic disease   within the abdomen or pelvis allowing for the limitations of the study. Small amount of pelvic free fluid noted. IR MIDLINE CATH   Final Result   Successful placement of dual lumen right upper extremity midline catheter. XR CHEST PA LATERAL W FLUOROSCOPY   Final Result   1. See above. FLUORO FOR SURGICAL PROCEDURES   Final Result   1. See above. CT CHEST PULMONARY EMBOLISM W CONTRAST   Final Result   1. Artifact degraded evaluation of the pulmonary arteries. No acute   pulmonary embolism to the segmental arteries given limitation. 2. Progression of disease. Increased size of right lower lobe nodule and   bone metastases.   Soft tissue in the anterior mediastinum and anterior to the   right middle lobe likely represents additional metastases. Follow-up   recommended. 3. Questionable mass at the base of the tongue. Correlate with history. 4.  Other findings as described. XR CHEST PORTABLE   Final Result   Chronic findings in the chest without acute airspace disease identified. Scar in the right lung apex again noted. The opacities recently described in   the right middle lobe and right lower lobe are not delineated on this exam.         NM BONE SCAN WHOLE BODY    (Results Pending)           Assessment/Plan:    Active Hospital Problems    Diagnosis    Bone metastases ([address] [C79.51]    Severe malnutrition ([address] [E43]    Small cell lung cancer, right ([address] [C34.91]    Polysubstance dependence ([address] [F19.20]    Cocaine use [F14.90]    Delirium tremens ([address] [F10.231]    Acute bronchitis [J20.9]    Hematuria [R31.9]    IRMA (acute kidney injury) ([address] [N17.9]    Hypokalemia [E87.6]    Centrilobular emphysema ([address] [J43.2]    Moderate to severe pulmonary hypertension ([address] [I27.20]    Hilar adenopathy [R59.0]    Lung nodule [R91.1]    Personal history of covid-19 [Z86.16]    Tricuspid regurgitation [I07.1]    IV drug abuse ([address] [F19.10]    Acute respiratory failure with hypoxia ([address] [J96.01]         \"46 year old man with PMH of IVDU with heroin and meth, chronic hep C, tricuspid regurg, lung mass, COPD, alcohol dependence presented to the ED today with complaints of hematuria and shortness of breath.  Patient apparently left AMA on 2/4 from [facility] was being worked up for his tricuspid regurgitation and lung mass. Patient reports ever since he left the hospital, he has been short of breath particularly worse with exertion.  He reports some leg swelling that is dependent type.  Denies any chest pain.  Denies subjective fevers chills or rigors.  No cough.  Continues to inject drugs into his right leg almost daily.   Patient reports 2 to 3 weeks history of dark red-colored urine.  Denies any dysuria, urgency or frequency or difficulty urinating. \"          SCLC, probably extensive stage based on multilobar involvement and bone mets on CT  - patient and significant other continue to contemplate the offered port placement and chemotherapy. Palliative care consulted. - negative MRI brain with and without contrast.    AECOPD, with severe pulmonary HTN and RV dysfunction  - completed course of steroids and doxycycline. Inhaled bronchodilators.      Acute hypoxic respiratory failure. Due to above issues. Wean to RA as tolerated. The patient has no supplemental O2 requirement at baseline.       IRMA. Baseline Cr normal.  Responded to IVFs.    Hematuria. Urology planned eventual cystoscopy, signed off. Negative urine culture. Negative ANCA. Hematuria resolved with time, then recurred due to almaraz trauma in the ICU. Almaraz removed 3/9.     Polysubstance abuse, with opioid dependence and withdrawal.  Treated with multiple sedatives. Encouraged cessation.  - he was caught snorting cocaine during this admission.        Acute metabolic encephalopathy, which developed during this hospital stay, due to above issues, treated accordingly. Improved with time. Posterior tongue mass suggested on CT, now ruled out. ENT consulted, laryngoscopy negative, no further workup anticipated. Severe TV regurgitation. Cardiology determined that no further workup was indicated and signed off. DVT Prophylaxis: SCDs  Diet: DIET GENERAL;  Dietary Nutrition Supplements: Standard High Calorie Oral Supplement  Code Status: Full Code    PT/OT Eval Status: early in this hospital stay, before he got so sick, he wasn't interested in putting forth much effort. They will not be back to evaluate him. Dispo - when his respiratory status is stable. Also would like to have a more definite oncologic plan prior to discharge.   He lives in his truck and abuses drugs and admits that his ability to f/u as outpatient is questionable. Furthermore, he has a general aversion to healthcare and constantly seems to be contemplating leaving AMA, so his willingness to follow up is also questionable. Alternatively, hospice would be reasonable at any point if he wants this instead (unclear whether home vs IP hospice would be most appropriate, but the patient strongly prefers to go home).       Stacie Kaur MD

## 2021-03-09 NOTE — PROGRESS NOTES
Physical Therapy    Facility/Department: [facility] C2 CARD TELEMETRY  Re-Evaluation and Treatment    NAME: Bhumika Trevizo  : 1970  MRN: [id number]    Date of Service: 3/9/2021    Discharge Recommendations:  Subacute/Skilled Nursing Facility   PT Equipment Recommendations  Equipment Needed: No  Other: defer to facility    Assessment   Body structures, Functions, Activity limitations: Decreased functional mobility ; Decreased strength  Assessment: Pt is 49 yo male who presents with diagnosis of hematuria and SOB. Pt seen for re-evaluation after transfer to ICU. Pt CGA for mobility with HHA this date. Pt limited d/t high HR, impaired activity tolerance, balance, and decreased strength. Pt would benefit from continued skilled therapy to address deficits. Recommend SNF at d/c. Treatment Diagnosis: impaired functional mobility  Specific instructions for Next Treatment: progress mobility as tolerated  Prognosis: Good;Fair  Decision Making: Low Complexity  PT Education: Disease Specific Education; Functional Mobility Training;Goals;Gait Training;PT Role;Plan of Care;Transfer Training  Patient Education: Pt educated on safe mobility technique and importance of OOB mobility -- pt verbalized understanding  Barriers to Learning: none  REQUIRES PT FOLLOW UP: Yes  Activity Tolerance  Activity Tolerance: Patient Tolerated treatment well;Patient limited by fatigue;Patient limited by endurance  Activity Tolerance: /85, ; SpO2 100% on 1L    Patient Diagnosis(es): The primary encounter diagnosis was Acute respiratory failure with hypoxia ([address] Diagnoses of Lung nodule, Bone metastases ([address] Hematuria, unspecified type, and Hypokalemia were also pertinent to this visit.      has a past medical history of IRMA (acute kidney injury) ([address] Centrilobular emphysema ([address] COVID-19, EtOH dependence ([address] H/O brain surgery, Hepatitis C antibody test positive, History of surgery, Intravenous drug abuse ([address] Methamphetamine abuse ([facility] MRSA (methicillin resistant staph aureus) culture positive, Paralysis of upper limb ([facility] PE (pulmonary thromboembolism) ([facility] Pneumonia, and Small cell lung cancer, right ([facility] has a past surgical history that includes brain surgery (01/01/1986); fracture surgery; brain surgery; Leg Surgery; transesophageal echocardiogram (02/03/2021); bronchoscopy (N/A, 2/4/2021); bronchoscopy (2/4/2021); bronchoscopy (N/A, 3/1/2021); bronchoscopy (3/1/2021); bronchoscopy (3/1/2021); bronchoscopy (3/1/2021); bronchoscopy (3/1/2021); and IR MIDLINE CATH (3/5/2021). Restrictions  Restrictions/Precautions  Restrictions/Precautions: Fall Risk, General Precautions  Required Braces or Orthoses?: No  Position Activity Restriction  Other position/activity restrictions:  Up with assistance  Vision/Hearing  Vision: Within Functional Limits  Hearing: Within functional limits     Subjective  General  Chart Reviewed: Yes  Patient assessed for rehabilitation services?: Yes  Response To Previous Treatment: Not applicable  Family / Caregiver Present: No  Referring Practitioner: Yeimi Padilla MD  Referral Date : 03/09/21(Re-Eval)  Diagnosis: Hematuria  Follows Commands: Within Functional Limits  General Comment  Comments: RN cleared pt for therapy eval  Subjective  Subjective: Pt supine in bed upon arrival. Agreeable to participate  Pain Screening  Patient Currently in Pain: No    Orientation  Orientation  Overall Orientation Status: Within Normal Limits  Social/Functional History  Social/Functional History  Type of Home: Homeless(Pt also reports \"living out of a truck\")  ADL Assistance: Independent  Ambulation Assistance: Independent  Occupation: On disability    Objective     RLE AROM: WFL  LLE AROM : WFL  Strength RLE  Comment: Grossly 4-/5 hips, knees, ankles  Strength LLE  Comment: Grossly 4-/5 hips, knees, ankles     Sensation  Overall Sensation Status: Impaired(reports history of RUE nerve damage)  Light Touch: Severe deficits in the RUE  Stereognosis: Severe deficits in the RUE     Bed mobility  Supine to Sit: Stand by assistance  Sit to Supine: Unable to assess(pt up in chair at end of session)     Transfers  Sit to Stand: Contact guard assistance  Stand to sit: Contact guard assistance     Ambulation  Ambulation?: Yes  Ambulation 1  Surface: level tile  Device: Hand-Held Assist  Assistance: Contact guard assistance  Quality of Gait: Forward flexed and crouched posture. Mild usnteadiness with no overt LOB  Gait Deviations: Decreased step length;Decreased step height;Shuffles; Slow Meaghan  Distance: 3 ft to chair  Comments: Distance limited d/t elevated heart rate     Balance  Sitting - Static: Good  Sitting - Dynamic: Fair;+  Standing - Static: Fair  Standing - Dynamic: Fair;-        Plan   Plan  Times per week: 3-5x/wk  Times per day: Daily  Specific instructions for Next Treatment: progress mobility as tolerated  Current Treatment Recommendations: Strengthening, Neuromuscular Re-education, Safety Education & Training, Balance Training, Endurance Training, Functional Mobility Training, Transfer Training, Gait Training, Equipment Evaluation, Education, & procurement, Patient/Caregiver Education & Training  Safety Devices  Type of devices: All fall risk precautions in place, Call light within reach, Chair alarm in place, Gait belt, Patient at risk for falls, Nurse notified, Left in chair                                   AM-PAC Score     AM-PAC Inpatient Mobility without Stair Climbing Raw Score : 15 (03/09/21 1438)  AM-PAC Inpatient without Stair Climbing T-Scale Score : 43.03 (03/09/21 1438)  Mobility Inpatient CMS 0-100% Score: 47.43 (03/09/21 1438)  Mobility Inpatient without Stair CMS G-Code Modifier : CK (03/09/21 1438)       Goals  Short term goals  Time Frame for Short term goals: 1 week (3/16) unless otherwise specified  Short term goal 1: Pt will be supervision with bed mobility.   Short term goal 2: Pt will be SBA for transfers with LRAD. Short term goal 3: Pt will ambulate 50 ft with LRAD and min A. Short term goal 4: 3/13: Pt will participate in 12-15 reps of BLE exercises to promote strength and activity tolerance. Patient Goals   Patient goals : \"to get better\"       Therapy Time   Individual Concurrent Group Co-treatment   Time In 1127         Time Out 1147         Minutes 20         Timed Code Treatment Minutes: [facility], PT, DPT  If pt is unable to be seen after this session, please let this note serve as discharge summary. Please see case management note for discharge disposition. Thank you.

## 2021-03-09 NOTE — CARE COORDINATION
Writer received voicemail from [facility] with [facility] about one of their facilities in [facility] possibly being able to accept patient. [facility] and left message, awaiting returned call. This is possibly not an option as pt is potentially being treated here in [place] for [facility] is a distance away and would not be conducive to treatment. CM going to discuss [facility] once returns call. Also following for patient/family decisions on treatment as there are ongoing discussions.   Christopher Carpenter, RN

## 2021-03-09 NOTE — PROGRESS NOTES
ONCOLOGY HEMATOLOGY CARE PROGRESS NOTE      SUBJECTIVE:     Mentation is much better. He is cooperative. He remembers me from my visit on Friday. He is aware of his cancer diagnosis. Bone scan today. PORT to be placed pending family goals of care discussion pending staging scans. Psych is following patient as well. ROS:   The remaining 10 point review of symptoms is unremarkable. OBJECTIVE        Physical    VITALS:  BP (!) 150/119   Pulse 101   Temp 99.2 °F (37.3 °C) (Bladder)   Resp (!) 35   Ht 5' (1.524 m)   Wt 100 lb 1.4 oz (45.4 kg)   SpO2 97%   BMI 19.55 kg/m²   TEMPERATURE:  Current - Temp: 99.2 °F (37.3 °C); Max - Temp  Av.9 °F (37.2 °C)  Min: 98.3 °F (36.8 °C)  Max: 99.2 °F (37.3 °C)  PULSE OXIMETRY RANGE: SpO2  Av.1 %  Min: 97 %  Max: 100 %  24HR INTAKE/OUTPUT:      Intake/Output Summary (Last 24 hours) at 3/9/2021 0910  Last data filed at 3/9/2021 3247  Gross per 24 hour   Intake 830 ml   Output 51442 ml   Net -70887 ml       CONSTITUTIONAL:  awake, alert, cooperative, no apparent distress, HEENT oral pharynx , no scleral icterus  HEMATOLOGIC/LYMPHATICS:  no cervical lymphadenopathy, no supraclavicular lymphadenopathy, no axillary lymphadenopathy and no inguinal lymphadenopathy  LUNGS:  No increased work of breathing, good air exchange, clear to auscultation bilaterally, no crackles or wheezing  CARDIOVASCULAR:  , regular rate and rhythm, normal S1 and S2, no S3 or S4, and no murmur noted  ABDOMEN:  No scars, normal bowel sounds, soft, non-distended, non-tender, no masses palpated, no hepatosplenomegally  MUSCULOSKELETAL:  There is no redness, warmth, or swelling of the joints. EXTREMETIES: No clubbing cynosis or edema  NEUROLOGIC:  He is much more alert and oriented this morning. He is still hard to understand.   SKIN:  no bruising or bleeding      Data      Recent Labs     21  2142   WBC 10.1   HGB 10.8*   HCT 33.5*      MCV 82.6        Recent Labs     03/08/21  2142   *   K 3.0*      CO2 33*   PHOS 1.6*   BUN 18   CREATININE 1.0     No results for input(s): AST, ALT, ALB, BILIDIR, BILITOT, ALKPHOS in the last 72 hours.     Magnesium:    Lab Results   Component Value Date    MG 3.00 03/08/2021    MG 2.00 02/27/2021    MG 2.30 12/29/2020         Problem List  Patient Active Problem List   Diagnosis    MRSA bacteremia    Pleural effusion, left    Leukocytosis    Illicit drug use    Hepatitis    Chest pain    Pulmonary embolism ([address]    AMS (altered mental status)    Acute respiratory failure with hypoxia (HCC)    Drug overdose    Abnormal chest x-ray    Acute encephalopathy    Disorder of electrolytes    Non-traumatic rhabdomyolysis    Elevated procalcitonin    Methamphetamine abuse (HCC)    Toxic encephalopathy    Acute hypercapnic respiratory failure (HCC)    Heroin withdrawal (HCC)    Tachycardia    Hypoxia    Acute hypoxemic respiratory failure due to COVID-19 [facility])    Acute respiratory failure with hypoxia and hypercapnia (HCC)    Pneumonia due to COVID-19 virus    Thrombocytopenia (HCC)    Elevated LFTs    COVID-19    Acute metabolic encephalopathy    Moderate protein-calorie malnutrition (HCC)    Pneumonia due to organism    Sepsis ([address]    Pulmonary infiltrate    Bacteremia due to Staphylococcus aureus    Delirium    Hyperglycemia    IV drug abuse (HCC)    Tricuspid regurgitation    PFO (patent foramen ovale)    Lung nodule    Centrilobular emphysema (HCC)    Hilar adenopathy    Personal history of covid-19    Moderate to severe pulmonary hypertension ([address]    Former smoker    Alcohol use    Acute bronchitis    Hematuria    IRMA (acute kidney injury) ([address]    Hypokalemia    Cocaine use    Delirium tremens ([address]    Severe malnutrition (HCC)    Polysubstance dependence ([address]    Small cell lung cancer, right (HCC)    Bone metastases ([address]       ASSESSMENT AND PLAN: Small cell lung carcinoma:  CT of the abdomen does not show any evidence of metastatic disease- repeat with IV contrast given need to establish stage and help family with goals of care discussion   -Bone scan today   - MRI brain negative   -I did discuss chemotherapy with the patient  -He knows that this may prolong his life, but may not cure him. Also there is a potential for it not to work at all.  -I went over the risks and benefits in detail.  -He would like to go ahead and have a Port-A-Cath placed for chemotherapy  -A port was ordered- gen surgery notes reviewed- holding on port until final decision made on chemo     Family meeting:  -The family did not show up further 9:00 meeting on 3/6/2021  - Palliative care on board  - Bone scan today- can have Dr. Meaghan Leigh meet with family tomorrow morning if able to visit. 8-9 am?        Drug abuse:  -This is a concern with his Port-A-Cath; however, I suspect he is going to continue to use narcotics with or without the port.           ONCOLOGIC DISPOSITION:    -Pending his mental status, staging scans, family decision regarding chemo ; if bone mets confirmed he is extensive stage small cell and hospice would be reasonable given co-morbidities, etc.     Leo Degroot CNP   Please contact through [facility]

## 2021-03-09 NOTE — PROGRESS NOTES
Discussed status with primary team.  Patient still considering whether to proceed with port placement, pending further assessment of his cancer status (ie status of metastatic disease). We will await final decision by patient, and accomodate his wishes if a port is requested. Will follow.     ASHLEY

## 2021-03-09 NOTE — CARE COORDINATION
Writer received return call from Kee Amanda at [facility]. The only accepting facility was in the [facility] location. She is going to represent patient today to see if a local facility would accept as they would not provide transport to chemo treatments in [place] and they would not allow patient to travel via private care due to IVDU history. Kee Amanda to relook at patient and return call to Larry Walsh.  Ese Barcenas RN

## 2021-03-10 LAB
EKG ATRIAL RATE: 97 BPM
EKG DIAGNOSIS: NORMAL
EKG P AXIS: 68 DEGREES
EKG P-R INTERVAL: 114 MS
EKG Q-T INTERVAL: 370 MS
EKG QRS DURATION: 74 MS
EKG QTC CALCULATION (BAZETT): 469 MS
EKG R AXIS: 89 DEGREES
EKG T AXIS: 59 DEGREES
EKG VENTRICULAR RATE: 97 BPM

## 2021-03-10 NOTE — PROGRESS NOTES
Patient still wants to leave against medical advice. Dr. Joe Myers aware. Patient alert and oriented X4 and able to make own decisions. Explained risks of leaving AMA to patient. Patient's ride, Wai wick at bedside to take patient home. Midline IV removed. Patient signed [place] paperwork. Patient given all belongings including wallet, cell phone, and clothes.

## 2021-03-10 NOTE — DISCHARGE SUMMARY
Polysubstance abuse, with opioid dependence and withdrawal.  Treated with multiple sedatives for a number of days.  Encouraged cessation.  - he was caught snorting cocaine during this admission. Repeatedly asking to go home and \"come back tomorrow. \"     Acute metabolic encephalopathy, which developed during this hospital stay, due to above issues, treated accordingly. Improved with time.      Posterior tongue mass suggested on CT, now ruled out.  ENT consulted, laryngoscopy negative, no further workup anticipated.     Severe TV regurgitation.  Cardiology determined that no further workup was indicated and signed off.

## 2021-03-11 ENCOUNTER — CARE COORDINATION (OUTPATIENT)
Dept: CASE MANAGEMENT | Age: 51
End: 2021-03-11

## 2021-03-11 NOTE — CARE COORDINATION
[facility] Transitions Follow Up Call    3/11/2021    Patient: Cassandra Garcia  Patient : 1970   MRN: [id number]  Reason for Admission: Acute resp failure  Discharge Date: 3/9/21 RARS: Readmission Risk Score: 66    Pt to [facility] from  to 3/9 from [facility] he left AMA. Was educated by MD of the risks, including death. On  he also left AMA. Per H&P   \"46 year old man with PMH of IVDU with heroin and meth, chronic hep C, tricuspid regurg, lung mass, COPD, alcohol dependence presented to the ED today with complaints of hematuria and shortness of breath.  Patient apparently left AMA on  from [facility] was being worked up for his tricuspid regurgitation and lung mass.  Patient reports ever since he left the hospital, he has been short of breath particularly worse with exertion.  He reports some leg swelling that is dependent type.  Denies any chest pain.  Denies subjective fevers chills or rigors.  No cough.  Continues to inject drugs into his right leg almost daily.  Patient reports 2 to 3 weeks history of dark red-colored urine.  Denies any dysuria, urgency or frequency or difficulty urinating. \" Pt has small cell lung cancer with mets. Hospice being considered      Unable to reach pt.               Spoke with: UTR    Care Transitions Subsequent and Final Call    Subsequent and Final Calls  Care Transitions Interventions  Other Interventions: Follow Up  No future appointments.     Luz Maria Caraballo, VINCENTN, RN   [address] [place] [address] Transition Nurse  [phone]

## 2021-03-23 LAB
AFB CULTURE (MYCOBACTERIA): NORMAL
AFB SMEAR: NORMAL

## 2021-03-29 ENCOUNTER — TELEPHONE (OUTPATIENT)
Dept: INTERNAL MEDICINE CLINIC | Age: 51
End: 2021-03-29

## 2021-04-05 LAB
FUNGUS (MYCOLOGY) CULTURE: NORMAL
FUNGUS STAIN: NORMAL

## 2021-04-20 LAB
AFB CULTURE (MYCOBACTERIA): NORMAL
AFB SMEAR: NORMAL

## (undated) DEVICE — SYRINGE MED 10ML SLIP TIP BLNT FILL AND LUERLOCK DISP

## (undated) DEVICE — SHEET,DRAPE,40X58,STERILE: Brand: MEDLINE

## (undated) DEVICE — CANNULA,OXY,ADULT,SUPERSOFT,W/7'TUB,SC: Brand: MEDLINE INDUSTRIES, INC.

## (undated) DEVICE — SINGLE USE SUCTION VALVE MAJ-209: Brand: SINGLE USE SUCTION VALVE (STERILE)

## (undated) DEVICE — BRONCHOSCOPE CYTOLOGY BRUSH: Brand: COOK

## (undated) DEVICE — SINGLE USE BIOPSY VALVE MAJ-210: Brand: SINGLE USE BIOPSY VALVE (STERILE)

## (undated) DEVICE — ELECTRODE,RADIOTRANSLUCENT,FOAM,3PK: Brand: MEDLINE

## (undated) DEVICE — FRAME EYEWR PROTCT ASST CLR DISP SAFEVIEW

## (undated) DEVICE — SYRINGE MED 10ML LUERLOCK TIP W/O SFTY DISP

## (undated) DEVICE — SYRINGE MED 50ML LUERSLIP TIP

## (undated) DEVICE — Z DISCONTINUED USE 2276105 GOWN PROTCT UNIV CHST W28IN L49IN SL 24IN BLU SPUNBOND FLM

## (undated) DEVICE — LINER,SOFT,SUCTION CANISTER,1500CC: Brand: MEDLINE

## (undated) DEVICE — BOWL MED M 16OZ PLAS CAP GRAD

## (undated) DEVICE — TUBING, SUCTION, 3/16" X 12', STRAIGHT: Brand: MEDLINE

## (undated) DEVICE — YANKAUER,BULB TIP,W/O VENT,RIGID,STERILE: Brand: MEDLINE

## (undated) DEVICE — TRAP,MUCUS SPECIMEN, 80CC: Brand: MEDLINE

## (undated) DEVICE — DISPOSABLE BIOPSY FORCEPS: Brand: DISPOSABLE BIOPSY FORCEPS

## (undated) DEVICE — CONMED SCOPE SAVER BITE BLOCK, 20X27 MM: Brand: SCOPE SAVER

## (undated) DEVICE — TUBING, SUCTION, 3/16" X 6', STRAIGHT: Brand: MEDLINE

## (undated) DEVICE — ENDO CARRY-ON PROCEDURE KIT INCLUDES SUCTION TUBING, LUBRICANT, GAUZE, BIOHAZARD STICKER, TRANSPORT PAD AND INTERCEPT BEDSIDE KIT.: Brand: ENDO CARRY-ON PROCEDURE KIT

## (undated) DEVICE — NEEDLE ASPIR 21GA L700MM US GUID TREAT DST END FOR EFFICIENT

## (undated) DEVICE — ADAPTER TBNG DIA15MM SWVL FBROPT BRONCHSCP TERM 2 AXIS PEEP